# Patient Record
Sex: FEMALE | Race: WHITE | NOT HISPANIC OR LATINO | Employment: OTHER | URBAN - NONMETROPOLITAN AREA
[De-identification: names, ages, dates, MRNs, and addresses within clinical notes are randomized per-mention and may not be internally consistent; named-entity substitution may affect disease eponyms.]

---

## 2017-08-09 ENCOUNTER — OFFICE VISIT (OUTPATIENT)
Dept: NEUROSURGERY | Facility: CLINIC | Age: 82
End: 2017-08-09

## 2017-08-09 VITALS
DIASTOLIC BLOOD PRESSURE: 68 MMHG | SYSTOLIC BLOOD PRESSURE: 152 MMHG | HEIGHT: 60 IN | BODY MASS INDEX: 35.34 KG/M2 | WEIGHT: 180 LBS

## 2017-08-09 DIAGNOSIS — G91.2 NORMAL PRESSURE HYDROCEPHALUS (HCC): Primary | ICD-10-CM

## 2017-08-09 DIAGNOSIS — G62.9 NEUROPATHY: ICD-10-CM

## 2017-08-09 DIAGNOSIS — Z78.9 NON-SMOKER: ICD-10-CM

## 2017-08-09 PROCEDURE — 99203 OFFICE O/P NEW LOW 30 MIN: CPT | Performed by: NEUROLOGICAL SURGERY

## 2017-08-09 RX ORDER — FLUOXETINE HYDROCHLORIDE 20 MG/1
20 CAPSULE ORAL DAILY
COMMUNITY

## 2017-08-09 RX ORDER — LISINOPRIL 40 MG/1
40 TABLET ORAL DAILY
COMMUNITY

## 2017-08-09 RX ORDER — HYDROCODONE BITARTRATE AND ACETAMINOPHEN 7.5; 325 MG/1; MG/1
1 TABLET ORAL EVERY 6 HOURS PRN
COMMUNITY
End: 2021-02-25 | Stop reason: ALTCHOICE

## 2017-08-09 RX ORDER — GABAPENTIN 300 MG/1
100 CAPSULE ORAL 3 TIMES DAILY
Status: ON HOLD | COMMUNITY
End: 2022-01-01

## 2017-08-09 RX ORDER — CLONIDINE HYDROCHLORIDE 0.1 MG/1
0.1 TABLET ORAL 2 TIMES DAILY
COMMUNITY

## 2017-08-09 RX ORDER — HYDROCHLOROTHIAZIDE 25 MG/1
25 TABLET ORAL DAILY
COMMUNITY
End: 2020-12-30 | Stop reason: HOSPADM

## 2017-08-09 RX ORDER — LOVASTATIN 20 MG/1
20 TABLET ORAL NIGHTLY
COMMUNITY
End: 2020-12-30 | Stop reason: HOSPADM

## 2017-08-09 RX ORDER — AMOXICILLIN 500 MG
1200 CAPSULE ORAL 2 TIMES DAILY WITH MEALS
COMMUNITY

## 2017-08-09 RX ORDER — QUETIAPINE FUMARATE 300 MG/1
300 TABLET, FILM COATED ORAL NIGHTLY
COMMUNITY

## 2017-08-09 RX ORDER — PROPRANOLOL HYDROCHLORIDE 20 MG/1
20 TABLET ORAL 3 TIMES DAILY
COMMUNITY
End: 2020-12-30 | Stop reason: HOSPADM

## 2017-08-09 RX ORDER — PRAMIPEXOLE DIHYDROCHLORIDE 0.5 MG/1
0.5 TABLET ORAL NIGHTLY
COMMUNITY

## 2017-08-09 RX ORDER — RALOXIFENE HYDROCHLORIDE 60 MG/1
60 TABLET, FILM COATED ORAL DAILY
COMMUNITY
End: 2021-03-11

## 2017-08-09 NOTE — PROGRESS NOTES
Patient: Dolly Ramirez  : 10/14/1933    Primary Care Provider: Vidal Elizondo MD    Requesting Provider: Vidal Elizondo MD        History    Chief Complaint: Walking problems  Chief Complaint   Patient presents with   • involuntary movement of body     no pain but complains of jerking of the legs and arms; had imaging @ [facility] and brought CD in today for review       History of Present Illness: 83-year-old female with a six-year history of gradually decreasing ability to walk.  She currently walks with a walker she has been using a walker now for several years.  She does feel getting worse.  Recently she still describing some tremulousness and jerking in her legs when she walks she also describes it with her arms.  When he pushed for details it really sounds more like a tremor and a tremulousness rather than jerking.  He denies any significant memory are cognitive problems.  She denies any urinary incontinence other than the say that she wears a pad because she cannot get to the bathroom facet because her walking is so poor.  She does have some back problems and orthopedic knee issues is also blind in one eye.  Otherwise she lives with her  she uses a walker in the house.  She is relatively functional for her ADLs.  She does not drive.    Review of Systems   Musculoskeletal: Positive for gait problem.   All other systems reviewed and are negative.      Past Medical History:   Past Medical History:   Diagnosis Date   • Arthritis    • Cancer     breast cancer   • Chronic joint pain        Past Surgical History:   Past Surgical History:   Procedure Laterality Date   • BREAST SURGERY      breast cancer   • KIDNEY SURGERY      cancer   • TOTAL KNEE ARTHROPLASTY         Family History: family history is not on file.    Social History:  reports that she has never smoked. She has never used smokeless tobacco. She reports that she does not drink alcohol or use illicit  drugs.    Medications:    (Not in a hospital admission)    Allergies:  Review of patient's allergies indicates no known allergies.    Physical Exam:     Physical Exam   Constitutional: She is oriented to person, place, and time. She appears well-developed and well-nourished.   Cardiovascular: Normal rate and regular rhythm.    Pulmonary/Chest: Effort normal. No respiratory distress.   Abdominal: Soft. She exhibits no distension. There is no tenderness.   Neurological: She is oriented to person, place, and time. She has normal strength. She has an abnormal Tandem Gait Test. She has a normal Finger-Nose-Finger Test.   Reflex Scores:       Tricep reflexes are 1+ on the right side and 1+ on the left side.       Bicep reflexes are 1+ on the right side and 1+ on the left side.       Brachioradialis reflexes are 1+ on the right side and 1+ on the left side.       Patellar reflexes are 1+ on the right side and 1+ on the left side.       Achilles reflexes are 1+ on the right side and 1+ on the left side.  Psychiatric: Her speech is normal.       Neurologic Exam     Mental Status   Oriented to person, place, and time.   Attention: normal.   Speech: speech is normal   Level of consciousness: alert  Knowledge: good.     Cranial Nerves   Cranial nerves II through XII intact.     Motor Exam   Muscle bulk: normal  Overall muscle tone: normal  Right arm pronator drift: absent  Left arm pronator drift: absent    Strength   Strength 5/5 throughout.     Sensory Exam   Light touch normal.   Pinprick normal.     Gait, Coordination, and Reflexes     Coordination   Finger to nose coordination: normal  Tandem walking coordination: abnormal    Tremor   Intention tremor: Mild resting and intention tremor.    Reflexes   Right brachioradialis: 1+  Left brachioradialis: 1+  Right biceps: 1+  Left biceps: 1+  Right triceps: 1+  Left triceps: 1+  Right patellar: 1+  Left patellar: 1+  Right achilles: 1+  Left achilles: 1+  Right Gaspar:  absent  Left Gaspar: absent  Right ankle clonus: absent  Left ankle clonus: absent       Slow shuffling unsteady gait with short step length         Independent Review of Radiographic Studies:   MRI of the brain shows ventriculomegaly with some transependymal flow.    ASSESSMENT/PLAN: Dolly clinically presents with a slow shuffling gait and an MRI that is possibly suggestive for normal pressure hydrocephalus.  I think it would be reasonable to put her through our normal pressure hydrocephalus evaluation we explained this to her in great detail.  Her biggest issue is her walking and that would be what we would hope to try to improve with this diagnosis and treatment.  She is going to discuss this amongst her family members. We would be happy to order the workup and get it started and see her in follow-up.  1. Normal pressure hydrocephalus    2. Neuropathy    3. Non-smoker    4. BMI 35.0-35.9,adult          Return in about 4 weeks (around 9/6/2017) for test results w/DR MONIQUE.      Corky Monique MD

## 2017-08-09 NOTE — PATIENT INSTRUCTIONS

## 2019-04-23 ENCOUNTER — TRANSCRIBE ORDERS (OUTPATIENT)
Dept: ADMINISTRATIVE | Facility: HOSPITAL | Age: 84
End: 2019-04-23

## 2019-04-23 ENCOUNTER — LAB (OUTPATIENT)
Dept: LAB | Facility: HOSPITAL | Age: 84
End: 2019-04-23

## 2019-04-23 ENCOUNTER — HOSPITAL ENCOUNTER (OUTPATIENT)
Dept: GENERAL RADIOLOGY | Facility: HOSPITAL | Age: 84
Discharge: HOME | End: 2019-04-23
Admitting: INTERNAL MEDICINE

## 2019-04-23 DIAGNOSIS — D64.9 ANEMIA, UNSPECIFIED TYPE: ICD-10-CM

## 2019-04-23 DIAGNOSIS — N18.30 CHRONIC KIDNEY DISEASE, STAGE III (MODERATE) (HCC): ICD-10-CM

## 2019-04-23 DIAGNOSIS — J96.11 CHRONIC RESPIRATORY FAILURE WITH HYPOXIA (HCC): ICD-10-CM

## 2019-04-23 DIAGNOSIS — I10 ESSENTIAL (PRIMARY) HYPERTENSION: ICD-10-CM

## 2019-04-23 DIAGNOSIS — J96.11 CHRONIC RESPIRATORY FAILURE WITH HYPOXIA (HCC): Primary | ICD-10-CM

## 2019-04-23 LAB
25(OH)D3 SERPL-MCNC: 64.9 NG/ML (ref 30–100)
ALBUMIN SERPL-MCNC: 4.5 G/DL (ref 3.5–5)
ALBUMIN/GLOB SERPL: 1.6 G/DL (ref 1.1–2.5)
ALP SERPL-CCNC: 66 U/L (ref 24–120)
ALT SERPL W P-5'-P-CCNC: <15 U/L (ref 0–54)
ANION GAP SERPL CALCULATED.3IONS-SCNC: 10 MMOL/L (ref 4–13)
AST SERPL-CCNC: 47 U/L (ref 7–45)
BASOPHILS # BLD AUTO: 0.03 10*3/MM3 (ref 0–0.2)
BASOPHILS NFR BLD AUTO: 0.5 % (ref 0–2)
BILIRUB SERPL-MCNC: 0.5 MG/DL (ref 0.1–1)
BUN BLD-MCNC: 21 MG/DL (ref 5–21)
BUN/CREAT SERPL: 17.8 (ref 7–25)
CALCIUM SPEC-SCNC: 10.2 MG/DL (ref 8.4–10.4)
CHLORIDE SERPL-SCNC: 94 MMOL/L (ref 98–110)
CO2 SERPL-SCNC: 33 MMOL/L (ref 24–31)
CREAT BLD-MCNC: 1.18 MG/DL (ref 0.5–1.4)
DEPRECATED RDW RBC AUTO: 49.6 FL (ref 40–54)
EOSINOPHIL # BLD AUTO: 0.14 10*3/MM3 (ref 0–0.7)
EOSINOPHIL NFR BLD AUTO: 2.3 % (ref 0–4)
ERYTHROCYTE [DISTWIDTH] IN BLOOD BY AUTOMATED COUNT: 14.1 % (ref 12–15)
GFR SERPL CREATININE-BSD FRML MDRD: 44 ML/MIN/1.73
GLOBULIN UR ELPH-MCNC: 2.9 GM/DL
GLUCOSE BLD-MCNC: 117 MG/DL (ref 70–100)
HCT VFR BLD AUTO: 38.8 % (ref 37–47)
HGB BLD-MCNC: 12.1 G/DL (ref 12–16)
IMM GRANULOCYTES # BLD AUTO: 0.01 10*3/MM3 (ref 0–0.05)
IMM GRANULOCYTES NFR BLD AUTO: 0.2 % (ref 0–5)
LYMPHOCYTES # BLD AUTO: 1.63 10*3/MM3 (ref 0.72–4.86)
LYMPHOCYTES NFR BLD AUTO: 26.3 % (ref 15–45)
MCH RBC QN AUTO: 29.9 PG (ref 28–32)
MCHC RBC AUTO-ENTMCNC: 31.2 G/DL (ref 33–36)
MCV RBC AUTO: 95.8 FL (ref 82–98)
MONOCYTES # BLD AUTO: 0.57 10*3/MM3 (ref 0.19–1.3)
MONOCYTES NFR BLD AUTO: 9.2 % (ref 4–12)
NEUTROPHILS # BLD AUTO: 3.82 10*3/MM3 (ref 1.87–8.4)
NEUTROPHILS NFR BLD AUTO: 61.5 % (ref 39–78)
NRBC BLD AUTO-RTO: 0 /100 WBC (ref 0–0.2)
PLATELET # BLD AUTO: 242 10*3/MM3 (ref 130–400)
PMV BLD AUTO: 9.9 FL (ref 6–12)
POTASSIUM BLD-SCNC: 4.1 MMOL/L (ref 3.5–5.3)
PROT SERPL-MCNC: 7.4 G/DL (ref 6.3–8.7)
RBC # BLD AUTO: 4.05 10*6/MM3 (ref 4.2–5.4)
SODIUM BLD-SCNC: 137 MMOL/L (ref 135–145)
T4 FREE SERPL-MCNC: 1.12 NG/DL (ref 0.78–2.19)
TSH SERPL DL<=0.05 MIU/L-ACNC: 2.01 MIU/ML (ref 0.47–4.68)
WBC NRBC COR # BLD: 6.2 10*3/MM3 (ref 4.8–10.8)

## 2019-04-23 PROCEDURE — 36415 COLL VENOUS BLD VENIPUNCTURE: CPT

## 2019-04-23 PROCEDURE — 84439 ASSAY OF FREE THYROXINE: CPT | Performed by: INTERNAL MEDICINE

## 2019-04-23 PROCEDURE — 85025 COMPLETE CBC W/AUTO DIFF WBC: CPT | Performed by: INTERNAL MEDICINE

## 2019-04-23 PROCEDURE — 84443 ASSAY THYROID STIM HORMONE: CPT | Performed by: INTERNAL MEDICINE

## 2019-04-23 PROCEDURE — 80053 COMPREHEN METABOLIC PANEL: CPT | Performed by: INTERNAL MEDICINE

## 2019-04-23 PROCEDURE — 84481 FREE ASSAY (FT-3): CPT | Performed by: INTERNAL MEDICINE

## 2019-04-23 PROCEDURE — 71046 X-RAY EXAM CHEST 2 VIEWS: CPT

## 2019-04-23 PROCEDURE — 82306 VITAMIN D 25 HYDROXY: CPT | Performed by: INTERNAL MEDICINE

## 2019-04-24 LAB — T3FREE SERPL-MCNC: 2.4 PG/ML (ref 2–4.4)

## 2020-12-27 ENCOUNTER — APPOINTMENT (OUTPATIENT)
Dept: MRI IMAGING | Facility: HOSPITAL | Age: 85
End: 2020-12-27

## 2020-12-27 ENCOUNTER — HOSPITAL ENCOUNTER (INPATIENT)
Facility: HOSPITAL | Age: 85
LOS: 2 days | Discharge: NURSING, REHAB OR LONG-TERM CARE | End: 2020-12-30
Attending: FAMILY MEDICINE | Admitting: INTERNAL MEDICINE

## 2020-12-27 ENCOUNTER — APPOINTMENT (OUTPATIENT)
Dept: GENERAL RADIOLOGY | Facility: HOSPITAL | Age: 85
End: 2020-12-27

## 2020-12-27 DIAGNOSIS — R27.0 ATAXIA: ICD-10-CM

## 2020-12-27 DIAGNOSIS — R79.89 ELEVATED BRAIN NATRIURETIC PEPTIDE (BNP) LEVEL: ICD-10-CM

## 2020-12-27 DIAGNOSIS — R13.12 OROPHARYNGEAL DYSPHAGIA: ICD-10-CM

## 2020-12-27 DIAGNOSIS — R60.9 EDEMA, UNSPECIFIED TYPE: ICD-10-CM

## 2020-12-27 DIAGNOSIS — R26.9 GAIT ABNORMALITY: ICD-10-CM

## 2020-12-27 DIAGNOSIS — R77.8 ELEVATED TROPONIN: ICD-10-CM

## 2020-12-27 DIAGNOSIS — Z74.09 IMPAIRED MOBILITY AND ADLS: ICD-10-CM

## 2020-12-27 DIAGNOSIS — I63.9 CEREBROVASCULAR ACCIDENT (CVA), UNSPECIFIED MECHANISM (HCC): Primary | ICD-10-CM

## 2020-12-27 DIAGNOSIS — Z78.9 IMPAIRED MOBILITY AND ADLS: ICD-10-CM

## 2020-12-27 DIAGNOSIS — R25.2 JERKING MOVEMENTS OF EXTREMITIES: ICD-10-CM

## 2020-12-27 LAB
ALBUMIN SERPL-MCNC: 3.7 G/DL (ref 3.5–5.2)
ALBUMIN/GLOB SERPL: 1.4 G/DL
ALP SERPL-CCNC: 50 U/L (ref 39–117)
ALT SERPL W P-5'-P-CCNC: 5 U/L (ref 1–33)
ANION GAP SERPL CALCULATED.3IONS-SCNC: 9 MMOL/L (ref 5–15)
AST SERPL-CCNC: 29 U/L (ref 1–32)
BASOPHILS # BLD AUTO: 0.02 10*3/MM3 (ref 0–0.2)
BASOPHILS NFR BLD AUTO: 0.4 % (ref 0–1.5)
BILIRUB SERPL-MCNC: 0.4 MG/DL (ref 0–1.2)
BUN SERPL-MCNC: 32 MG/DL (ref 8–23)
BUN/CREAT SERPL: 22.2 (ref 7–25)
CALCIUM SPEC-SCNC: 10 MG/DL (ref 8.6–10.5)
CHLORIDE SERPL-SCNC: 92 MMOL/L (ref 98–107)
CO2 SERPL-SCNC: 31 MMOL/L (ref 22–29)
CREAT SERPL-MCNC: 1.44 MG/DL (ref 0.57–1)
DEPRECATED RDW RBC AUTO: 55.1 FL (ref 37–54)
EOSINOPHIL # BLD AUTO: 0.06 10*3/MM3 (ref 0–0.4)
EOSINOPHIL NFR BLD AUTO: 1.1 % (ref 0.3–6.2)
ERYTHROCYTE [DISTWIDTH] IN BLOOD BY AUTOMATED COUNT: 15.8 % (ref 12.3–15.4)
ERYTHROCYTE [SEDIMENTATION RATE] IN BLOOD: 5 MM/HR (ref 0–20)
GFR SERPL CREATININE-BSD FRML MDRD: 34 ML/MIN/1.73
GLOBULIN UR ELPH-MCNC: 2.6 GM/DL
GLUCOSE SERPL-MCNC: 121 MG/DL (ref 65–99)
HCT VFR BLD AUTO: 38.8 % (ref 34–46.6)
HGB BLD-MCNC: 12.1 G/DL (ref 12–15.9)
HOLD SPECIMEN: NORMAL
HOLD SPECIMEN: NORMAL
IMM GRANULOCYTES # BLD AUTO: 0.01 10*3/MM3 (ref 0–0.05)
IMM GRANULOCYTES NFR BLD AUTO: 0.2 % (ref 0–0.5)
LYMPHOCYTES # BLD AUTO: 1.09 10*3/MM3 (ref 0.7–3.1)
LYMPHOCYTES NFR BLD AUTO: 19.3 % (ref 19.6–45.3)
MAGNESIUM SERPL-MCNC: 1.7 MG/DL (ref 1.6–2.4)
MCH RBC QN AUTO: 29.6 PG (ref 26.6–33)
MCHC RBC AUTO-ENTMCNC: 31.2 G/DL (ref 31.5–35.7)
MCV RBC AUTO: 94.9 FL (ref 79–97)
MONOCYTES # BLD AUTO: 0.55 10*3/MM3 (ref 0.1–0.9)
MONOCYTES NFR BLD AUTO: 9.7 % (ref 5–12)
NEUTROPHILS NFR BLD AUTO: 3.93 10*3/MM3 (ref 1.7–7)
NEUTROPHILS NFR BLD AUTO: 69.3 % (ref 42.7–76)
NRBC BLD AUTO-RTO: 0 /100 WBC (ref 0–0.2)
NT-PROBNP SERPL-MCNC: ABNORMAL PG/ML (ref 0–1800)
PLATELET # BLD AUTO: 219 10*3/MM3 (ref 140–450)
PMV BLD AUTO: 9.7 FL (ref 6–12)
POTASSIUM SERPL-SCNC: 4.6 MMOL/L (ref 3.5–5.2)
PROT SERPL-MCNC: 6.3 G/DL (ref 6–8.5)
RBC # BLD AUTO: 4.09 10*6/MM3 (ref 3.77–5.28)
SARS-COV-2 RNA PNL SPEC NAA+PROBE: NOT DETECTED
SODIUM SERPL-SCNC: 132 MMOL/L (ref 136–145)
TROPONIN T SERPL-MCNC: 0.03 NG/ML (ref 0–0.03)
WBC # BLD AUTO: 5.66 10*3/MM3 (ref 3.4–10.8)
WHOLE BLOOD HOLD SPECIMEN: NORMAL
WHOLE BLOOD HOLD SPECIMEN: NORMAL

## 2020-12-27 PROCEDURE — 85651 RBC SED RATE NONAUTOMATED: CPT | Performed by: PHYSICIAN ASSISTANT

## 2020-12-27 PROCEDURE — 25010000002 LORAZEPAM PER 2 MG: Performed by: FAMILY MEDICINE

## 2020-12-27 PROCEDURE — 83880 ASSAY OF NATRIURETIC PEPTIDE: CPT | Performed by: PHYSICIAN ASSISTANT

## 2020-12-27 PROCEDURE — 93005 ELECTROCARDIOGRAM TRACING: CPT

## 2020-12-27 PROCEDURE — 71045 X-RAY EXAM CHEST 1 VIEW: CPT

## 2020-12-27 PROCEDURE — 99285 EMERGENCY DEPT VISIT HI MDM: CPT

## 2020-12-27 PROCEDURE — 93005 ELECTROCARDIOGRAM TRACING: CPT | Performed by: FAMILY MEDICINE

## 2020-12-27 PROCEDURE — 93010 ELECTROCARDIOGRAM REPORT: CPT | Performed by: EMERGENCY MEDICINE

## 2020-12-27 PROCEDURE — 84484 ASSAY OF TROPONIN QUANT: CPT | Performed by: PHYSICIAN ASSISTANT

## 2020-12-27 PROCEDURE — 72141 MRI NECK SPINE W/O DYE: CPT

## 2020-12-27 PROCEDURE — 84443 ASSAY THYROID STIM HORMONE: CPT | Performed by: PHYSICIAN ASSISTANT

## 2020-12-27 PROCEDURE — 85025 COMPLETE CBC W/AUTO DIFF WBC: CPT | Performed by: PHYSICIAN ASSISTANT

## 2020-12-27 PROCEDURE — 83735 ASSAY OF MAGNESIUM: CPT | Performed by: PHYSICIAN ASSISTANT

## 2020-12-27 PROCEDURE — 36415 COLL VENOUS BLD VENIPUNCTURE: CPT

## 2020-12-27 PROCEDURE — 80053 COMPREHEN METABOLIC PANEL: CPT | Performed by: PHYSICIAN ASSISTANT

## 2020-12-27 PROCEDURE — 93005 ELECTROCARDIOGRAM TRACING: CPT | Performed by: PHYSICIAN ASSISTANT

## 2020-12-27 PROCEDURE — 84439 ASSAY OF FREE THYROXINE: CPT | Performed by: PHYSICIAN ASSISTANT

## 2020-12-27 PROCEDURE — 70551 MRI BRAIN STEM W/O DYE: CPT

## 2020-12-27 PROCEDURE — 84481 FREE ASSAY (FT-3): CPT | Performed by: PHYSICIAN ASSISTANT

## 2020-12-27 PROCEDURE — 87635 SARS-COV-2 COVID-19 AMP PRB: CPT | Performed by: PHYSICIAN ASSISTANT

## 2020-12-27 RX ORDER — LORAZEPAM 2 MG/ML
1 INJECTION INTRAMUSCULAR ONCE
Status: COMPLETED | OUTPATIENT
Start: 2020-12-27 | End: 2020-12-27

## 2020-12-27 RX ORDER — BUMETANIDE 0.25 MG/ML
1 INJECTION INTRAMUSCULAR; INTRAVENOUS ONCE
Status: COMPLETED | OUTPATIENT
Start: 2020-12-27 | End: 2020-12-28

## 2020-12-27 RX ORDER — SODIUM CHLORIDE 0.9 % (FLUSH) 0.9 %
10 SYRINGE (ML) INJECTION AS NEEDED
Status: DISCONTINUED | OUTPATIENT
Start: 2020-12-27 | End: 2020-12-30 | Stop reason: HOSPADM

## 2020-12-27 RX ADMIN — LORAZEPAM 1 MG: 2 INJECTION INTRAMUSCULAR; INTRAVENOUS at 22:56

## 2020-12-28 ENCOUNTER — APPOINTMENT (OUTPATIENT)
Dept: ULTRASOUND IMAGING | Facility: HOSPITAL | Age: 85
End: 2020-12-28

## 2020-12-28 ENCOUNTER — APPOINTMENT (OUTPATIENT)
Dept: CARDIOLOGY | Facility: HOSPITAL | Age: 85
End: 2020-12-28

## 2020-12-28 PROBLEM — J96.02 ACUTE RESPIRATORY FAILURE WITH HYPOXIA AND HYPERCAPNIA (HCC): Status: ACTIVE | Noted: 2020-12-28

## 2020-12-28 PROBLEM — I50.9 ACUTE HEART FAILURE (HCC): Status: ACTIVE | Noted: 2020-12-28

## 2020-12-28 PROBLEM — J96.01 ACUTE RESPIRATORY FAILURE WITH HYPOXIA AND HYPERCAPNIA (HCC): Status: ACTIVE | Noted: 2020-12-28

## 2020-12-28 PROBLEM — I63.9 CEREBROVASCULAR ACCIDENT (CVA) (HCC): Status: ACTIVE | Noted: 2020-12-28

## 2020-12-28 PROBLEM — I10 BENIGN HYPERTENSION: Chronic | Status: ACTIVE | Noted: 2020-12-28

## 2020-12-28 PROBLEM — H54.40 BLIND ONE EYE: Chronic | Status: ACTIVE | Noted: 2020-12-28

## 2020-12-28 LAB
ALBUMIN SERPL-MCNC: 3.9 G/DL (ref 3.5–5.2)
ALBUMIN/GLOB SERPL: 1.4 G/DL
ALP SERPL-CCNC: 55 U/L (ref 39–117)
ALT SERPL W P-5'-P-CCNC: 6 U/L (ref 1–33)
AMMONIA BLD-SCNC: 26 UMOL/L (ref 11–51)
AMPHET+METHAMPHET UR QL: NEGATIVE
AMPHETAMINES UR QL: NEGATIVE
ANION GAP SERPL CALCULATED.3IONS-SCNC: 11 MMOL/L (ref 5–15)
ARTERIAL PATENCY WRIST A: ABNORMAL
AST SERPL-CCNC: 28 U/L (ref 1–32)
ATMOSPHERIC PRESS: 756 MMHG
ATMOSPHERIC PRESS: 759 MMHG
ATMOSPHERIC PRESS: 759 MMHG
BACTERIA UR QL AUTO: ABNORMAL /HPF
BARBITURATES UR QL SCN: NEGATIVE
BASE EXCESS BLDA CALC-SCNC: 7.3 MMOL/L (ref 0–2)
BASE EXCESS BLDA CALC-SCNC: 8.5 MMOL/L (ref 0–2)
BASE EXCESS BLDA CALC-SCNC: 9.3 MMOL/L (ref 0–2)
BASOPHILS # BLD AUTO: 0.02 10*3/MM3 (ref 0–0.2)
BASOPHILS NFR BLD AUTO: 0.3 % (ref 0–1.5)
BDY SITE: ABNORMAL
BENZODIAZ UR QL SCN: POSITIVE
BH CV ECHO MEAS - AO MAX PG (FULL): 22 MMHG
BH CV ECHO MEAS - AO MAX PG: 23.8 MMHG
BH CV ECHO MEAS - AO MEAN PG (FULL): 10 MMHG
BH CV ECHO MEAS - AO MEAN PG: 11 MMHG
BH CV ECHO MEAS - AO ROOT AREA (BSA CORRECTED): 1.9
BH CV ECHO MEAS - AO ROOT AREA: 8 CM^2
BH CV ECHO MEAS - AO ROOT DIAM: 3.2 CM
BH CV ECHO MEAS - AO V2 MAX: 244 CM/SEC
BH CV ECHO MEAS - AO V2 MEAN: 156 CM/SEC
BH CV ECHO MEAS - AO V2 VTI: 58.7 CM
BH CV ECHO MEAS - AVA(I,A): 0.93 CM^2
BH CV ECHO MEAS - AVA(I,D): 0.93 CM^2
BH CV ECHO MEAS - AVA(V,A): 0.87 CM^2
BH CV ECHO MEAS - AVA(V,D): 0.87 CM^2
BH CV ECHO MEAS - BSA(HAYCOCK): 1.8 M^2
BH CV ECHO MEAS - BSA: 1.7 M^2
BH CV ECHO MEAS - BZI_BMI: 33.9 KILOGRAMS/M^2
BH CV ECHO MEAS - BZI_METRIC_HEIGHT: 149.9 CM
BH CV ECHO MEAS - BZI_METRIC_WEIGHT: 76.2 KG
BH CV ECHO MEAS - EDV(CUBED): 114.1 ML
BH CV ECHO MEAS - EDV(MOD-SP4): 72.1 ML
BH CV ECHO MEAS - EDV(TEICH): 110.2 ML
BH CV ECHO MEAS - EF(CUBED): 48 %
BH CV ECHO MEAS - EF(MOD-SP4): 65 %
BH CV ECHO MEAS - EF(TEICH): 40.2 %
BH CV ECHO MEAS - ESV(CUBED): 59.3 ML
BH CV ECHO MEAS - ESV(MOD-SP4): 25.2 ML
BH CV ECHO MEAS - ESV(TEICH): 65.9 ML
BH CV ECHO MEAS - FS: 19.6 %
BH CV ECHO MEAS - IVS/LVPW: 0.94
BH CV ECHO MEAS - IVSD: 1.2 CM
BH CV ECHO MEAS - LA DIMENSION: 4.4 CM
BH CV ECHO MEAS - LA/AO: 1.4
BH CV ECHO MEAS - LAT PEAK E' VEL: 5.6 CM/SEC
BH CV ECHO MEAS - LV DIASTOLIC VOL/BSA (35-75): 42.1 ML/M^2
BH CV ECHO MEAS - LV MASS(C)D: 232 GRAMS
BH CV ECHO MEAS - LV MASS(C)DI: 135.5 GRAMS/M^2
BH CV ECHO MEAS - LV MAX PG: 1.8 MMHG
BH CV ECHO MEAS - LV MEAN PG: 1 MMHG
BH CV ECHO MEAS - LV SYSTOLIC VOL/BSA (12-30): 14.7 ML/M^2
BH CV ECHO MEAS - LV V1 MAX: 67.2 CM/SEC
BH CV ECHO MEAS - LV V1 MEAN: 45.9 CM/SEC
BH CV ECHO MEAS - LV V1 VTI: 17.4 CM
BH CV ECHO MEAS - LVIDD: 4.9 CM
BH CV ECHO MEAS - LVIDS: 3.9 CM
BH CV ECHO MEAS - LVLD AP4: 7.4 CM
BH CV ECHO MEAS - LVLS AP4: 6.1 CM
BH CV ECHO MEAS - LVOT AREA (M): 3.1 CM^2
BH CV ECHO MEAS - LVOT AREA: 3.1 CM^2
BH CV ECHO MEAS - LVOT DIAM: 2 CM
BH CV ECHO MEAS - LVPWD: 1.3 CM
BH CV ECHO MEAS - MED PEAK E' VEL: 4.8 CM/SEC
BH CV ECHO MEAS - MV A MAX VEL: 115 CM/SEC
BH CV ECHO MEAS - MV DEC SLOPE: 365.5 CM/SEC^2
BH CV ECHO MEAS - MV DEC TIME: 0.16 SEC
BH CV ECHO MEAS - MV E MAX VEL: 54.3 CM/SEC
BH CV ECHO MEAS - MV E/A: 0.47
BH CV ECHO MEAS - MV P1/2T MAX VEL: 97.7 CM/SEC
BH CV ECHO MEAS - MV P1/2T: 78.3 MSEC
BH CV ECHO MEAS - MVA P1/2T LCG: 2.3 CM^2
BH CV ECHO MEAS - MVA(P1/2T): 2.8 CM^2
BH CV ECHO MEAS - PA MAX PG: 3.4 MMHG
BH CV ECHO MEAS - PA V2 MAX: 92.3 CM/SEC
BH CV ECHO MEAS - RAP SYSTOLE: 5 MMHG
BH CV ECHO MEAS - RVDD: 5.4 CM
BH CV ECHO MEAS - RVSP: 67.7 MMHG
BH CV ECHO MEAS - SI(AO): 275.7 ML/M^2
BH CV ECHO MEAS - SI(CUBED): 32 ML/M^2
BH CV ECHO MEAS - SI(LVOT): 31.9 ML/M^2
BH CV ECHO MEAS - SI(MOD-SP4): 27.4 ML/M^2
BH CV ECHO MEAS - SI(TEICH): 25.8 ML/M^2
BH CV ECHO MEAS - SV(AO): 472.1 ML
BH CV ECHO MEAS - SV(CUBED): 54.8 ML
BH CV ECHO MEAS - SV(LVOT): 54.7 ML
BH CV ECHO MEAS - SV(MOD-SP4): 46.9 ML
BH CV ECHO MEAS - SV(TEICH): 44.2 ML
BH CV ECHO MEAS - TR MAX VEL: 396 CM/SEC
BH CV ECHO MEASUREMENTS AVERAGE E/E' RATIO: 10.44
BILIRUB SERPL-MCNC: 0.4 MG/DL (ref 0–1.2)
BILIRUB UR QL STRIP: NEGATIVE
BODY TEMPERATURE: 37 C
BUN SERPL-MCNC: 31 MG/DL (ref 8–23)
BUN/CREAT SERPL: 21.8 (ref 7–25)
BUPRENORPHINE SERPL-MCNC: NEGATIVE NG/ML
CALCIUM SPEC-SCNC: 9.8 MG/DL (ref 8.6–10.5)
CANNABINOIDS SERPL QL: NEGATIVE
CHLORIDE SERPL-SCNC: 91 MMOL/L (ref 98–107)
CHOLEST SERPL-MCNC: 130 MG/DL (ref 0–200)
CLARITY UR: CLEAR
CO2 SERPL-SCNC: 30 MMOL/L (ref 22–29)
COCAINE UR QL: NEGATIVE
COLOR UR: ABNORMAL
CREAT SERPL-MCNC: 1.42 MG/DL (ref 0.57–1)
D-LACTATE SERPL-SCNC: 0.6 MMOL/L (ref 0.5–2)
DEPRECATED RDW RBC AUTO: 55.8 FL (ref 37–54)
EOSINOPHIL # BLD AUTO: 0.04 10*3/MM3 (ref 0–0.4)
EOSINOPHIL NFR BLD AUTO: 0.6 % (ref 0.3–6.2)
EPAP: 6
EPAP: 8
ERYTHROCYTE [DISTWIDTH] IN BLOOD BY AUTOMATED COUNT: 15.8 % (ref 12.3–15.4)
GAS FLOW AIRWAY: 3 LPM
GFR SERPL CREATININE-BSD FRML MDRD: 35 ML/MIN/1.73
GLOBULIN UR ELPH-MCNC: 2.8 GM/DL
GLUCOSE BLDC GLUCOMTR-MCNC: 103 MG/DL (ref 70–130)
GLUCOSE SERPL-MCNC: 107 MG/DL (ref 65–99)
GLUCOSE UR STRIP-MCNC: NEGATIVE MG/DL
HBA1C MFR BLD: 5.4 % (ref 4.8–5.6)
HCO3 BLDA-SCNC: 35.8 MMOL/L (ref 20–26)
HCO3 BLDA-SCNC: 37 MMOL/L (ref 20–26)
HCO3 BLDA-SCNC: 37.2 MMOL/L (ref 20–26)
HCT VFR BLD AUTO: 41.1 % (ref 34–46.6)
HDLC SERPL-MCNC: 55 MG/DL (ref 40–60)
HGB BLD-MCNC: 12.9 G/DL (ref 12–15.9)
HGB UR QL STRIP.AUTO: NEGATIVE
HOLD SPECIMEN: NORMAL
HYALINE CASTS UR QL AUTO: ABNORMAL /LPF
IMM GRANULOCYTES # BLD AUTO: 0.02 10*3/MM3 (ref 0–0.05)
IMM GRANULOCYTES NFR BLD AUTO: 0.3 % (ref 0–0.5)
INHALED O2 CONCENTRATION: 30 %
INHALED O2 CONCENTRATION: 35 %
IPAP: 14
IPAP: 18
KETONES UR QL STRIP: NEGATIVE
LDLC SERPL CALC-MCNC: 55 MG/DL (ref 0–100)
LDLC/HDLC SERPL: 0.95 {RATIO}
LEFT ATRIUM VOLUME INDEX: 60 ML/M2
LEUKOCYTE ESTERASE UR QL STRIP.AUTO: NEGATIVE
LV EF 2D ECHO EST: 65 %
LYMPHOCYTES # BLD AUTO: 1.08 10*3/MM3 (ref 0.7–3.1)
LYMPHOCYTES NFR BLD AUTO: 17.4 % (ref 19.6–45.3)
Lab: ABNORMAL
MAXIMAL PREDICTED HEART RATE: 133 BPM
MCH RBC QN AUTO: 29.9 PG (ref 26.6–33)
MCHC RBC AUTO-ENTMCNC: 31.4 G/DL (ref 31.5–35.7)
MCV RBC AUTO: 95.4 FL (ref 79–97)
METHADONE UR QL SCN: NEGATIVE
MODALITY: ABNORMAL
MONOCYTES # BLD AUTO: 0.69 10*3/MM3 (ref 0.1–0.9)
MONOCYTES NFR BLD AUTO: 11.1 % (ref 5–12)
NEUTROPHILS NFR BLD AUTO: 4.36 10*3/MM3 (ref 1.7–7)
NEUTROPHILS NFR BLD AUTO: 70.3 % (ref 42.7–76)
NITRITE UR QL STRIP: NEGATIVE
NOTIFIED BY: ABNORMAL
NOTIFIED WHO: ABNORMAL
NRBC BLD AUTO-RTO: 0 /100 WBC (ref 0–0.2)
NT-PROBNP SERPL-MCNC: 8437 PG/ML (ref 0–1800)
OPIATES UR QL: POSITIVE
OXYCODONE UR QL SCN: NEGATIVE
PCO2 BLDA: 67.1 MM HG (ref 35–45)
PCO2 BLDA: 69.5 MM HG (ref 35–45)
PCO2 BLDA: 70.2 MM HG (ref 35–45)
PCO2 TEMP ADJ BLD: 67.1 MM HG (ref 35–45)
PCO2 TEMP ADJ BLD: 69.5 MM HG (ref 35–45)
PCO2 TEMP ADJ BLD: 70.2 MM HG (ref 35–45)
PCP UR QL SCN: NEGATIVE
PH BLDA: 7.32 PH UNITS (ref 7.35–7.45)
PH BLDA: 7.33 PH UNITS (ref 7.35–7.45)
PH BLDA: 7.35 PH UNITS (ref 7.35–7.45)
PH UR STRIP.AUTO: <=5 [PH] (ref 5–8)
PH, TEMP CORRECTED: 7.32 PH UNITS (ref 7.35–7.45)
PH, TEMP CORRECTED: 7.33 PH UNITS (ref 7.35–7.45)
PH, TEMP CORRECTED: 7.35 PH UNITS (ref 7.35–7.45)
PLATELET # BLD AUTO: 202 10*3/MM3 (ref 140–450)
PMV BLD AUTO: 9.5 FL (ref 6–12)
PO2 BLDA: 60 MM HG (ref 83–108)
PO2 BLDA: 67.2 MM HG (ref 83–108)
PO2 BLDA: 92.5 MM HG (ref 83–108)
PO2 TEMP ADJ BLD: 60 MM HG (ref 83–108)
PO2 TEMP ADJ BLD: 67.2 MM HG (ref 83–108)
PO2 TEMP ADJ BLD: 92.5 MM HG (ref 83–108)
POTASSIUM SERPL-SCNC: 4.8 MMOL/L (ref 3.5–5.2)
PROPOXYPH UR QL: NEGATIVE
PROT SERPL-MCNC: 6.7 G/DL (ref 6–8.5)
PROT UR QL STRIP: ABNORMAL
QT INTERVAL: 382 MS
QTC INTERVAL: 415 MS
RBC # BLD AUTO: 4.31 10*6/MM3 (ref 3.77–5.28)
RBC # UR: ABNORMAL /HPF
REF LAB TEST METHOD: ABNORMAL
SAO2 % BLDCOA: 89.8 % (ref 94–99)
SAO2 % BLDCOA: 93.2 % (ref 94–99)
SAO2 % BLDCOA: 98.2 % (ref 94–99)
SET MECH RESP RATE: 12
SET MECH RESP RATE: 14
SODIUM SERPL-SCNC: 132 MMOL/L (ref 136–145)
SP GR UR STRIP: 1.02 (ref 1–1.03)
SQUAMOUS #/AREA URNS HPF: ABNORMAL /HPF
STRESS TARGET HR: 113 BPM
T3FREE SERPL-MCNC: 1.59 PG/ML (ref 2–4.4)
T4 FREE SERPL-MCNC: 0.94 NG/DL (ref 0.93–1.7)
TRICYCLICS UR QL SCN: POSITIVE
TRIGL SERPL-MCNC: 113 MG/DL (ref 0–150)
TROPONIN T SERPL-MCNC: 0.02 NG/ML (ref 0–0.03)
TSH SERPL DL<=0.05 MIU/L-ACNC: 2.68 UIU/ML (ref 0.27–4.2)
UROBILINOGEN UR QL STRIP: ABNORMAL
VENTILATOR MODE: ABNORMAL
VIT B12 BLD-MCNC: 1577 PG/ML (ref 211–946)
VLDLC SERPL-MCNC: 20 MG/DL (ref 5–40)
WBC # BLD AUTO: 6.21 10*3/MM3 (ref 3.4–10.8)
WBC UR QL AUTO: ABNORMAL /HPF

## 2020-12-28 PROCEDURE — 94799 UNLISTED PULMONARY SVC/PX: CPT

## 2020-12-28 PROCEDURE — 92526 ORAL FUNCTION THERAPY: CPT

## 2020-12-28 PROCEDURE — 83605 ASSAY OF LACTIC ACID: CPT | Performed by: PHYSICIAN ASSISTANT

## 2020-12-28 PROCEDURE — 93880 EXTRACRANIAL BILAT STUDY: CPT | Performed by: SURGERY

## 2020-12-28 PROCEDURE — 99223 1ST HOSP IP/OBS HIGH 75: CPT | Performed by: PSYCHIATRY & NEUROLOGY

## 2020-12-28 PROCEDURE — 80061 LIPID PANEL: CPT | Performed by: INTERNAL MEDICINE

## 2020-12-28 PROCEDURE — 93010 ELECTROCARDIOGRAM REPORT: CPT | Performed by: EMERGENCY MEDICINE

## 2020-12-28 PROCEDURE — 83036 HEMOGLOBIN GLYCOSYLATED A1C: CPT | Performed by: INTERNAL MEDICINE

## 2020-12-28 PROCEDURE — 82607 VITAMIN B-12: CPT | Performed by: PHYSICIAN ASSISTANT

## 2020-12-28 PROCEDURE — 80306 DRUG TEST PRSMV INSTRMNT: CPT | Performed by: PSYCHIATRY & NEUROLOGY

## 2020-12-28 PROCEDURE — 82803 BLOOD GASES ANY COMBINATION: CPT

## 2020-12-28 PROCEDURE — 80053 COMPREHEN METABOLIC PANEL: CPT | Performed by: INTERNAL MEDICINE

## 2020-12-28 PROCEDURE — 84484 ASSAY OF TROPONIN QUANT: CPT | Performed by: INTERNAL MEDICINE

## 2020-12-28 PROCEDURE — 93005 ELECTROCARDIOGRAM TRACING: CPT | Performed by: INTERNAL MEDICINE

## 2020-12-28 PROCEDURE — 97530 THERAPEUTIC ACTIVITIES: CPT

## 2020-12-28 PROCEDURE — 81001 URINALYSIS AUTO W/SCOPE: CPT | Performed by: PHYSICIAN ASSISTANT

## 2020-12-28 PROCEDURE — 82962 GLUCOSE BLOOD TEST: CPT

## 2020-12-28 PROCEDURE — 83880 ASSAY OF NATRIURETIC PEPTIDE: CPT | Performed by: INTERNAL MEDICINE

## 2020-12-28 PROCEDURE — P9612 CATHETERIZE FOR URINE SPEC: HCPCS

## 2020-12-28 PROCEDURE — 93306 TTE W/DOPPLER COMPLETE: CPT

## 2020-12-28 PROCEDURE — 92610 EVALUATE SWALLOWING FUNCTION: CPT

## 2020-12-28 PROCEDURE — 94660 CPAP INITIATION&MGMT: CPT

## 2020-12-28 PROCEDURE — 93306 TTE W/DOPPLER COMPLETE: CPT | Performed by: INTERNAL MEDICINE

## 2020-12-28 PROCEDURE — 97535 SELF CARE MNGMENT TRAINING: CPT

## 2020-12-28 PROCEDURE — 93880 EXTRACRANIAL BILAT STUDY: CPT

## 2020-12-28 PROCEDURE — 82140 ASSAY OF AMMONIA: CPT | Performed by: CLINICAL NURSE SPECIALIST

## 2020-12-28 PROCEDURE — 97162 PT EVAL MOD COMPLEX 30 MIN: CPT

## 2020-12-28 PROCEDURE — 85025 COMPLETE CBC W/AUTO DIFF WBC: CPT | Performed by: INTERNAL MEDICINE

## 2020-12-28 PROCEDURE — 97166 OT EVAL MOD COMPLEX 45 MIN: CPT

## 2020-12-28 PROCEDURE — 93970 EXTREMITY STUDY: CPT

## 2020-12-28 PROCEDURE — 93970 EXTREMITY STUDY: CPT | Performed by: SURGERY

## 2020-12-28 PROCEDURE — 25010000002 PERFLUTREN 6.52 MG/ML SUSPENSION: Performed by: INTERNAL MEDICINE

## 2020-12-28 PROCEDURE — 36600 WITHDRAWAL OF ARTERIAL BLOOD: CPT

## 2020-12-28 RX ORDER — SODIUM CHLORIDE 0.9 % (FLUSH) 0.9 %
10 SYRINGE (ML) INJECTION EVERY 12 HOURS SCHEDULED
Status: DISCONTINUED | OUTPATIENT
Start: 2020-12-28 | End: 2020-12-30 | Stop reason: HOSPADM

## 2020-12-28 RX ORDER — OXYBUTYNIN CHLORIDE 5 MG/1
5 TABLET ORAL 3 TIMES DAILY
Status: ON HOLD | COMMUNITY
End: 2020-12-28

## 2020-12-28 RX ORDER — OXYBUTYNIN CHLORIDE 10 MG/1
10 TABLET, EXTENDED RELEASE ORAL DAILY
COMMUNITY

## 2020-12-28 RX ORDER — PROPRANOLOL HYDROCHLORIDE 40 MG/1
20 TABLET ORAL 3 TIMES DAILY
Status: DISCONTINUED | OUTPATIENT
Start: 2020-12-28 | End: 2020-12-30 | Stop reason: HOSPADM

## 2020-12-28 RX ORDER — PRAMIPEXOLE DIHYDROCHLORIDE 0.25 MG/1
0.5 TABLET ORAL DAILY
Status: DISCONTINUED | OUTPATIENT
Start: 2020-12-28 | End: 2020-12-30 | Stop reason: HOSPADM

## 2020-12-28 RX ORDER — ASPIRIN 300 MG/1
300 SUPPOSITORY RECTAL DAILY
Status: DISCONTINUED | OUTPATIENT
Start: 2020-12-28 | End: 2020-12-30

## 2020-12-28 RX ORDER — OXYBUTYNIN CHLORIDE 5 MG/1
5 TABLET ORAL 3 TIMES DAILY
Status: DISCONTINUED | OUTPATIENT
Start: 2020-12-28 | End: 2020-12-28

## 2020-12-28 RX ORDER — CLONIDINE HYDROCHLORIDE 0.1 MG/1
0.1 TABLET ORAL EVERY 12 HOURS SCHEDULED
Status: DISCONTINUED | OUTPATIENT
Start: 2020-12-28 | End: 2020-12-28

## 2020-12-28 RX ORDER — GABAPENTIN 300 MG/1
300 CAPSULE ORAL EVERY 12 HOURS SCHEDULED
Status: DISCONTINUED | OUTPATIENT
Start: 2020-12-28 | End: 2020-12-30 | Stop reason: HOSPADM

## 2020-12-28 RX ORDER — LISINOPRIL 20 MG/1
40 TABLET ORAL DAILY
Status: DISCONTINUED | OUTPATIENT
Start: 2020-12-28 | End: 2020-12-28

## 2020-12-28 RX ORDER — ACETAMINOPHEN 325 MG/1
650 TABLET ORAL EVERY 4 HOURS PRN
Status: DISCONTINUED | OUTPATIENT
Start: 2020-12-28 | End: 2020-12-30 | Stop reason: HOSPADM

## 2020-12-28 RX ORDER — ASPIRIN 325 MG
325 TABLET ORAL DAILY
Status: DISCONTINUED | OUTPATIENT
Start: 2020-12-28 | End: 2020-12-29

## 2020-12-28 RX ORDER — SODIUM CHLORIDE 0.9 % (FLUSH) 0.9 %
10 SYRINGE (ML) INJECTION AS NEEDED
Status: DISCONTINUED | OUTPATIENT
Start: 2020-12-28 | End: 2020-12-30 | Stop reason: HOSPADM

## 2020-12-28 RX ORDER — ACETAMINOPHEN 650 MG/1
650 SUPPOSITORY RECTAL EVERY 4 HOURS PRN
Status: DISCONTINUED | OUTPATIENT
Start: 2020-12-28 | End: 2020-12-30 | Stop reason: HOSPADM

## 2020-12-28 RX ORDER — ONDANSETRON 2 MG/ML
4 INJECTION INTRAMUSCULAR; INTRAVENOUS EVERY 6 HOURS PRN
Status: DISCONTINUED | OUTPATIENT
Start: 2020-12-28 | End: 2020-12-30 | Stop reason: HOSPADM

## 2020-12-28 RX ORDER — ONDANSETRON 4 MG/1
4 TABLET, FILM COATED ORAL EVERY 6 HOURS PRN
Status: DISCONTINUED | OUTPATIENT
Start: 2020-12-28 | End: 2020-12-30 | Stop reason: HOSPADM

## 2020-12-28 RX ORDER — NITROGLYCERIN 0.4 MG/1
0.4 TABLET SUBLINGUAL
Status: DISCONTINUED | OUTPATIENT
Start: 2020-12-28 | End: 2020-12-30 | Stop reason: HOSPADM

## 2020-12-28 RX ORDER — RALOXIFENE HYDROCHLORIDE 60 MG/1
60 TABLET, FILM COATED ORAL DAILY
Status: DISCONTINUED | OUTPATIENT
Start: 2020-12-28 | End: 2020-12-30 | Stop reason: HOSPADM

## 2020-12-28 RX ORDER — BUMETANIDE 0.25 MG/ML
1 INJECTION INTRAMUSCULAR; INTRAVENOUS EVERY 12 HOURS
Status: DISCONTINUED | OUTPATIENT
Start: 2020-12-28 | End: 2020-12-30

## 2020-12-28 RX ORDER — FLUOXETINE HYDROCHLORIDE 20 MG/1
20 CAPSULE ORAL DAILY
Status: DISCONTINUED | OUTPATIENT
Start: 2020-12-28 | End: 2020-12-30 | Stop reason: HOSPADM

## 2020-12-28 RX ORDER — ATORVASTATIN CALCIUM 10 MG/1
10 TABLET, FILM COATED ORAL DAILY
Status: DISCONTINUED | OUTPATIENT
Start: 2020-12-28 | End: 2020-12-28

## 2020-12-28 RX ORDER — ATORVASTATIN CALCIUM 40 MG/1
80 TABLET, FILM COATED ORAL NIGHTLY
Status: DISCONTINUED | OUTPATIENT
Start: 2020-12-28 | End: 2020-12-30 | Stop reason: HOSPADM

## 2020-12-28 RX ORDER — OXYBUTYNIN CHLORIDE 5 MG/1
10 TABLET, EXTENDED RELEASE ORAL DAILY
Status: DISCONTINUED | OUTPATIENT
Start: 2020-12-28 | End: 2020-12-30 | Stop reason: HOSPADM

## 2020-12-28 RX ORDER — QUETIAPINE FUMARATE 100 MG/1
300 TABLET, FILM COATED ORAL NIGHTLY
Status: DISCONTINUED | OUTPATIENT
Start: 2020-12-28 | End: 2020-12-30 | Stop reason: HOSPADM

## 2020-12-28 RX ADMIN — OXYBUTYNIN CHLORIDE 5 MG: 5 TABLET ORAL at 10:35

## 2020-12-28 RX ADMIN — ATORVASTATIN CALCIUM 80 MG: 40 TABLET, FILM COATED ORAL at 21:31

## 2020-12-28 RX ADMIN — PERFLUTREN 8.48 MG: 6.52 INJECTION, SUSPENSION INTRAVENOUS at 14:54

## 2020-12-28 RX ADMIN — PRAMIPEXOLE DIHYDROCHLORIDE 0.5 MG: 0.25 TABLET ORAL at 10:36

## 2020-12-28 RX ADMIN — ASPIRIN 325 MG: 325 TABLET, COATED ORAL at 10:41

## 2020-12-28 RX ADMIN — GABAPENTIN 300 MG: 300 CAPSULE ORAL at 10:49

## 2020-12-28 RX ADMIN — SODIUM CHLORIDE, PRESERVATIVE FREE 10 ML: 5 INJECTION INTRAVENOUS at 21:30

## 2020-12-28 RX ADMIN — FLUOXETINE HYDROCHLORIDE 20 MG: 20 CAPSULE ORAL at 10:36

## 2020-12-28 RX ADMIN — QUETIAPINE FUMARATE 300 MG: 100 TABLET ORAL at 21:31

## 2020-12-28 RX ADMIN — OXYBUTYNIN CHLORIDE 10 MG: 5 TABLET, EXTENDED RELEASE ORAL at 16:21

## 2020-12-28 RX ADMIN — PROPRANOLOL HYDROCHLORIDE 20 MG: 40 TABLET ORAL at 10:34

## 2020-12-28 RX ADMIN — RALOXIFENE 60 MG: 60 TABLET ORAL at 10:37

## 2020-12-28 RX ADMIN — BUMETANIDE 1 MG: 0.25 INJECTION INTRAMUSCULAR; INTRAVENOUS at 21:31

## 2020-12-28 RX ADMIN — PROPRANOLOL HYDROCHLORIDE 20 MG: 40 TABLET ORAL at 16:21

## 2020-12-28 RX ADMIN — BUMETANIDE 1 MG: 0.25 INJECTION INTRAMUSCULAR; INTRAVENOUS at 00:43

## 2020-12-28 RX ADMIN — CARBIDOPA AND LEVODOPA 1 TABLET: 25; 100 TABLET ORAL at 10:35

## 2020-12-28 RX ADMIN — PROPRANOLOL HYDROCHLORIDE 20 MG: 40 TABLET ORAL at 21:42

## 2020-12-28 RX ADMIN — GABAPENTIN 300 MG: 300 CAPSULE ORAL at 21:42

## 2020-12-28 RX ADMIN — BUMETANIDE 1 MG: 0.25 INJECTION INTRAMUSCULAR; INTRAVENOUS at 10:34

## 2020-12-28 RX ADMIN — SODIUM CHLORIDE, PRESERVATIVE FREE 10 ML: 5 INJECTION INTRAVENOUS at 10:42

## 2020-12-29 PROBLEM — J84.9 INTERSTITIAL LUNG DISEASE (HCC): Status: ACTIVE | Noted: 2020-12-29

## 2020-12-29 PROBLEM — I26.09 ACUTE COR PULMONALE (HCC): Status: ACTIVE | Noted: 2020-12-29

## 2020-12-29 LAB
ANION GAP SERPL CALCULATED.3IONS-SCNC: 6 MMOL/L (ref 5–15)
BUN SERPL-MCNC: 28 MG/DL (ref 8–23)
BUN/CREAT SERPL: 23.9 (ref 7–25)
CALCIUM SPEC-SCNC: 9.8 MG/DL (ref 8.6–10.5)
CHLORIDE SERPL-SCNC: 91 MMOL/L (ref 98–107)
CO2 SERPL-SCNC: 39 MMOL/L (ref 22–29)
CREAT SERPL-MCNC: 1.17 MG/DL (ref 0.57–1)
GFR SERPL CREATININE-BSD FRML MDRD: 44 ML/MIN/1.73
GLUCOSE SERPL-MCNC: 131 MG/DL (ref 65–99)
POTASSIUM SERPL-SCNC: 3.8 MMOL/L (ref 3.5–5.2)
QT INTERVAL: 386 MS
QTC INTERVAL: 413 MS
SODIUM SERPL-SCNC: 136 MMOL/L (ref 136–145)

## 2020-12-29 PROCEDURE — 97110 THERAPEUTIC EXERCISES: CPT

## 2020-12-29 PROCEDURE — 80048 BASIC METABOLIC PNL TOTAL CA: CPT | Performed by: NURSE PRACTITIONER

## 2020-12-29 PROCEDURE — 99232 SBSQ HOSP IP/OBS MODERATE 35: CPT | Performed by: INTERNAL MEDICINE

## 2020-12-29 PROCEDURE — 94799 UNLISTED PULMONARY SVC/PX: CPT

## 2020-12-29 PROCEDURE — 94660 CPAP INITIATION&MGMT: CPT

## 2020-12-29 PROCEDURE — 97535 SELF CARE MNGMENT TRAINING: CPT

## 2020-12-29 PROCEDURE — 99222 1ST HOSP IP/OBS MODERATE 55: CPT | Performed by: INTERNAL MEDICINE

## 2020-12-29 PROCEDURE — 97530 THERAPEUTIC ACTIVITIES: CPT

## 2020-12-29 PROCEDURE — 99233 SBSQ HOSP IP/OBS HIGH 50: CPT | Performed by: PSYCHIATRY & NEUROLOGY

## 2020-12-29 RX ORDER — ASPIRIN 81 MG/1
81 TABLET, CHEWABLE ORAL DAILY
Status: DISCONTINUED | OUTPATIENT
Start: 2020-12-30 | End: 2020-12-30 | Stop reason: HOSPADM

## 2020-12-29 RX ADMIN — OXYBUTYNIN CHLORIDE 10 MG: 5 TABLET, EXTENDED RELEASE ORAL at 10:12

## 2020-12-29 RX ADMIN — APIXABAN 2.5 MG: 2.5 TABLET, FILM COATED ORAL at 13:46

## 2020-12-29 RX ADMIN — GABAPENTIN 300 MG: 300 CAPSULE ORAL at 20:51

## 2020-12-29 RX ADMIN — BUMETANIDE 1 MG: 0.25 INJECTION INTRAMUSCULAR; INTRAVENOUS at 10:06

## 2020-12-29 RX ADMIN — PROPRANOLOL HYDROCHLORIDE 20 MG: 40 TABLET ORAL at 20:51

## 2020-12-29 RX ADMIN — APIXABAN 2.5 MG: 2.5 TABLET, FILM COATED ORAL at 20:51

## 2020-12-29 RX ADMIN — PROPRANOLOL HYDROCHLORIDE 20 MG: 40 TABLET ORAL at 17:09

## 2020-12-29 RX ADMIN — GABAPENTIN 300 MG: 300 CAPSULE ORAL at 10:07

## 2020-12-29 RX ADMIN — SODIUM CHLORIDE, PRESERVATIVE FREE 10 ML: 5 INJECTION INTRAVENOUS at 20:52

## 2020-12-29 RX ADMIN — BUMETANIDE 1 MG: 0.25 INJECTION INTRAMUSCULAR; INTRAVENOUS at 20:51

## 2020-12-29 RX ADMIN — FLUOXETINE HYDROCHLORIDE 20 MG: 20 CAPSULE ORAL at 10:06

## 2020-12-29 RX ADMIN — ASPIRIN 325 MG: 325 TABLET, COATED ORAL at 10:12

## 2020-12-29 RX ADMIN — RALOXIFENE 60 MG: 60 TABLET ORAL at 10:06

## 2020-12-29 RX ADMIN — PRAMIPEXOLE DIHYDROCHLORIDE 0.5 MG: 0.25 TABLET ORAL at 10:07

## 2020-12-29 RX ADMIN — PROPRANOLOL HYDROCHLORIDE 20 MG: 40 TABLET ORAL at 10:12

## 2020-12-29 RX ADMIN — SODIUM CHLORIDE, PRESERVATIVE FREE 10 ML: 5 INJECTION INTRAVENOUS at 10:15

## 2020-12-29 RX ADMIN — QUETIAPINE FUMARATE 300 MG: 100 TABLET ORAL at 20:51

## 2020-12-29 RX ADMIN — ATORVASTATIN CALCIUM 80 MG: 40 TABLET, FILM COATED ORAL at 20:51

## 2020-12-30 ENCOUNTER — HOSPITAL ENCOUNTER (INPATIENT)
Age: 85
LOS: 17 days | Discharge: HOME HEALTH | DRG: 057 | End: 2021-01-16
Attending: PSYCHIATRY & NEUROLOGY | Admitting: PSYCHIATRY & NEUROLOGY
Payer: MEDICARE

## 2020-12-30 ENCOUNTER — APPOINTMENT (OUTPATIENT)
Dept: NUCLEAR MEDICINE | Facility: HOSPITAL | Age: 85
End: 2020-12-30

## 2020-12-30 VITALS
BODY MASS INDEX: 32.6 KG/M2 | HEIGHT: 59 IN | RESPIRATION RATE: 16 BRPM | OXYGEN SATURATION: 97 % | WEIGHT: 161.7 LBS | TEMPERATURE: 98.1 F | DIASTOLIC BLOOD PRESSURE: 65 MMHG | SYSTOLIC BLOOD PRESSURE: 144 MMHG | HEART RATE: 76 BPM

## 2020-12-30 DIAGNOSIS — I63.9 ACUTE ISCHEMIC STROKE (HCC): Primary | ICD-10-CM

## 2020-12-30 LAB
ANION GAP SERPL CALCULATED.3IONS-SCNC: 7 MMOL/L (ref 5–15)
BUN SERPL-MCNC: 28 MG/DL (ref 8–23)
BUN/CREAT SERPL: 24.3 (ref 7–25)
CALCIUM SPEC-SCNC: 9.8 MG/DL (ref 8.6–10.5)
CHLORIDE SERPL-SCNC: 88 MMOL/L (ref 98–107)
CO2 SERPL-SCNC: 43 MMOL/L (ref 22–29)
CREAT SERPL-MCNC: 1.15 MG/DL (ref 0.57–1)
GFR SERPL CREATININE-BSD FRML MDRD: 45 ML/MIN/1.73
GLUCOSE SERPL-MCNC: 92 MG/DL (ref 65–99)
MAGNESIUM SERPL-MCNC: 1.3 MG/DL (ref 1.6–2.4)
POTASSIUM SERPL-SCNC: 3.4 MMOL/L (ref 3.5–5.2)
SARS-COV-2 RDRP RESP QL NAA+PROBE: NORMAL
SODIUM SERPL-SCNC: 138 MMOL/L (ref 136–145)

## 2020-12-30 PROCEDURE — 87635 SARS-COV-2 COVID-19 AMP PRB: CPT | Performed by: INTERNAL MEDICINE

## 2020-12-30 PROCEDURE — 83735 ASSAY OF MAGNESIUM: CPT | Performed by: NURSE PRACTITIONER

## 2020-12-30 PROCEDURE — 97535 SELF CARE MNGMENT TRAINING: CPT

## 2020-12-30 PROCEDURE — 1180000000 HC REHAB R&B

## 2020-12-30 PROCEDURE — 25010000002 MAGNESIUM SULFATE 2 GM/50ML SOLUTION: Performed by: INTERNAL MEDICINE

## 2020-12-30 PROCEDURE — 6370000000 HC RX 637 (ALT 250 FOR IP): Performed by: PSYCHIATRY & NEUROLOGY

## 2020-12-30 PROCEDURE — 97116 GAIT TRAINING THERAPY: CPT

## 2020-12-30 PROCEDURE — 99232 SBSQ HOSP IP/OBS MODERATE 35: CPT | Performed by: PSYCHIATRY & NEUROLOGY

## 2020-12-30 PROCEDURE — 0 TECHNETIUM ALBUMIN AGGREGATED: Performed by: INTERNAL MEDICINE

## 2020-12-30 PROCEDURE — 94799 UNLISTED PULMONARY SVC/PX: CPT

## 2020-12-30 PROCEDURE — 80048 BASIC METABOLIC PNL TOTAL CA: CPT | Performed by: NURSE PRACTITIONER

## 2020-12-30 PROCEDURE — A9540 TC99M MAA: HCPCS | Performed by: INTERNAL MEDICINE

## 2020-12-30 PROCEDURE — 94660 CPAP INITIATION&MGMT: CPT

## 2020-12-30 PROCEDURE — 92526 ORAL FUNCTION THERAPY: CPT | Performed by: SPEECH-LANGUAGE PATHOLOGIST

## 2020-12-30 PROCEDURE — 78582 LUNG VENTILAT&PERFUS IMAGING: CPT

## 2020-12-30 PROCEDURE — 99232 SBSQ HOSP IP/OBS MODERATE 35: CPT | Performed by: INTERNAL MEDICINE

## 2020-12-30 RX ORDER — BUMETANIDE 0.25 MG/ML
1 INJECTION INTRAMUSCULAR; INTRAVENOUS DAILY
Status: DISCONTINUED | OUTPATIENT
Start: 2020-12-31 | End: 2020-12-30 | Stop reason: HOSPADM

## 2020-12-30 RX ORDER — FLUOXETINE HYDROCHLORIDE 20 MG/1
20 CAPSULE ORAL DAILY
COMMUNITY
End: 2022-01-01 | Stop reason: SDUPTHER

## 2020-12-30 RX ORDER — QUETIAPINE FUMARATE 300 MG/1
300 TABLET, FILM COATED ORAL NIGHTLY
Status: DISCONTINUED | OUTPATIENT
Start: 2020-12-30 | End: 2020-12-30

## 2020-12-30 RX ORDER — RALOXIFENE HYDROCHLORIDE 60 MG/1
60 TABLET, FILM COATED ORAL DAILY
COMMUNITY
Start: 2020-12-31 | End: 2021-01-01

## 2020-12-30 RX ORDER — GABAPENTIN 300 MG/1
300 CAPSULE ORAL 3 TIMES DAILY
Status: DISCONTINUED | OUTPATIENT
Start: 2020-12-30 | End: 2020-12-31 | Stop reason: SDUPTHER

## 2020-12-30 RX ORDER — PRAMIPEXOLE DIHYDROCHLORIDE 0.25 MG/1
0.5 TABLET ORAL NIGHTLY
Status: DISCONTINUED | OUTPATIENT
Start: 2020-12-30 | End: 2021-01-16 | Stop reason: HOSPADM

## 2020-12-30 RX ORDER — CLONIDINE HYDROCHLORIDE 0.1 MG/1
0.1 TABLET ORAL 2 TIMES DAILY
COMMUNITY
End: 2022-01-01 | Stop reason: SDUPTHER

## 2020-12-30 RX ORDER — CLONIDINE HYDROCHLORIDE 0.1 MG/1
0.1 TABLET ORAL 2 TIMES DAILY
Status: DISCONTINUED | OUTPATIENT
Start: 2020-12-30 | End: 2020-12-31 | Stop reason: SDUPTHER

## 2020-12-30 RX ORDER — QUETIAPINE FUMARATE 300 MG/1
300 TABLET, FILM COATED ORAL NIGHTLY
Status: DISCONTINUED | OUTPATIENT
Start: 2020-12-30 | End: 2020-12-31 | Stop reason: SDUPTHER

## 2020-12-30 RX ORDER — ASPIRIN 81 MG/1
81 TABLET, CHEWABLE ORAL DAILY
Start: 2020-12-31 | End: 2021-09-17

## 2020-12-30 RX ORDER — ATORVASTATIN CALCIUM 80 MG/1
80 TABLET, FILM COATED ORAL NIGHTLY
Start: 2020-12-30

## 2020-12-30 RX ORDER — BUMETANIDE 1 MG/1
1 TABLET ORAL DAILY
Start: 2020-12-31

## 2020-12-30 RX ORDER — GABAPENTIN 300 MG/1
300 CAPSULE ORAL 3 TIMES DAILY
Status: ON HOLD | COMMUNITY
End: 2021-01-16 | Stop reason: HOSPADM

## 2020-12-30 RX ORDER — ASPIRIN 81 MG/1
81 TABLET, CHEWABLE ORAL DAILY
Status: ON HOLD | COMMUNITY
End: 2021-01-16 | Stop reason: SDUPTHER

## 2020-12-30 RX ORDER — BUMETANIDE 1 MG/1
1 TABLET ORAL DAILY
Status: ON HOLD | COMMUNITY
End: 2021-01-16 | Stop reason: SDUPTHER

## 2020-12-30 RX ORDER — MAGNESIUM SULFATE HEPTAHYDRATE 40 MG/ML
2 INJECTION, SOLUTION INTRAVENOUS ONCE
Status: COMPLETED | OUTPATIENT
Start: 2020-12-30 | End: 2020-12-30

## 2020-12-30 RX ORDER — METOPROLOL SUCCINATE 25 MG/1
50 TABLET, EXTENDED RELEASE ORAL DAILY
Start: 2020-12-31 | End: 2020-12-30 | Stop reason: SDUPTHER

## 2020-12-30 RX ORDER — CHOLECALCIFEROL (VITAMIN D3) 1250 MCG
1 CAPSULE ORAL DAILY
COMMUNITY
Start: 2020-12-31

## 2020-12-30 RX ORDER — LISINOPRIL 40 MG/1
40 TABLET ORAL DAILY
COMMUNITY
End: 2022-01-01 | Stop reason: SDUPTHER

## 2020-12-30 RX ORDER — OXYBUTYNIN CHLORIDE 10 MG/1
10 TABLET, EXTENDED RELEASE ORAL DAILY
COMMUNITY
End: 2022-01-01 | Stop reason: SDUPTHER

## 2020-12-30 RX ORDER — POTASSIUM CHLORIDE 750 MG/1
40 CAPSULE, EXTENDED RELEASE ORAL ONCE
Status: COMPLETED | OUTPATIENT
Start: 2020-12-30 | End: 2020-12-30

## 2020-12-30 RX ORDER — ATORVASTATIN CALCIUM 80 MG/1
80 TABLET, FILM COATED ORAL NIGHTLY
Status: DISCONTINUED | OUTPATIENT
Start: 2020-12-30 | End: 2021-01-16 | Stop reason: HOSPADM

## 2020-12-30 RX ORDER — PRAMIPEXOLE DIHYDROCHLORIDE 0.5 MG/1
0.5 TABLET ORAL NIGHTLY
COMMUNITY
End: 2022-01-01 | Stop reason: SDUPTHER

## 2020-12-30 RX ORDER — HYDROCODONE BITARTRATE AND ACETAMINOPHEN 7.5; 325 MG/1; MG/1
1 TABLET ORAL EVERY 6 HOURS PRN
Status: ON HOLD | COMMUNITY
End: 2021-01-16 | Stop reason: HOSPADM

## 2020-12-30 RX ORDER — QUETIAPINE FUMARATE 300 MG/1
300 TABLET, FILM COATED ORAL NIGHTLY
COMMUNITY
End: 2022-01-01 | Stop reason: SDUPTHER

## 2020-12-30 RX ORDER — AMOXICILLIN 500 MG
1 CAPSULE ORAL 2 TIMES DAILY WITH MEALS
COMMUNITY

## 2020-12-30 RX ORDER — METOPROLOL SUCCINATE 25 MG/1
25 TABLET, EXTENDED RELEASE ORAL DAILY
Status: ON HOLD | COMMUNITY
End: 2021-01-16 | Stop reason: HOSPADM

## 2020-12-30 RX ORDER — METOPROLOL SUCCINATE 25 MG/1
25 TABLET, EXTENDED RELEASE ORAL DAILY
Start: 2020-12-31

## 2020-12-30 RX ORDER — HYDROCODONE BITARTRATE AND ACETAMINOPHEN 7.5; 325 MG/1; MG/1
1 TABLET ORAL EVERY 6 HOURS PRN
Status: DISCONTINUED | OUTPATIENT
Start: 2020-12-30 | End: 2021-01-16 | Stop reason: HOSPADM

## 2020-12-30 RX ADMIN — KIT FOR THE PREPARATION OF TECHNETIUM TC 99M ALBUMIN AGGREGATED 1 DOSE: 2.5 INJECTION, POWDER, FOR SOLUTION INTRAVENOUS at 10:30

## 2020-12-30 RX ADMIN — CLONIDINE HYDROCHLORIDE 0.1 MG: 0.1 TABLET ORAL at 23:12

## 2020-12-30 RX ADMIN — POTASSIUM CHLORIDE 40 MEQ: 750 CAPSULE, EXTENDED RELEASE ORAL at 09:07

## 2020-12-30 RX ADMIN — ACETAMINOPHEN 650 MG: 325 TABLET, FILM COATED ORAL at 00:18

## 2020-12-30 RX ADMIN — PROPRANOLOL HYDROCHLORIDE 20 MG: 40 TABLET ORAL at 09:07

## 2020-12-30 RX ADMIN — OXYBUTYNIN CHLORIDE 10 MG: 5 TABLET, EXTENDED RELEASE ORAL at 09:07

## 2020-12-30 RX ADMIN — QUETIAPINE FUMARATE 300 MG: 300 TABLET ORAL at 23:12

## 2020-12-30 RX ADMIN — SODIUM CHLORIDE, PRESERVATIVE FREE 10 ML: 5 INJECTION INTRAVENOUS at 09:16

## 2020-12-30 RX ADMIN — GABAPENTIN 300 MG: 300 CAPSULE ORAL at 23:12

## 2020-12-30 RX ADMIN — PRAMIPEXOLE DIHYDROCHLORIDE 0.5 MG: 0.25 TABLET ORAL at 09:07

## 2020-12-30 RX ADMIN — APIXABAN 2.5 MG: 2.5 TABLET, FILM COATED ORAL at 09:16

## 2020-12-30 RX ADMIN — ATORVASTATIN CALCIUM 80 MG: 80 TABLET, FILM COATED ORAL at 23:12

## 2020-12-30 RX ADMIN — CARBIDOPA AND LEVODOPA 1 TABLET: 25; 100 TABLET ORAL at 23:12

## 2020-12-30 RX ADMIN — RALOXIFENE 60 MG: 60 TABLET ORAL at 09:07

## 2020-12-30 RX ADMIN — ASPIRIN 81 MG: 81 TABLET, CHEWABLE ORAL at 09:07

## 2020-12-30 RX ADMIN — GABAPENTIN 300 MG: 300 CAPSULE ORAL at 09:07

## 2020-12-30 RX ADMIN — PRAMIPEXOLE DIHYDROCHLORIDE 0.5 MG: 0.25 TABLET ORAL at 23:12

## 2020-12-30 RX ADMIN — PROPRANOLOL HYDROCHLORIDE 20 MG: 40 TABLET ORAL at 15:02

## 2020-12-30 RX ADMIN — FLUOXETINE HYDROCHLORIDE 20 MG: 20 CAPSULE ORAL at 09:07

## 2020-12-30 RX ADMIN — MAGNESIUM SULFATE HEPTAHYDRATE 2 G: 40 INJECTION, SOLUTION INTRAVENOUS at 09:07

## 2020-12-30 ASSESSMENT — PAIN SCALES - GENERAL: PAINLEVEL_OUTOF10: 0

## 2020-12-31 PROBLEM — I63.9 ACUTE ISCHEMIC STROKE (HCC): Status: ACTIVE | Noted: 2020-12-31

## 2020-12-31 LAB
BASOPHILS ABSOLUTE: 0 K/UL (ref 0–0.2)
BASOPHILS RELATIVE PERCENT: 0.2 % (ref 0–1)
EOSINOPHILS ABSOLUTE: 0.1 K/UL (ref 0–0.6)
EOSINOPHILS RELATIVE PERCENT: 2.2 % (ref 0–5)
HCT VFR BLD CALC: 37 % (ref 37–47)
HEMOGLOBIN: 10.9 G/DL (ref 12–16)
IMMATURE GRANULOCYTES #: 0 K/UL
LYMPHOCYTES ABSOLUTE: 1.2 K/UL (ref 1.1–4.5)
LYMPHOCYTES RELATIVE PERCENT: 18.4 % (ref 20–40)
MCH RBC QN AUTO: 29.2 PG (ref 27–31)
MCHC RBC AUTO-ENTMCNC: 29.5 G/DL (ref 33–37)
MCV RBC AUTO: 99.2 FL (ref 81–99)
MONOCYTES ABSOLUTE: 0.8 K/UL (ref 0–0.9)
MONOCYTES RELATIVE PERCENT: 12.7 % (ref 0–10)
NEUTROPHILS ABSOLUTE: 4.2 K/UL (ref 1.5–7.5)
NEUTROPHILS RELATIVE PERCENT: 66.2 % (ref 50–65)
PDW BLD-RTO: 15.4 % (ref 11.5–14.5)
PLATELET # BLD: 163 K/UL (ref 130–400)
PMV BLD AUTO: 9.3 FL (ref 9.4–12.3)
PREALBUMIN: 12 MG/DL (ref 20–40)
RBC # BLD: 3.73 M/UL (ref 4.2–5.4)
WBC # BLD: 6.3 K/UL (ref 4.8–10.8)

## 2020-12-31 PROCEDURE — 97535 SELF CARE MNGMENT TRAINING: CPT

## 2020-12-31 PROCEDURE — 6370000000 HC RX 637 (ALT 250 FOR IP): Performed by: PSYCHIATRY & NEUROLOGY

## 2020-12-31 PROCEDURE — 97161 PT EVAL LOW COMPLEX 20 MIN: CPT

## 2020-12-31 PROCEDURE — 85025 COMPLETE CBC W/AUTO DIFF WBC: CPT

## 2020-12-31 PROCEDURE — 99223 1ST HOSP IP/OBS HIGH 75: CPT | Performed by: PSYCHIATRY & NEUROLOGY

## 2020-12-31 PROCEDURE — 97530 THERAPEUTIC ACTIVITIES: CPT

## 2020-12-31 PROCEDURE — 92610 EVALUATE SWALLOWING FUNCTION: CPT

## 2020-12-31 PROCEDURE — 1180000000 HC REHAB R&B

## 2020-12-31 PROCEDURE — 97165 OT EVAL LOW COMPLEX 30 MIN: CPT

## 2020-12-31 PROCEDURE — 96125 COGNITIVE TEST BY HC PRO: CPT

## 2020-12-31 PROCEDURE — 97110 THERAPEUTIC EXERCISES: CPT

## 2020-12-31 PROCEDURE — 84134 ASSAY OF PREALBUMIN: CPT

## 2020-12-31 PROCEDURE — 36415 COLL VENOUS BLD VENIPUNCTURE: CPT

## 2020-12-31 PROCEDURE — 2700000000 HC OXYGEN THERAPY PER DAY

## 2020-12-31 PROCEDURE — 92522 EVALUATE SPEECH PRODUCTION: CPT

## 2020-12-31 RX ORDER — RALOXIFENE HYDROCHLORIDE 60 MG/1
60 TABLET, FILM COATED ORAL DAILY
Status: DISCONTINUED | OUTPATIENT
Start: 2020-12-31 | End: 2020-12-31 | Stop reason: CLARIF

## 2020-12-31 RX ORDER — BUMETANIDE 1 MG/1
1 TABLET ORAL DAILY
Status: DISCONTINUED | OUTPATIENT
Start: 2020-12-31 | End: 2021-01-16 | Stop reason: HOSPADM

## 2020-12-31 RX ORDER — HYDROCODONE BITARTRATE AND ACETAMINOPHEN 7.5; 325 MG/1; MG/1
1 TABLET ORAL EVERY 6 HOURS PRN
Status: DISCONTINUED | OUTPATIENT
Start: 2020-12-31 | End: 2021-01-16 | Stop reason: HOSPADM

## 2020-12-31 RX ORDER — AMOXICILLIN 500 MG
1 CAPSULE ORAL 2 TIMES DAILY WITH MEALS
Status: DISCONTINUED | OUTPATIENT
Start: 2020-12-31 | End: 2020-12-31 | Stop reason: CLARIF

## 2020-12-31 RX ORDER — QUETIAPINE FUMARATE 300 MG/1
300 TABLET, FILM COATED ORAL NIGHTLY
Status: DISCONTINUED | OUTPATIENT
Start: 2020-12-31 | End: 2021-01-16 | Stop reason: HOSPADM

## 2020-12-31 RX ORDER — FLUOXETINE HYDROCHLORIDE 20 MG/1
20 CAPSULE ORAL DAILY
Status: DISCONTINUED | OUTPATIENT
Start: 2020-12-31 | End: 2021-01-16 | Stop reason: HOSPADM

## 2020-12-31 RX ORDER — SODIUM PHOSPHATE, DIBASIC AND SODIUM PHOSPHATE, MONOBASIC 7; 19 G/133ML; G/133ML
1 ENEMA RECTAL DAILY PRN
Status: DISCONTINUED | OUTPATIENT
Start: 2020-12-31 | End: 2021-01-16 | Stop reason: HOSPADM

## 2020-12-31 RX ORDER — LISINOPRIL 20 MG/1
40 TABLET ORAL DAILY
Status: DISCONTINUED | OUTPATIENT
Start: 2020-12-31 | End: 2021-01-16 | Stop reason: HOSPADM

## 2020-12-31 RX ORDER — ASPIRIN 81 MG/1
81 TABLET, CHEWABLE ORAL DAILY
Status: DISCONTINUED | OUTPATIENT
Start: 2020-12-31 | End: 2021-01-16 | Stop reason: HOSPADM

## 2020-12-31 RX ORDER — METOPROLOL SUCCINATE 25 MG/1
25 TABLET, EXTENDED RELEASE ORAL DAILY
Status: DISCONTINUED | OUTPATIENT
Start: 2020-12-31 | End: 2021-01-12

## 2020-12-31 RX ORDER — POLYETHYLENE GLYCOL 3350 17 G/17G
17 POWDER, FOR SOLUTION ORAL DAILY
Status: DISCONTINUED | OUTPATIENT
Start: 2020-12-31 | End: 2021-01-16 | Stop reason: HOSPADM

## 2020-12-31 RX ORDER — GABAPENTIN 300 MG/1
300 CAPSULE ORAL 3 TIMES DAILY
Status: DISCONTINUED | OUTPATIENT
Start: 2020-12-31 | End: 2021-01-05

## 2020-12-31 RX ORDER — OXYBUTYNIN CHLORIDE 5 MG/1
10 TABLET, EXTENDED RELEASE ORAL DAILY
Status: DISCONTINUED | OUTPATIENT
Start: 2020-12-31 | End: 2021-01-16 | Stop reason: HOSPADM

## 2020-12-31 RX ORDER — PRAMIPEXOLE DIHYDROCHLORIDE 0.25 MG/1
0.5 TABLET ORAL NIGHTLY
Status: DISCONTINUED | OUTPATIENT
Start: 2020-12-31 | End: 2020-12-31 | Stop reason: SDUPTHER

## 2020-12-31 RX ORDER — CLONIDINE HYDROCHLORIDE 0.1 MG/1
0.1 TABLET ORAL 2 TIMES DAILY
Status: DISCONTINUED | OUTPATIENT
Start: 2020-12-31 | End: 2021-01-16 | Stop reason: HOSPADM

## 2020-12-31 RX ORDER — ACETAMINOPHEN 325 MG/1
650 TABLET ORAL EVERY 4 HOURS PRN
Status: DISCONTINUED | OUTPATIENT
Start: 2020-12-31 | End: 2021-01-16 | Stop reason: HOSPADM

## 2020-12-31 RX ORDER — M-VIT,TX,IRON,MINS/CALC/FOLIC 27MG-0.4MG
1 TABLET ORAL DAILY
Status: DISCONTINUED | OUTPATIENT
Start: 2020-12-31 | End: 2021-01-16 | Stop reason: HOSPADM

## 2020-12-31 RX ADMIN — GABAPENTIN 300 MG: 300 CAPSULE ORAL at 13:47

## 2020-12-31 RX ADMIN — GABAPENTIN 300 MG: 300 CAPSULE ORAL at 20:58

## 2020-12-31 RX ADMIN — BUMETANIDE 1 MG: 1 TABLET ORAL at 07:50

## 2020-12-31 RX ADMIN — Medication 5000 UNITS: at 07:51

## 2020-12-31 RX ADMIN — PRAMIPEXOLE DIHYDROCHLORIDE 0.5 MG: 0.25 TABLET ORAL at 20:58

## 2020-12-31 RX ADMIN — QUETIAPINE FUMARATE 300 MG: 300 TABLET ORAL at 20:58

## 2020-12-31 RX ADMIN — POLYETHYLENE GLYCOL 3350 17 G: 17 POWDER, FOR SOLUTION ORAL at 07:49

## 2020-12-31 RX ADMIN — FLUOXETINE HYDROCHLORIDE 20 MG: 20 CAPSULE ORAL at 07:51

## 2020-12-31 RX ADMIN — APIXABAN 2.5 MG: 2.5 TABLET, FILM COATED ORAL at 06:38

## 2020-12-31 RX ADMIN — OXYBUTYNIN CHLORIDE 10 MG: 5 TABLET, EXTENDED RELEASE ORAL at 07:49

## 2020-12-31 RX ADMIN — CARBIDOPA AND LEVODOPA 1 TABLET: 25; 100 TABLET ORAL at 20:58

## 2020-12-31 RX ADMIN — MULTIPLE VITAMINS W/ MINERALS TAB 1 TABLET: TAB at 07:49

## 2020-12-31 RX ADMIN — GABAPENTIN 300 MG: 300 CAPSULE ORAL at 07:50

## 2020-12-31 RX ADMIN — ASPIRIN 81 MG: 81 TABLET, CHEWABLE ORAL at 07:49

## 2020-12-31 RX ADMIN — ATORVASTATIN CALCIUM 80 MG: 80 TABLET, FILM COATED ORAL at 20:58

## 2020-12-31 RX ADMIN — APIXABAN 2.5 MG: 2.5 TABLET, FILM COATED ORAL at 18:19

## 2020-12-31 RX ADMIN — CLONIDINE HYDROCHLORIDE 0.1 MG: 0.1 TABLET ORAL at 20:58

## 2020-12-31 ASSESSMENT — PAIN SCALES - GENERAL: PAINLEVEL_OUTOF10: 0

## 2020-12-31 NOTE — PLAN OF CARE
Short term goal 2: PROPEL W/C 75FT WITH TURNS, SBA   Short term goal 3: COMPLETE CAR TF MOD A FOR LE MANAGEMENT  Short term goal 4: COMPLETE STAND STEP TF TO R AND L WITH RW, CGA  Short term goal 5: PT CGA FOR ALL BED MOBILITY            Long term goals  Time Frame for Long term goals : 3 WEEKS  Long term goal 1: PT ' WITH RW, SUPERVISION  Long term goal 2: NEGOTIATE 4 STEPS, 4\", CGA, BILATERAL HANDRAIL  Long term goal 3: COMPLETE STAND STEP TF TO R AND L WITH RW, IND  Long term goal 4: COMPLETE CAR TF WITH SUPERVISION  Long term goal 5: PT IND WITH ALL BED MOBILITY  These goals were reviewed with this patient at the time of assessment and Sonal Osuna is in agreement.      Plan of Care: Frequency:  [] 5 days per week, 90 minutes per day                             [x]  5 days per week, 60 minutes per day               Current Treatment Recommendations: Strengthening, ROM, Transfer Training, ADL/Self-care Training, IADL Training, Balance Training, Functional Mobility Training, Cognitive/Perceptual Training, Wheelchair Mobility Training, Endurance Training, Gait Training, Stair training, Neuromuscular Re-education, Home Exercise Program, Safety Education & Training, Patient/Caregiver Education & Training, Equipment Evaluation, Education, & procurement, Positioning    IRF-MARY BETH  Roll Left and Right  Assistance Needed: Partial/moderate assistance  CARE Score: 3  Discharge Goal: Independent  Sit to Lying  Assistance Needed: Partial/moderate assistance  CARE Score: 3  Discharge Goal: Independent  Lying to Sitting on Side of Bed  Assistance Needed: Partial/moderate assistance  CARE Score: 3  Discharge Goal: Independent  Sit to Stand  Assistance Needed: Partial/moderate assistance  CARE Score: 3  Discharge Goal: Independent  Chair/Bed-to-Chair Transfer  Assistance Needed: Dependent  CARE Score: 1  Discharge Goal: Independent  Car Transfer Reason if not Attempted: Not attempted due to medical condition or safety concerns  CARE Score: 88  Discharge Goal: Supervision or touching assistance  Walk 10 Feet  Reason if not Attempted: Not attempted due to medical condition or safety concerns  CARE Score: 88  Discharge Goal: Independent  Walk 50 Feet with Two Turns  Reason if not Attempted: Not attempted due to medical condition or safety concerns  CARE Score: 88  Discharge Goal: Independent  Walk 150 Feet  Reason if not Attempted: Not attempted due to medical condition or safety concerns  CARE Score: 88  Discharge Goal: Supervision or touching assistance  Walking 10 Feet on Uneven Surfaces  Reason if not Attempted: Not attempted due to medical condition or safety concerns  CARE Score: 88  Discharge Goal: Supervision or touching assistance  1 Step (Curb)  Reason if not Attempted: Not attempted due to medical condition or safety concerns  CARE Score: 88  Discharge Goal: Supervision or touching assistance  4 Steps  Reason if not Attempted: Not attempted due to medical condition or safety concerns  CARE Score: 88  Discharge Goal: Supervision or touching assistance  12 Steps  Reason if not Attempted: Not attempted due to medical condition or safety concerns  CARE Score: 88  Discharge Goal: Supervision or touching assistance  Wheel 50 Feet with Two Turns  Reason if not Attempted: Not attempted due to medical condition or safety concerns  CARE Score: 88  Discharge Goal: Independent  Wheel 150 Feet  Reason if not Attempted: Not attempted due to medical condition or safety concerns  CARE Score: 88  Discharge Goal: Independent    OCCUPATIONAL THERAPY:  Goals:             Short term goals  Time Frame for Short term goals: 1 week  Short term goal 1: CGA with clothing management/hygiene for toileting. Short term goal 2: CGA with toilet transfers. Short term goal 3: Min A with bathing hygiene. Short term goal 4: Min A with LB dressing, AE PRN. Assistance Needed: Substantial/maximal assistance  CARE Score: 2  Discharge Goal: Supervision or touching assistance  Toilet Transfer  Assistance Needed: Partial/moderate assistance  CARE Score: 3  Discharge Goal: Supervision or touching assistance  Picking Up Object  Assistance Needed: Dependent  CARE Score: 1  Discharge Goal: Partial/moderate assistance  Treatments may include IADL retraining, strengthening, safety education and training, patient/caregiver education and training, equipment evaluation/ training/procurement, neuromuscular reeducation, wheelchair mobility training. SPEECH THERAPY:   Plan of Care and Goals:   LTG Goal 1: The patient will use appropriate memory strategies to schedule and recall weekly events, express needs and recall names to maintain safety and participate socially in functional living environment. LTG 2: The patient will develop functional cognitive linguistic based skills and utilize compensatory strategies to communicate wants and needs effectively to different conversational partners, maintain safety and participate socially in functional living environment           Short-term Goals  Timeframe for Short-term Goals: 2-3 weeks  Goal 1: The patient will demonstrate recall of functional information following a/an (immediate, short term, long term) delay with min cues in order to increase functional integration into environment. Goal 2: The patient will complete simple/complex naming tasks with min verbal cues at 80% accuracy to improve thought organization and word retrieval skills. Goal 3: The patient will follow multi step commands related to functional living environment with 80% accuracy and min cues in order to increase functional integration into environment. Goal 4: The patient will complete problem solving/reasoning tasks with min verbal cues at 80% accuracy in order to improve safety awareness for functional tasks. Timeframe for Short-term Goals: 2-3 weeks    IRF-MARY BETH  Hearing, Speech, and Vision  Expression of Ideas and Wants: Without difficulty  Understanding Verbal and Non-Verbal Content: Understands             Plan of Care:  Frequency:   [x] 5 days per week, 60 minutes per day                            [] Not appropriate for Speech Therapy  Treatments may include speech/language/communication therapy, cognitive training, group therapy, education, and/or dysphagia therapy based on the above goals. These goals were reviewed with this patient at the time of assessment and Christian Ibrahim is in agreement. CASE MANAGEMENT:  Goals:   Assist patient/family with discharge planning, patient/family counseling,  and coordination with insurance during ARU stay. Patient Goals: want to go home as soon as I can, able to take care of self               Activities Prior to Admit:                         Christian Ibrahim will be seen a minimum of 3 hours of therapy per day/a minimum of 5 out of 7 days per week. [] In this rare instance due to the nature of this patient's medical involvement, this patient will be seen 15 hours per week (900 minutes within a 7 day period). Treatments may include therapeutic exercises, gait training, neuromuscular re-ed, transfer training, community reintegration, bed mobility, w/c mobility and training, self care, home mgmt, cognitive training, energy conservation,dysphagia tx, speech/language/communication therapy, group therapy, and patient/family education. In addition, dietician/nutritionist may monitor calorie count as well as intake and collaboratively work with SLP on dietary upgrades. Neuropsychology/Psychology may evaluate and provide necessary support.     Medical issues being managed closely and that require 24 hour availability of a physician:   [] Swallowing Precautions  [x] Bowel/Bladder Fx  [] Weight bearing precautions

## 2020-12-31 NOTE — PROGRESS NOTES
PHARMACY NOTE  Angelia Yu was ordered Evista. Per the [address], this medication is non-formulary and not stocked by pharmacy. It has been discontinued. The medication can be reordered at discharge.      Electronically signed by Ezequiel Dunn, [address] [place] [address] on 12/31/2020 at 6:34 AM

## 2020-12-31 NOTE — PROGRESS NOTES
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 (WEAKNESS OF R SIDE>L SIDE, VISION IMPAIRED ON L SIDE)    Additional Pertinent Hx: PE with acute cor pulmonale; interstitial pulmonary disease; HTN; ACUTE RESP FAILURE WITH HYPOXIA AND HYPERCAPNIA; POLYNEUROPATHY; NORMAL PRESSURE HYDROCEPHALUS; MACULAR DEGENERATION  RESTRICTIONS/PRECAUTIONS:    Restrictions/Precautions  Restrictions/Precautions: Weight Bearing, Fall Risk(ASPIRATION RISK)  Position Activity Restriction  Other position/activity restrictions: 2 L O2  OVERALL  ORIENTATION STATUS:     PAIN:  Pain Level: 0(denies any pain whatsoever)                    NEUROLOGICAL                    Motor Control  Gross Motor?: Exceptions  Comments: DYSTONIA NOTED WITH MOVEMENT, AND INTERMITTENTLY AT REST      STRENGTH  Strength RLE  Comment: GROSSLY 3+/5  Strength LLE  Comment: GROSSLY 4- TO 4/5  ROM  AROM RLE (degrees)  RLE General AROM: DECREASED HIP AND KNEE FLX; ANKLE DF AND PF WFL   AROM LLE (degrees)  LLE General AROM: DECREASED HIP AND KNEE FLX; ANKLE DF AND PF WFL   POSTURE/BALANCE  Balance  Sitting - Static: Fair, -(NEEDS UE SUPPORT TO MAINTAIN SEATED BALANCE)  Sitting - Dynamic: Poor  Standing - Dynamic: Poor, -  Comments: PT CAN STAND FOR 2MIN WITH RW AND THEN PERFORM STAND STEP TF. (UNSUPPORTED STANDING NOT ASSESSED )       ACTIVITY TOLERANCE  Activity Tolerance  Activity Tolerance: Patient limited by fatigue, Patient limited by endurance      BED MOBILITY  Bed Mobility  Rolling: Moderate assistance, Minimal assistance(ROLLING R AND L; PT USING BEDRAIL)  Supine to Sit: Moderate assistance(SITTING UP FROM R S/L TO SITTING EOB )  Scooting:  Moderate assistance(SCOOTING FWD TO EOB SO FEET FLAT ON FLOOR FOR STS) Comment: PT REQUIRED MOD A WITH SUPINE TO SIT FROM R S/L TO SITTING EOB, ASSIST WITH LE AND TRUNK BY THERAPIST  TRANSFERS  Sit to Stand: Moderate Assistance(PT DIFFICULT INITIATING STS )      Bed to Chair: Moderate assistance, 2 Person Assistance(PT UNSTEADY WHEN 1 STEP ATTEMPTED STANDING EOB )        WHEELCHAIR PROPULSION 1  Propulsion 1  Propulsion: Manual  Level: Level Tile  Method: FRANK GARCIA  Level of Assistance: Minimal assistance  Description/ Details: NO TURNS  Distance: 4 FT   WHEELCHAIR PROPULSION 2     AMBULATION 1     AMBULATION 2     STAIRS       GOALS:  Short term goals  Time Frame for Short term goals: 1 WEEK  Short term goal 1: PT AMB 3X12FT IN II BARS WITH MOD AX1   Short term goal 2: PROPEL W/C 75FT WITH TURNS, SBA   Short term goal 3: COMPLETE CAR TF MOD A FOR LE MANAGEMENT  Short term goal 4: COMPLETE STAND STEP TF TO R AND L WITH RW, CGA  Short term goal 5: PT CGA FOR ALL BED MOBILITY    Long term goals  Time Frame for Long term goals : 3 WEEKS  Long term goal 1: PT ' WITH RW, SUPERVISION  Long term goal 2: NEGOTIATE 4 STEPS, 4\", CGA, BILATERAL HANDRAIL  Long term goal 3: COMPLETE STAND STEP TF TO R AND L WITH RW, IND  Long term goal 4: COMPLETE CAR TF WITH SUPERVISION  Long term goal 5: PT IND WITH ALL BED MOBILITY  HOME LIVING:                    ASSESSMENT (IMPAIRMENTS/BARRIERS):   Body structures, Functions, Activity limitations: Decreased functional mobility , Decreased ADL status, Decreased ROM, Decreased strength, Decreased safe awareness, Decreased endurance, Decreased balance, Decreased coordination Assistance Needed: Partial/moderate assistance  CARE Score: 3  Discharge Goal: Independent    Sit to Lying  Assistance Needed: Partial/moderate assistance  CARE Score: 3  Discharge Goal: Independent    Lying to Sitting on Side of Bed  Assistance Needed: Partial/moderate assistance  CARE Score: 3  Discharge Goal: Independent    Sit to Stand  Assistance Needed: Partial/moderate assistance  CARE Score: 3  Discharge Goal: Independent    Chair/Bed-to-Chair Transfer  Assistance Needed: Dependent  CARE Score: 1  Discharge Goal: Independent    Car Transfer  Reason if not Attempted: Not attempted due to medical condition or safety concerns  CARE Score: 88  Discharge Goal: Supervision or touching assistance    Walk 10 Feet  Reason if not Attempted: Not attempted due to medical condition or safety concerns  CARE Score: 88  Discharge Goal: Independent    Walk 50 Feet with Two Turns  Reason if not Attempted: Not attempted due to medical condition or safety concerns  CARE Score: 88  Discharge Goal: Independent    Walk 150 Feet  Reason if not Attempted: Not attempted due to medical condition or safety concerns  CARE Score: 88  Discharge Goal: Supervision or touching assistance    Walking 10 Feet on Uneven Surfaces  Reason if not Attempted: Not attempted due to medical condition or safety concerns  CARE Score: 88  Discharge Goal: Supervision or touching assistance    1 Step (Curb)  Reason if not Attempted: Not attempted due to medical condition or safety concerns  CARE Score: 88  Discharge Goal: Supervision or touching assistance    4 Steps  Reason if not Attempted: Not attempted due to medical condition or safety concerns  CARE Score: 88  Discharge Goal: Supervision or touching assistance    12 Steps  Reason if not Attempted: Not attempted due to medical condition or safety concerns  CARE Score: 88  Discharge Goal: Supervision or touching assistance    Wheel 50 Feet with Two Turns Reason if not Attempted: Not attempted due to medical condition or safety concerns  CARE Score: 88  Discharge Goal: Independent    Wheel 150 Feet  Reason if not Attempted: Not attempted due to medical condition or safety concerns  CARE Score: 88  Discharge Goal: Independent      LAST TREATMENT TIME  PT Individual Minutes  Time In: 0900  Time Out: 1010  Minutes: 70

## 2020-12-31 NOTE — PROGRESS NOTES
PHARMACY NOTE  Orlando Rice was ordered Fish Oil. Per the [address], this medication is non-formulary and not stocked by pharmacy. It has been discontinued. The medication can be reordered at discharge.      Electronically signed by Hi Knapp, [address] [place] [address] on 12/31/2020 at 6:35 AM

## 2020-12-31 NOTE — PROGRESS NOTES
Comprehensive Nutrition Assessment    Type and Reason for Visit:  Initial    Nutrition Recommendations/Plan: continue current POC    Nutrition Assessment:  Following patient for new admission to Rehab unit. Pt just finishing lunch at time of visit. Sleepy. PO intake improved from breakfast--now %. Malnutrition Assessment:  Malnutrition Status:  No malnutrition    Context:  Acute Illness     Findings of the 6 clinical characteristics of malnutrition:  Energy Intake:  No significant decrease in energy intake  Weight Loss:  No significant weight loss     Body Fat Loss:  No significant body fat loss     Muscle Mass Loss:  No significant muscle mass loss    Fluid Accumulation:  1 - Mild Extremities   Strength:  Not Performed    Nutrition Related Findings:  well nourished appearing      Wounds:  (scabs)       Current Nutrition Therapies:    DIET GENERAL; Anthropometric Measures:  · Height: 4' 11\" (149.9 cm)  · Current Body Weight: 169 lb 11 oz (77 kg)   · Admission Body Weight: 169 lb 11 oz (77 kg)    · Usual Body Weight: 161 lb 11 oz (73.3 kg)(12/27/2020)     · Ideal Body Weight: 95 lbs; % Ideal Body Weight 178.6 %   · BMI: 34.3  · Adjusted Body Weight:  ; No Adjustment   · Adjusted BMI:      · BMI Categories: Obese Class 1 (BMI 30.0-34. 9)       Nutrition Diagnosis:   · Overweight/Obese related to excessive energy intake as evidenced by BMI      Nutrition Interventions:   Food and/or Nutrient Delivery:  Continue Current Diet  Nutrition Education/Counseling:  No recommendation at this time   Coordination of Nutrition Care:  Continue to monitor while inpatient    Goals:  po intake 50% or greater.   Weight stable or decrease 1-3# per week       Nutrition Monitoring and Evaluation:   Behavioral-Environmental Outcomes:  None Identified   Food/Nutrient Intake Outcomes:  Food and Nutrient Intake  Physical Signs/Symptoms Outcomes:  Biochemical Data, Skin, Weight, Nutrition Focused Physical Findings Discharge Planning:    Continue current diet     Electronically signed by Luciana Vitale MS, RD, LD on 12/31/20 at 1:02 PM CST    Contact: [phone]

## 2020-12-31 NOTE — PROGRESS NOTES
[facility]  SPEECH THERAPY  [facility] UNIT  Speech  Language  Cognitive Evaluation    TIME   [id number]      Name: Anderson May  YOB: 1933  Gender: female  Referring Physician: Maryann Curtis    Admitting diagnosis: Bilateral embolic strokes     Secondary diagnoses: Cor pulmonale, restless legs, essential hypertension, macular degeneration      History of Present Illness/Injury: This 80 y.o. female  who presented to [facility] with inability to walk. She has a past medical history significant for breast cancer status post bilateral implants, chronic back pain, tremors, macular degeneration and essential hypertension. Patient had gone to bed on 12/26 around 2030. When she awoke in the morning around 830 she had difficulty getting out of bed. At baseline she is alert and oriented and conversant. Patient continued to have difficulty ambulating with unsteady gait. Her primary care provider advised her to seek evaluation at the emergency department. In the ED, blood pressure was elevated at 186/102. MRI brain was done showing acute stroke left medial precentral gyrus and acute right occipital stroke with chronic microvascular disease and atrophy. IV TPA not considered as last known well was 2030 on 12/26. She was admitted to the hospitalist service. The Cognitive Linguistic Quick Test (CLQT) assists you in quickly determining severity ratings (normal, mild, moderate, severe) for five primary domains of cognition (Attention, Memory, Executive Functions, Language, and Visuo-spatial Skills) and a composite severity rating for adults with known or suspected neurological impairment (e.g., as a result of stroke, traumatic brain injury, or dementia). · A Clock Drawing Severity Rating can serve as a quick monitor of progress or decline. · CLQT results can be used to target areas for direct treatment or everyday management of impaired skills to identify the need for more in-depth testing, or to help determine a different diagnosis.     Clock drawing task was completed with a score of 12/13. Generative naming was completed to assess word retrieval skills. Patient completing with a score of 3 out of possible 9. Story retell completed to assess working memory, comprehension, and language output skills. Patient completed with a score of 6 out of possible 10. Personal facts portion completed with a score of 8/8. Overall impression: Patient presents with mild/mod expressive and receptive language deficits. Expressive language deficits in areas of convergent/divergent naming and thought organization. REceptive language deficits in areas of following multi step commands, and understanding more complex concepts and questions. Moderate cognitive deficits in areas of problem solving/reasoning, memory, attention, executive functions, and safety awareness. Recommend speech services 5/6x a week for 60 minute sessions. Precautions: Fall    Subjective:  Patient was drowsy at the beginning of the session, however continued to be cooperative with all tasks. Receptive:   Auditory Comprehension:  Word level: WFL  Phrase/sentence level: WFL  Simple yes/no questions: Yes/No Questions: [facility])  One Step Basic Commands: (WFL)  Two Step Basic Commands: Mild Multistep Basic Commands: Moderate(Mild/mod)  Basic Questions: Antwan Dodd  Conversation: Mild(Mild/moderate impairment)     Visual/Reading Comprehension:   Decreased scanning secondary to blindness in right eye. Patient was able to read at large print level. Expressive Skills/ Verbal Expression:  Primary Mode of Expression: Verbal  Initiation: (WFL)  Repetition: (WFL)  Automatic Speech: (WFL)  Confrontation: (WFL)  Convergent: Moderate  Divergent: Severe  Responsive: Mild  Conversation: Moderate  Interfering Components: Impaired thought organization    Motor Speech/Dysarthria:   Motor Speech: Within Functional Limits    Oral Motor:  Appeared to be within functional limits    Attention/ Orientation/ Memory:    Overall Orientation Status: Within Functional Limits  Attention/concentration: Attention: Exceptions to [facility]  Patient oriented to person, place, time and biographical information. Patient did present with mild decreased attention skills when completing tasks. Did require min verbal cues to stay on task. Patient recalling 3/3 words following 1 minute delay/distraction. Story retell completed within the CLQT. Patient scoring a 6 out of possible 10. Problem Solving:   Problem Solving: Exceptions to [facility]    Abstract reasoning:    Abstract Reasoning: [facility]    Safety Awareness:   Safety/Judgement: Exceptions to [facility]    Number Sequencing:  Numeric Reasoning: [facility]     Pragmatics: Pragmatics: Within functional limits    Writing:   Dominant Hand: Right  Will assess further in treatment session.        VISION/HEARING:    Vision: Impaired(Patient is blind in right eye)  Hearing: Hard of hearing    REHAB POTENTIAL: [] Excellent [x] Good [] Fair  [] Poor    EDUCATION:   Learner: [x]Patient [] Significant other [] Son/Daughter [] Parent     [] Other:   Education: [x] Reviewed results and recommendations of this evaluation     [] Reviewed diet and strategies [] Reviewed signs, symptoms and risk of aspiration     [] Demonstrated how to thick liquid appropriately. [x] Reviewed goals and Plan of Care     [] OTHER:   Method: [x] Discussion [x] Demonstration [] Hand-out     [] OTHER:   Evaluation of Education:     [] Verbalizes understanding [x] Demonstrates with assistance     [] Demonstrates without assistance [x]Needs further instruction     [] No evidence of learning  [] Family not present    PLAN / TREATMENT RECOMMENDATIONS:  [] No further speech therapy services indicated. [x]  Further Speech therapy services reccommended       See POC for Goals.

## 2020-12-31 NOTE — PROGRESS NOTES
Ruddy Contreras arrived to room # [id number]. Presented with: cva  Mental Status: Patient is oriented, alert and coherent. Vitals:    12/30/20 2120   BP: (!) 149/68   Pulse: 79   Resp: 16   Temp: 97.1 °F (36.2 °C)     Patient safety contract and falls prevention contract reviewed with patient Yes. Oriented Patient to room. Call light within reach. Yes. Needs, issues or concerns expressed at this time: yes, concerned that bed is \"too high\".       Electronically signed by Layla Peña RN on 12/30/2020 at 10:03 PM

## 2020-12-31 NOTE — PROGRESS NOTES
4 Eyes Skin Assessment    Diego Starr is being assessed upon: Admission    I agree that I, SARAN Barragan, along with Tino Camacho RN (either 2 RN's or 1 LPN and 1 RN) have performed a thorough Head to Toe Skin Assessment on the patient. ALL assessment sites listed below have been assessed. Areas assessed by both nurses:     [x]   Head, Face, and Ears   [x]   Shoulders, Back, and Chest  [x]   Arms, Elbows, and Hands   [x]   Coccyx, Sacrum, and Ischium  [x]   Legs, Feet, and Heels    Does the Patient have Skin Breakdown?  No    Erasmo Prevention initiated: Yes  Wound Care Orders initiated: No    WO nurse consulted for Pressure Injury (Stage 3,4, Unstageable, DTI, NWPT, and Complex wounds) and New or Established Ostomies: No        Primary Nurse eSignature: SARAN Barragan RN on 12/30/2020 at 10:02 PM      Co-Signer eSignature: {Esignature:[id number]}

## 2020-12-31 NOTE — H&P
[facility]   History and Physical        Patient:   Chato Torrez  MR#:    [id number]  Account Number:                   [id number]      Room:    [address] [place] [address]-   YOB: 1933  Date of Progress Note: 12/31/2020  Time of Note                           6:26 AM  Attending Physician:  Gisella Estrada MD        Admitting diagnosis: Bilateral embolic strokes    Secondary diagnoses: Cor pulmonale, restless legs, essential hypertension, macular degeneration    CHIEF COMPLAINT: Generalized weakness, worse on the right      HISTORY OF PRESENT ILLNESS:   This 80 y.o. female  who presented to [facility] with inability to walk. She has a past medical history significant for breast cancer status post bilateral implants, chronic back pain, tremors, macular degeneration and essential hypertension. Patient had gone to bed on 12/26 around 2030. When she awoke in the morning around 830 she had difficulty getting out of bed. At baseline she is alert and oriented and conversant. Patient continued to have difficulty ambulating with unsteady gait. Her primary care provider advised her to seek evaluation at the emergency department. In the ED, blood pressure was elevated at 186/102. MRI brain was done showing acute stroke left medial precentral gyrus and acute right occipital stroke with chronic microvascular disease and atrophy. IV TPA not considered as last known well was 2030 on 12/26. She was admitted to the hospitalist service. interstitial fibrotic changes within the lungs. VQ scan today showed low probability for pulmonary embolus. She will need pulmonary function test, CT of the chest, and right heart cath in the outpatient setting once euvolemic. Creatinine on admission noted to with repeat down to 1. 15. Serum CO2 43she has a contraction alkalosis but renal function appears to be improving and stable. Overall, she is alert and oriented and neurologically back to baseline. She remains on 2 L nasal cannula recommend to wean as tolerated for SPO2 goal greater than 90%, avoid over oxygenating with known CO2 retention. Continue to encourage BiPAP nightly. She denies shortness of breath, chest pain or pressure. She is tolerating a diet. She has had good urine output from diuresis. Labs did show hypokalemia and hypomagnesemia for which she did receive a blood replacement. She continues to have weakness, more so on the right. She was evaluated by SPT for swallow and found to have Oropharyngeal dysphagia but she has now been upgraded to a regular diet with thin liquids. She is participating in both PT/OT. She is felt to need a stay on Rehab to work towards her goal of returning home with her family. She is now felt ready to start the Rehab program.    REVIEW OF SYSTEMS:  Constitutional  No fever or chills. No diaphoresis or significant fatigue. HENT   No tinnitus or significant hearing loss. Eyes  no sudden vision change or eye pain  Respiratory  no significant shortness of breath or cough  Cardiovascular  no chest pain No palpitations or significant leg swelling  Gastrointestinal  no abdominal swelling or pain. Genitourinary  No difficulty urinating, dysuria  Musculoskeletal  no back pain or myalgia. Skin  no color change or rash  Neurologic  No seizures. No lateralizing weakness. Hematologic  no easy bruising or excessive bleeding. Psychiatric  no severe anxiety or nervousness. All other review of systems are negative. Past Medical History:  No past medical history on file. Past Surgical History:  No past surgical history on file.     Medications in Hospital:      Current Facility-Administered Medications:     apixaban (ELIQUIS) tablet 2.5 mg, 2.5 mg, Oral, Q12H, Saintclair Edin, MD    aspirin chewable tablet 81 mg, 81 mg, Oral, Daily, Saintclair Edin, MD    bumetanide [facility]) tablet 1 mg, 1 mg, Oral, Daily, Saintclair Edin, MD    metoprolol succinate (TOPROL XL) extended release tablet 25 mg, 25 mg, Oral, Daily, Saintclair Edin, MD    carbidopa-levodopa (SINEMET)  MG per tablet 1 tablet, 1 tablet, Oral, Nightly, Saintclair Edin, MD    cloNIDine (CATAPRES) tablet 0.1 mg, 0.1 mg, Oral, BID, Saintclair Edin, MD    Fish Oil CAPS 1,200 mg, 1 capsule, Oral, BID WC, Saintclair Edin, MD    FLUoxetine (PROZAC) capsule 20 mg, 20 mg, Oral, Daily, Saintclair Edin, MD    gabapentin (NEURONTIN) capsule 300 mg, 300 mg, Oral, TID, Saintclair Edin, MD    HYDROcodone-acetaminophen [facility]) 7.5-325 MG per tablet 1 tablet, 1 tablet, Oral, Q6H PRN, Saintclair Edin, MD    lisinopril (PRINIVIL;ZESTRIL) tablet 40 mg, 40 mg, Oral, Daily, Saintclair Edin, MD    Multivitamin Adults TABS 1 ampule, 1 ampule, Oral, Daily, Saintclair Edin, MD    oxybutynin [facility]) extended release tablet 10 mg, 10 mg, Oral, Daily, Saintclair Edin, MD    pramipexole (MIRAPEX) tablet 0.5 mg, 0.5 mg, Oral, Nightly, Saintclair Edin, MD    QUEtiapine (SEROQUEL) tablet 300 mg, 300 mg, Oral, Nightly, Saintclair Edin, MD    Vitamin D3 CAPS 1 capsule, 1 capsule, Oral, Daily, Saintclair Edin, MD    raloxifene (EVISTA) tablet 60 mg, 60 mg, Oral, Daily, Saintclair Edin, MD    acetaminophen (TYLENOL) tablet 650 mg, 650 mg, Oral, Q4H PRN, Saintclair Edin, MD    polyethylene glycol [facility]) packet 17 g, 17 g, Oral, Daily, Saintclair Edin, MD    magnesium hydroxide (MILK OF MAGNESIA) 400 MG/5ML suspension 30 mL, 30 mL, Oral, Daily PRN, Saintclair Edin, MD   fleet rectal enema 1 enema, 1 enema, Rectal, Daily PRN, Ellen Joseph MD    atorvastatin (LIPITOR) tablet 80 mg, 80 mg, Oral, Nightly, Ellen Joseph MD, 80 mg at 12/30/20 2312    cloNIDine (CATAPRES) tablet 0.1 mg, 0.1 mg, Oral, BID, Ellen Joseph MD, 0.1 mg at 12/30/20 2312    gabapentin (NEURONTIN) capsule 300 mg, 300 mg, Oral, TID, Ellen Joseph MD, 300 mg at 12/30/20 2312    HYDROcodone-acetaminophen ([facility]) 7.5-325 MG per tablet 1 tablet, 1 tablet, Oral, Q6H PRN, Ellen Joseph MD    pramipexole (MIRAPEX) tablet 0.5 mg, 0.5 mg, Oral, Nightly, Ellen Joseph MD, 0.5 mg at 12/30/20 2312    carbidopa-levodopa (SINEMET)  MG per tablet 1 tablet, 1 tablet, Oral, Nightly, Ellen Joseph MD, 1 tablet at 12/30/20 2312    QUEtiapine (SEROQUEL) tablet 300 mg, 300 mg, Oral, Nightly, Ellen Joseph MD, 300 mg at 12/30/20 2312    Allergies:  Patient has no known allergies. Social History:   TOBACCO:   reports that she has never smoked. She does not have any smokeless tobacco history on file. ETOH:   has no history on file for alcohol. Family History:   No family history on file. Physical Exam:    Vitals: /60   Pulse 78   Temp 96.4 °F (35.8 °C) (Temporal)   Resp 16   Ht 4' 11\" (1.499 m)   Wt 169 lb 11.2 oz (77 kg)   SpO2 95%   BMI 34.28 kg/m²     Constitutional  well developed, well nourished. Eyes  conjunctiva normal.  Pupils react to light  Ear, nose, throat -hearing intact to finger rub No scars, masses, or lesions over external nose or ears, no atrophy of tongue  Neck-symmetric, no masses noted, no jugular vein distension  Respiration- chest wall appears symmetric, good expansion,   normal effort without use of accessory muscles  Cardiovascular- RRR without m,r,g  Musculoskeletal  no significant wasting of muscles noted, no bony deformities  Extremities-no clubbing, cyanosis or edema  Skin  warm, dry, and intact. No rash, erythema, or pallor. Psychiatric  mood, affect, and behavior appear normal.      Neurological exam  Awake, alert, fluent oriented x 3 appropriate affect  Attention and concentration appear appropriate  Recent and remote memory appears unremarkable  Speech normal without dysarthria  No clear issues with language of fund of knowledge    Cranial Nerve Exam   CN II- Visual fields grossly unremarkable  CN III, IV,VI-EOMI, No nystagmus, conjugate eye movements, no ptosis  CN VII-no facial assymetry  CN VIII-Hearing intact   CN IX and X- Palate elevates in midline  CN XI-good shoulder shrug  CN XII-Tongue midline with no fasciculations or fibrillations    Motor Exam  Moving bilateral arms and legs equally      Reflexes   Absent throughout    Tremors- no tremors in hands or head noted    Gait  Not tested    Coordination  No clear ataxia noted        CBC: No results for input(s): WBC, HGB, PLT in the last 72 hours. BMP:  No results for input(s): NA, K, CL, CO2, BUN, CREATININE, GLUCOSE in the last 72 hours. Hepatic: No results for input(s): AST, ALT, ALB, BILITOT, ALKPHOS in the last 72 hours. Lipids: No results for input(s): CHOL, HDL in the last 72 hours. Invalid input(s): LDLCALCU    INR: No results for input(s): INR in the last 72 hours. Assessment and Plan     1. 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 Baby aspirin and Eliquis  2. DVT prophylaxisEliquis  3. Essential hypertensioncontinue current medications and monitoring  4. CHF with recent pulmonary edemacontinue Bumex  5. Restless leg syndromecontinue Sinemet and Mirapex  6. Acute respiratory failure with hypercapniacontinue O2 and monitoring    If her creatinine remains below 1.5 will consider regular dose Eliquis.   Patient's functional assessment  Prior to hospitalization the patient was continent of bowel and continent of bladder    Functional assessment All notes from reehab data were reviewed regarding the patient's functional status. Current therapy  Requires PT, OT and/or speech therapy    Anticipated discharge approximately 17 days    Anticipated discharge setting  Home with home care    No clear barriers to discharge    The patient appears to be an appropriate candidate for inpatient rehabilitation    Sufficiently stable: Patient's condition is sufficiently stable at the time of admission to allow the patient to actively participate in an intensive rehabilitation program    Close medical supervision: A rehabilitation physician, or other licensed treating physician with specialized training and experience in inpatient rehabilitation, will conduct face-to-face visits with the patient a minimum of at least 3 days per week throughout the patient's stay    This patient requires close medical supervision for the active management of the ongoing conditions and potential complications stated in the admission note    Intensive rehabilitation nursing: The patient demonstrates the need for 24-hour rehabilitation nursing care for active management of the multiple medical issues documented in the admission note    Appropriate therapy needed: The patient requires the active and ongoing therapeutic intervention of at least 2 therapeutic disciplines, one of which must be physical or occupational therapy and/or speech therapy    Intensive therapy: The patient requires and is reasonably expected to actively participate in at least 3 hours of therapy per day at least 5 days per week, and expected to make measurable improvements that will be of practical value to improve the patient's functional capacity or adaptation to impairments.  In addition, therapy treatments will begin within 36 hours from midnight of the day of the patient's admission to the inpatient rehabilitation facility    Expected duration and frequency therapy: 180 minutes per day, 5 days per week Interdisciplinary team: The patient demonstrates the need for an interdisciplinary team for active management of the following medical issues including ataxia, motor planning, balance, disease management, elimination, endurance, family training, education, independent ADLs, pain management, precautions, range of motion, safety, strength, and transfers    I have reviewed the preadmission screening documents and concur with the findings. I believe the patient meets criteria and is sufficiently stable to allow participation in the program. This requires an intensive level of therapy, close medical supervision, and an interdisciplinary team approach provided through an individualized plan of care. I approve admitting this patient for an intensive inpatient rehabilitation program.      Akbar Brenner.  Apryl Cruz MD

## 2020-12-31 NOTE — PROGRESS NOTES
Occupational Therapy   Occupational Therapy Initial Assessment  Date: 2020   Patient Name: Alexandra Aggarwal  MRN: [id number]     : 10/14/1933    Date of Service: 2020    Discharge Recommendations:  Continue to assess pending progress       Assessment   Performance deficits / Impairments: Decreased functional mobility ; Decreased ADL status; Decreased strength;Decreased cognition;Decreased endurance;Decreased balance;Decreased high-level IADLs  Assessment: Patient requires increased assistance with ADLs due to decreased strength/activity tolerance, requiring 1 L O2. Pt's O2 sats at 83% when trialing on room air. She has intention tremors inhibiting her transfers. Placed back on 1 L O2. She requires skilled OT to increase her independence with ADLs. Treatment Diagnosis: B CVAs  Prognosis: Good  Decision Making: Low Complexity  OT Education: OT Role;Plan of Care;ADL Adaptive Strategies;Transfer Training  Activity Tolerance  Activity Tolerance: Patient limited by fatigue  Safety Devices  Safety Devices in place: Yes  Type of devices: Bed alarm in place;Call light within reach           Patient Diagnosis(es): There were no encounter diagnoses. has no past medical history on file. has no past surgical history on file. Treatment Diagnosis: B CVAs      Restrictions  Restrictions/Precautions  Restrictions/Precautions: Weight Bearing, Fall Risk(ASPIRATION RISK)  Lower Extremity Weight Bearing Restrictions  Right Lower Extremity Weight Bearing: Weight Bearing As Tolerated  Left Lower Extremity Weight Bearing: Weight Bearing As Tolerated  Position Activity Restriction  Other position/activity restrictions: 1 L O2    Subjective   General  Chart Reviewed: Yes  Patient assessed for rehabilitation services?: Yes  Diagnosis: B  CVAs  Oxygen Therapy  O2 Device: Nasal cannula  O2 Flow Rate (L/min): 2 L/min  Social/Functional History    Will need to assess tomorrow.         Objective Vision: Impaired(Macular degeneration/ blind in R eye)  Hearing: Exceptions to [facility]  Hearing Exceptions: Hard of hearing/hearing concerns    Orientation  Overall Orientation Status: Within Functional Limits     Balance  Sitting Balance: Stand by assistance  Standing Balance: Minimal assistance     Tone RUE  RUE Tone: Normotonic  Tone LUE  LUE Tone: Normotonic  Coordination  Movements Are Fluid And Coordinated: No  Coordination and Movement description: Gross motor impairments; Intention tremors        Transfers  Stand Step Transfers: Moderate assistance  Sit to stand: Minimal assistance  Stand to sit: Minimal assistance     Cognition  Overall Cognitive Status: Exceptions  Following Commands: Follows all commands without difficulty  Attention Span: Appears intact  Cognition Comment: Very lethargic, difficulty verbalizing at times.                  LUE AROM (degrees)  LUE AROM : WFL  Left Hand AROM (degrees)  Left Hand AROM: WFL  RUE AROM (degrees)  RUE AROM : WFL  Right Hand AROM (degrees)  Right Hand AROM: WFL  LUE Strength  Gross LUE Strength: Exceptions to [facility]  LUE Strength Comment: Proximally/distally 4/5  RUE Strength  Gross RUE Strength: Exceptions to [facility]  RUE Strength Comment: Proximally/distally 4/5                   Plan   Plan  Current Treatment Recommendations: Strengthening, Balance Training, Functional Mobility Training, Endurance Training, Safety Education & Training, Equipment Evaluation, Education, & procurement, Patient/Caregiver Education & Training, Self-Care / ADL, Home Management Training         OutComes Score             Eating  Assistance Needed: Setup or clean-up assistance  CARE Score: 5  Discharge Goal: Independent    Oral Hygiene  Assistance Needed: Partial/moderate assistance  Comment: with brushing dentures  CARE Score: 3  Discharge Goal: Independent     Toileting Hygiene  Assistance Needed: Substantial/maximal assistance  CARE Score: 2  Discharge Goal: Supervision or touching assistance

## 2020-12-31 NOTE — PROGRESS NOTES
Speech Language Pathology  Facility/Department: [facility] 8 REHAB UNIT   CLINICAL BEDSIDE SWALLOW EVALUATION    NAME: Alexandra Aggarwal  : 10/14/1933  MRN: [id number]    ADMISSION DATE: 2020  ADMITTING DIAGNOSIS: has Acute ischemic stroke [facility]) on their problem list.      Date of Eval: 2020  Evaluating Therapist: Cornelia Cline    Current Diet level:  Current Diet : Regular  Current Liquid Diet : Thin      Primary Complaint   Chewing/swallowing    Pain:  Pain Assessment  Pain Assessment: 0-10  Pain Level: 0(denies any pain whatsoever)    Reason for Referral  Alexandra Aggarwal was referred for a bedside swallow evaluation to assess the efficiency of her swallow function, identify signs and symptoms of aspiration and make recommendations regarding safe dietary consistencies, effective compensatory strategies, and safe eating environment. Impression  Patients swallow function assessed. Patient presenting with functional oral prep and mastication for more solid consistencies. Mild sluggish/decreased laryngeal elevation for airway protection was observed, however no overt s/s of aspiration with any regular solid, soft solid, or thin liquids during lunch time meal.       At this time recommend continuation of regular diet consistency. Meds whole with water. Treatment Plan  Requires SLP Intervention: Yes(For cognition)    Recommended Diet and Intervention  Diet Solids Recommendation: Regular  Liquid Consistency Recommendation: Thin  Recommended Form of Meds: Whole with water          Compensatory Swallowing Strategies  Compensatory Swallowing Strategies: Small bites/sips; Alternate solids and liquids;Upright as possible for all oral intake;Remain upright for 30-45 minutes after meals    General  Chart Reviewed: Yes  Behavior/Cognition: Alert; Cooperative  Communication Observation: Functional  Follows Directions: Simple  Dentition: Dentures bottom  Patient Positioning: Upright in chair Consistencies Administered: Reg solid; Dysphagia Soft and Bite-Sized (Dysphagia III); Dysphagia Pureed (Dysphagia I); Thin      Oral Phase Dysfunction  Oral Phase  Oral Phase: WFL  Oral Phase  Oral Phase - Comment: Patient presenting with functional mastication and oral prep for more solid consistencies . Indicators of Pharyngeal Phase Dysfunction   Pharyngeal Phase  Pharyngeal Phase: Exceptions  Pharyngeal Phase   Pharyngeal: Mild sluggish/decreased laryngeal elevation for airway protection, however no overt s/s of aspiration observed with any regular solid, soft solid, or thin liquids.     Electronically signed by MICHELLE Parikh on 12/31/2020 at 1:44 PM

## 2021-01-01 ENCOUNTER — OFFICE VISIT (OUTPATIENT)
Dept: PRIMARY CARE CLINIC | Age: 86
End: 2021-01-01
Payer: MEDICARE

## 2021-01-01 VITALS
SYSTOLIC BLOOD PRESSURE: 134 MMHG | BODY MASS INDEX: 34.84 KG/M2 | WEIGHT: 172.8 LBS | DIASTOLIC BLOOD PRESSURE: 80 MMHG | HEIGHT: 59 IN | RESPIRATION RATE: 16 BRPM | HEART RATE: 66 BPM | OXYGEN SATURATION: 100 % | TEMPERATURE: 97.7 F

## 2021-01-01 DIAGNOSIS — I35.1 NONRHEUMATIC AORTIC VALVE INSUFFICIENCY: ICD-10-CM

## 2021-01-01 DIAGNOSIS — I63.9 CEREBROVASCULAR ACCIDENT (CVA), UNSPECIFIED MECHANISM (HCC): ICD-10-CM

## 2021-01-01 DIAGNOSIS — I50.812 CHRONIC RIGHT-SIDED HEART FAILURE (HCC): ICD-10-CM

## 2021-01-01 DIAGNOSIS — G91.2 NPH (NORMAL PRESSURE HYDROCEPHALUS) (HCC): Primary | ICD-10-CM

## 2021-01-01 PROCEDURE — 99203 OFFICE O/P NEW LOW 30 MIN: CPT | Performed by: FAMILY MEDICINE

## 2021-01-01 PROCEDURE — 97129 THER IVNTJ 1ST 15 MIN: CPT

## 2021-01-01 PROCEDURE — 1180000000 HC REHAB R&B

## 2021-01-01 PROCEDURE — G8484 FLU IMMUNIZE NO ADMIN: HCPCS | Performed by: FAMILY MEDICINE

## 2021-01-01 PROCEDURE — 1090F PRES/ABSN URINE INCON ASSESS: CPT | Performed by: FAMILY MEDICINE

## 2021-01-01 PROCEDURE — 2700000000 HC OXYGEN THERAPY PER DAY

## 2021-01-01 PROCEDURE — 1123F ACP DISCUSS/DSCN MKR DOCD: CPT | Performed by: FAMILY MEDICINE

## 2021-01-01 PROCEDURE — G8427 DOCREV CUR MEDS BY ELIG CLIN: HCPCS | Performed by: FAMILY MEDICINE

## 2021-01-01 PROCEDURE — 97130 THER IVNTJ EA ADDL 15 MIN: CPT

## 2021-01-01 PROCEDURE — 4040F PNEUMOC VAC/ADMIN/RCVD: CPT | Performed by: FAMILY MEDICINE

## 2021-01-01 PROCEDURE — 97530 THERAPEUTIC ACTIVITIES: CPT

## 2021-01-01 PROCEDURE — G8417 CALC BMI ABV UP PARAM F/U: HCPCS | Performed by: FAMILY MEDICINE

## 2021-01-01 PROCEDURE — 1036F TOBACCO NON-USER: CPT | Performed by: FAMILY MEDICINE

## 2021-01-01 PROCEDURE — 97110 THERAPEUTIC EXERCISES: CPT

## 2021-01-01 PROCEDURE — 6370000000 HC RX 637 (ALT 250 FOR IP): Performed by: PSYCHIATRY & NEUROLOGY

## 2021-01-01 PROCEDURE — 97116 GAIT TRAINING THERAPY: CPT

## 2021-01-01 PROCEDURE — 97535 SELF CARE MNGMENT TRAINING: CPT

## 2021-01-01 RX ADMIN — ATORVASTATIN CALCIUM 80 MG: 80 TABLET, FILM COATED ORAL at 20:47

## 2021-01-01 RX ADMIN — FLUOXETINE HYDROCHLORIDE 20 MG: 20 CAPSULE ORAL at 07:57

## 2021-01-01 RX ADMIN — OXYBUTYNIN CHLORIDE 10 MG: 5 TABLET, EXTENDED RELEASE ORAL at 07:58

## 2021-01-01 RX ADMIN — GABAPENTIN 300 MG: 300 CAPSULE ORAL at 20:47

## 2021-01-01 RX ADMIN — METOPROLOL SUCCINATE 25 MG: 25 TABLET, EXTENDED RELEASE ORAL at 07:57

## 2021-01-01 RX ADMIN — APIXABAN 2.5 MG: 2.5 TABLET, FILM COATED ORAL at 17:04

## 2021-01-01 RX ADMIN — ASPIRIN 81 MG: 81 TABLET, CHEWABLE ORAL at 07:57

## 2021-01-01 RX ADMIN — Medication 5000 UNITS: at 07:57

## 2021-01-01 RX ADMIN — BUMETANIDE 1 MG: 1 TABLET ORAL at 07:57

## 2021-01-01 RX ADMIN — CLONIDINE HYDROCHLORIDE 0.1 MG: 0.1 TABLET ORAL at 20:47

## 2021-01-01 RX ADMIN — CLONIDINE HYDROCHLORIDE 0.1 MG: 0.1 TABLET ORAL at 07:57

## 2021-01-01 RX ADMIN — CARBIDOPA AND LEVODOPA 1 TABLET: 25; 100 TABLET ORAL at 20:47

## 2021-01-01 RX ADMIN — MULTIPLE VITAMINS W/ MINERALS TAB 1 TABLET: TAB at 07:57

## 2021-01-01 RX ADMIN — PRAMIPEXOLE DIHYDROCHLORIDE 0.5 MG: 0.25 TABLET ORAL at 20:46

## 2021-01-01 RX ADMIN — GABAPENTIN 300 MG: 300 CAPSULE ORAL at 13:47

## 2021-01-01 RX ADMIN — APIXABAN 2.5 MG: 2.5 TABLET, FILM COATED ORAL at 05:30

## 2021-01-01 RX ADMIN — HYDROCODONE BITARTRATE AND ACETAMINOPHEN 1 TABLET: 7.5; 325 TABLET ORAL at 17:06

## 2021-01-01 RX ADMIN — QUETIAPINE FUMARATE 300 MG: 300 TABLET ORAL at 20:47

## 2021-01-01 RX ADMIN — GABAPENTIN 300 MG: 300 CAPSULE ORAL at 07:58

## 2021-01-01 RX ADMIN — LISINOPRIL 40 MG: 20 TABLET ORAL at 07:57

## 2021-01-01 ASSESSMENT — ENCOUNTER SYMPTOMS
DIARRHEA: 0
NAUSEA: 0
BACK PAIN: 0
ABDOMINAL PAIN: 0
VOMITING: 0
WHEEZING: 0
EYE DISCHARGE: 0
COUGH: 0
SHORTNESS OF BREATH: 1
COLOR CHANGE: 0

## 2021-01-01 ASSESSMENT — PATIENT HEALTH QUESTIONNAIRE - PHQ9
SUM OF ALL RESPONSES TO PHQ QUESTIONS 1-9: 0
1. LITTLE INTEREST OR PLEASURE IN DOING THINGS: 0
2. FEELING DOWN, DEPRESSED OR HOPELESS: 0
SUM OF ALL RESPONSES TO PHQ9 QUESTIONS 1 & 2: 0
SUM OF ALL RESPONSES TO PHQ QUESTIONS 1-9: 0
SUM OF ALL RESPONSES TO PHQ QUESTIONS 1-9: 0

## 2021-01-01 ASSESSMENT — PAIN SCALES - GENERAL: PAINLEVEL_OUTOF10: 6

## 2021-01-01 ASSESSMENT — PAIN DESCRIPTION - ONSET: ONSET: SUDDEN

## 2021-01-01 ASSESSMENT — PAIN DESCRIPTION - FREQUENCY: FREQUENCY: INTERMITTENT

## 2021-01-01 ASSESSMENT — PAIN - FUNCTIONAL ASSESSMENT: PAIN_FUNCTIONAL_ASSESSMENT: PREVENTS OR INTERFERES SOME ACTIVE ACTIVITIES AND ADLS

## 2021-01-01 NOTE — PROGRESS NOTES
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 She was cooperative with all therapy tasks. Objective: The patient was able to state the year, month, day of the week. She was able to recall all demographic information without cueing. She was able to complete mental manipulation tasks. She did lose her place a few times with counting in reverse but with minimal cues, she scored at 100%. Some dysfluencies were noted this date. Anomia observed in conversation and structured tasks. Short term recall tasks were completed with delay and distraction. She was able to recall unrelated words at 80% without cues. 02 sats on room air were at 90 this date. Assessment:  Patient presents with mild/mod expressive and receptive language deficits. Moderate cognitive deficits were noted. She is recommended to continue speech therapy services to address deficits.        SHORT TERM GOAL #1: Goal 1: The patient will demonstrate recall of functional information following a/an (immediate, short term, long term) delay with min cues in order to increase functional integration into environment. SHORT TERM GOAL #2:  Goal 2: The patient will complete simple/complex naming tasks with min verbal cues at 80% accuracy to improve thought organization and word retrieval skills. SHORT TERM GOAL #3:  Goal 3: The patient will follow multi step commands related to functional living environment with 80% accuracy and min cues in order to increase functional integration into environment. SHORT TERM GOAL #4:  Goal 4: The patient will complete problem solving/reasoning tasks with min verbal cues at 80% accuracy in order to improve safety awareness for functional tasks. ASSESSMENT:  Assessment: [x]Progressing towards goals          []Not Progressing towards goals    Patient Tolerance of Treatment:   [x]Tolerated well []Tolerated fair []Required rest breaks []Fatigued    Education:  Learner:  [x]Patient          []Significant Other          []Other  Education provided regarding:  [x]Goals and POC   []Diet and swallowing precautions    []Home Exercise Program  []Progress and/or discharge information  Method of Education:  [x]Discussion          []Demonstration          []Handout          []Other  Evaluation of Education:   [x]Verbalized understanding   []Demonstrates without assistance  []Demonstrates with assistance  []Needs further instruction     []No evidence of learning                  []No family present      Plan: [x]Continue with current plan of care    []Modify current plan of care as follows:    []Discharge patient    Discharge Location:    Services/Supervision Recommended:      [x]Patient continues to require treatment by a licensed therapist to address functional deficits as outlined in the established plan of care.     Electronically Signed By:  Fazal Walter  1/1/2021,11:18 AM.

## 2021-01-02 PROCEDURE — 97130 THER IVNTJ EA ADDL 15 MIN: CPT

## 2021-01-02 PROCEDURE — 97129 THER IVNTJ 1ST 15 MIN: CPT

## 2021-01-02 PROCEDURE — 6370000000 HC RX 637 (ALT 250 FOR IP): Performed by: PSYCHIATRY & NEUROLOGY

## 2021-01-02 PROCEDURE — 1180000000 HC REHAB R&B

## 2021-01-02 PROCEDURE — 97530 THERAPEUTIC ACTIVITIES: CPT

## 2021-01-02 PROCEDURE — 97535 SELF CARE MNGMENT TRAINING: CPT

## 2021-01-02 PROCEDURE — 97110 THERAPEUTIC EXERCISES: CPT

## 2021-01-02 PROCEDURE — 2700000000 HC OXYGEN THERAPY PER DAY

## 2021-01-02 RX ADMIN — GABAPENTIN 300 MG: 300 CAPSULE ORAL at 15:00

## 2021-01-02 RX ADMIN — FLUOXETINE HYDROCHLORIDE 20 MG: 20 CAPSULE ORAL at 08:36

## 2021-01-02 RX ADMIN — OXYBUTYNIN CHLORIDE 10 MG: 5 TABLET, EXTENDED RELEASE ORAL at 08:36

## 2021-01-02 RX ADMIN — CLONIDINE HYDROCHLORIDE 0.1 MG: 0.1 TABLET ORAL at 08:40

## 2021-01-02 RX ADMIN — APIXABAN 2.5 MG: 2.5 TABLET, FILM COATED ORAL at 20:54

## 2021-01-02 RX ADMIN — GABAPENTIN 300 MG: 300 CAPSULE ORAL at 20:54

## 2021-01-02 RX ADMIN — BUMETANIDE 1 MG: 1 TABLET ORAL at 08:36

## 2021-01-02 RX ADMIN — ASPIRIN 81 MG: 81 TABLET, CHEWABLE ORAL at 08:36

## 2021-01-02 RX ADMIN — CLONIDINE HYDROCHLORIDE 0.1 MG: 0.1 TABLET ORAL at 20:54

## 2021-01-02 RX ADMIN — POLYETHYLENE GLYCOL 3350 17 G: 17 POWDER, FOR SOLUTION ORAL at 08:36

## 2021-01-02 RX ADMIN — CARBIDOPA AND LEVODOPA 1 TABLET: 25; 100 TABLET ORAL at 20:54

## 2021-01-02 RX ADMIN — PRAMIPEXOLE DIHYDROCHLORIDE 0.5 MG: 0.25 TABLET ORAL at 20:54

## 2021-01-02 RX ADMIN — APIXABAN 2.5 MG: 2.5 TABLET, FILM COATED ORAL at 08:36

## 2021-01-02 RX ADMIN — METOPROLOL SUCCINATE 25 MG: 25 TABLET, EXTENDED RELEASE ORAL at 08:36

## 2021-01-02 RX ADMIN — Medication 5000 UNITS: at 08:41

## 2021-01-02 RX ADMIN — MULTIPLE VITAMINS W/ MINERALS TAB 1 TABLET: TAB at 08:41

## 2021-01-02 RX ADMIN — QUETIAPINE FUMARATE 300 MG: 300 TABLET ORAL at 20:54

## 2021-01-02 RX ADMIN — ATORVASTATIN CALCIUM 80 MG: 80 TABLET, FILM COATED ORAL at 20:54

## 2021-01-02 RX ADMIN — LISINOPRIL 40 MG: 20 TABLET ORAL at 08:36

## 2021-01-02 RX ADMIN — GABAPENTIN 300 MG: 300 CAPSULE ORAL at 08:36

## 2021-01-02 ASSESSMENT — PAIN SCALES - GENERAL: PAINLEVEL_OUTOF10: 0

## 2021-01-02 NOTE — PROGRESS NOTES
01/02/21 1009   Restrictions/Precautions   Restrictions/Precautions Weight Bearing; Fall Risk   Lower Extremity Weight Bearing Restrictions   Right Lower Extremity Weight Bearing Weight Bearing As Tolerated   Left Lower Extremity Weight Bearing Weight Bearing As Tolerated   General   Chart Reviewed Yes   Patient assessed for rehabilitation services? 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 FOCUSING OF LARGE AMPLITUDE LOW Antoine Díaz.    Conditions Requiring Skilled Therapeutic Intervention   Assessment PT ARIVED ON 2L/MIN NC WITH PO2 OF 99%, O2 DECREASED TO 1L/MIN WITH PO2 REMAINING AT 99%, PT THEN PLACED ON ROOM AIR WITH PO2 AT 92%   Activity Tolerance   Activity Tolerance Patient limited by fatigue

## 2021-01-02 NOTE — PLAN OF CARE
Problem: Falls - Risk of:  Goal: Will remain free from falls  Description: Will remain free from falls  Outcome: Ongoing  Goal: Absence of physical injury  Description: Absence of physical injury  Outcome: Ongoing     Problem: ACTIVITY INTOLERANCE/IMPAIRED MOBILITY  Goal: Mobility/activity is maintained at optimum level for patient  Outcome: Ongoing     Problem: Skin Integrity:  Goal: Will show no infection signs and symptoms  Description: Will show no infection signs and symptoms  Outcome: Ongoing  Goal: Absence of new skin breakdown  Description: Absence of new skin breakdown  Outcome: Ongoing

## 2021-01-02 NOTE — PROGRESS NOTES
Occupational Therapy  Facility/Department: [facility] 8 REHAB UNIT  Daily Treatment Note  NAME: Jasvir Rodriguez  : 10/14/1933  MRN: [id number]    Date of Service: 2021    Discharge Recommendations:  Continue to assess pending progress       Assessment   Performance deficits / Impairments: Decreased functional mobility ; Decreased ADL status; Decreased strength;Decreased cognition;Decreased endurance;Decreased balance;Decreased high-level IADLs  Assessment: She has intention tremors inhibiting her transfers, had great difficulty with stand pivot transfer from bed to w/c today. She requires skilled OT to increase her independence with ADLs. Treatment Diagnosis: B CVAs  Prognosis: Good  Activity Tolerance  Activity Tolerance: Patient Tolerated treatment well         Patient Diagnosis(es): There were no encounter diagnoses. has no past medical history on file. has no past surgical history on file. Restrictions  Restrictions/Precautions  Restrictions/Precautions: Weight Bearing, Fall Risk  Lower Extremity Weight Bearing Restrictions  Right Lower Extremity Weight Bearing: Weight Bearing As Tolerated  Left Lower Extremity Weight Bearing: Weight Bearing As Tolerated  Position Activity Restriction  Other position/activity restrictions: 1 L O2  Subjective   General  Chart Reviewed: Yes  Patient assessed for rehabilitation services?: Yes  Response to previous treatment: Patient with no complaints from previous session  Family / Caregiver Present: No  Diagnosis: B  CVAs  Vital Signs  Patient Currently in Pain: No   Orientation     Objective    ADL  Grooming: Setup;Stand by assistance  UE Dressing: Moderate assistance;Minimal assistance  LE Dressing: Moderate assistance;Maximum assistance        Balance  Sitting Balance: Supervision  Bed mobility  Supine to Sit: Contact guard assistance  Transfers  Stand Pivot Transfers: Moderate assistance  Sit to stand:  Moderate assistance  Stand to sit: Minimal assistance Type of ROM/Therapeutic Exercise  Type of ROM/Therapeutic Exercise: AROM  Comment: BUE 2 sets x 10 reps. Plan   Plan  Current Treatment Recommendations: Strengthening, Balance Training, Functional Mobility Training, Endurance Training, Safety Education & Training, Equipment Evaluation, Education, & procurement, Patient/Caregiver Education & Training, Self-Care / ADL, Home Management Training  G-Code     OutComes Score                                                  AM-PAC Score             Goals  Short term goals  Time Frame for Short term goals: 1 week  Short term goal 1: CGA with clothing management/hygiene for toileting. Short term goal 2: CGA with toilet transfers. Short term goal 3: Min A with bathing hygiene. Short term goal 4: Min A with LB dressing, AE PRN. Short term goal 5: Min A with donning/doffing socks and shoes using AE PRN. Short term goal 6: Patient will complete 1-2 handed static standing tasks for 3 minutes, requiring SBA. Long term goals  Time Frame for Long term goals : 3 weeks  Long term goal 1: Supervision with clothing management/hygiene for toileting. Long term goal 2: Supervision with toilet transfers. Long term goal 3: Supervision with bathing hygiene. Long term goal 4: Supervision with LB dressing, AE PRN. Long term goal 5: Supervision with UB dressing. Long term goals 6: Patient verbalize DME needs. Patient Goals   Patient goals : Return home.        Therapy Time   Individual Concurrent Group Co-treatment   Time In 0900         Time Out 1000         Minutes 60         Timed Code Treatment Minutes: Yasmin 23 Migel Venegas

## 2021-01-02 NOTE — PROGRESS NOTES
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 02 sats were at 90 and above, therefore she is not wearing her 02 today. She has stated her  was a preacher and completed numerous mission trips. She made a point to state she did not go on all of the mission trips with him and does not want staff members asking if she joined him. Objective:  Pt did complain of food sticking in the oral cavity this date. She did complain of dry mouth. Liquid wash was utilized which was beneficial in clearing residue. No overt s/s of aspiration observed with solids or with thin liquids via straw. Increased dysfluencies and anomia were noted this date. Anomia during conversation and structured tasks was noted. Automatic speech tasks were completed. Days of the week were completed without difficutly. Months of year were also completed without difficulty. Counting from 1 to 20 without cueing. Mental manipulation tasks (counting in reverse from 20 to 1 without cues) were completed without cueing. [place] divergent naming tasks were completed. Five items were named in two minutes. Later in the session she was able to name two items in a different category when given one minute to complete. Abstract divergent naming tasks were completed. She was able to name words that start with the letter /s/. She named eleven words when given two minutes to complete. Assessment:  Patient presents with mild/mod expressive and receptive language deficits. Moderate cognitive deficits were noted. She is recommended to continue speech therapy services to address deficits. SHORT TERM GOAL #1:  Goal 1: The patient will demonstrate recall of functional information following a/an (immediate, short term, long term) delay with min cues in order to increase functional integration into environment. SHORT TERM GOAL #2:  Goal 2: The patient will complete simple/complex naming tasks with min verbal cues at 80% accuracy to improve thought organization and word retrieval skills. SHORT TERM GOAL #3:  Goal 3: The patient will follow multi step commands related to functional living environment with 80% accuracy and min cues in order to increase functional integration into environment. SHORT TERM GOAL #4:  Goal 4: The patient will complete problem solving/reasoning tasks with min verbal cues at 80% accuracy in order to improve safety awareness for functional tasks.       ASSESSMENT:  Assessment: [x]Progressing towards goals          []Not Progressing towards goals    Patient Tolerance of Treatment:   [x]Tolerated well []Tolerated fair []Required rest breaks []Fatigued    Education:  Learner:  [x]Patient          []Significant Other          []Other  Education provided regarding:  [x]Goals and POC   []Diet and swallowing precautions    []Home Exercise Program  []Progress and/or discharge information  Method of Education:  [x]Discussion          []Demonstration          []Handout          []Other  Evaluation of Education: [x]Verbalized understanding   []Demonstrates without assistance  []Demonstrates with assistance  []Needs further instruction     []No evidence of learning                  []No family present      Plan: [x]Continue with current plan of care    []Modify current plan of care as follows:    []Discharge patient    Discharge Location:    Services/Supervision Recommended:      [x]Patient continues to require treatment by a licensed therapist to address functional deficits as outlined in the established plan of care.                   Electronically Signed By:  Linda Kemp  1/2/2021,11:12 AM.

## 2021-01-02 NOTE — PLAN OF CARE
Problem: Falls - Risk of:  Goal: Will remain free from falls  Description: Will remain free from falls  1/2/2021 1544 by Bharat Barry RN  Outcome: Ongoing  1/2/2021 0537 by Paresh Perez LPN  Outcome: Ongoing  Goal: Absence of physical injury  Description: Absence of physical injury  1/2/2021 1544 by Bharat Barry RN  Outcome: Ongoing  1/2/2021 0537 by Paresh Perez LPN  Outcome: Ongoing

## 2021-01-03 PROCEDURE — 6370000000 HC RX 637 (ALT 250 FOR IP): Performed by: PSYCHIATRY & NEUROLOGY

## 2021-01-03 PROCEDURE — 2700000000 HC OXYGEN THERAPY PER DAY

## 2021-01-03 PROCEDURE — 1180000000 HC REHAB R&B

## 2021-01-03 RX ADMIN — CLONIDINE HYDROCHLORIDE 0.1 MG: 0.1 TABLET ORAL at 20:57

## 2021-01-03 RX ADMIN — CLONIDINE HYDROCHLORIDE 0.1 MG: 0.1 TABLET ORAL at 07:27

## 2021-01-03 RX ADMIN — MULTIPLE VITAMINS W/ MINERALS TAB 1 TABLET: TAB at 07:28

## 2021-01-03 RX ADMIN — ASPIRIN 81 MG: 81 TABLET, CHEWABLE ORAL at 07:27

## 2021-01-03 RX ADMIN — BUMETANIDE 1 MG: 1 TABLET ORAL at 07:27

## 2021-01-03 RX ADMIN — APIXABAN 2.5 MG: 2.5 TABLET, FILM COATED ORAL at 20:56

## 2021-01-03 RX ADMIN — PRAMIPEXOLE DIHYDROCHLORIDE 0.5 MG: 0.25 TABLET ORAL at 20:56

## 2021-01-03 RX ADMIN — APIXABAN 2.5 MG: 2.5 TABLET, FILM COATED ORAL at 07:27

## 2021-01-03 RX ADMIN — QUETIAPINE FUMARATE 300 MG: 300 TABLET ORAL at 20:56

## 2021-01-03 RX ADMIN — LISINOPRIL 40 MG: 20 TABLET ORAL at 07:27

## 2021-01-03 RX ADMIN — Medication 5000 UNITS: at 07:28

## 2021-01-03 RX ADMIN — GABAPENTIN 300 MG: 300 CAPSULE ORAL at 20:56

## 2021-01-03 RX ADMIN — GABAPENTIN 300 MG: 300 CAPSULE ORAL at 07:28

## 2021-01-03 RX ADMIN — GABAPENTIN 300 MG: 300 CAPSULE ORAL at 14:17

## 2021-01-03 RX ADMIN — CARBIDOPA AND LEVODOPA 1 TABLET: 25; 100 TABLET ORAL at 21:01

## 2021-01-03 RX ADMIN — OXYBUTYNIN CHLORIDE 10 MG: 5 TABLET, EXTENDED RELEASE ORAL at 07:28

## 2021-01-03 RX ADMIN — FLUOXETINE HYDROCHLORIDE 20 MG: 20 CAPSULE ORAL at 07:28

## 2021-01-03 RX ADMIN — HYDROCODONE BITARTRATE AND ACETAMINOPHEN 1 TABLET: 7.5; 325 TABLET ORAL at 14:17

## 2021-01-03 RX ADMIN — ATORVASTATIN CALCIUM 80 MG: 80 TABLET, FILM COATED ORAL at 20:56

## 2021-01-03 RX ADMIN — METOPROLOL SUCCINATE 25 MG: 25 TABLET, EXTENDED RELEASE ORAL at 07:28

## 2021-01-03 ASSESSMENT — PAIN SCALES - GENERAL: PAINLEVEL_OUTOF10: 8

## 2021-01-03 NOTE — PLAN OF CARE
Problem: Falls - Risk of:  Goal: Will remain free from falls  Description: Will remain free from falls  1/3/2021 1029 by Atul Husain RN  Outcome: Ongoing  1/3/2021 0103 by Bety Troncoso LPN  Outcome: Ongoing  Goal: Absence of physical injury  Description: Absence of physical injury  1/3/2021 1029 by Atul Husain RN  Outcome: Ongoing  1/3/2021 0103 by Bety Troncoso LPN  Outcome: Ongoing

## 2021-01-03 NOTE — PLAN OF CARE
Problem: Falls - Risk of:  Goal: Will remain free from falls  Description: Will remain free from falls  1/3/2021 0103 by Ivan Jaime LPN  Outcome: Ongoing  1/2/2021 1544 by Ginette Wright RN  Outcome: Ongoing  Goal: Absence of physical injury  Description: Absence of physical injury  1/3/2021 0103 by Ivan Jaime LPN  Outcome: Ongoing  1/2/2021 1544 by Ginette Wright RN  Outcome: Ongoing     Problem: ACTIVITY INTOLERANCE/IMPAIRED MOBILITY  Goal: Mobility/activity is maintained at optimum level for patient  1/3/2021 0103 by Ivan Jaime LPN  Outcome: Ongoing  1/2/2021 1544 by Ginette Wright RN  Outcome: Ongoing     Problem: Skin Integrity:  Goal: Will show no infection signs and symptoms  Description: Will show no infection signs and symptoms  1/3/2021 0103 by Ivan Jaime LPN  Outcome: Ongoing  1/2/2021 1544 by Ginette Wright RN  Outcome: Ongoing  Goal: Absence of new skin breakdown  Description: Absence of new skin breakdown  1/3/2021 0103 by Ivan Jaime LPN  Outcome: Ongoing  1/2/2021 1544 by Ginette Wright RN  Outcome: Ongoing

## 2021-01-04 LAB
ANION GAP SERPL CALCULATED.3IONS-SCNC: 6 MMOL/L (ref 7–19)
BASOPHILS ABSOLUTE: 0 K/UL (ref 0–0.2)
BASOPHILS RELATIVE PERCENT: 0.4 % (ref 0–1)
BUN BLDV-MCNC: 39 MG/DL (ref 8–23)
CALCIUM SERPL-MCNC: 9.2 MG/DL (ref 8.8–10.2)
CHLORIDE BLD-SCNC: 94 MMOL/L (ref 98–111)
CO2: 40 MMOL/L (ref 22–29)
CREAT SERPL-MCNC: 1.6 MG/DL (ref 0.5–0.9)
EOSINOPHILS ABSOLUTE: 0.2 K/UL (ref 0–0.6)
EOSINOPHILS RELATIVE PERCENT: 3 % (ref 0–5)
GFR AFRICAN AMERICAN: 37
GFR NON-AFRICAN AMERICAN: 30
GLUCOSE BLD-MCNC: 97 MG/DL (ref 74–109)
HCT VFR BLD CALC: 34.6 % (ref 37–47)
HEMOGLOBIN: 10 G/DL (ref 12–16)
HYPOCHROMIA: ABNORMAL
IMMATURE GRANULOCYTES #: 0 K/UL
LYMPHOCYTES ABSOLUTE: 1.3 K/UL (ref 1.1–4.5)
LYMPHOCYTES RELATIVE PERCENT: 25.5 % (ref 20–40)
MCH RBC QN AUTO: 28.7 PG (ref 27–31)
MCHC RBC AUTO-ENTMCNC: 28.9 G/DL (ref 33–37)
MCV RBC AUTO: 99.4 FL (ref 81–99)
MONOCYTES ABSOLUTE: 0.7 K/UL (ref 0–0.9)
MONOCYTES RELATIVE PERCENT: 14.5 % (ref 0–10)
NEUTROPHILS ABSOLUTE: 2.8 K/UL (ref 1.5–7.5)
NEUTROPHILS RELATIVE PERCENT: 56.2 % (ref 50–65)
PDW BLD-RTO: 15.1 % (ref 11.5–14.5)
PLATELET # BLD: 171 K/UL (ref 130–400)
PLATELET SLIDE REVIEW: ADEQUATE
PMV BLD AUTO: 10.2 FL (ref 9.4–12.3)
POTASSIUM REFLEX MAGNESIUM: 4.2 MMOL/L (ref 3.5–5)
RBC # BLD: 3.48 M/UL (ref 4.2–5.4)
SODIUM BLD-SCNC: 140 MMOL/L (ref 136–145)
WBC # BLD: 5 K/UL (ref 4.8–10.8)

## 2021-01-04 PROCEDURE — 97110 THERAPEUTIC EXERCISES: CPT

## 2021-01-04 PROCEDURE — 99232 SBSQ HOSP IP/OBS MODERATE 35: CPT | Performed by: PSYCHIATRY & NEUROLOGY

## 2021-01-04 PROCEDURE — 2700000000 HC OXYGEN THERAPY PER DAY

## 2021-01-04 PROCEDURE — 36415 COLL VENOUS BLD VENIPUNCTURE: CPT

## 2021-01-04 PROCEDURE — 97116 GAIT TRAINING THERAPY: CPT

## 2021-01-04 PROCEDURE — 97535 SELF CARE MNGMENT TRAINING: CPT

## 2021-01-04 PROCEDURE — 85025 COMPLETE CBC W/AUTO DIFF WBC: CPT

## 2021-01-04 PROCEDURE — 80048 BASIC METABOLIC PNL TOTAL CA: CPT

## 2021-01-04 PROCEDURE — 97129 THER IVNTJ 1ST 15 MIN: CPT

## 2021-01-04 PROCEDURE — 97130 THER IVNTJ EA ADDL 15 MIN: CPT

## 2021-01-04 PROCEDURE — 1180000000 HC REHAB R&B

## 2021-01-04 PROCEDURE — 6370000000 HC RX 637 (ALT 250 FOR IP): Performed by: PSYCHIATRY & NEUROLOGY

## 2021-01-04 RX ADMIN — LISINOPRIL 40 MG: 20 TABLET ORAL at 07:33

## 2021-01-04 RX ADMIN — MULTIPLE VITAMINS W/ MINERALS TAB 1 TABLET: TAB at 07:34

## 2021-01-04 RX ADMIN — GABAPENTIN 300 MG: 300 CAPSULE ORAL at 13:27

## 2021-01-04 RX ADMIN — CLONIDINE HYDROCHLORIDE 0.1 MG: 0.1 TABLET ORAL at 07:34

## 2021-01-04 RX ADMIN — METOPROLOL SUCCINATE 25 MG: 25 TABLET, EXTENDED RELEASE ORAL at 07:34

## 2021-01-04 RX ADMIN — QUETIAPINE FUMARATE 300 MG: 300 TABLET ORAL at 20:19

## 2021-01-04 RX ADMIN — GABAPENTIN 300 MG: 300 CAPSULE ORAL at 07:34

## 2021-01-04 RX ADMIN — BUMETANIDE 1 MG: 1 TABLET ORAL at 07:33

## 2021-01-04 RX ADMIN — CLONIDINE HYDROCHLORIDE 0.1 MG: 0.1 TABLET ORAL at 20:20

## 2021-01-04 RX ADMIN — FLUOXETINE HYDROCHLORIDE 20 MG: 20 CAPSULE ORAL at 07:34

## 2021-01-04 RX ADMIN — GABAPENTIN 300 MG: 300 CAPSULE ORAL at 20:20

## 2021-01-04 RX ADMIN — ASPIRIN 81 MG: 81 TABLET, CHEWABLE ORAL at 07:34

## 2021-01-04 RX ADMIN — PRAMIPEXOLE DIHYDROCHLORIDE 0.5 MG: 0.25 TABLET ORAL at 20:19

## 2021-01-04 RX ADMIN — APIXABAN 2.5 MG: 2.5 TABLET, FILM COATED ORAL at 20:20

## 2021-01-04 RX ADMIN — CARBIDOPA AND LEVODOPA 1 TABLET: 25; 100 TABLET ORAL at 20:19

## 2021-01-04 RX ADMIN — OXYBUTYNIN CHLORIDE 10 MG: 5 TABLET, EXTENDED RELEASE ORAL at 07:34

## 2021-01-04 RX ADMIN — ATORVASTATIN CALCIUM 80 MG: 80 TABLET, FILM COATED ORAL at 20:19

## 2021-01-04 RX ADMIN — APIXABAN 2.5 MG: 2.5 TABLET, FILM COATED ORAL at 07:33

## 2021-01-04 RX ADMIN — Medication 5000 UNITS: at 07:33

## 2021-01-04 ASSESSMENT — PAIN DESCRIPTION - PAIN TYPE: TYPE: ACUTE PAIN

## 2021-01-04 ASSESSMENT — PAIN SCALES - GENERAL: PAINLEVEL_OUTOF10: 4

## 2021-01-04 ASSESSMENT — PAIN - FUNCTIONAL ASSESSMENT: PAIN_FUNCTIONAL_ASSESSMENT: PREVENTS OR INTERFERES SOME ACTIVE ACTIVITIES AND ADLS

## 2021-01-04 ASSESSMENT — PAIN DESCRIPTION - ONSET: ONSET: PROGRESSIVE

## 2021-01-04 ASSESSMENT — PAIN DESCRIPTION - DESCRIPTORS: DESCRIPTORS: ACHING;DISCOMFORT

## 2021-01-04 ASSESSMENT — PAIN DESCRIPTION - FREQUENCY: FREQUENCY: INTERMITTENT

## 2021-01-04 ASSESSMENT — PAIN DESCRIPTION - PROGRESSION: CLINICAL_PROGRESSION: NOT CHANGED

## 2021-01-04 NOTE — PROGRESS NOTES
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 She requested to have her feet propped. She also reported a great lunch meal and stated she enjoyed eating lunch today. Pt reported she was \"feeling like patti,\" when she could stand and walk on her feet. Objective:   Pt did not demonstrate speech production deficits this date. Pt had minimal to no difficulty with expressive language in structured conversation. Delayed recall of functional information was completed with 'wh' questions and mild difficulty. Simple naming tasks completed with family members. Pt recalled all members within two minutes naming six people. Multi-step commands completed with ADL task. Pt was independent with demonstrating safety awareness during task. She also verbalized safety awareness errors. SHORT TERM GOAL #1:  Goal 1: The patient will demonstrate recall of functional information following a/an (immediate, short term, long term) delay with min cues in order to increase functional integration into environment. NOT MET    SHORT TERM GOAL #2:  Goal 2: The patient will complete simple/complex naming tasks with min verbal cues at 80% accuracy to improve thought organization and word retrieval skills.  NOT MET SHORT TERM GOAL #3:  Goal 3: The patient will follow multi step commands related to functional living environment with 80% accuracy and min cues in order to increase functional integration into environment. SHORT TERM GOAL #4:  Goal 4: The patient will complete problem solving/reasoning tasks with min verbal cues at 80% accuracy in order to improve safety awareness for functional tasks. SHORT TERM GOAL #5:       Swallowing Short Term Goals            ASSESSMENT:  Assessment: [x]Progressing towards goals          []Not Progressing towards goals    Patient Tolerance of Treatment:   [x]Tolerated well []Tolerated fair []Required rest breaks []Fatigued    Education:  Learner:  [x]Patient          []Significant Other          []Other  Education provided regarding:  [x]Goals and POC   []Diet and swallowing precautions    []Home Exercise Program  []Progress and/or discharge information  Method of Education:  [x]Discussion          []Demonstration          []Handout          []Other  Evaluation of Education:   [x]Verbalized understanding   []Demonstrates without assistance  []Demonstrates with assistance  []Needs further instruction     []No evidence of learning                  []No family present      Plan: [x]Continue with current plan of care    []Modify current plan of care as follows:    []Discharge patient    Discharge Location:    Services/Supervision Recommended:      [x]Patient continues to require treatment by a licensed therapist to address functional deficits as outlined in the established plan of care.     Electronically signed by Naina Malone on 1/4/21 at 1:28 PM CST

## 2021-01-04 NOTE — PROGRESS NOTES
Occupational Therapy     01/04/21 0900   General   Diagnosis B  CVAs   Pain Assessment   Patient Currently in Pain No   Pain Assessment 0-10   Balance   Sitting Balance Supervision   Standing Balance Minimal assistance   Transfers   Stand Step Transfers Minimal assistance   Sit to stand Minimal assistance   Stand to sit Contact guard assistance   Transfer Comments Cues for proper hand placement. Toilet Transfers   Toilet - Technique Ambulating   Equipment Used Grab bars   Toilet Transfer Minimal assistance   Assessment   Performance deficits / Impairments Decreased functional mobility ; Decreased ADL status; Decreased strength;Decreased cognition;Decreased endurance;Decreased balance;Decreased high-level IADLs   Treatment Diagnosis B CVAs   Prognosis Good   Timed Code Treatment Minutes 60 Minutes   Activity Tolerance   Activity Tolerance Patient Tolerated treatment well   Safety Devices   Safety Devices in place Yes   Type of devices Gait belt  (Given to PT.)   Plan   Current Treatment Recommendations Strengthening;Balance Training;Functional Mobility Training; Endurance Training; Safety Education & Training;Equipment Evaluation, Education, & procurement;Patient/Caregiver Education & Training;Self-Care / ADL; Home Management Training      01/04/21 0900   Oral Hygiene   Assistance Needed Supervision or touching assistance   CARE Score [id number] Needed Partial/moderate assistance   Comment Mod A. CARE Score 3   Shower/Bathe Self   Assistance Needed Partial/moderate assistance   Comment Shower, LH sponge, Min A for balance during standing portion of task. CARE Score 3   Upper Body Dressing   Assistance Needed Supervision or touching assistance   CARE Score 4   Lower Body Dressing   Assistance Needed Partial/moderate assistance   Comment Mod A, cues for tech.    CARE Score 3   Putting On/Taking Off Footwear   Assistance Needed Substantial/maximal assistance   CARE Score 2

## 2021-01-04 NOTE — PROGRESS NOTES
David Starks  [id number]     01/04/21 1034 01/04/21 1035 01/04/21 1049   Subjective   Subjective Pt agreed to therapy this morning. --   --    Bed Mobility   Rolling Stand by assistance  --   --    Supine to Sit Contact guard assistance  --   --    Scooting Stand by assistance  --   --    Transfers   Sit to Stand  --  Minimal Assistance  --    Stand to sit  --  Minimal Assistance  --    Bed to Chair  --  Minimal assistance  (Stand Pivot w/ RW to L side)  --    Ambulation   Ambulation?  --  Yes  --    Ambulation 1   Surface  --  level tile  --    Device  --  Rolling Walker  --    Other Apparatus  --  Wheelchair follow  --    Assistance  --  Minimal assistance  --    Quality of Gait  --  Pt needed proper instruction for 3 pt gait pattern w/ RW, but continues to struggle w/ her involuntary jerking movements and fatigues very quickly. --    Distance  --  6'  --    Exercises   Comments  --   --  Practiced bed mobility on mat table and TF training. Conditions Requiring Skilled Therapeutic Intervention   Body structures, Functions, Activity limitations  --   --  Decreased functional mobility ; Decreased ADL status; Decreased ROM; Decreased strength;Decreased safe awareness;Decreased endurance;Decreased balance;Decreased coordination   Assessment  --   --  Practiced bed mobility and TF training this morning. Pt is able to Roll to L side w/ SBA and only needed CGA for Supine to/from Sit after proper instructions. Practiced Stand-Step Pivot TF, but Pt tends to grab at therapist due to her jerking movements needing Mod-A. Switched to [employer]. Pt was much more smooth and safe when performing Stand Pivot w/ RW only needing Min A. Pt continues to fatigue quickly during activities needing several rest breaks.    Prognosis  --   --  Fair   Activity Tolerance   Activity Tolerance  --   --  Patient limited by fatigue;Patient limited by endurance Electronically signed by Jac Rodriguez PTA on 1/4/2021 at 10:54 AM

## 2021-01-04 NOTE — PROGRESS NOTES
Patient:   Jamia Calloway  MR#:    [id number]   Room:    0811/811-01   YOB: 1933  Date of Progress Note: 1/4/2021  Time of Note                           8:07 AM  Consulting Physician:   Thea Coker M.D. Attending Physician:  Thea Coker MD     CHIEF COMPLAINT: Generalized weakness, worse on the right     Subjective  This 80 y.o. female  who presented to [facility] with inability to walk. She has a past medical history significant for breast cancer status post bilateral implants, chronic back pain, tremors, macular degeneration and essential hypertension. Patient had gone to bed on 12/26 around 2030. When she awoke in the morning around 830 she had difficulty getting out of bed. At baseline she is alert and oriented and conversant. Patient continued to have difficulty ambulating with unsteady gait. Her primary care provider advised her to seek evaluation at the emergency department. In the ED, blood pressure was elevated at 186/102. MRI brain was done showing acute stroke left medial precentral gyrus and acute right occipital stroke with chronic microvascular disease and atrophy. IV TPA not considered as last known well was 2030 on 12/26. She was admitted to the hospitalist service. She was seen in consultation by neurology. Aspirin 325 mg daily was changed to dual therapy with Eliquis 2.5 mg twice daily and aspirin 81 mg daily. Per neurology, MRI brain likely embolic with right occipital and left medial precentral gyrus. Also, transthoracic echo findings placed at increased risk of developing atrial fibrillation with mild to moderate left atrial enlargement and TTE limited study suggest possible right to left shunt with rope score 4 suggesting that 38% chance that stroke due to PFO and 12% risk of to urinary recurrence of stroke/TIA. Venous Doppler of bilateral lower extremities did not show thrombus. Given patient age, would need to determine risk/benefits of closure but at this point recommend dual therapy with aspirin and Eliquis. Lipitor 80 mg daily. LDL at goal at 55. She has maintained normal sinus rhythm on continuous telemetry. She will need Zio patch at discharge. She is okay for discharge from neurology standpoint. She is to follow-up with neurology in 3 to 4 weeks. She was seen in consultation by cardiology. BNP 8,437 on admission. Chest x-ray showed cardiomegaly, bilateral effusions, and pulmonary edema. Transthoracic echocardiogram showed cor pulmonale with normal LVEF. She was started on IV Bumex twice daily will be transitioned to oral to start tomorrow 1 mg Bumex daily. She has had good urine output with diuresis. Patient states lower extremity edema has much improved. Patient denies history of heart failure or pulmonary disease. She is a non-smoker. She is to follow-up with cardiology per recommendations, she will need a right heart cath as outpatient basis. She was seen in consultation by pulmonology. Acute respiratory failure with hypoxia and hypercapnia. ABG on 12/28 showed pH 7.32, PCO2 69, PO2 60 and HCO3 35. She was subsequently placed on BiPAP. Probably chronic CO2 retention.   Of note, she had a previous chest x-ray in 2019 that showed interstitial fibrotic changes within the lungs. VQ scan today showed low probability for pulmonary embolus. She will need pulmonary function test, CT of the chest, and right heart cath in the outpatient setting once euvolemic. Creatinine on admission noted to with repeat down to 1. 15. Serum CO2 43she has a contraction alkalosis but renal function appears to be improving and stable. Overall, she is alert and oriented and neurologically back to baseline. She remains on 2 L nasal cannula recommend to wean as tolerated for SPO2 goal greater than 90%, avoid over oxygenating with known CO2 retention. Continue to encourage BiPAP nightly. She denies shortness of breath, chest pain or pressure. She is tolerating a diet. She has had good urine output from diuresis. Labs did show hypokalemia and hypomagnesemia for which she did receive a blood replacement. She continues to have weakness, more so on the right. She was evaluated by SPT for swallow and found to have Oropharyngeal dysphagia but she has now been upgraded to a regular diet with thin liquids. No complaints this morning  REVIEW OF SYSTEMS:  Constitutional: No fevers No chills  Neck:No stiffness  Respiratory: No shortness of breath  Cardiovascular: No chest pain No palpitations  Gastrointestinal: No abdominal pain    Genitourinary: No Dysuria  Neurological: No headache, no confusion      PHYSICAL EXAM:  BP (!) 110/57   Pulse 78   Temp 96.6 °F (35.9 °C) (Temporal)   Resp 16   Ht 4' 11\" (1.499 m)   Wt 168 lb 3.2 oz (76.3 kg)   SpO2 92%   BMI 33.97 kg/m²     Constitutional: she appears well-developed and well-nourished. Eyes  conjunctiva normal.  Pupils react to light  Ear, nose, throat -hearing intact to voice.  No scars, masses, or lesions over external nose or ears, no atrophy of tongue  Neck-symmetric, no masses noted, no jugular vein distension  Respiration- chest wall appears symmetric, good expansion, normal effort without use of accessory muscles  Cardiovascular- RRR  Musculoskeletal  no significant wasting of muscles noted, no bony deformities, gait no gross ataxia  Extremities-no clubbing, cyanosis or edema  Skin  warm, dry, and intact. No rash, erythema, or pallor. Psychiatric  mood, affect, and behavior appear normal.      Neurology  NEUROLOGICAL EXAM:      Mental status   Awake, alert, fluent oriented x 3 appropriate affect  Attention and concentration appear appropriate  Recent and remote memory appears unremarkable  Speech normal without dysarthria  No clear issues with language       Cranial Nerves   CN II- Visual fields grossly unremarkable  CN III, IV,VI-EOMI, No nystagmus, conjugate eye movements, no ptosis  CN VII-no facial asymmetry  CN VIII-Hearing intact   CN IX and X- Palate elevates in midline  CN XI-good shoulder shrug  CN XII-Tongue midline with no fasciculations or fibrillations          Motor function  Antigravity x 4     Sensory function Intact to light touch     Cerebellar F-N intact     Tremor None present     Gait                  Not tested           Nursing/pcp notes, imaging,labs and vitals reviewed. PT,OT and/or speech notes reviewed    Lab Results   Component Value Date    WBC 5.0 01/04/2021    HGB 10.0 (L) 01/04/2021    HCT 34.6 (L) 01/04/2021    MCV 99.4 (H) 01/04/2021     01/04/2021     Lab Results   Component Value Date     01/04/2021    K 4.2 01/04/2021    CL 94 (L) 01/04/2021    CO2 40 (H) 01/04/2021    BUN 39 (H) 01/04/2021    CREATININE 1.6 (H) 01/04/2021    GLUCOSE 97 01/04/2021    CALCIUM 9.2 01/04/2021    LABGLOM 30 (A) 01/04/2021    GFRAA 37 (L) 01/04/2021   No results found for: INRNo results found for: PHENYTOIN, ESR, CRP    Objective    ADL  Grooming: Setup;Stand by assistance  UE Dressing: Moderate assistance;Minimal assistance  LE Dressing:  Moderate assistance;Maximum assistance  Balance  Sitting Balance: Supervision  Bed mobility Supine to Sit: Contact guard assistance  Transfers  Stand Pivot Transfers: Moderate assistance  Sit to stand: Moderate assistance  Stand to sit: Minimal assistance  Type of ROM/Therapeutic Exercise  Type of ROM/Therapeutic Exercise: AROM  Comment: BUE 2 sets x 10 reps. Objective:  Pt did complain of food sticking in the oral cavity this date. She did complain of dry mouth. Liquid wash was utilized which was beneficial in clearing residue. No overt s/s of aspiration observed with solids or with thin liquids via straw.      Increased dysfluencies and anomia were noted this date. Anomia during conversation and structured tasks was noted. Automatic speech tasks were completed. Days of the week were completed without difficutly. Months of year were also completed without difficulty.     Counting from 1 to 20 without cueing. Mental manipulation tasks (counting in reverse from 20 to 1 without cues) were completed without cueing.     [place] divergent naming tasks were completed. Five items were named in two minutes. Later in the session she was able to name two items in a different category when given one minute to complete.     Abstract divergent naming tasks were completed. She was able to name words that start with the letter /s/. She named eleven words when given two minutes to complete.        Assessment:  Patient presents with mild/mod expressive and receptive language deficits. Moderate cognitive deficits were noted. She is recommended to continue speech therapy services to address deficits.        01/02/21 1009   Restrictions/Precautions   Restrictions/Precautions Weight Bearing; Fall Risk   Lower Extremity Weight Bearing Restrictions   Right Lower Extremity Weight Bearing Weight Bearing As Tolerated   Left Lower Extremity Weight Bearing Weight Bearing As Tolerated   General   Chart Reviewed Yes   Patient assessed for rehabilitation services?  Yes Additional Pertinent Hx PE with acute cor pulmonale; interstitial pulmonary disease; HTN; ACUTE RESP FAILURE WITH HYPOXIA AND HYPERCAPNIA; POLYNEUROPATHY; NORMAL PRESSURE HYDROCEPHALUS; MACULAR DEGENERATION; R EYE BLINDNESS   Diagnosis CVA L MEDIAL PRECENTRAL GYRUS AND R OCCPITAL STROKE (R SIDE BODY EFFECTED)   Pain Assessment   Pain Assessment 0-10   Pain Level 0   Exercises   Comments SEATED EX BLE'S 2 SETS 10 REPS. FOCUSING OF LARGE AMPLITUDE LOW [place]. Conditions Requiring Skilled Therapeutic Intervention   Assessment PT ARIVED ON 2L/MIN NC WITH PO2 OF 99%, O2 DECREASED TO 1L/MIN WITH PO2 REMAINING AT 99%, PT THEN PLACED ON ROOM AIR WITH PO2 AT 92%   Activity Tolerance   Activity Tolerance Patient limited by fatigue        RECORD REVIEW: Previous medical records, medications were reviewed at today's visit    IMPRESSION:   1. 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 Baby aspirin and Eliquis  2. DVT prophylaxisEliquis  3. Essential hypertensioncontinue current medications and monitoring  4. CHF with recent pulmonary edemacontinue Bumex  5. Restless leg syndromecontinue Sinemet and Mirapex  6. Acute respiratory failure with hypercapniacontinue O2 and monitoring  7. CKDreduce diuretics and continue to monitor. GFR worse today than it was last week.   Staff next week

## 2021-01-04 NOTE — PATIENT CARE CONFERENCE
Quality of Gait: MAXIMUM VERBAL CUES FOR PROPER 3 POIN TSEQUENCE LEADING WITH RIGHT LE.  INCONSISTENT PLACEMENT OF HANDSS. INITIALLLY REQUIRED ASSIST FOR PROPER RIGHT LE ADVANCEMENT, HOWEVER THIS IMPROVED WITH REPETITIONS.  2 INSTANCES OF INTENTIONAL/RESTING FULL BODY TREMORS DURING GAIT. ABLE TO CONTINUE DESPITRE THESE EPISODES WHICH EVENTUALLY SUBSIDED. DURING EPISODE PATIENT PRESENTED WITH BILATERAL PSEUDO-KNEE BUCKLE WITHOUT FULL COLLAPSE. Distance: 12 FT X2  STAIRS     GOALS:  Short term goals  Time Frame for Short term goals: 1 WEEK  Short term goal 1: PT AMB 3X12FT IN II BARS WITH MOD AX1   Short term goal 2: PROPEL W/C 75FT WITH TURNS, SBA   Short term goal 3: COMPLETE CAR TF MOD A FOR LE MANAGEMENT  Short term goal 4: COMPLETE STAND STEP TF TO R AND L WITH RW, CGA  Short term goal 5: PT CGA FOR ALL BED MOBILITY    Long term goals  Time Frame for Long term goals : 3 WEEKS  Long term goal 1: PT ' WITH RW, SUPERVISION  Long term goal 2: NEGOTIATE 4 STEPS, 4\", CGA, BILATERAL HANDRAIL  Long term goal 3: COMPLETE STAND STEP TF TO R AND L WITH RW, IND  Long term goal 4: COMPLETE CAR TF WITH SUPERVISION  Long term goal 5: PT IND WITH ALL BED MOBILITY    ASSESSMENT:  Assessment: O2 SATS DROPPED TO 86% ON 1L DURING BED MOBILITY, REQUIRED INCREASING TO 2L TO FULLY RECOVER. RECOMMEND 1L AT REST, 2L WITH ACTIVIYT. SEE GAIT. RECOMMEND CONTINUE IN PARALLEL BARS 1-2 MORE SESSION BEFORE ADVANCING TO RW.       SPEECH THERAPY    Objective:   Recall of functional information including delayed and short-term was targeted in structured conversation with open-ended and 'wh' questions. Pt with response latency frequently that did not interfere with conversational turns.  Pt with 70-80% accuracy.     Simple naming tasks with family members completed. Pt named 5 members in one minute. Later, pt attempted to name 14 members, but could not focus attention to get all names. Verbal cues did not assist with attention, pt just lost interest.     Functional problem solving and verbal reasoning completed with 'wh' questions. SLP modeled responses occasionally, provided verbal prompts for details, and provided verbal cues for pt to demonstrate 70-80% accuracy.         SHORT TERM GOAL #1:  Goal 1: The patient will demonstrate recall of functional information following a/an (immediate, short term, long term) delay with min cues in order to increase functional integration into environment.     SHORT TERM GOAL #2:  Goal 2: The patient will complete simple/complex naming tasks with min verbal cues at 80% accuracy to improve thought organization and word retrieval skills.     SHORT TERM GOAL #3:  Goal 3: The patient will follow multi step commands related to functional living environment with 80% accuracy and min cues in order to increase functional integration into environment.     SHORT TERM GOAL #4:  Goal 4: The patient will complete problem solving/reasoning tasks with min verbal cues at 80% accuracy in order to improve safety awareness for functional tasks. OCCUPATIONAL THERAPY    CURRENT IRF-MARY BETH SCORES  Eating: CARE Score: 5  Oral Hygiene: CARE Score: 4  Toileting: CARE Score: 3  Shower/Bathe: CARE Score: 3  Upper Body Dressing: CARE Score: 4  Lower Body Dressing: CARE Score: 3  Footwear: CARE Score: 2  Toilet Transfers: CARE Score: 3(min A)  Picking Up Object:  CARE Score: 1      UE Functioning:  WFLs    Pain Assessment:  Pain Level: 0       STGs:  Short term goals  Time Frame for Short term goals: 1 week  Short term goal 1: CGA with clothing management/hygiene for toileting. Short term goal 2: CGA with toilet transfers. Short term goal 3: Min A with bathing hygiene. Assessment: Daughter present for team conference, asks appropriate questions. Son is assisting Mr Fernando Pradhan at home    Discharge Plan   Estimated Length of Stay: 1/16/21  Destination: family will assist at home, home health care    Pass: No    Services at Discharge: [id number], Occupational Therapy, Speech Therapy and Nursing per evaluations    Equipment at Discharge: Determine closer to D/C; need O2 evaluation    Progress made in the prior week:  1. EVAL 1/31  2.  3.  4.  5.      Goals for following week:  1. AMBULATE 25 FT WITH RW CGA  2. Complete LB dressing with min A  3.   4.   5.     Factors facilitating achievement of predicted outcomes: Motivated, Cooperative and Pleasant    Barriers to the achievement of predicted outcomes: Communication deficit, Decreased endurance, Decreased sensation, Decreased proprioception, Upper extremity weakness and Lower extremity weakness  Tremors-(preexisted) daughter reported that she was diagnosed several years ago with normal pressure hydrocephalus    Team Members Present at Conference:  : Cecilia Hidalgo [place]   Occupational Therapist: Gillian Steven, OTR/L  Physical Therapist: Daria Singletary PT,DPT  Speech Therapist: Lilian Mullins [address] [address] [facility], [place], [facility]  Nurse: Pieter Lezama, RN,BSN   Nurse Manager:  Pieter Lezama, RN, BSN  Dietitian:  Teresa Ramirez RD  Rehab Director:  Lisha Cagle approve the established interdisciplinary plan of care as documented within the medical record of Eliza Darling.

## 2021-01-05 LAB
ANION GAP SERPL CALCULATED.3IONS-SCNC: 5 MMOL/L (ref 7–19)
BUN BLDV-MCNC: 41 MG/DL (ref 8–23)
CALCIUM SERPL-MCNC: 9.2 MG/DL (ref 8.8–10.2)
CHLORIDE BLD-SCNC: 94 MMOL/L (ref 98–111)
CO2: 40 MMOL/L (ref 22–29)
CREAT SERPL-MCNC: 1.6 MG/DL (ref 0.5–0.9)
GFR AFRICAN AMERICAN: 37
GFR NON-AFRICAN AMERICAN: 30
GLUCOSE BLD-MCNC: 103 MG/DL (ref 74–109)
POTASSIUM SERPL-SCNC: 3.9 MMOL/L (ref 3.5–5)
SODIUM BLD-SCNC: 139 MMOL/L (ref 136–145)

## 2021-01-05 PROCEDURE — 97116 GAIT TRAINING THERAPY: CPT

## 2021-01-05 PROCEDURE — 97129 THER IVNTJ 1ST 15 MIN: CPT

## 2021-01-05 PROCEDURE — 36415 COLL VENOUS BLD VENIPUNCTURE: CPT

## 2021-01-05 PROCEDURE — 2700000000 HC OXYGEN THERAPY PER DAY

## 2021-01-05 PROCEDURE — 1180000000 HC REHAB R&B

## 2021-01-05 PROCEDURE — 97530 THERAPEUTIC ACTIVITIES: CPT

## 2021-01-05 PROCEDURE — 97110 THERAPEUTIC EXERCISES: CPT

## 2021-01-05 PROCEDURE — 6370000000 HC RX 637 (ALT 250 FOR IP): Performed by: PSYCHIATRY & NEUROLOGY

## 2021-01-05 PROCEDURE — 97130 THER IVNTJ EA ADDL 15 MIN: CPT

## 2021-01-05 PROCEDURE — 80048 BASIC METABOLIC PNL TOTAL CA: CPT

## 2021-01-05 PROCEDURE — 97535 SELF CARE MNGMENT TRAINING: CPT

## 2021-01-05 PROCEDURE — 99232 SBSQ HOSP IP/OBS MODERATE 35: CPT | Performed by: PSYCHIATRY & NEUROLOGY

## 2021-01-05 RX ORDER — GABAPENTIN 100 MG/1
100 CAPSULE ORAL 3 TIMES DAILY
Status: DISCONTINUED | OUTPATIENT
Start: 2021-01-05 | End: 2021-01-16 | Stop reason: HOSPADM

## 2021-01-05 RX ADMIN — CARBIDOPA AND LEVODOPA 1 TABLET: 25; 100 TABLET ORAL at 20:14

## 2021-01-05 RX ADMIN — Medication 5000 UNITS: at 08:09

## 2021-01-05 RX ADMIN — MULTIPLE VITAMINS W/ MINERALS TAB 1 TABLET: TAB at 08:06

## 2021-01-05 RX ADMIN — METOPROLOL SUCCINATE 25 MG: 25 TABLET, EXTENDED RELEASE ORAL at 08:06

## 2021-01-05 RX ADMIN — OXYBUTYNIN CHLORIDE 10 MG: 5 TABLET, EXTENDED RELEASE ORAL at 08:06

## 2021-01-05 RX ADMIN — QUETIAPINE FUMARATE 300 MG: 300 TABLET ORAL at 20:15

## 2021-01-05 RX ADMIN — GABAPENTIN 100 MG: 100 CAPSULE ORAL at 14:48

## 2021-01-05 RX ADMIN — CLONIDINE HYDROCHLORIDE 0.1 MG: 0.1 TABLET ORAL at 20:15

## 2021-01-05 RX ADMIN — FLUOXETINE HYDROCHLORIDE 20 MG: 20 CAPSULE ORAL at 08:06

## 2021-01-05 RX ADMIN — APIXABAN 2.5 MG: 2.5 TABLET, FILM COATED ORAL at 08:06

## 2021-01-05 RX ADMIN — ASPIRIN 81 MG: 81 TABLET, CHEWABLE ORAL at 08:06

## 2021-01-05 RX ADMIN — GABAPENTIN 300 MG: 300 CAPSULE ORAL at 08:06

## 2021-01-05 RX ADMIN — CLONIDINE HYDROCHLORIDE 0.1 MG: 0.1 TABLET ORAL at 08:06

## 2021-01-05 RX ADMIN — APIXABAN 2.5 MG: 2.5 TABLET, FILM COATED ORAL at 20:14

## 2021-01-05 RX ADMIN — LISINOPRIL 40 MG: 20 TABLET ORAL at 08:06

## 2021-01-05 RX ADMIN — ATORVASTATIN CALCIUM 80 MG: 80 TABLET, FILM COATED ORAL at 20:15

## 2021-01-05 RX ADMIN — GABAPENTIN 100 MG: 100 CAPSULE ORAL at 20:14

## 2021-01-05 RX ADMIN — PRAMIPEXOLE DIHYDROCHLORIDE 0.5 MG: 0.25 TABLET ORAL at 20:14

## 2021-01-05 NOTE — PROGRESS NOTES
Patient:   Cristopher Miranda  MR#:    [id number]   Room:    0811/811-01   YOB: 1933  Date of Progress Note: 1/5/2021  Time of Note                           1:27 PM  Consulting Physician:   Roxana Stratton M.D. Attending Physician:  Roxana Stratton MD     CHIEF COMPLAINT: Generalized weakness, worse on the right     Subjective  This 80 y.o. female  who presented to [facility] with inability to walk. She has a past medical history significant for breast cancer status post bilateral implants, chronic back pain, tremors, macular degeneration and essential hypertension. Patient had gone to bed on 12/26 around 2030. When she awoke in the morning around 830 she had difficulty getting out of bed. At baseline she is alert and oriented and conversant. Patient continued to have difficulty ambulating with unsteady gait. Her primary care provider advised her to seek evaluation at the emergency department. In the ED, blood pressure was elevated at 186/102. MRI brain was done showing acute stroke left medial precentral gyrus and acute right occipital stroke with chronic microvascular disease and atrophy. IV TPA not considered as last known well was 2030 on 12/26. She was admitted to the hospitalist service. She was seen in consultation by neurology. Aspirin 325 mg daily was changed to dual therapy with Eliquis 2.5 mg twice daily and aspirin 81 mg daily. Per neurology, MRI brain likely embolic with right occipital and left medial precentral gyrus. Also, transthoracic echo findings placed at increased risk of developing atrial fibrillation with mild to moderate left atrial enlargement and TTE limited study suggest possible right to left shunt with rope score 4 suggesting that 38% chance that stroke due to PFO and 12% risk of to urinary recurrence of stroke/TIA. Venous Doppler of bilateral lower extremities did not show thrombus. Given patient age, would need to determine risk/benefits of closure but at this point recommend dual therapy with aspirin and Eliquis. Lipitor 80 mg daily. LDL at goal at 55. She has maintained normal sinus rhythm on continuous telemetry. She will need Zio patch at discharge. She is okay for discharge from neurology standpoint. She is to follow-up with neurology in 3 to 4 weeks. She was seen in consultation by cardiology. BNP 8,437 on admission. Chest x-ray showed cardiomegaly, bilateral effusions, and pulmonary edema. Transthoracic echocardiogram showed cor pulmonale with normal LVEF. She was started on IV Bumex twice daily will be transitioned to oral to start tomorrow 1 mg Bumex daily. She has had good urine output with diuresis. Patient states lower extremity edema has much improved. Patient denies history of heart failure or pulmonary disease. She is a non-smoker. She is to follow-up with cardiology per recommendations, she will need a right heart cath as outpatient basis. She was seen in consultation by pulmonology. Acute respiratory failure with hypoxia and hypercapnia. ABG on 12/28 showed pH 7.32, PCO2 69, PO2 60 and HCO3 35. She was subsequently placed on BiPAP. Probably chronic CO2 retention.   Of note, she had a previous chest x-ray in 2019 that showed interstitial fibrotic changes within the lungs. VQ scan today showed low probability for pulmonary embolus. She will need pulmonary function test, CT of the chest, and right heart cath in the outpatient setting once euvolemic. Creatinine on admission noted to with repeat down to 1. 15. Serum CO2 43she has a contraction alkalosis but renal function appears to be improving and stable. Overall, she is alert and oriented and neurologically back to baseline. She remains on 2 L nasal cannula recommend to wean as tolerated for SPO2 goal greater than 90%, avoid over oxygenating with known CO2 retention. Continue to encourage BiPAP nightly. She denies shortness of breath, chest pain or pressure. She is tolerating a diet. She has had good urine output from diuresis. Labs did show hypokalemia and hypomagnesemia for which she did receive a blood replacement. She continues to have weakness, more so on the right. She was evaluated by SPT for swallow and found to have Oropharyngeal dysphagia but she has now been upgraded to a regular diet with thin liquids. Numerous questions answered. No significant complaints although edema in her feet appears worse. REVIEW OF SYSTEMS:  Constitutional: No fevers No chills  Neck:No stiffness  Respiratory: No shortness of breath  Cardiovascular: No chest pain No palpitations  Gastrointestinal: No abdominal pain    Genitourinary: No Dysuria  Neurological: No headache, no confusion      PHYSICAL EXAM:  /62   Pulse 82   Temp 97.8 °F (36.6 °C) (Temporal)   Resp 16   Ht 4' 11\" (1.499 m)   Wt 168 lb 3.2 oz (76.3 kg)   SpO2 (!) 86% Comment: during bed mob, stayed high 80s, increased to 2l for recover  BMI 33.97 kg/m²     Constitutional: she appears well-developed and well-nourished. Eyes  conjunctiva normal.  Pupils react to light  Ear, nose, throat -hearing intact to voice.  No scars, masses, or lesions over external nose or ears, no atrophy of tongue Neck-symmetric, no masses noted, no jugular vein distension  Respiration- chest wall appears symmetric, good expansion,   normal effort without use of accessory muscles  Cardiovascular- RRR  Musculoskeletal  no significant wasting of muscles noted, no bony deformities, gait no gross ataxia  Extremities-no clubbing, cyanosis or edema  Skin  warm, dry, and intact. No rash, erythema, or pallor. Psychiatric  mood, affect, and behavior appear normal.      Neurology  NEUROLOGICAL EXAM:      Mental status   Awake, alert, fluent oriented x 3 appropriate affect  Attention and concentration appear appropriate  Recent and remote memory appears unremarkable  Speech normal without dysarthria  No clear issues with language       Cranial Nerves   CN II- Visual fields grossly unremarkable  CN III, IV,VI-EOMI, No nystagmus, conjugate eye movements, no ptosis  CN VII-no facial asymmetry  CN VIII-Hearing intact   CN IX and X- Palate elevates in midline  CN XI-good shoulder shrug  CN XII-Tongue midline with no fasciculations or fibrillations          Motor function  Antigravity x 4     Sensory function Intact to light touch     Cerebellar F-N intact     Tremor None present     Gait                  Not tested           Nursing/pcp notes, imaging,labs and vitals reviewed.      PT,OT and/or speech notes reviewed    Lab Results   Component Value Date    WBC 5.0 01/04/2021    HGB 10.0 (L) 01/04/2021    HCT 34.6 (L) 01/04/2021    MCV 99.4 (H) 01/04/2021     01/04/2021     Lab Results   Component Value Date     01/05/2021    K 3.9 01/05/2021    CL 94 (L) 01/05/2021    CO2 40 (H) 01/05/2021    BUN 41 (H) 01/05/2021    CREATININE 1.6 (H) 01/05/2021    GLUCOSE 103 01/05/2021    CALCIUM 9.2 01/05/2021    LABGLOM 30 (A) 01/05/2021    GFRAA 37 (L) 01/05/2021   No results found for: INRNo results found for: PHENYTOIN, ESR, CRP  01/05/21 1000    Oxygen Therapy   SpO2 98 %   O2 Device Nasal cannula   O2 Flow Rate (L/min) 2 L/min

## 2021-01-05 NOTE — PROGRESS NOTES
01/05/21 1000   Oxygen Therapy   SpO2 98 %   O2 Device Nasal cannula   O2 Flow Rate (L/min) 2 L/min   Bed Mobility   Supine to Sit Stand by assistance  (triplanar to left)   Sit to Supine Stand by assistance  (from left side of bed.  triplanar/long sit techniqe)   Transfers   Sit to Stand Moderate Assistance;Minimal Assistance  (mod a from recliner, min a from wc.)   Bed to Chair Minimal assistance;Contact guard assistance  (with rw)   Comment lack of anterior lean for sit to stand from recliner, improved from wc   Ambulation 1   Device Parallel Bars   Other Apparatus O2  (2l)   Assistance Moderate assistance   Quality of Gait MAXIMUM VERBAL CUES FOR PROPER 3 POIN TSEQUENCE LEADING WITH RIGHT LE.  INCONSISTENT PLACEMENT OF HANDSS. INITIALLLY REQUIRED ASSIST FOR PROPER RIGHT LE ADVANCEMENT, HOWEVER THIS IMPROVED WITH REPETITIONS.  2 INSTANCES OF INTENTIONAL/RESTING FULL BODY TREMORS DURING GAIT. ABLE TO CONTINUE DESPITRE THESE EPISODES WHICH EVENTUALLY SUBSIDED. DURING EPISODE PATIENT PRESENTED WITH BILATERAL PSEUDO-KNEE BUCKLE WITHOUT FULL COLLAPSE. Distance 12 FT X2   Conditions Requiring Skilled Therapeutic Intervention   Body structures, Functions, Activity limitations Decreased functional mobility ; Decreased ADL status; Decreased ROM; Decreased strength;Decreased safe awareness;Decreased endurance;Decreased balance;Decreased coordination   Assessment O2 SATS DROPPED TO 86% ON 1L DURING BED MOBILITY, REQUIRED INCREASING TO 2L TO FULLY RECOVER. RECOMMEND 1L AT REST, 2L WITH ACTIVIYT. SEE GAIT. RECOMMEND CONTINUE IN PARALLEL BARS 1-2 MORE SESSION BEFORE ADVANCING TO RW.

## 2021-01-05 NOTE — PLAN OF CARE
Problem: Falls - Risk of:  Goal: Will remain free from falls  Description: Will remain free from falls  1/5/2021 1139 by Ester Thomas RN  Outcome: Ongoing  1/4/2021 2304 by Duglas Rincon LPN  Outcome: Ongoing  Goal: Absence of physical injury  Description: Absence of physical injury  1/5/2021 1139 by Ester Thomas RN  Outcome: Ongoing  1/4/2021 2304 by Duglas Rincon LPN  Outcome: Ongoing

## 2021-01-05 NOTE — PROGRESS NOTES
Type of ROM/Therapeutic Exercise: AROM; Resistive Bands  Comment: BUE 2 sets x 10 reps AROM and with red resistive band. Plan   Plan  Current Treatment Recommendations: Strengthening, Balance Training, Functional Mobility Training, Endurance Training, Safety Education & Training, Equipment Evaluation, Education, & procurement, Patient/Caregiver Education & Training, Self-Care / ADL, Home Management Training  G-Code     OutComes Score                                                  AM-PAC Score             Goals  Short term goals  Time Frame for Short term goals: 1 week  Short term goal 1: CGA with clothing management/hygiene for toileting. Short term goal 2: CGA with toilet transfers. Short term goal 3: Min A with bathing hygiene. Short term goal 4: Min A with LB dressing, AE PRN. Short term goal 5: Min A with donning/doffing socks and shoes using AE PRN. Short term goal 6: Patient will complete 1-2 handed static standing tasks for 3 minutes, requiring SBA. Long term goals  Time Frame for Long term goals : 3 weeks  Long term goal 1: Supervision with clothing management/hygiene for toileting. Long term goal 2: Supervision with toilet transfers. Long term goal 3: Supervision with bathing hygiene. Long term goal 4: Supervision with LB dressing, AE PRN. Long term goal 5: Supervision with UB dressing. Long term goals 6: Patient verbalize DME needs. Patient Goals   Patient goals : Return home.        Therapy Time   Individual Concurrent Group Co-treatment   Time In 0815         Time Out 0900         Minutes 45         Timed Code Treatment Minutes: [id number]

## 2021-01-05 NOTE — PLAN OF CARE
Problem: Falls - Risk of:  Goal: Will remain free from falls  Description: Will remain free from falls  1/4/2021 2304 by Troy Boyle LPN  Outcome: Ongoing  1/4/2021 1524 by Vanita Weller RN  Outcome: Ongoing  Goal: Absence of physical injury  Description: Absence of physical injury  1/4/2021 2304 by Troy Boyle LPN  Outcome: Ongoing  1/4/2021 1524 by Vanita Weller RN  Outcome: Ongoing     Problem: ACTIVITY INTOLERANCE/IMPAIRED MOBILITY  Goal: Mobility/activity is maintained at optimum level for patient  1/4/2021 2304 by Troy Boyle LPN  Outcome: Ongoing  1/4/2021 1524 by Vanita Weller RN  Outcome: Ongoing     Problem: Skin Integrity:  Goal: Will show no infection signs and symptoms  Description: Will show no infection signs and symptoms  1/4/2021 2304 by Troy Boyle LPN  Outcome: Ongoing  1/4/2021 1524 by Vanita Weller RN  Outcome: Ongoing  Goal: Absence of new skin breakdown  Description: Absence of new skin breakdown  1/4/2021 2304 by Troy Boyle LPN  Outcome: Ongoing  1/4/2021 1524 by Vanita Weller RN  Outcome: Ongoing

## 2021-01-05 NOTE — PROGRESS NOTES
[facility]  INPATIENT SPEECH THERAPY  [facility] 8 REHAB UNIT  TIME     1100   1200    [x]Daily Note  []Progress Note    Date: 2021  Patient Name: Frederick Villa        MRN: [id number]    Account #: [de-identified]  : 10/14/1933  (80 y.o.)  Gender: female   Primary Andrei Duckworth MD  Swallowing Status/Diet: General diet, thin liquids        PATIENT DIAGNOSIS(ES):    Diagnosis: CVA L MEDIAL PRECENTRAL GYRUS AND R OCCPITAL STROKE (WEAKNESS OF R SIDE>L SIDE, VISION IMPAIRED ON L SIDE)     Additional Pertinent Hx: PE with acute cor pulmonale; interstitial pulmonary disease; HTN; ACUTE RESP FAILURE WITH HYPOXIA AND HYPERCAPNIA; POLYNEUROPATHY; NORMAL PRESSURE HYDROCEPHALUS; MACULAR DEGENERATION     Subjective:   Pt upright in wheelchair ready for skilled ST. Pt verbalized, \"you are making me think. \"     Objective:   Recall of functional information including delayed and short-term was targeted in structured conversation with open-ended and 'wh' questions. Pt with response latency frequently that did not interfere with conversational turns. Pt with 70-80% accuracy. Simple naming tasks with family members completed. Pt named 5 members in one minute. Later, pt attempted to name 14 members, but could not focus attention to get all names. Verbal cues did not assist with attention, pt just lost interest.    Functional problem solving and verbal reasoning completed with 'wh' questions. SLP modeled responses occasionally, provided verbal prompts for details, and provided verbal cues for pt to demonstrate 70-80% accuracy. SHORT TERM GOAL #1:  Goal 1: The patient will demonstrate recall of functional information following a/an (immediate, short term, long term) delay with min cues in order to increase functional integration into environment.     SHORT TERM GOAL #2: Goal 2: The patient will complete simple/complex naming tasks with min verbal cues at 80% accuracy to improve thought organization and word retrieval skills. SHORT TERM GOAL #3:  Goal 3: The patient will follow multi step commands related to functional living environment with 80% accuracy and min cues in order to increase functional integration into environment. SHORT TERM GOAL #4:  Goal 4: The patient will complete problem solving/reasoning tasks with min verbal cues at 80% accuracy in order to improve safety awareness for functional tasks. ASSESSMENT:  Assessment: [x]Progressing towards goals          []Not Progressing towards goals    Patient Tolerance of Treatment:   [x]Tolerated well []Tolerated fair []Required rest breaks []Fatigued    Education:  Learner:  [x]Patient          []Significant Other          []Other  Education provided regarding:  [x]Goals and POC   []Diet and swallowing precautions    []Home Exercise Program  []Progress and/or discharge information  Method of Education:  [x]Discussion          []Demonstration          []Handout          []Other  Evaluation of Education:   [x]Verbalized understanding   []Demonstrates without assistance  []Demonstrates with assistance  []Needs further instruction     []No evidence of learning                  []No family present      Plan: [x]Continue with current plan of care    []Modify current plan of care as follows:    []Discharge patient    Discharge Location:    Services/Supervision Recommended:      [x]Patient continues to require treatment by a licensed therapist to address functional deficits as outlined in the established plan of care.     Electronically signed by Christina Villarreal on 1/5/21 at 3:08 PM CST

## 2021-01-06 LAB
ANION GAP SERPL CALCULATED.3IONS-SCNC: 6 MMOL/L (ref 7–19)
BUN BLDV-MCNC: 35 MG/DL (ref 8–23)
CALCIUM SERPL-MCNC: 9.2 MG/DL (ref 8.8–10.2)
CHLORIDE BLD-SCNC: 98 MMOL/L (ref 98–111)
CO2: 39 MMOL/L (ref 22–29)
CREAT SERPL-MCNC: 1.4 MG/DL (ref 0.5–0.9)
GFR AFRICAN AMERICAN: 43
GFR NON-AFRICAN AMERICAN: 36
GLUCOSE BLD-MCNC: 96 MG/DL (ref 74–109)
POTASSIUM SERPL-SCNC: 4.2 MMOL/L (ref 3.5–5)
SODIUM BLD-SCNC: 143 MMOL/L (ref 136–145)

## 2021-01-06 PROCEDURE — 2700000000 HC OXYGEN THERAPY PER DAY

## 2021-01-06 PROCEDURE — 6370000000 HC RX 637 (ALT 250 FOR IP): Performed by: PSYCHIATRY & NEUROLOGY

## 2021-01-06 PROCEDURE — 97116 GAIT TRAINING THERAPY: CPT

## 2021-01-06 PROCEDURE — 97129 THER IVNTJ 1ST 15 MIN: CPT

## 2021-01-06 PROCEDURE — 97130 THER IVNTJ EA ADDL 15 MIN: CPT

## 2021-01-06 PROCEDURE — 36415 COLL VENOUS BLD VENIPUNCTURE: CPT

## 2021-01-06 PROCEDURE — 1180000000 HC REHAB R&B

## 2021-01-06 PROCEDURE — 80048 BASIC METABOLIC PNL TOTAL CA: CPT

## 2021-01-06 PROCEDURE — 97530 THERAPEUTIC ACTIVITIES: CPT

## 2021-01-06 PROCEDURE — 97535 SELF CARE MNGMENT TRAINING: CPT

## 2021-01-06 PROCEDURE — 97110 THERAPEUTIC EXERCISES: CPT

## 2021-01-06 PROCEDURE — 99233 SBSQ HOSP IP/OBS HIGH 50: CPT | Performed by: PSYCHIATRY & NEUROLOGY

## 2021-01-06 RX ADMIN — ATORVASTATIN CALCIUM 80 MG: 80 TABLET, FILM COATED ORAL at 20:09

## 2021-01-06 RX ADMIN — CARBIDOPA AND LEVODOPA 1 TABLET: 25; 100 TABLET ORAL at 20:09

## 2021-01-06 RX ADMIN — APIXABAN 2.5 MG: 2.5 TABLET, FILM COATED ORAL at 20:09

## 2021-01-06 RX ADMIN — Medication 5000 UNITS: at 09:09

## 2021-01-06 RX ADMIN — FLUOXETINE HYDROCHLORIDE 20 MG: 20 CAPSULE ORAL at 09:08

## 2021-01-06 RX ADMIN — BUMETANIDE 1 MG: 1 TABLET ORAL at 09:09

## 2021-01-06 RX ADMIN — CLONIDINE HYDROCHLORIDE 0.1 MG: 0.1 TABLET ORAL at 09:09

## 2021-01-06 RX ADMIN — ASPIRIN 81 MG: 81 TABLET, CHEWABLE ORAL at 09:09

## 2021-01-06 RX ADMIN — LISINOPRIL 40 MG: 20 TABLET ORAL at 09:09

## 2021-01-06 RX ADMIN — GABAPENTIN 100 MG: 100 CAPSULE ORAL at 13:06

## 2021-01-06 RX ADMIN — CLONIDINE HYDROCHLORIDE 0.1 MG: 0.1 TABLET ORAL at 20:09

## 2021-01-06 RX ADMIN — GABAPENTIN 100 MG: 100 CAPSULE ORAL at 09:09

## 2021-01-06 RX ADMIN — MULTIPLE VITAMINS W/ MINERALS TAB 1 TABLET: TAB at 09:08

## 2021-01-06 RX ADMIN — HYDROCODONE BITARTRATE AND ACETAMINOPHEN 1 TABLET: 7.5; 325 TABLET ORAL at 18:58

## 2021-01-06 RX ADMIN — QUETIAPINE FUMARATE 300 MG: 300 TABLET ORAL at 20:09

## 2021-01-06 RX ADMIN — PRAMIPEXOLE DIHYDROCHLORIDE 0.5 MG: 0.25 TABLET ORAL at 20:09

## 2021-01-06 RX ADMIN — OXYBUTYNIN CHLORIDE 10 MG: 5 TABLET, EXTENDED RELEASE ORAL at 09:08

## 2021-01-06 RX ADMIN — METOPROLOL SUCCINATE 25 MG: 25 TABLET, EXTENDED RELEASE ORAL at 09:09

## 2021-01-06 RX ADMIN — APIXABAN 2.5 MG: 2.5 TABLET, FILM COATED ORAL at 09:08

## 2021-01-06 RX ADMIN — GABAPENTIN 100 MG: 100 CAPSULE ORAL at 20:09

## 2021-01-06 ASSESSMENT — PAIN - FUNCTIONAL ASSESSMENT: PAIN_FUNCTIONAL_ASSESSMENT: ACTIVITIES ARE NOT PREVENTED

## 2021-01-06 ASSESSMENT — PAIN SCALES - GENERAL
PAINLEVEL_OUTOF10: 0
PAINLEVEL_OUTOF10: 8

## 2021-01-06 ASSESSMENT — PAIN DESCRIPTION - DESCRIPTORS: DESCRIPTORS: ACHING;DISCOMFORT

## 2021-01-06 ASSESSMENT — PAIN DESCRIPTION - ONSET: ONSET: ON-GOING

## 2021-01-06 NOTE — PROGRESS NOTES
[facility]  INPATIENT SPEECH THERAPY  [facility] 8 [facility] UNIT  TIME    1100   1200    [x]Daily Note  []Progress Note    Date: 2021  Patient Name: Mary Cabello        MRN: [id number]    Account #: [de-identified]  : 10/14/1933  (80 y.o.)  Gender: female   Primary Provider: João Lo MD  Precautions: fall risk  Swallowing Status/Diet: General diet, thin liquids    Subjective:   Pt upright in wheelchair with PT present. Toileting needs were completed at beginning of session with PT present. Pt had SOB and stuffy nose that did impair her breath rate this date. Objective:   Recall of functional information, person's names, and recent medical events all verbalized with minimal difficulty and mild response latency with immediate, short-term and long-term information. Simple and complex naming tasks completed with 70-80% accuracy with verbal prompts. During functional ADL task, pt responded to multi-step directions with minimal verbal repetition and mild-moderate visual prompts. Pt approximately 70-80%. SHORT TERM GOAL #1:  Goal 1: The patient will demonstrate recall of functional information following a/an (immediate, short term, long term) delay with min cues in order to increase functional integration into environment. SHORT TERM GOAL #2:  Goal 2: The patient will complete simple/complex naming tasks with min verbal cues at 80% accuracy to improve thought organization and word retrieval skills. SHORT TERM GOAL #3:  Goal 3: The patient will follow multi step commands related to functional living environment with 80% accuracy and min cues in order to increase functional integration into environment. SHORT TERM GOAL #4:  Goal 4: The patient will complete problem solving/reasoning tasks with min verbal cues at 80% accuracy in order to improve safety awareness for functional tasks.       ASSESSMENT:  Assessment: [x]Progressing towards goals          []Not Progressing towards goals Patient Tolerance of Treatment:   [x]Tolerated well []Tolerated fair []Required rest breaks []Fatigued    Education:  Learner:  [x]Patient          []Significant Other          []Other  Education provided regarding:  [x]Goals and POC   []Diet and swallowing precautions    []Home Exercise Program  []Progress and/or discharge information  Method of Education:  [x]Discussion          []Demonstration          []Handout          []Other  Evaluation of Education:   [x]Verbalized understanding   []Demonstrates without assistance  []Demonstrates with assistance  []Needs further instruction     []No evidence of learning                  []No family present      Plan: [x]Continue with current plan of care    []Modify current plan of care as follows:    []Discharge patient    Discharge Location:    Services/Supervision Recommended:      [x]Patient continues to require treatment by a licensed therapist to address functional deficits as outlined in the established plan of care.     Electronically signed by Sotero Helm on 1/6/21 at 2:28 PM CST

## 2021-01-06 NOTE — PLAN OF CARE
Problem: Falls - Risk of:  Goal: Will remain free from falls  Description: Will remain free from falls  1/5/2021 2330 by Claudeen Bel, RN  Outcome: Ongoing  1/5/2021 1139 by Rosalinda Mitchell RN  Outcome: Ongoing  Goal: Absence of physical injury  Description: Absence of physical injury  1/5/2021 2330 by Claudeen Bel, RN  Outcome: Ongoing  1/5/2021 1139 by Rosalinda Mitchell RN  Outcome: Ongoing     Problem: Skin Integrity:  Goal: Will show no infection signs and symptoms  Description: Will show no infection signs and symptoms  1/5/2021 2330 by Claudeen Bel, RN  Outcome: Ongoing  1/5/2021 1139 by Rosalinda Mitchell RN  Outcome: Ongoing  Goal: Absence of new skin breakdown  Description: Absence of new skin breakdown  1/5/2021 2330 by Claudeen Bel, RN  Outcome: Ongoing  1/5/2021 1139 by Rosalinda Mitchell RN  Outcome: Ongoing

## 2021-01-06 NOTE — PROGRESS NOTES
01/06/21 1000   Restrictions/Precautions   Restrictions/Precautions Weight Bearing; Fall Risk   Lower Extremity Weight Bearing Restrictions   Right Lower Extremity Weight Bearing Weight Bearing As Tolerated   Left Lower Extremity Weight Bearing Weight Bearing As Tolerated   Position Activity Restriction   Other position/activity restrictions 2 L O2   Vision   Vision Impaired   Hearing   Hearing X   Hearing Exceptions Hard of hearing/hearing concerns   General   Chart Reviewed Yes   Patient assessed for rehabilitation services? Yes   Additional Pertinent Hx PE with acute cor pulmonale; interstitial pulmonary disease; HTN; ACUTE RESP FAILURE WITH HYPOXIA AND HYPERCAPNIA; POLYNEUROPATHY; NORMAL PRESSURE HYDROCEPHALUS; MACULAR DEGENERATION; R EYE BLINDNESS   Pain Assessment   Pain Assessment 0-10   Pain Level 0   Oxygen Therapy   O2 Flow Rate (L/min) 2 L/min   Transfers   Sit to Stand Contact guard assistance   Stand to sit Contact guard assistance   Stand Pivot Transfers Minimal Assistance   Ambulation   Ambulation? Yes   Ambulation 1   Surface level tile   Device Parallel Bars   Other Apparatus O2   Assistance Moderate assistance   Quality of Gait MAXIMUM VERBAL CUES FOR PROPER 3 POIN TSEQUENCE LEADING WITH RIGHT LE.  INCONSISTENT PLACEMENT OF HANDSS. INITIALLLY REQUIRED ASSIST FOR PROPER RIGHT LE ADVANCEMENT, HOWEVER THIS IMPROVED WITH REPETITIONS.  2 INSTANCES OF INTENTIONAL/RESTING FULL BODY TREMORS DURING GAIT. ABLE TO CONTINUE DESPITRE THESE EPISODES WHICH EVENTUALLY SUBSIDED. DURING EPISODE PATIENT PRESENTED WITH BILATERAL PSEUDO-KNEE BUCKLE WITHOUT FULL COLLAPSE.      Distance 12 FT x3   Exercises   Comments sitting BLE active exercises 2 sets x 20 reps   Conditions Requiring Skilled Therapeutic Intervention Body structures, Functions, Activity limitations Decreased functional mobility ; Decreased ADL status; Decreased ROM; Decreased strength;Decreased safe awareness;Decreased endurance;Decreased balance;Decreased coordination   Assessment Patient ambulated 12 feet x3 with decreased complaints of fatigue. Pt demonstrated increased balance during ambulation. Patient performed sit-to-stand and stand-to-sit TF with contact guarding only. Activity Tolerance   Activity Tolerance Patient Tolerated treatment well   Safety Devices   Type of devices Chair alarm in place;Call light within reach; Left in chair;Patient at risk for falls

## 2021-01-06 NOTE — PROGRESS NOTES
Occupational Therapy     01/06/21 0900   Pain Assessment   Patient Currently in Pain No   Pain Assessment 0-10   Pain Level 0   Balance   Sitting Balance Supervision   Standing Balance Contact guard assistance  (At RW.)   Standing Balance   Time 3 minutes x1 trial, 2 minutes and 30 seconds x1 trial.   Activity Intermittent 1 handed static standing task. Comment O2 at 95% post standing trials on room air. Functional Mobility   Functional - Mobility Device Rolling Walker   Activity Other   Assist Level Minimal assistance   Functional Mobility Comments Short distance x3 trials, O2 sat dropped to 86% on room air post functional mobillity, increased to 96% on 2L of O2 per nc and 2 minutes rest.   Transfers   Sit to stand Contact guard assistance   Stand to sit Contact guard assistance   Transfer Comments Cues for proper hand placement. Toilet Transfers   Toilet - Technique Stand pivot   Equipment Used Grab bars   Toilet Transfer Contact guard assistance   Type of ROM/Therapeutic Exercise   Type of ROM/Therapeutic Exercise AROM   Comment BUE 1 set x 10 reps. Assessment   Performance deficits / Impairments Decreased functional mobility ; Decreased ADL status; Decreased strength;Decreased cognition;Decreased endurance;Decreased balance;Decreased high-level IADLs   Treatment Diagnosis B CVAs   Prognosis Good   Timed Code Treatment Minutes 60 Minutes   Activity Tolerance   Activity Tolerance Patient Tolerated treatment well   Safety Devices   Safety Devices in place Yes   Type of devices Gait belt  (GIven to PT.)   Plan   Current Treatment Recommendations Strengthening;Balance Training;Functional Mobility Training; Endurance Training; Safety Education & Training;Equipment Evaluation, Education, & procurement;Patient/Caregiver Education & Training;Self-Care / ADL; Home Management Training      01/06/21 0900   Toileting Hygiene   Assistance Needed Supervision or touching assistance Comment CGA during standing portion of task. CARE Score 4   Lower Body Dressing   Assistance Needed Partial/moderate assistance   Comment Using AE, cues for tech. CARE Score 3   Putting On/Taking Off Footwear   Assistance Needed Partial/moderate assistance   Comment Using AE, mod cues for tech, unable to don shoes this date d/t edema in B feet.    CARE Score 3

## 2021-01-06 NOTE — PROGRESS NOTES
Occupational Therapy     01/06/21 1300   General   Diagnosis B  CVAs   Pain Assessment   Patient Currently in Pain No   Pain Assessment 0-10   Pain Level 0   Balance   Sitting Balance Supervision   Standing Balance Contact guard assistance   Functional Mobility   Functional - Mobility Device Rolling Walker   Activity Other   Assist Level Minimal assistance   Transfers   Sit to stand Contact guard assistance   Stand to sit Contact guard assistance   Type of ROM/Therapeutic Exercise   Type of ROM/Therapeutic Exercise Cane/Wand   Comment 2# 2 sets x 10 reps. Assessment   Performance deficits / Impairments Decreased functional mobility ; Decreased ADL status; Decreased strength;Decreased cognition;Decreased endurance;Decreased balance;Decreased high-level IADLs   Treatment Diagnosis B CVAs   Prognosis Good   Timed Code Treatment Minutes 30 Minutes   Activity Tolerance   Activity Tolerance Patient Tolerated treatment well   Safety Devices   Safety Devices in place Yes   Type of devices Call light within reach; Chair alarm in place   Plan   Current Treatment Recommendations Strengthening;Balance Training;Functional Mobility Training; Endurance Training; Safety Education & Training;Equipment Evaluation, Education, & procurement;Patient/Caregiver Education & Training;Self-Care / ADL; Home Management Training

## 2021-01-06 NOTE — PROGRESS NOTES
Patient:   Deepika Lorenzo  MR#:    [id number]   Room:    0811/811-01   YOB: 1933  Date of Progress Note: 1/6/2021  Time of Note                           8:03 AM  Consulting Physician:   Lyly Blackwell M.D. Attending Physician:  Lyly Blackwell MD     CHIEF COMPLAINT: Generalized weakness, worse on the right     Subjective  This 80 y.o. female  who presented to [facility] with inability to walk. She has a past medical history significant for breast cancer status post bilateral implants, chronic back pain, tremors, macular degeneration and essential hypertension. Patient had gone to bed on 12/26 around 2030. When she awoke in the morning around 830 she had difficulty getting out of bed. At baseline she is alert and oriented and conversant. Patient continued to have difficulty ambulating with unsteady gait. Her primary care provider advised her to seek evaluation at the emergency department. In the ED, blood pressure was elevated at 186/102. MRI brain was done showing acute stroke left medial precentral gyrus and acute right occipital stroke with chronic microvascular disease and atrophy. IV TPA not considered as last known well was 2030 on 12/26. She was admitted to the hospitalist service. She was seen in consultation by neurology. Aspirin 325 mg daily was changed to dual therapy with Eliquis 2.5 mg twice daily and aspirin 81 mg daily. Per neurology, MRI brain likely embolic with right occipital and left medial precentral gyrus. Also, transthoracic echo findings placed at increased risk of developing atrial fibrillation with mild to moderate left atrial enlargement and TTE limited study suggest possible right to left shunt with rope score 4 suggesting that 38% chance that stroke due to PFO and 12% risk of to urinary recurrence of stroke/TIA. Venous Doppler of bilateral lower extremities did not show thrombus. Given patient age, would need to determine risk/benefits of closure but at this point recommend dual therapy with aspirin and Eliquis. Lipitor 80 mg daily. LDL at goal at 55. She has maintained normal sinus rhythm on continuous telemetry. She will need Zio patch at discharge. She is to follow-up with neurology in 3 to 4 weeks. She was seen in consultation by cardiology. BNP 8,437 on admission. Chest x-ray showed cardiomegaly, bilateral effusions, and pulmonary edema. Transthoracic echocardiogram showed cor pulmonale with normal LVEF. She was started on IV Bumex twice daily will be transitioned to oral to start tomorrow 1 mg Bumex daily. She has had good urine output with diuresis. Patient states lower extremity edema has much improved. Patient denies history of heart failure or pulmonary disease. She is a non-smoker. She is to follow-up with cardiology per recommendations, she will need a right heart cath as outpatient basis. She was seen in consultation by pulmonology. Acute respiratory failure with hypoxia and hypercapnia. ABG on 12/28 showed pH 7.32, PCO2 69, PO2 60 and HCO3 35. She was subsequently placed on BiPAP. Probably chronic CO2 retention. Of note, she had a previous chest x-ray in 2019 that showed interstitial fibrotic changes within the lungs.   VQ scan showed low probability for pulmonary embolus. She will need pulmonary function test, CT of the chest, and right heart cath in the outpatient setting once euvolemic. Creatinine on admission noted to with repeat down to 1. 15. Serum CO2 43she has a contraction alkalosis but renal function appears to be improving and stable. Overall, she is alert and oriented and neurologically back to baseline. She remains on 2 L nasal cannula recommend to wean as tolerated for SPO2 goal greater than 90%, avoid over oxygenating with known CO2 retention. Continue to encourage BiPAP nightly. She denies shortness of breath, chest pain or pressure. She is tolerating a diet. She has had good urine output from diuresis. Labs did show hypokalemia and hypomagnesemia for which she did receive a blood replacement. She continues to have weakness, more so on the right. She was evaluated by SPT for swallow and found to have Oropharyngeal dysphagia but she has now been upgraded to a regular diet with thin liquids. No c/o today. Anxious to go home. Says her feet swell all the time at home. Declines DANIEL hose. REVIEW OF SYSTEMS:  Constitutional: No fevers No chills  Neck:No stiffness  Respiratory: No shortness of breath  Cardiovascular: No chest pain No palpitations  Gastrointestinal: No abdominal pain    Genitourinary: No Dysuria  Neurological: No headache, no confusion      PHYSICAL EXAM:  /75   Pulse 71   Temp 97.2 °F (36.2 °C) (Temporal)   Resp 16   Ht 4' 11\" (1.499 m)   Wt 168 lb 3.2 oz (76.3 kg)   SpO2 94%   BMI 33.97 kg/m²     Constitutional: she appears well-developed and well-nourished. Eyes  conjunctiva normal.  Pupils react to light  Ear, nose, throat -hearing intact to voice.  No scars, masses, or lesions over external nose or ears, no atrophy of tongue  Neck-symmetric, no masses noted, no jugular vein distension  Respiration- chest wall appears symmetric, good expansion, Long term goal 3: COMPLETE STAND STEP TF TO R AND L WITH RW, IND  Long term goal 4: COMPLETE CAR TF WITH SUPERVISION  Long term goal 5: PT IND WITH ALL BED MOBILITY     ASSESSMENT:  Assessment: O2 SATS DROPPED TO 86% ON 1L DURING BED MOBILITY, REQUIRED INCREASING TO 2L TO FULLY RECOVER. RECOMMEND 1L AT REST, 2L WITH ACTIVIYT. SEE GAIT. RECOMMEND CONTINUE IN PARALLEL BARS 1-2 MORE SESSION BEFORE ADVANCING TO RW.         SPEECH THERAPY     Objective:   Recall of functional information including delayed and short-term was targeted in structured conversation with open-ended and 'wh' questions. Pt with response latency frequently that did not interfere with conversational turns. Pt with 70-80% accuracy.      Simple naming tasks with family members completed. Pt named 5 members in one minute. Later, pt attempted to name 14 members, but could not focus attention to get all names. Verbal cues did not assist with attention, pt just lost interest.     Functional problem solving and verbal reasoning completed with 'wh' questions.  SLP modeled responses occasionally, provided verbal prompts for details, and provided verbal cues for pt to demonstrate 70-80% accuracy.         SHORT TERM GOAL #1:  Goal 1: The patient will demonstrate recall of functional information following a/an (immediate, short term, long term) delay with min cues in order to increase functional integration into environment.     SHORT TERM GOAL #2:  Goal 2: The patient will complete simple/complex naming tasks with min verbal cues at 80% accuracy to improve thought organization and word retrieval skills.     SHORT TERM GOAL #3:  Goal 3: The patient will follow multi step commands related to functional living environment with 80% accuracy and min cues in order to increase functional integration into environment.     SHORT TERM GOAL #4: Pt is well-nourished with PO intake >50% and stable wt status. No issues. Please see nutrition note for details.     RECORD REVIEW: Previous medical records, medications were reviewed at today's visit    IMPRESSION:   1. 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 Baby aspirin and Eliquis  2. DVT prophylaxisEliquis  3. Essential hypertensioncontinue current medications and monitoring. stable  4. CHF with recent pulmonary edemacontinue Bumex  5. Restless leg syndromecontinue Sinemet and Mirapex. Controlled. 6.  Acute respiratory failure with hypercapniacontinue O2 and monitoring. Stable. Still requiring O2  7. CKDstable to worse than it was a week or so ago. Continues to have significant edema. Bumex held only on 1/5. Gabapentin reduced to help with peripheral edema. Patient denies shortness of air.     Staffing this date , mutlidisciplinary with entire team and pt's daughter with complex decision making and planning for discharge  ELOS:1/16/20

## 2021-01-07 LAB
ANION GAP SERPL CALCULATED.3IONS-SCNC: 8 MMOL/L (ref 7–19)
BASOPHILS ABSOLUTE: 0 K/UL (ref 0–0.2)
BASOPHILS RELATIVE PERCENT: 0.6 % (ref 0–1)
BUN BLDV-MCNC: 32 MG/DL (ref 8–23)
CALCIUM SERPL-MCNC: 9.7 MG/DL (ref 8.8–10.2)
CHLORIDE BLD-SCNC: 94 MMOL/L (ref 98–111)
CO2: 39 MMOL/L (ref 22–29)
CREAT SERPL-MCNC: 1.3 MG/DL (ref 0.5–0.9)
EOSINOPHILS ABSOLUTE: 0.2 K/UL (ref 0–0.6)
EOSINOPHILS RELATIVE PERCENT: 3.1 % (ref 0–5)
GFR AFRICAN AMERICAN: 47
GFR NON-AFRICAN AMERICAN: 39
GLUCOSE BLD-MCNC: 97 MG/DL (ref 74–109)
HCT VFR BLD CALC: 35.1 % (ref 37–47)
HEMOGLOBIN: 10.5 G/DL (ref 12–16)
IMMATURE GRANULOCYTES #: 0 K/UL
LYMPHOCYTES ABSOLUTE: 1.3 K/UL (ref 1.1–4.5)
LYMPHOCYTES RELATIVE PERCENT: 26.7 % (ref 20–40)
MCH RBC QN AUTO: 29.5 PG (ref 27–31)
MCHC RBC AUTO-ENTMCNC: 29.9 G/DL (ref 33–37)
MCV RBC AUTO: 98.6 FL (ref 81–99)
MONOCYTES ABSOLUTE: 0.7 K/UL (ref 0–0.9)
MONOCYTES RELATIVE PERCENT: 14 % (ref 0–10)
NEUTROPHILS ABSOLUTE: 2.7 K/UL (ref 1.5–7.5)
NEUTROPHILS RELATIVE PERCENT: 55.4 % (ref 50–65)
PDW BLD-RTO: 14.9 % (ref 11.5–14.5)
PLATELET # BLD: 194 K/UL (ref 130–400)
PMV BLD AUTO: 10.4 FL (ref 9.4–12.3)
POTASSIUM REFLEX MAGNESIUM: 4 MMOL/L (ref 3.5–5)
RBC # BLD: 3.56 M/UL (ref 4.2–5.4)
SODIUM BLD-SCNC: 141 MMOL/L (ref 136–145)
WBC # BLD: 4.9 K/UL (ref 4.8–10.8)

## 2021-01-07 PROCEDURE — 97110 THERAPEUTIC EXERCISES: CPT

## 2021-01-07 PROCEDURE — 6370000000 HC RX 637 (ALT 250 FOR IP): Performed by: PSYCHIATRY & NEUROLOGY

## 2021-01-07 PROCEDURE — 97116 GAIT TRAINING THERAPY: CPT

## 2021-01-07 PROCEDURE — 97530 THERAPEUTIC ACTIVITIES: CPT

## 2021-01-07 PROCEDURE — 97130 THER IVNTJ EA ADDL 15 MIN: CPT

## 2021-01-07 PROCEDURE — 2700000000 HC OXYGEN THERAPY PER DAY

## 2021-01-07 PROCEDURE — 99232 SBSQ HOSP IP/OBS MODERATE 35: CPT | Performed by: PSYCHIATRY & NEUROLOGY

## 2021-01-07 PROCEDURE — 36415 COLL VENOUS BLD VENIPUNCTURE: CPT

## 2021-01-07 PROCEDURE — 80048 BASIC METABOLIC PNL TOTAL CA: CPT

## 2021-01-07 PROCEDURE — 97129 THER IVNTJ 1ST 15 MIN: CPT

## 2021-01-07 PROCEDURE — 85025 COMPLETE CBC W/AUTO DIFF WBC: CPT

## 2021-01-07 PROCEDURE — 1180000000 HC REHAB R&B

## 2021-01-07 RX ADMIN — OXYBUTYNIN CHLORIDE 10 MG: 5 TABLET, EXTENDED RELEASE ORAL at 08:31

## 2021-01-07 RX ADMIN — Medication 5000 UNITS: at 08:30

## 2021-01-07 RX ADMIN — FLUOXETINE HYDROCHLORIDE 20 MG: 20 CAPSULE ORAL at 08:31

## 2021-01-07 RX ADMIN — APIXABAN 2.5 MG: 2.5 TABLET, FILM COATED ORAL at 08:31

## 2021-01-07 RX ADMIN — QUETIAPINE FUMARATE 300 MG: 300 TABLET ORAL at 20:17

## 2021-01-07 RX ADMIN — APIXABAN 2.5 MG: 2.5 TABLET, FILM COATED ORAL at 20:17

## 2021-01-07 RX ADMIN — CLONIDINE HYDROCHLORIDE 0.1 MG: 0.1 TABLET ORAL at 08:31

## 2021-01-07 RX ADMIN — PRAMIPEXOLE DIHYDROCHLORIDE 0.5 MG: 0.25 TABLET ORAL at 20:17

## 2021-01-07 RX ADMIN — ASPIRIN 81 MG: 81 TABLET, CHEWABLE ORAL at 08:31

## 2021-01-07 RX ADMIN — GABAPENTIN 100 MG: 100 CAPSULE ORAL at 08:31

## 2021-01-07 RX ADMIN — CLONIDINE HYDROCHLORIDE 0.1 MG: 0.1 TABLET ORAL at 20:17

## 2021-01-07 RX ADMIN — CARBIDOPA AND LEVODOPA 1 TABLET: 25; 100 TABLET ORAL at 20:17

## 2021-01-07 RX ADMIN — ATORVASTATIN CALCIUM 80 MG: 80 TABLET, FILM COATED ORAL at 20:18

## 2021-01-07 RX ADMIN — BUMETANIDE 1 MG: 1 TABLET ORAL at 08:31

## 2021-01-07 RX ADMIN — GABAPENTIN 100 MG: 100 CAPSULE ORAL at 20:17

## 2021-01-07 RX ADMIN — MULTIPLE VITAMINS W/ MINERALS TAB 1 TABLET: TAB at 08:30

## 2021-01-07 RX ADMIN — LISINOPRIL 40 MG: 20 TABLET ORAL at 08:31

## 2021-01-07 RX ADMIN — METOPROLOL SUCCINATE 25 MG: 25 TABLET, EXTENDED RELEASE ORAL at 08:31

## 2021-01-07 RX ADMIN — GABAPENTIN 100 MG: 100 CAPSULE ORAL at 13:54

## 2021-01-07 ASSESSMENT — PAIN SCALES - GENERAL: PAINLEVEL_OUTOF10: 0

## 2021-01-07 NOTE — PROGRESS NOTES
[facility]  INPATIENT SPEECH THERAPY  [facility] 8 REHAB UNIT  TIME   Time In: 1100  Time Out: 1200  Minutes: 60       [x]Daily Note  []Progress Note    Date: 2021  Patient Name: Erum Phoenix        MRN: [id number]    Account #: [de-identified]  : 10/14/1933  (80 y.o.)  Gender: female   Primary Provider: George Marquez MD  Precautions: Fall  Swallowing Status/Diet: Regular diet with thin liquids      Subjective: Patient alert and cooperative with all therapy tasks. Patient upright in her recliner. Patient states she has been refusing her alarm. Patient has nasal cannula 2 liters. Objective:   Patient able to recall her kids names, grand kids names, and great grand kids names at 100% accuracy. Patient also able to verbalize/recall recent daily events with no difficulty. Patient did present with word finding difficulties towards the end of the session. Patient stating \"my words just aren't coming out right. \" Did also note poor thought organization towards the end of the session. Suspect fatigue. Patient continues to show improvements for following multi step commands when completing ADL's. Safety awareness also improved when completing ambulation tasks and toileting tasks. Continue speech services to address higher level cognition. SHORT TERM GOAL #1:  Goal 1: The patient will demonstrate recall of functional information following a/an (immediate, short term, long term) delay with min cues in order to increase functional integration into environment. SHORT TERM GOAL #2:  Goal 2: The patient will complete simple/complex naming tasks with min verbal cues at 80% accuracy to improve thought organization and word retrieval skills. SHORT TERM GOAL #3:  Goal 3: The patient will follow multi step commands related to functional living environment with 80% accuracy and min cues in order to increase functional integration into environment.     SHORT TERM GOAL #4: Goal 4: The patient will complete problem solving/reasoning tasks with min verbal cues at 80% accuracy in order to improve safety awareness for functional tasks. ASSESSMENT:  Assessment: [x]Progressing towards goals          []Not Progressing towards goals    Patient Tolerance of Treatment:   [x]Tolerated well []Tolerated fair []Required rest breaks []Fatigued    Education:  Learner:  [x]Patient          []Significant Other          []Other  Education provided regarding:  [x]Goals and POC   []Diet and swallowing precautions    []Home Exercise Program  []Progress and/or discharge information  Method of Education:  [x]Discussion          []Demonstration          []Handout          []Other  Evaluation of Education:   [x]Verbalized understanding   []Demonstrates without assistance  []Demonstrates with assistance  []Needs further instruction     []No evidence of learning                  []No family present      Plan: [x]Continue with current plan of care    []Modify current plan of care as follows:    []Discharge patient    Discharge Location:    Services/Supervision Recommended:      [x]Patient continues to require treatment by a licensed therapist to address functional deficits as outlined in the established plan of care.

## 2021-01-07 NOTE — PROGRESS NOTES
She was seen in consultation by neurology. Aspirin 325 mg daily was changed to dual therapy with Eliquis 2.5 mg twice daily and aspirin 81 mg daily. Per neurology, MRI brain likely embolic with right occipital and left medial precentral gyrus. Also, transthoracic echo findings placed at increased risk of developing atrial fibrillation with mild to moderate left atrial enlargement and TTE limited study suggest possible right to left shunt with rope score 4 suggesting that 38% chance that stroke due to PFO and 12% risk of to urinary recurrence of stroke/TIA. Venous Doppler of bilateral lower extremities did not show thrombus. Given patient age, would need to determine risk/benefits of closure but at this point recommend dual therapy with aspirin and Eliquis. Lipitor 80 mg daily. LDL at goal at 55. She has maintained normal sinus rhythm on continuous telemetry. She will need Zio patch at discharge. She is to follow-up with neurology in 3 to 4 weeks. She was seen in consultation by cardiology. BNP 8,437 on admission. Chest x-ray showed cardiomegaly, bilateral effusions, and pulmonary edema. Transthoracic echocardiogram showed cor pulmonale with normal LVEF. She was started on IV Bumex twice daily will be transitioned to oral to start tomorrow 1 mg Bumex daily. She has had good urine output with diuresis. Patient states lower extremity edema has much improved. Patient denies history of heart failure or pulmonary disease. She is a non-smoker. She is to follow-up with cardiology per recommendations, she will need a right heart cath as outpatient basis. She was seen in consultation by pulmonology. Acute respiratory failure with hypoxia and hypercapnia. ABG on 12/28 showed pH 7.32, PCO2 69, PO2 60 and HCO3 35. She was subsequently placed on BiPAP. Probably chronic CO2 retention. Of note, she had a previous chest x-ray in 2019 that showed interstitial fibrotic changes within the lungs.   VQ scan showed low probability for pulmonary embolus. She will need pulmonary function test, CT of the chest, and right heart cath in the outpatient setting once euvolemic. Creatinine on admission noted to with repeat down to 1. 15. Serum CO2 43she has a contraction alkalosis but renal function appears to be improving and stable. Overall, she is alert and oriented and neurologically back to baseline. She remains on 2 L nasal cannula recommend to wean as tolerated for SPO2 goal greater than 90%, avoid over oxygenating with known CO2 retention. Continue to encourage BiPAP nightly. She denies shortness of breath, chest pain or pressure. She is tolerating a diet. She has had good urine output from diuresis. Labs did show hypokalemia and hypomagnesemia for which she did receive a blood replacement. She continues to have weakness, more so on the right. She was evaluated by SPT for swallow and found to have Oropharyngeal dysphagia but she has now been upgraded to a regular diet with thin liquids. No complaints this morning  REVIEW OF SYSTEMS:  Constitutional: No fevers No chills  Neck:No stiffness  Respiratory: No shortness of breath  Cardiovascular: No chest pain No palpitations  Gastrointestinal: No abdominal pain    Genitourinary: No Dysuria  Neurological: No headache, no confusion      PHYSICAL EXAM:  /69   Pulse 66   Temp 96.6 °F (35.9 °C) (Temporal)   Resp 16   Ht 4' 11\" (1.499 m)   Wt 168 lb 3.2 oz (76.3 kg)   SpO2 92%   BMI 33.97 kg/m²     Constitutional: she appears well-developed and well-nourished. Eyes  conjunctiva normal.  Pupils react to light  Ear, nose, throat -hearing intact to voice.  No scars, masses, or lesions over external nose or ears, no atrophy of tongue  Neck-symmetric, no masses noted, no jugular vein distension  Respiration- chest wall appears symmetric, good expansion,   normal effort without use of accessory muscles  Cardiovascular- RRR Musculoskeletal  no significant wasting of muscles noted, no bony deformities, gait no gross ataxia  Extremities-no clubbing, cyanosis or edema  Skin  warm, dry, and intact. No rash, erythema, or pallor. Psychiatric  mood, affect, and behavior appear normal.      Neurology  NEUROLOGICAL EXAM:      Mental status   Awake, alert, fluent oriented x 3 appropriate affect  Attention and concentration appear appropriate  Recent and remote memory appears unremarkable  Speech normal without dysarthria  No clear issues with language       Cranial Nerves   CN II- Visual fields grossly unremarkable  CN III, IV,VI-EOMI, No nystagmus, conjugate eye movements, no ptosis  CN VII-no facial asymmetry  CN VIII-Hearing intact      Motor function  Antigravity x 4             Tremor None present     Gait                  Not tested           Nursing/pcp notes, imaging,labs and vitals reviewed.      PT,OT and/or speech notes reviewed    Lab Results   Component Value Date    WBC 4.9 01/07/2021    HGB 10.5 (L) 01/07/2021    HCT 35.1 (L) 01/07/2021    MCV 98.6 01/07/2021     01/07/2021     Lab Results   Component Value Date     01/07/2021    K 4.0 01/07/2021    CL 94 (L) 01/07/2021    CO2 39 (H) 01/07/2021    BUN 32 (H) 01/07/2021    CREATININE 1.3 (H) 01/07/2021    GLUCOSE 97 01/07/2021    CALCIUM 9.7 01/07/2021    LABGLOM 39 (A) 01/07/2021    GFRAA 47 (L) 01/07/2021   No results found for: INRNo results found for: PHENYTOIN, ESR, CRP  PHYSICAL THERAPY  STRENGTH  Strength RLE  Comment: GROSSLY 3+/5  Strength LLE  Comment: GROSSLY 4- TO 4/5  ROM  AROM RLE (degrees)  RLE General AROM: DECREASED HIP AND KNEE FLX; ANKLE DF AND PF WFL   AROM LLE (degrees)  LLE General AROM: DECREASED HIP AND KNEE FLX; ANKLE DF AND PF WFL   BED MOBILITY  Bed Mobility  Rolling: Stand by assistance  Supine to Sit: Stand by assistance(triplanar to left)  Sit to Supine: Stand by assistance(from left side of bed.  triplanar/long sit techniqe) Scooting: Stand by assistance  Comment: PT REQUIRED MOD A WITH SUPINE TO SIT FROM R S/L TO SITTING EOB, ASSIST WITH LE AND TRUNK BY THERAPIST  TRANSFERS  Transfers  Sit to Stand: Moderate Assistance, Minimal Assistance(mod a from recliner, min a from wc.)  Stand to sit: Minimal Assistance  Bed to Chair: Minimal assistance, Contact guard assistance(with rw)  Stand Pivot Transfers: Moderate Assistance(MOD A FOR STS; VC FOR FOOT PLACEMENT/SEQUENCE)  Comment: lack of anterior lean for sit to stand from recliner, improved from wc  WHEELCHAIR PROPULSION  Propulsion 1  Propulsion: Manual  Level: Level Tile  Method: FRANK GARCIA  Level of Assistance: Minimal assistance  Description/ Details: NO TURNS  Distance: 4 FT   AMBULATION  Ambulation 1  Surface: level tile  Device: Parallel Bars  Other Apparatus: O2(2l)  Assistance: Moderate assistance  Quality of Gait: MAXIMUM VERBAL CUES FOR PROPER 3 POIN TSEQUENCE LEADING WITH RIGHT LE.  INCONSISTENT PLACEMENT OF HANDSS. INITIALLLY REQUIRED ASSIST FOR PROPER RIGHT LE ADVANCEMENT, HOWEVER THIS IMPROVED WITH REPETITIONS.  2 INSTANCES OF INTENTIONAL/RESTING FULL BODY TREMORS DURING GAIT. ABLE TO CONTINUE DESPITRE THESE EPISODES WHICH EVENTUALLY SUBSIDED. DURING EPISODE PATIENT PRESENTED WITH BILATERAL PSEUDO-KNEE BUCKLE WITHOUT FULL COLLAPSE.     Distance: 12 FT X2  STAIRS  GOALS:  Short term goals  Time Frame for Short term goals: 1 WEEK  Short term goal 1: PT AMB 3X12FT IN II BARS WITH MOD AX1   Short term goal 2: PROPEL W/C 75FT WITH TURNS, SBA   Short term goal 3: COMPLETE CAR TF MOD A FOR LE MANAGEMENT  Short term goal 4: COMPLETE STAND STEP TF TO R AND L WITH RW, CGA  Short term goal 5: PT CGA FOR ALL BED MOBILITY     Long term goals  Time Frame for Long term goals : 3 WEEKS  Long term goal 1: PT ' WITH RW, SUPERVISION  Long term goal 2: NEGOTIATE 4 STEPS, 4\", CGA, BILATERAL HANDRAIL  Long term goal 3: COMPLETE STAND STEP TF TO R AND L WITH RW, IND Long term goal 4: COMPLETE CAR TF WITH SUPERVISION  Long term goal 5: PT IND WITH ALL BED MOBILITY     ASSESSMENT:  Assessment: O2 SATS DROPPED TO 86% ON 1L DURING BED MOBILITY, REQUIRED INCREASING TO 2L TO FULLY RECOVER. RECOMMEND 1L AT REST, 2L WITH ACTIVIYT. SEE GAIT. RECOMMEND CONTINUE IN PARALLEL BARS 1-2 MORE SESSION BEFORE ADVANCING TO RW.         SPEECH THERAPY     Objective:   Recall of functional information including delayed and short-term was targeted in structured conversation with open-ended and 'wh' questions. Pt with response latency frequently that did not interfere with conversational turns. Pt with 70-80% accuracy.      Simple naming tasks with family members completed. Pt named 5 members in one minute. Later, pt attempted to name 14 members, but could not focus attention to get all names. Verbal cues did not assist with attention, pt just lost interest.     Functional problem solving and verbal reasoning completed with 'wh' questions.  SLP modeled responses occasionally, provided verbal prompts for details, and provided verbal cues for pt to demonstrate 70-80% accuracy.         SHORT TERM GOAL #1:  Goal 1: The patient will demonstrate recall of functional information following a/an (immediate, short term, long term) delay with min cues in order to increase functional integration into environment.     SHORT TERM GOAL #2:  Goal 2: The patient will complete simple/complex naming tasks with min verbal cues at 80% accuracy to improve thought organization and word retrieval skills.     SHORT TERM GOAL #3:  Goal 3: The patient will follow multi step commands related to functional living environment with 80% accuracy and min cues in order to increase functional integration into environment.     SHORT TERM GOAL #4:  Goal 4: The patient will complete problem solving/reasoning tasks with min verbal cues at 80% accuracy in order to improve safety awareness for functional tasks.        OCCUPATIONAL THERAPY    CURRENT IRF-[facility] BETH SCORES  Eating: CARE Score: 5  Oral Hygiene: CARE Score: 4  Toileting: CARE Score: 3  Shower/Bathe: CARE Score: 3  Upper Body Dressing: CARE Score: 4  Lower Body Dressing: CARE Score: 3  Footwear: CARE Score: 2  Toilet Transfers: CARE Score: 3(min A)  Picking Up Object:  CARE Score: 1        UE Functioning:  WFLs     Pain Assessment:  Pain Level: 0     STGs:  Short term goals  Time Frame for Short term goals: 1 week  Short term goal 1: CGA with clothing management/hygiene for toileting. Short term goal 2: CGA with toilet transfers. Short term goal 3: Min A with bathing hygiene. Short term goal 4: Min A with LB dressing, AE PRN. Short term goal 5: Min A with donning/doffing socks and shoes using AE PRN. Short term goal 6: Patient will complete 1-2 handed static standing tasks for 3 minutes, requiring SBA.     LTGs:  Long term goals  Time Frame for Long term goals : 3 weeks  Long term goal 1: Supervision with clothing management/hygiene for toileting. Long term goal 2: Supervision with toilet transfers. Long term goal 3: Supervision with bathing hygiene. Long term goal 4: Supervision with LB dressing, AE PRN. Long term goal 5: Supervision with UB dressing. Long term goals 6: Patient verbalize DME needs.     Assessment:     Decreased functional mobility ; Decreased ADL status; Decreased strength; Decreased cognition; Decreased endurance; Decreased balance; Decreased high-level IADLs                 NUTRITION  Current Wt: Weight: 168 lb 3.2 oz (76.3 kg) / Body mass index is 33.97 kg/m². Admission Wt: Admission Body Weight: 169 lb 11 oz (77 kg)  Oral Diet Orders:   GENERAL  Oral Nutrition Supplement (ONS) Orders:  None  Pt is well-nourished with PO intake >50% and stable wt status. No issues.  Please see nutrition note for details.     RECORD REVIEW: Previous medical records, medications were reviewed at today's visit    IMPRESSION: 1.  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 Baby aspirin and Eliquis  2. DVT prophylaxisEliquis  3. Essential hypertensioncontinue current medications and monitoring. stable  4. CHF with recent pulmonary edemacontinue Bumex  5. Restless leg syndromecontinue Sinemet and Mirapex. Controlled. 6.  Acute respiratory failure with hypercapniacontinue O2 and monitoring. Stable. Still requiring O2  7. CKDstable to worse than it was a week or so ago. Continues to have significant edema. Bumex held only on 1/5. Gabapentin reduced to help with peripheral edema. Patient denies shortness of air.     Continue current treatment  ELOS:1/16/20

## 2021-01-07 NOTE — PROGRESS NOTES
Angelia Yu  [id number]     01/07/21 1418 01/07/21 1419 01/07/21 1420   Subjective   Subjective Pt agreed to therapy this afternoon. --   --    Bed Mobility   Rolling  --  Stand by assistance  --    Supine to Sit  --  Stand by assistance  --    Sit to Supine  --  Stand by assistance  --    Transfers   Sit to Stand  --  Contact guard assistance  --    Stand to sit  --  Contact guard assistance  --    Bed to Chair  --  Contact guard assistance  --    Ambulation   Ambulation?  --  Yes  --    Ambulation 1   Surface  --  level tile  --    Device  --  Rolling Walker  --    Other Apparatus  --  Wheelchair follow;O2  (2L)  --    Assistance  --  Contact guard assistance  --    Quality of Gait  --  Pt needed VC's to stay inside RW. Pt also showed forward flex posture. --    Distance  --  28'  --    Exercises   Comments  --   --  Sitting Jose LE ther ex x 15 reps. Conditions Requiring Skilled Therapeutic Intervention   Body structures, Functions, Activity limitations  --   --  Decreased functional mobility ; Decreased ADL status; Decreased strength;Decreased endurance   Assessment  --   --  Pt continues to need VC's for techniques w/ bed mobility. Pt has improved to CGA for TF's. Pt continues to amb well w/ RW, but needed minor cues. Pt still fatigues quickly during activities needing several rest breaks.    Prognosis  --   --  Fair   Activity Tolerance   Activity Tolerance  --   --  Patient limited by fatigue;Patient limited by endurance   Electronically signed by Rogelio Lyons PTA on 1/7/2021 at 2:29 PM

## 2021-01-07 NOTE — PROGRESS NOTES
01/07/21 1000   Restrictions/Precautions   Restrictions/Precautions Weight Bearing; Fall Risk   Lower Extremity Weight Bearing Restrictions   Right Lower Extremity Weight Bearing Weight Bearing As Tolerated   Left Lower Extremity Weight Bearing Weight Bearing As Tolerated   Position Activity Restriction   Other position/activity restrictions 2 L O2   Vision   Vision Impaired   Hearing   Hearing X   Hearing Exceptions Hard of hearing/hearing concerns   General   Chart Reviewed Yes   Patient assessed for rehabilitation services? Yes   Additional Pertinent Hx PE with acute cor pulmonale; interstitial pulmonary disease; HTN; ACUTE RESP FAILURE WITH HYPOXIA AND HYPERCAPNIA; POLYNEUROPATHY; NORMAL PRESSURE HYDROCEPHALUS; MACULAR DEGENERATION; R EYE BLINDNESS   Pain Screening   Patient Currently in Pain No   Pain Assessment   Pain Assessment 0-10   Pain Level 0   Oxygen Therapy   SpO2 98 %   O2 Device Nasal cannula   O2 Flow Rate (L/min) 2 L/min   Transfers   Sit to Stand Contact guard assistance   Stand to sit Contact guard assistance   Bed to Chair Minimal assistance;Contact guard assistance   Stand Pivot Transfers Contact guard assistance   Comment PT improved anterior lean with transfers. Ambulation   Ambulation? Yes   More Ambulation? Yes   Ambulation 1   Surface level tile   Device Parallel Bars   Other Apparatus Wheelchair follow;O2   Assistance Contact guard assistance   Quality of Gait Pt has improved gait pattern and weight shifting. Decreased jerking compared to past sessions. Gait Deviations Slow Scarlet   Distance 12 ft   Ambulation 2   Surface - 2 level tile   Device 2 Rolling Walker   Other Apparatus 2 O2;Wheelchair follow   Assistance 2 Contact guard assistance   Quality of Gait 2 Pt had improved distance with no LOB.    Gait Deviations Slow Scarlet   Distance 40' x 3   Ambulation 3   Surface - 3 level tile   Device 3 Rolling Walker   Other Apparatus 3 O2   Assistance 3 Contact guard assistance Gait Deviations Slow Scarlet   Distance 20' x 2   Comments Patient performed 20' of gait followed by a transfer to chair x 2. Decreased speed and increased shuffling when turning. Stairs/Curb   Stairs? No   Conditions Requiring Skilled Therapeutic Intervention   Body structures, Functions, Activity limitations Decreased functional mobility ; Decreased ADL status; Decreased ROM; Decreased strength;Decreased safe awareness;Decreased endurance;Decreased balance;Decreased coordination   Activity Tolerance   Activity Tolerance Patient Tolerated treatment well;Patient limited by endurance   Safety Devices   Type of devices Call light within reach; Chair alarm in place

## 2021-01-08 LAB
ANION GAP SERPL CALCULATED.3IONS-SCNC: 9 MMOL/L (ref 7–19)
BUN BLDV-MCNC: 28 MG/DL (ref 8–23)
CALCIUM SERPL-MCNC: 9.6 MG/DL (ref 8.8–10.2)
CHLORIDE BLD-SCNC: 97 MMOL/L (ref 98–111)
CO2: 35 MMOL/L (ref 22–29)
CREAT SERPL-MCNC: 1.2 MG/DL (ref 0.5–0.9)
GFR AFRICAN AMERICAN: 51
GFR NON-AFRICAN AMERICAN: 42
GLUCOSE BLD-MCNC: 103 MG/DL (ref 74–109)
POTASSIUM SERPL-SCNC: 4.2 MMOL/L (ref 3.5–5)
SODIUM BLD-SCNC: 141 MMOL/L (ref 136–145)

## 2021-01-08 PROCEDURE — 36415 COLL VENOUS BLD VENIPUNCTURE: CPT

## 2021-01-08 PROCEDURE — 97130 THER IVNTJ EA ADDL 15 MIN: CPT

## 2021-01-08 PROCEDURE — 97530 THERAPEUTIC ACTIVITIES: CPT

## 2021-01-08 PROCEDURE — 1180000000 HC REHAB R&B

## 2021-01-08 PROCEDURE — 97129 THER IVNTJ 1ST 15 MIN: CPT

## 2021-01-08 PROCEDURE — 97535 SELF CARE MNGMENT TRAINING: CPT

## 2021-01-08 PROCEDURE — 80048 BASIC METABOLIC PNL TOTAL CA: CPT

## 2021-01-08 PROCEDURE — 99232 SBSQ HOSP IP/OBS MODERATE 35: CPT | Performed by: PSYCHIATRY & NEUROLOGY

## 2021-01-08 PROCEDURE — 97116 GAIT TRAINING THERAPY: CPT

## 2021-01-08 PROCEDURE — 97110 THERAPEUTIC EXERCISES: CPT

## 2021-01-08 PROCEDURE — 6370000000 HC RX 637 (ALT 250 FOR IP): Performed by: PSYCHIATRY & NEUROLOGY

## 2021-01-08 RX ADMIN — BUMETANIDE 1 MG: 1 TABLET ORAL at 08:19

## 2021-01-08 RX ADMIN — PRAMIPEXOLE DIHYDROCHLORIDE 0.5 MG: 0.25 TABLET ORAL at 20:05

## 2021-01-08 RX ADMIN — GABAPENTIN 100 MG: 100 CAPSULE ORAL at 08:19

## 2021-01-08 RX ADMIN — FLUOXETINE HYDROCHLORIDE 20 MG: 20 CAPSULE ORAL at 08:19

## 2021-01-08 RX ADMIN — GABAPENTIN 100 MG: 100 CAPSULE ORAL at 14:06

## 2021-01-08 RX ADMIN — ACETAMINOPHEN 650 MG: 325 TABLET ORAL at 20:04

## 2021-01-08 RX ADMIN — CLONIDINE HYDROCHLORIDE 0.1 MG: 0.1 TABLET ORAL at 20:05

## 2021-01-08 RX ADMIN — Medication 5000 UNITS: at 08:19

## 2021-01-08 RX ADMIN — APIXABAN 2.5 MG: 2.5 TABLET, FILM COATED ORAL at 08:19

## 2021-01-08 RX ADMIN — LISINOPRIL 40 MG: 20 TABLET ORAL at 08:18

## 2021-01-08 RX ADMIN — ASPIRIN 81 MG: 81 TABLET, CHEWABLE ORAL at 08:19

## 2021-01-08 RX ADMIN — QUETIAPINE FUMARATE 300 MG: 300 TABLET ORAL at 20:04

## 2021-01-08 RX ADMIN — GABAPENTIN 100 MG: 100 CAPSULE ORAL at 20:04

## 2021-01-08 RX ADMIN — APIXABAN 2.5 MG: 2.5 TABLET, FILM COATED ORAL at 20:04

## 2021-01-08 RX ADMIN — METOPROLOL SUCCINATE 25 MG: 25 TABLET, EXTENDED RELEASE ORAL at 08:21

## 2021-01-08 RX ADMIN — ATORVASTATIN CALCIUM 80 MG: 80 TABLET, FILM COATED ORAL at 20:04

## 2021-01-08 RX ADMIN — CLONIDINE HYDROCHLORIDE 0.1 MG: 0.1 TABLET ORAL at 08:18

## 2021-01-08 RX ADMIN — OXYBUTYNIN CHLORIDE 10 MG: 5 TABLET, EXTENDED RELEASE ORAL at 08:19

## 2021-01-08 RX ADMIN — CARBIDOPA AND LEVODOPA 1 TABLET: 25; 100 TABLET ORAL at 20:04

## 2021-01-08 RX ADMIN — MULTIPLE VITAMINS W/ MINERALS TAB 1 TABLET: TAB at 08:19

## 2021-01-08 ASSESSMENT — PAIN DESCRIPTION - ORIENTATION: ORIENTATION: ANTERIOR

## 2021-01-08 ASSESSMENT — PAIN DESCRIPTION - DESCRIPTORS: DESCRIPTORS: HEADACHE

## 2021-01-08 ASSESSMENT — PAIN SCALES - GENERAL
PAINLEVEL_OUTOF10: 8
PAINLEVEL_OUTOF10: 3

## 2021-01-08 ASSESSMENT — PAIN - FUNCTIONAL ASSESSMENT: PAIN_FUNCTIONAL_ASSESSMENT: ACTIVITIES ARE NOT PREVENTED

## 2021-01-08 ASSESSMENT — PAIN DESCRIPTION - LOCATION: LOCATION: HEAD

## 2021-01-08 NOTE — PROGRESS NOTES
Occupational Therapy  Facility/Department: [facility] 8 REHAB UNIT  Daily Treatment Note  NAME: Cristopher Miranda  : 10/14/1933  MRN: [id number]    Date of Service: 2021    Discharge Recommendations:  Continue to assess pending progress       Assessment   Performance deficits / Impairments: Decreased functional mobility ; Decreased ADL status; Decreased strength;Decreased cognition;Decreased endurance;Decreased balance;Decreased high-level IADLs  Assessment: Pt's O2 sats on room air at 91%. Post functional mobility to/from bathroom pt's O2 decreased to 82%. Did not increase with rest, placed on 1 L O2 and O2 sats increased to 92%. Recommend 1 L O2 with exertion and room air at rest. Notified RN and PT. Treatment Diagnosis: B CVAs  OT Education: Transfer Training  Activity Tolerance  Activity Tolerance: Patient Tolerated treatment well  Safety Devices  Safety Devices in place: Yes(Left with PT)  Type of devices: Left in chair         Patient Diagnosis(es): There were no encounter diagnoses. has no past medical history on file. has no past surgical history on file.     Restrictions  Restrictions/Precautions  Restrictions/Precautions: Weight Bearing, Fall Risk  Lower Extremity Weight Bearing Restrictions  Right Lower Extremity Weight Bearing: Weight Bearing As Tolerated  Left Lower Extremity Weight Bearing: Weight Bearing As Tolerated  Position Activity Restriction  Other position/activity restrictions: 2 L O2  Subjective   General  Chart Reviewed: Yes  Patient assessed for rehabilitation services?: Yes  Response to previous treatment: Patient with no complaints from previous session  Family / Caregiver Present: No  Diagnosis: B  CVAs  Vital Signs  Pulse: 69  Oxygen Therapy  SpO2: 91 %  Pulse Oximeter Device Mode: Intermittent  Pulse Oximeter Device Location: Left;Finger  O2 Device: None (Room air)  O2 Flow Rate (L/min): 1 L/min        Objective    Transfers  Sit to stand: Stand by assistance Stand to sit: Stand by assistance  Transfer Comments: to/from recliner, w/c and couch      Type of ROM/Therapeutic Exercise  Type of ROM/Therapeutic Exercise: Cane/Wand  Comment: 2#, 10 reps with shoulder flexion    Plan   Plan  Current Treatment Recommendations: Strengthening, Balance Training, Functional Mobility Training, Endurance Training, Safety Education & Training, Equipment Evaluation, Education, & procurement, Patient/Caregiver Education & Training, Self-Care / ADL, Home Management Training       OutComes Score       01/08/21 1000   Eating   Assistance Needed Independent   CARE Score 6   [address] Needed Supervision or touching assistance   Comment CGA   CARE Score 4      01/08/21 1000   Toilet Transfer   Assistance Needed Supervision or touching assistance   Comment SBA   CARE Score 4          Goals  Short term goals  Time Frame for Short term goals: 1 week  Short term goal 1: MET  Short term goal 2: MET  Short term goal 3: Min A with bathing hygiene. Short term goal 4: Min A with LB dressing, AE PRN. Short term goal 5: Min A with donning/doffing socks and shoes using AE PRN. Short term goal 6: Patient will complete 1-2 handed static standing tasks for 3 minutes, requiring SBA. Long term goals  Time Frame for Long term goals : 3 weeks  Long term goal 1: Supervision with clothing management/hygiene for toileting. Long term goal 2: Supervision with toilet transfers. Long term goal 3: Supervision with bathing hygiene. Long term goal 4: Supervision with LB dressing, AE PRN. Long term goal 5: Supervision with UB dressing. Long term goals 6: Patient verbalize DME needs. Patient Goals   Patient goals : Return home.        Therapy Time   Individual Concurrent Group Co-treatment   Time In 1000         Time Out 1100         Minutes 60         Timed Code Treatment Minutes: 75 James Adair OT

## 2021-01-08 NOTE — PROGRESS NOTES
Joshua Ge  [id number]     01/08/21 1121 01/08/21 1131 01/08/21 1209   Restrictions/Precautions   Restrictions/Precautions Weight Bearing; Fall Risk  --   --    Lower Extremity Weight Bearing Restrictions   Right Lower Extremity Weight Bearing Weight Bearing As Tolerated  --   --    Left Lower Extremity Weight Bearing Weight Bearing As Tolerated  --   --    Transfers   Sit to Stand Stand by assistance  --   --    Stand to sit Contact guard assistance  --   --    Ambulation   Ambulation?  --  Yes  --    Ambulation 1   Surface  --  level tile  --    Device  --  Rolling Walker  --    Other Apparatus  --  Wheelchair follow  (On [employer]. pre amb, 96% and post amb 91%)  --    Assistance  --  Contact guard assistance  --    Quality of Gait  --  Maintained a good and steady pace with a forward gaze and stayed within the RW. On Room Air. pre amb, 96% and post amb 91%  --    Gait Deviations  --  Slow Scarlet  --    Distance  --  61'  --    Exercises   Comments Assisted Pt to BR and ADLs at start of session. Sitting Jose LE ther ex x 15 reps  --   --    Conditions Requiring Skilled Therapeutic Intervention   Body structures, Functions, Activity limitations  --   --  Decreased functional mobility ; Decreased ADL status; Decreased strength;Decreased endurance   Assessment  --   --  Pt tolerated session well. Assisted pt to BR and ADLs at start of session. Pt amb well maintained a good and steady pace with a forward gaze and stayed within the RW. On Room Air. pre amb, 96% and post amb 91%. Pt requested to do sitting Jose ther ex on RA. Pt did display some fatigue. Checked pulse ox and 86% on RA. Had pt put O2 at 1L on and pulse ox was 93%. Post ther ex pulse ox was 98% on O2 1L.    Safety Devices   Type of devices  --   --   --       01/08/21 1217   Restrictions/Precautions   Restrictions/Precautions  --    Lower Extremity Weight Bearing Restrictions   Right Lower Extremity Weight Bearing  -- Left Lower Extremity Weight Bearing  --    Transfers   Sit to Stand  --    Stand to sit  --    Ambulation   Ambulation? --    Ambulation 1   Surface  --    Device  --    Other Apparatus  --    Assistance  --    Quality of Gait  --    Gait Deviations  --    Distance  --    Exercises   Comments  --    Conditions Requiring Skilled Therapeutic Intervention   Body structures, Functions, Activity limitations  --    Assessment  --    Safety Devices   Type of devices Call light within reach; Chair alarm in place; Left in chair   Electronically signed by Aristeo Bocanegra PTA on 1/8/2021 at 12:19 PM

## 2021-01-08 NOTE — PROGRESS NOTES
Neil Aly  [id number]       01/08/21 1421 01/08/21 1424 01/08/21 1437   Restrictions/Precautions   Restrictions/Precautions Weight Bearing; Fall Risk  --   --    Lower Extremity Weight Bearing Restrictions   Right Lower Extremity Weight Bearing Weight Bearing As Tolerated  --   --    Left Lower Extremity Weight Bearing Weight Bearing As Tolerated  --   --    Subjective   Subjective Pt agreed to therapy this afternoon. --   --    Pain Screening   Patient Currently in Pain No  --   --    Pain Assessment   Pain Assessment 0-10  --   --    Transfers   Sit to Stand  --  Stand by assistance  --    Stand to sit  --  Contact guard assistance  --    Ambulation   Ambulation?  --  Yes  --    Ambulation 1   Surface  --  level tile  --    Device  --  Rolling Walker  --    Assistance  --  Contact guard assistance;Stand by assistance  --    Quality of Gait  --  Maintained a good and steady pace with a forward gaze and stayed within the RW. On Room Air. pre amb, 96% and post amb 90%. --    Gait Deviations  --  Slow Scarlet  --    Distance  --  76'  --    Comments  --  Pt requested not to have a w/c follow. Tolerated increased distance well and incorporated a turn. --    Exercises   Comments  --   --  Sitting Jose LE ther ex x 15 reps.    Conditions Requiring Skilled Therapeutic Intervention   Body structures, Functions, Activity limitations  --   --   --    Assessment  --   --   --    Activity Tolerance   Activity Tolerance  --   --   --    Safety Devices   Type of devices  --   --   --       01/08/21 1505 01/08/21 1511   Restrictions/Precautions   Restrictions/Precautions  --   --    Lower Extremity Weight Bearing Restrictions   Right Lower Extremity Weight Bearing  --   --    Left Lower Extremity Weight Bearing  --   --    Subjective   Subjective  --   --    Pain Screening   Patient Currently in Pain  --   --    Pain Assessment   Pain Assessment  --   --    Transfers   Sit to Stand  --   --    Stand to sit  --   -- Ambulation   Ambulation?  --   --    Ambulation 1   Surface  --   --    Device  --   --    Assistance  --   --    Quality of Gait  --   --    Gait Deviations  --   --    Distance  --   --    Comments  --   --    Exercises   Comments  --   --    Conditions Requiring Skilled Therapeutic Intervention   Body structures, Functions, Activity limitations Decreased functional mobility ; Decreased ADL status; Decreased strength;Decreased endurance  --    Assessment Pt tolerated session well. Pt amb well maintaining a good and steady pace with a forward gaze and stayed within the RW. Pt requested to not have a w/c follow and was able to complete a continous amb with an incoporated turn while on RA. Pt tolerated sitting Jose ther ex on RA with minimal  fatigue. Sendy Crawford --    Activity Tolerance   Activity Tolerance Patient limited by fatigue;Patient limited by endurance  --    Safety Devices   Type of devices  --  Call light within reach; Chair alarm in place; Left in chair   Electronically signed by Trina Holcomb PTA on 1/8/2021 at 3:14 PM

## 2021-01-08 NOTE — PROGRESS NOTES
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 She was cooperative with all tasks. She was oriented x4. Objective:  She still complains of xerostomia. She tries to drink plenty of fluids throughout the day. Abstract divergent naming tasks were completed. She was asked to name words that start with /k/. She was given two minutes and to complete and named five items. A concrete divergent naming task was completed. She was able to name six items in one minute without cues. Her speech was much improved this date. Very few dysfluencies were noted this date. Very few episodes of anomia observed. Semantic paraphasias were noted this date. She was able to count  from 1 to 20 without cueing. Mental manipulation tasks (counting in reverse from 20 to 1 without cues) were completed without cueing. Reverse recall of two digits at 100% accuracy without cues. Problem solving and safety awareness tasks were completed at 80% accuracy without cues.                 Assessment:  Patient presents with mild/mod expressive and receptive language deficits. Moderate cognitive deficits were noted.  She is recommended to continue speech therapy services to address deficits.  Electronically Signed By:  Miko Stewart  1/8/2021,1:12 PM.

## 2021-01-08 NOTE — PLAN OF CARE
Problem: Falls - Risk of:  Goal: Will remain free from falls  Description: Will remain free from falls  1/8/2021 0906 by Kevon Nyhan, LPN  Outcome: Ongoing  1/7/2021 2303 by Jamal Stoll RN  Outcome: Ongoing  Goal: Absence of physical injury  Description: Absence of physical injury  1/8/2021 0906 by Kevon Nyhan, LPN  Outcome: Ongoing  1/7/2021 2303 by Jamal Stoll RN  Outcome: Ongoing     Problem: ACTIVITY INTOLERANCE/IMPAIRED MOBILITY  Goal: Mobility/activity is maintained at optimum level for patient  1/8/2021 0906 by Kevon Nyhan, LPN  Outcome: Ongoing  1/7/2021 2303 by Jamal Stoll RN  Outcome: Ongoing     Problem: HEMODYNAMIC STATUS  Goal: Patient has stable vital signs and fluid balance  1/8/2021 0906 by Kevon Nyhan, LPN  Outcome: Ongoing  1/7/2021 2303 by Jamal Stoll RN  Outcome: Ongoing     Problem: Skin Integrity:  Goal: Will show no infection signs and symptoms  Description: Will show no infection signs and symptoms  1/8/2021 0906 by Kevon Nyhan, LPN  Outcome: Ongoing  1/7/2021 2303 by Jamal Stoll RN  Outcome: Ongoing  Goal: Absence of new skin breakdown  Description: Absence of new skin breakdown  1/8/2021 0906 by Kevon Nyhan, LPN  Outcome: Ongoing  1/7/2021 2303 by Jamal Stoll RN  Outcome: Ongoing     Problem: Pain:  Goal: Pain level will decrease  Description: Pain level will decrease  1/8/2021 0906 by Kevon Nyhan, LPN  Outcome: Ongoing  1/7/2021 2303 by Jamal Stoll RN  Outcome: Ongoing  Goal: Control of acute pain  Description: Control of acute pain  1/8/2021 0906 by Kevon Nyhan, LPN  Outcome: Ongoing  1/7/2021 2303 by Jamal Stoll RN  Outcome: Ongoing  Goal: Control of chronic pain  Description: Control of chronic pain  1/8/2021 0906 by Kevon Nyhan, LPN  Outcome: Ongoing  1/7/2021 2303 by Jamal Stoll RN  Outcome: Ongoing     Problem: Bleeding:  Goal: Will show no signs and symptoms of excessive bleeding Description: Will show no signs and symptoms of excessive bleeding  1/8/2021 0906 by Teddy Estes LPN  Outcome: Ongoing  1/7/2021 2303 by Arnol Solorio RN  Outcome: Ongoing     Problem: Mood - Altered:  Goal: Mood stable  Description: Mood stable  1/8/2021 0906 by Teddy Estes LPN  Outcome: Ongoing  1/7/2021 2303 by Arnol Solorio RN  Outcome: Ongoing

## 2021-01-08 NOTE — PROGRESS NOTES
Patient:   Joshua Ge  MR#:    [id number]   Room:    [facility]/811-01   YOB: 1933  Date of Progress Note: 1/8/2021  Time of Note                           8:38 AM  Consulting Physician:   Melania Boothe M.D. Attending Physician:  Melania Boothe MD     CHIEF COMPLAINT: Generalized weakness, worse on the right     Subjective  This 80 y.o. female  who presented to [facility] with inability to walk. She has a past medical history significant for breast cancer status post bilateral implants, chronic back pain, tremors, macular degeneration and essential hypertension. Patient had gone to bed on 12/26 around 2030. When she awoke in the morning around 830 she had difficulty getting out of bed. At baseline she is alert and oriented and conversant. Patient continued to have difficulty ambulating with unsteady gait. Her primary care provider advised her to seek evaluation at the emergency department. In the ED, blood pressure was elevated at 186/102. MRI brain was done showing acute stroke left medial precentral gyrus and acute right occipital stroke with chronic microvascular disease and atrophy. IV TPA not considered as last known well was 2030 on 12/26. She was admitted to the hospitalist service. She was seen in consultation by neurology. Aspirin 325 mg daily was changed to dual therapy with Eliquis 2.5 mg twice daily and aspirin 81 mg daily. Per neurology, MRI brain likely embolic with right occipital and left medial precentral gyrus. Also, transthoracic echo findings placed at increased risk of developing atrial fibrillation with mild to moderate left atrial enlargement and TTE limited study suggest possible right to left shunt with rope score 4 suggesting that 38% chance that stroke due to PFO and 12% risk of to urinary recurrence of stroke/TIA. Venous Doppler of bilateral lower extremities did not show thrombus. Given patient age, would need to determine risk/benefits of closure but at this point recommend dual therapy with aspirin and Eliquis. Lipitor 80 mg daily. LDL at goal at 55. She has maintained normal sinus rhythm on continuous telemetry. She will need Zio patch at discharge. She is to follow-up with neurology in 3 to 4 weeks. She was seen in consultation by cardiology. BNP 8,437 on admission. Chest x-ray showed cardiomegaly, bilateral effusions, and pulmonary edema. Transthoracic echocardiogram showed cor pulmonale with normal LVEF. She was started on IV Bumex twice daily will be transitioned to oral to start tomorrow 1 mg Bumex daily. She has had good urine output with diuresis. Patient states lower extremity edema has much improved. Patient denies history of heart failure or pulmonary disease. She is a non-smoker. She is to follow-up with cardiology per recommendations, she will need a right heart cath as outpatient basis. She was seen in consultation by pulmonology. Acute respiratory failure with hypoxia and hypercapnia. ABG on 12/28 showed pH 7.32, PCO2 69, PO2 60 and HCO3 35. She was subsequently placed on BiPAP. Probably chronic CO2 retention. Of note, she had a previous chest x-ray in 2019 that showed interstitial fibrotic changes within the lungs.   VQ scan Cardiovascular- RRR  Musculoskeletal  no significant wasting of muscles noted, no bony deformities, gait no gross ataxia  Extremities-no clubbing, cyanosis or edema  Skin  warm, dry, and intact. No rash, erythema, or pallor. Psychiatric  mood, affect, and behavior appear normal.      Neurology  NEUROLOGICAL EXAM:      Mental status   Awake, alert, fluent oriented x 3 appropriate affect  Attention and concentration appear appropriate  Recent and remote memory appears unremarkable  Speech normal without dysarthria  No clear issues with language       Cranial Nerves   CN II- Visual fields grossly unremarkable  CN III, IV,VI-EOMI, No nystagmus, conjugate eye movements, no ptosis  CN VII-no facial asymmetry  CN VIII-Hearing intact      Motor function  Antigravity x 4             Tremor None present     Gait                  Not tested           Nursing/pcp notes, imaging,labs and vitals reviewed. PT,OT and/or speech notes reviewed    Lab Results   Component Value Date    WBC 4.9 01/07/2021    HGB 10.5 (L) 01/07/2021    HCT 35.1 (L) 01/07/2021    MCV 98.6 01/07/2021     01/07/2021     Lab Results   Component Value Date     01/08/2021    K 4.2 01/08/2021    CL 97 (L) 01/08/2021    CO2 35 (H) 01/08/2021    BUN 28 (H) 01/08/2021    CREATININE 1.2 (H) 01/08/2021    GLUCOSE 103 01/08/2021    CALCIUM 9.6 01/08/2021    LABGLOM 42 (A) 01/08/2021    GFRAA 51 (L) 01/08/2021   No results found for: INRNo results found for: PHENYTOIN, ESR, CRP    01/07/21 1418 01/07/21 1419 01/07/21 1420   Subjective   Subjective Pt agreed to therapy this afternoon.   --   --    Bed Mobility   Rolling  --  Stand by assistance  --    Supine to Sit  --  Stand by assistance  --    Sit to Supine  --  Stand by assistance  --    Transfers   Sit to Stand  --  Contact guard assistance  --    Stand to sit  --  Contact guard assistance  --    Bed to Chair  --  Contact guard assistance  --    Ambulation   Ambulation?  --  Yes  -- Ambulation 1   Surface  --  level tile  --    Device  --  Rolling Walker  --    Other Apparatus  --  Wheelchair follow;O2  (2L)  --    Assistance  --  Contact guard assistance  --    Quality of Gait  --  Pt needed VC's to stay inside RW. Pt also showed forward flex posture. --    Distance  --  28'  --    Exercises   Comments  --   --  Sitting Jose LE ther ex x 15 reps. Conditions Requiring Skilled Therapeutic Intervention   Body structures, Functions, Activity limitations  --   --  Decreased functional mobility ; Decreased ADL status; Decreased strength;Decreased endurance   Assessment  --   --  Pt continues to need VC's for techniques w/ bed mobility. Pt has improved to CGA for TF's. Pt continues to amb well w/ RW, but needed minor cues. Pt still fatigues quickly during activities needing several rest breaks. Prognosis  --   --  Fair   Activity Tolerance   Activity Tolerance  --   --  Patient limited by fatigue;Patient limited by endurance       01/07/21 1300   General   Diagnosis B  CVAs   Pain Assessment   Patient Currently in Pain No   Pain Assessment 0-10   Pain Level 0   Balance   Sitting Balance Independent   Standing Balance Stand by assistance   Standing Balance   Time 2 minutes x1 trial, 4 minutes x1 trial.   Activity 2 handed static standing task   Comment O2 maintained at 98% on 2L of O2 per nc. Functional Mobility   Functional - Mobility Device Rolling Walker   Activity To/from bathroom; Other   Assist Level Contact guard assistance   Transfers   Stand Step Transfers Stand by assistance   Sit to stand Stand by assistance   Stand to sit Stand by assistance   Toilet Transfers   Toilet - Technique Ambulating   Equipment Used Raised toilet seat without rails  (And GB.)   Toilet Transfer Stand by assistance   Toilet Transfers Comments Home setup.    Short term goals   Short term goal 2 MET   Assessment Performance deficits / Impairments Decreased functional mobility ; Decreased ADL status; Decreased strength;Decreased cognition;Decreased endurance;Decreased balance;Decreased high-level IADLs   Assessment Pt. tolerated tx well, pt. has demonstrated improvements in endurance and balance during functional mobility and ADLs. Pt. would benefit from continued skilled therapy to address deficits and increase independence during functional tasks. Treatment Diagnosis B CVAs   Prognosis Good   Timed Code Treatment Minutes 60 Minutes   Activity Tolerance   Activity Tolerance Patient Tolerated treatment well   Safety Devices   Safety Devices in place Yes   Type of devices Gait belt  (Given to PT.)   Plan   Current Treatment Recommendations Strengthening;Balance Training;Functional Mobility Training; Endurance Training; Safety Education & Training;Equipment Evaluation, Education, & procurement;Patient/Caregiver Education & Training;Self-Care / ADL; Home Management Training          RECORD REVIEW: Previous medical records, medications were reviewed at today's visit    IMPRESSION:   1. 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 Baby aspirin and Eliquis  2. DVT prophylaxisEliquis  3. Essential hypertensioncontinue current medications and monitoring. stable  4. CHF with recent pulmonary edemacontinue Bumex  5. Restless leg syndromecontinue Sinemet and Mirapex. Controlled. 6.  Acute respiratory failure with hypercapniacontinue O2 and monitoring. Stable. Still requiring O2  7. CKDimproving. continues to have significant edema. Bumex held only on 1/5. Gabapentin reduced to help with peripheral edema. Patient denies shortness of air.   Continues to slowly improve  Continue current treatment  ELOS:1/16/20

## 2021-01-09 PROCEDURE — 1180000000 HC REHAB R&B

## 2021-01-09 PROCEDURE — 97116 GAIT TRAINING THERAPY: CPT

## 2021-01-09 PROCEDURE — 6370000000 HC RX 637 (ALT 250 FOR IP): Performed by: PSYCHIATRY & NEUROLOGY

## 2021-01-09 PROCEDURE — 97110 THERAPEUTIC EXERCISES: CPT

## 2021-01-09 RX ORDER — ONDANSETRON 4 MG/1
4 TABLET, ORALLY DISINTEGRATING ORAL EVERY 8 HOURS PRN
Status: DISCONTINUED | OUTPATIENT
Start: 2021-01-09 | End: 2021-01-16 | Stop reason: HOSPADM

## 2021-01-09 RX ADMIN — QUETIAPINE FUMARATE 300 MG: 300 TABLET ORAL at 20:37

## 2021-01-09 RX ADMIN — METOPROLOL SUCCINATE 25 MG: 25 TABLET, EXTENDED RELEASE ORAL at 07:34

## 2021-01-09 RX ADMIN — CLONIDINE HYDROCHLORIDE 0.1 MG: 0.1 TABLET ORAL at 07:35

## 2021-01-09 RX ADMIN — ONDANSETRON 4 MG: 4 TABLET, ORALLY DISINTEGRATING ORAL at 15:22

## 2021-01-09 RX ADMIN — BUMETANIDE 1 MG: 1 TABLET ORAL at 07:35

## 2021-01-09 RX ADMIN — GABAPENTIN 100 MG: 100 CAPSULE ORAL at 20:37

## 2021-01-09 RX ADMIN — Medication 5000 UNITS: at 07:34

## 2021-01-09 RX ADMIN — GABAPENTIN 100 MG: 100 CAPSULE ORAL at 07:35

## 2021-01-09 RX ADMIN — OXYBUTYNIN CHLORIDE 10 MG: 5 TABLET, EXTENDED RELEASE ORAL at 07:35

## 2021-01-09 RX ADMIN — FLUOXETINE HYDROCHLORIDE 20 MG: 20 CAPSULE ORAL at 07:35

## 2021-01-09 RX ADMIN — APIXABAN 2.5 MG: 2.5 TABLET, FILM COATED ORAL at 20:37

## 2021-01-09 RX ADMIN — APIXABAN 2.5 MG: 2.5 TABLET, FILM COATED ORAL at 07:41

## 2021-01-09 RX ADMIN — ATORVASTATIN CALCIUM 80 MG: 80 TABLET, FILM COATED ORAL at 20:37

## 2021-01-09 RX ADMIN — ASPIRIN 81 MG: 81 TABLET, CHEWABLE ORAL at 07:35

## 2021-01-09 RX ADMIN — PRAMIPEXOLE DIHYDROCHLORIDE 0.5 MG: 0.25 TABLET ORAL at 20:37

## 2021-01-09 RX ADMIN — GABAPENTIN 100 MG: 100 CAPSULE ORAL at 14:02

## 2021-01-09 RX ADMIN — MULTIPLE VITAMINS W/ MINERALS TAB 1 TABLET: TAB at 07:34

## 2021-01-09 RX ADMIN — LISINOPRIL 40 MG: 20 TABLET ORAL at 07:35

## 2021-01-09 RX ADMIN — CARBIDOPA AND LEVODOPA 1 TABLET: 25; 100 TABLET ORAL at 20:36

## 2021-01-09 RX ADMIN — CLONIDINE HYDROCHLORIDE 0.1 MG: 0.1 TABLET ORAL at 20:36

## 2021-01-09 ASSESSMENT — PAIN SCALES - GENERAL: PAINLEVEL_OUTOF10: 0

## 2021-01-09 NOTE — PLAN OF CARE
Problem: Falls - Risk of:  Goal: Will remain free from falls  Description: Will remain free from falls  1/9/2021 0950 by Amber Koenig RN  Outcome: Ongoing  1/8/2021 2225 by Leticia Gagnon RN  Outcome: Ongoing   Up with 1 assist with walker

## 2021-01-09 NOTE — PLAN OF CARE
Problem: Falls - Risk of:  Goal: Will remain free from falls  Description: Will remain free from falls  1/8/2021 2225 by James Ewing RN  Outcome: Ongoing  1/8/2021 0906 by Salvatore Cruz LPN  Outcome: Ongoing  Goal: Absence of physical injury  Description: Absence of physical injury  1/8/2021 2225 by James Ewing RN  Outcome: Ongoing  1/8/2021 0906 by Salvatore Cruz LPN  Outcome: Ongoing     Problem: ACTIVITY INTOLERANCE/IMPAIRED MOBILITY  Goal: Mobility/activity is maintained at optimum level for patient  1/8/2021 2225 by James Ewing RN  Outcome: Ongoing  1/8/2021 0906 by Salvatore Cruz LPN  Outcome: Ongoing     Problem: Skin Integrity:  Goal: Will show no infection signs and symptoms  Description: Will show no infection signs and symptoms  1/8/2021 2225 by James Ewing RN  Outcome: Ongoing  1/8/2021 0906 by Salvatore Cruz LPN  Outcome: Ongoing  Goal: Absence of new skin breakdown  Description: Absence of new skin breakdown  1/8/2021 2225 by James Ewing RN  Outcome: Ongoing  1/8/2021 0906 by Salvatore Cruz LPN  Outcome: Ongoing     Problem: Pain:  Goal: Pain level will decrease  Description: Pain level will decrease  1/8/2021 2225 by James Ewing RN  Outcome: Ongoing  1/8/2021 0906 by Salvatore Cruz LPN  Outcome: Ongoing  Goal: Control of acute pain  Description: Control of acute pain  1/8/2021 2225 by James Ewing RN  Outcome: Ongoing  1/8/2021 0906 by Salvatore Cruz LPN  Outcome: Ongoing  Goal: Control of chronic pain  Description: Control of chronic pain  1/8/2021 2225 by James Ewing RN  Outcome: Ongoing  1/8/2021 0906 by Salvatore Cruz LPN  Outcome: Ongoing     Problem: HEMODYNAMIC STATUS  Goal: Patient has stable vital signs and fluid balance  1/8/2021 2225 by James Ewing RN  Outcome: Ongoing  1/8/2021 0906 by Salvatore Cruz LPN  Outcome: Ongoing     Problem: Bleeding: Goal: Will show no signs and symptoms of excessive bleeding  Description: Will show no signs and symptoms of excessive bleeding  1/8/2021 2225 by Jaylan Rick RN  Outcome: Ongoing  1/8/2021 0906 by Arnie Zurita LPN  Outcome: Ongoing     Problem: Mood - Altered:  Goal: Mood stable  Description: Mood stable  1/8/2021 2225 by Jaylan Rick RN  Outcome: Ongoing  1/8/2021 0906 by Arnie Zurita LPN  Outcome: Ongoing

## 2021-01-09 NOTE — PROGRESS NOTES
Joshua Ge  [id number]     01/09/21 0830 01/09/21 0838 01/09/21 0850   Restrictions/Precautions   Restrictions/Precautions  --  Weight Bearing; Fall Risk  --    Lower Extremity Weight Bearing Restrictions   Right Lower Extremity Weight Bearing  --  Weight Bearing As Tolerated  --    Left Lower Extremity Weight Bearing  --  Weight Bearing As Tolerated  --    Subjective   Subjective Pt agreed to therapy this morning. --   --    Pain Screening   Patient Currently in Pain No  --   --    Pain Assessment   Pain Assessment 0-10  --   --    Pain Level 0  --   --    Transfers   Sit to Stand  --  Stand by assistance  --    Stand to sit  --  Stand by assistance  --    Car Transfer  --  Stand by assistance;Contact guard assistance  --    Comment  --  Practiced Car TF well with no LOB. VCs given for hand placement. CGA for RLE advancement into car. --    Ambulation   Ambulation?  --   --   --    Ambulation 1   Surface  --   --   --    Device  --   --   --    Assistance  --   --   --    Quality of Gait  --   --   --    Gait Deviations  --   --   --    Distance  --   --   --    Comments  --   --   --    Exercises   Comments  --   --  Assisted pt to BR at the start and end of session. Assisted with dressing and changing depends .  Practiced Car TF   Conditions Requiring Skilled Therapeutic Intervention   Body structures, Functions, Activity limitations  --   --   --    Assessment  --   --   --    Activity Tolerance   Activity Tolerance  --   --   --    Safety Devices   Type of devices  --   --   --       01/09/21 1030 01/09/21 1032 01/09/21 1044   Restrictions/Precautions   Restrictions/Precautions  --   --   --    Lower Extremity Weight Bearing Restrictions   Right Lower Extremity Weight Bearing  --   --   --    Left Lower Extremity Weight Bearing  --   --   --    Subjective   Subjective  --   --   --    Pain Screening   Patient Currently in Pain  --   --   --    Pain Assessment   Pain Assessment  --   --   -- Pain Level  --   --   --    Transfers   Sit to Stand  --   --   --    Stand to sit  --   --   --    Car Transfer  --   --   --    Comment  --   --   --    Ambulation   Ambulation? Yes  --   --    Ambulation 1   Surface level tile  --   --    Device Rolling Walker  --   --    Assistance Contact guard assistance;Stand by assistance  --   --    Quality of Gait Maintained a good and steady pace with a forward gaze and stayed within the RW. No SOB noted. --   --    Gait Deviations Slow Scarlet  --   --    Distance 76'  --   --    Comments [employer] turn. --   --    Exercises   Comments  --   --   --    Conditions Requiring Skilled Therapeutic Intervention   Body structures, Functions, Activity limitations  --  Decreased functional mobility ; Decreased ADL status; Decreased strength;Decreased endurance  --    Assessment  --  Assisted pt to BR at start and end of session. Assisted with dressing and changing  depends pad. Pt continues to amb well, maintaining a good steady pace, staying within RW, and having a forward gaze. Tolerated Car TF well with no LOB. VCs given for hand placement. CGA for RLE advancement into car. Pt was on RA for all activities during therapy session. No SOB noted. --    Activity Tolerance   Activity Tolerance  --  Patient Tolerated treatment well  --    Safety Devices   Type of devices  --   --  Call light within reach; Chair alarm in place; Left in chair   Electronically signed by Mariya Matta PTA on 1/9/2021 at 10:47 AM

## 2021-01-10 PROCEDURE — 1180000000 HC REHAB R&B

## 2021-01-10 PROCEDURE — 6370000000 HC RX 637 (ALT 250 FOR IP): Performed by: PSYCHIATRY & NEUROLOGY

## 2021-01-10 RX ADMIN — OXYBUTYNIN CHLORIDE 10 MG: 5 TABLET, EXTENDED RELEASE ORAL at 07:30

## 2021-01-10 RX ADMIN — MULTIPLE VITAMINS W/ MINERALS TAB 1 TABLET: TAB at 07:36

## 2021-01-10 RX ADMIN — CARBIDOPA AND LEVODOPA 1 TABLET: 25; 100 TABLET ORAL at 20:07

## 2021-01-10 RX ADMIN — ATORVASTATIN CALCIUM 80 MG: 80 TABLET, FILM COATED ORAL at 20:07

## 2021-01-10 RX ADMIN — GABAPENTIN 100 MG: 100 CAPSULE ORAL at 13:56

## 2021-01-10 RX ADMIN — FLUOXETINE HYDROCHLORIDE 20 MG: 20 CAPSULE ORAL at 07:30

## 2021-01-10 RX ADMIN — ONDANSETRON 4 MG: 4 TABLET, ORALLY DISINTEGRATING ORAL at 18:16

## 2021-01-10 RX ADMIN — Medication 5000 UNITS: at 07:29

## 2021-01-10 RX ADMIN — ASPIRIN 81 MG: 81 TABLET, CHEWABLE ORAL at 07:30

## 2021-01-10 RX ADMIN — APIXABAN 2.5 MG: 2.5 TABLET, FILM COATED ORAL at 20:07

## 2021-01-10 RX ADMIN — GABAPENTIN 100 MG: 100 CAPSULE ORAL at 07:29

## 2021-01-10 RX ADMIN — PRAMIPEXOLE DIHYDROCHLORIDE 0.5 MG: 0.25 TABLET ORAL at 20:07

## 2021-01-10 RX ADMIN — CLONIDINE HYDROCHLORIDE 0.1 MG: 0.1 TABLET ORAL at 20:07

## 2021-01-10 RX ADMIN — METOPROLOL SUCCINATE 25 MG: 25 TABLET, EXTENDED RELEASE ORAL at 07:30

## 2021-01-10 RX ADMIN — CLONIDINE HYDROCHLORIDE 0.1 MG: 0.1 TABLET ORAL at 07:30

## 2021-01-10 RX ADMIN — QUETIAPINE FUMARATE 300 MG: 300 TABLET ORAL at 20:07

## 2021-01-10 RX ADMIN — GABAPENTIN 100 MG: 100 CAPSULE ORAL at 20:07

## 2021-01-10 RX ADMIN — APIXABAN 2.5 MG: 2.5 TABLET, FILM COATED ORAL at 07:30

## 2021-01-10 RX ADMIN — BUMETANIDE 1 MG: 1 TABLET ORAL at 07:30

## 2021-01-10 RX ADMIN — LISINOPRIL 40 MG: 20 TABLET ORAL at 07:30

## 2021-01-10 RX ADMIN — ACETAMINOPHEN 650 MG: 325 TABLET ORAL at 13:18

## 2021-01-10 ASSESSMENT — PAIN SCALES - GENERAL: PAINLEVEL_OUTOF10: 7

## 2021-01-10 NOTE — PLAN OF CARE
Problem: Falls - Risk of:  Goal: Will remain free from falls  Description: Will remain free from falls  1/10/2021 0849 by Keily Brasher RN  Outcome: Ongoing  1/9/2021 1948 by Brenton Sharif RN  Outcome: Ongoing   Up with 1 assist with walker

## 2021-01-10 NOTE — PLAN OF CARE
Problem: Falls - Risk of:  Goal: Will remain free from falls  Description: Will remain free from falls  1/9/2021 1948 by Zakia Tomlin RN  Outcome: Ongoing  1/9/2021 0950 by Maria Luisa Arroyo RN  Outcome: Ongoing  Goal: Absence of physical injury  Description: Absence of physical injury  1/9/2021 1948 by Zakia Tomlin RN  Outcome: Ongoing  1/9/2021 0950 by Maria Luisa Arroyo RN  Outcome: Ongoing     Problem: ACTIVITY INTOLERANCE/IMPAIRED MOBILITY  Goal: Mobility/activity is maintained at optimum level for patient  1/9/2021 1948 by Zakia Tomlin RN  Outcome: Ongoing  1/9/2021 0950 by Maria Luisa Arroyo RN  Outcome: Ongoing     Problem: Skin Integrity:  Goal: Will show no infection signs and symptoms  Description: Will show no infection signs and symptoms  1/9/2021 1948 by Zakia Tomlin RN  Outcome: Ongoing  1/9/2021 0950 by Maria Luisa Arroyo RN  Outcome: Ongoing  Goal: Absence of new skin breakdown  Description: Absence of new skin breakdown  1/9/2021 1948 by Zakia Tomlin RN  Outcome: Ongoing  1/9/2021 0950 by Maria Luisa Arroyo RN  Outcome: Ongoing     Problem: Pain:  Goal: Pain level will decrease  Description: Pain level will decrease  1/9/2021 1948 by Zakia Tomlin RN  Outcome: Ongoing  1/9/2021 0950 by Maria Luisa Arroyo RN  Outcome: Ongoing  Goal: Control of acute pain  Description: Control of acute pain  1/9/2021 1948 by Zakia Tomlin RN  Outcome: Ongoing  1/9/2021 0950 by Maria Luisa Arroyo RN  Outcome: Ongoing  Goal: Control of chronic pain  Description: Control of chronic pain  1/9/2021 1948 by Zakia Tomlin RN  Outcome: Ongoing  1/9/2021 0950 by Maria Luisa Arroyo RN  Outcome: Ongoing     Problem: HEMODYNAMIC STATUS  Goal: Patient has stable vital signs and fluid balance  1/9/2021 1948 by Zakia Tomlin RN  Outcome: Ongoing  1/9/2021 0950 by Maria Luisa Arroyo RN  Outcome: Ongoing     Problem: Bleeding: Goal: Will show no signs and symptoms of excessive bleeding  Description: Will show no signs and symptoms of excessive bleeding  1/9/2021 1948 by Reilly Browne RN  Outcome: Ongoing  1/9/2021 0950 by Lear Dancer, RN  Outcome: Ongoing     Problem: Mood - Altered:  Goal: Mood stable  Description: Mood stable  1/9/2021 1948 by Reilly Browne RN  Outcome: Ongoing  1/9/2021 0950 by Lear Dancer, RN  Outcome: Ongoing

## 2021-01-11 LAB
ANION GAP SERPL CALCULATED.3IONS-SCNC: 9 MMOL/L (ref 7–19)
BASOPHILS ABSOLUTE: 0 K/UL (ref 0–0.2)
BASOPHILS RELATIVE PERCENT: 0.6 % (ref 0–1)
BUN BLDV-MCNC: 20 MG/DL (ref 8–23)
CALCIUM SERPL-MCNC: 9.7 MG/DL (ref 8.8–10.2)
CHLORIDE BLD-SCNC: 98 MMOL/L (ref 98–111)
CO2: 32 MMOL/L (ref 22–29)
CREAT SERPL-MCNC: 1.2 MG/DL (ref 0.5–0.9)
EOSINOPHILS ABSOLUTE: 0.2 K/UL (ref 0–0.6)
EOSINOPHILS RELATIVE PERCENT: 3 % (ref 0–5)
GFR AFRICAN AMERICAN: 51
GFR NON-AFRICAN AMERICAN: 42
GLUCOSE BLD-MCNC: 97 MG/DL (ref 74–109)
HCT VFR BLD CALC: 33.5 % (ref 37–47)
HEMOGLOBIN: 10.1 G/DL (ref 12–16)
IMMATURE GRANULOCYTES #: 0 K/UL
LYMPHOCYTES ABSOLUTE: 1.3 K/UL (ref 1.1–4.5)
LYMPHOCYTES RELATIVE PERCENT: 25.1 % (ref 20–40)
MCH RBC QN AUTO: 28.9 PG (ref 27–31)
MCHC RBC AUTO-ENTMCNC: 30.1 G/DL (ref 33–37)
MCV RBC AUTO: 96 FL (ref 81–99)
MONOCYTES ABSOLUTE: 0.6 K/UL (ref 0–0.9)
MONOCYTES RELATIVE PERCENT: 12.6 % (ref 0–10)
NEUTROPHILS ABSOLUTE: 3 K/UL (ref 1.5–7.5)
NEUTROPHILS RELATIVE PERCENT: 58.3 % (ref 50–65)
PDW BLD-RTO: 14.6 % (ref 11.5–14.5)
PLATELET # BLD: 210 K/UL (ref 130–400)
PMV BLD AUTO: 10 FL (ref 9.4–12.3)
POTASSIUM REFLEX MAGNESIUM: 3.7 MMOL/L (ref 3.5–5)
RBC # BLD: 3.49 M/UL (ref 4.2–5.4)
SODIUM BLD-SCNC: 139 MMOL/L (ref 136–145)
WBC # BLD: 5.1 K/UL (ref 4.8–10.8)

## 2021-01-11 PROCEDURE — 36415 COLL VENOUS BLD VENIPUNCTURE: CPT

## 2021-01-11 PROCEDURE — 97530 THERAPEUTIC ACTIVITIES: CPT

## 2021-01-11 PROCEDURE — 85025 COMPLETE CBC W/AUTO DIFF WBC: CPT

## 2021-01-11 PROCEDURE — 97129 THER IVNTJ 1ST 15 MIN: CPT

## 2021-01-11 PROCEDURE — 6370000000 HC RX 637 (ALT 250 FOR IP): Performed by: PSYCHIATRY & NEUROLOGY

## 2021-01-11 PROCEDURE — 97116 GAIT TRAINING THERAPY: CPT

## 2021-01-11 PROCEDURE — 97130 THER IVNTJ EA ADDL 15 MIN: CPT

## 2021-01-11 PROCEDURE — 1180000000 HC REHAB R&B

## 2021-01-11 PROCEDURE — 99232 SBSQ HOSP IP/OBS MODERATE 35: CPT | Performed by: PSYCHIATRY & NEUROLOGY

## 2021-01-11 PROCEDURE — 97535 SELF CARE MNGMENT TRAINING: CPT

## 2021-01-11 PROCEDURE — 80048 BASIC METABOLIC PNL TOTAL CA: CPT

## 2021-01-11 PROCEDURE — 97110 THERAPEUTIC EXERCISES: CPT

## 2021-01-11 RX ADMIN — GABAPENTIN 100 MG: 100 CAPSULE ORAL at 14:13

## 2021-01-11 RX ADMIN — LISINOPRIL 40 MG: 20 TABLET ORAL at 08:10

## 2021-01-11 RX ADMIN — GABAPENTIN 100 MG: 100 CAPSULE ORAL at 08:10

## 2021-01-11 RX ADMIN — GABAPENTIN 100 MG: 100 CAPSULE ORAL at 20:33

## 2021-01-11 RX ADMIN — APIXABAN 2.5 MG: 2.5 TABLET, FILM COATED ORAL at 08:10

## 2021-01-11 RX ADMIN — ATORVASTATIN CALCIUM 80 MG: 80 TABLET, FILM COATED ORAL at 20:32

## 2021-01-11 RX ADMIN — OXYBUTYNIN CHLORIDE 10 MG: 5 TABLET, EXTENDED RELEASE ORAL at 08:09

## 2021-01-11 RX ADMIN — MULTIPLE VITAMINS W/ MINERALS TAB 1 TABLET: TAB at 08:10

## 2021-01-11 RX ADMIN — CLONIDINE HYDROCHLORIDE 0.1 MG: 0.1 TABLET ORAL at 08:10

## 2021-01-11 RX ADMIN — METOPROLOL SUCCINATE 25 MG: 25 TABLET, EXTENDED RELEASE ORAL at 08:10

## 2021-01-11 RX ADMIN — BUMETANIDE 1 MG: 1 TABLET ORAL at 08:09

## 2021-01-11 RX ADMIN — PRAMIPEXOLE DIHYDROCHLORIDE 0.5 MG: 0.25 TABLET ORAL at 20:32

## 2021-01-11 RX ADMIN — Medication 5000 UNITS: at 08:09

## 2021-01-11 RX ADMIN — FLUOXETINE HYDROCHLORIDE 20 MG: 20 CAPSULE ORAL at 08:10

## 2021-01-11 RX ADMIN — CLONIDINE HYDROCHLORIDE 0.1 MG: 0.1 TABLET ORAL at 20:32

## 2021-01-11 RX ADMIN — QUETIAPINE FUMARATE 300 MG: 300 TABLET ORAL at 20:32

## 2021-01-11 RX ADMIN — APIXABAN 2.5 MG: 2.5 TABLET, FILM COATED ORAL at 20:32

## 2021-01-11 RX ADMIN — CARBIDOPA AND LEVODOPA 1 TABLET: 25; 100 TABLET ORAL at 20:33

## 2021-01-11 RX ADMIN — ASPIRIN 81 MG: 81 TABLET, CHEWABLE ORAL at 08:09

## 2021-01-11 ASSESSMENT — PAIN SCALES - GENERAL
PAINLEVEL_OUTOF10: 0

## 2021-01-11 NOTE — PLAN OF CARE
Problem: Falls - Risk of:  Goal: Will remain free from falls  Description: Will remain free from falls  Outcome: Ongoing  Goal: Absence of physical injury  Description: Absence of physical injury  Outcome: Ongoing     Problem: HEMODYNAMIC STATUS  Goal: Patient has stable vital signs and fluid balance  Outcome: Ongoing     Problem: Skin Integrity:  Goal: Will show no infection signs and symptoms  Description: Will show no infection signs and symptoms  Outcome: Ongoing  Goal: Absence of new skin breakdown  Description: Absence of new skin breakdown  Outcome: Ongoing     Problem: Pain:  Goal: Pain level will decrease  Description: Pain level will decrease  Outcome: Ongoing  Goal: Control of acute pain  Description: Control of acute pain  Outcome: Ongoing  Goal: Control of chronic pain  Description: Control of chronic pain  Outcome: Ongoing

## 2021-01-11 NOTE — PROGRESS NOTES
Comment Used AE for socks without assistance, unable to don shoes d/t edema in B feet.    CARE Score 3

## 2021-01-11 NOTE — ACP (ADVANCE CARE PLANNING)
Advance Care Planning     Advance Care Planning Activator (Inpatient)  Conversation Note      Date of ACP Conversation: 1/11/2021    Conversation Conducted with: Patient with Decision Making Capacity  Patient said - she has a completed Living Will and could ask a family to bring one. ACP Activator: Zachariah Burgos makes decisions on behalf of the incapacitated patient: Decision Maker is asked to consider and make decisions based on patient values, known preferences, or best interests. Health Care Decision Maker:     Current Designated Health Care Decision Maker:   Primary Decision Maker: Brittney Saucedo - Spouse - [phone]      Care Preferences    Ventilation: \"If you were in your present state of health and suddenly became very ill and were unable to breathe on your own, what would your preference be about the use of a ventilator (breathing machine) if it were available to you? \"      Would the patient desire the use of ventilator (breathing machine)?: Yes    \"If your health worsens and it becomes clear that your chance of recovery is unlikely, what would your preference be about the use of a ventilator (breathing machine) if it were available to you? \"     Would the patient desire the use of ventilator (breathing machine)?: No      Resuscitation  \"CPR works best to restart the heart when there is a sudden event, like a heart attack, in someone who is otherwise healthy. Unfortunately, CPR does not typically restart the heart for people who have serious health conditions or who are very sick. \"    \"In the event your heart stopped as a result of an underlying serious health condition, would you want attempts to be made to restart your heart (answer \"yes\" for attempt to resuscitate) or would you prefer a natural death (answer \"no\" for do not attempt to resuscitate)? \" No      Length of ACP Conversation in minutes:  30 minutes    Conversation Outcomes:  [x] ACP discussion completed [x] Existing advance directive reviewed with patient; no changes to patient's previously recorded wishes    Follow-up plan:    [x] Advised patient/agent/surrogate to review completed ACP document and update if needed with changes in condition, patient preferences or care setting      Electronically signed by JULES Calderón Shaniko[employer] on 1/11/2021 at 4:18 PM

## 2021-01-11 NOTE — PATIENT CARE CONFERENCE
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)      PHYSICAL THERAPY  STRENGTH  Strength RLE  Comment: GROSSLY 3+/5  Strength LLE  Comment: GROSSLY 4- TO 4/5  ROM  AROM RLE (degrees)  RLE General AROM: DECREASED HIP AND KNEE FLX; ANKLE DF AND PF WFL   AROM LLE (degrees)  LLE General AROM: DECREASED HIP AND KNEE FLX; ANKLE DF AND PF WFL   BED MOBILITY  Bed Mobility  Bridging: Independent  Rolling: Independent  Supine to Sit: Independent  Sit to Supine: Independent  Scooting: Independent  Comment: PT REQUIRED MOD A WITH SUPINE TO SIT FROM R S/L TO SITTING EOB, ASSIST WITH LE AND TRUNK BY THERAPIST  TRANSFERS  Transfers  Sit to Stand: Stand by assistance  Stand to sit: Stand by assistance  Bed to Chair: Stand by assistance  Stand Pivot Transfers: (.)  Squat Pivot Transfers: (.)  Lateral Transfers: (.)  Car Transfer: Stand by assistance, Contact guard assistance  Comment: Practiced Car TF well with no LOB. VCs given for hand placement. CGA for RLE advancement into car. WHEELCHAIR PROPULSION  Propulsion 1  Propulsion: Manual  Level: Level Tile  Method: FRANK GARCIA  Level of Assistance: Stand by assistance  Description/ Details: Pt needed cues for proper propulsion techniques. Distance: 48'  AMBULATION  Ambulation 1  Surface: level tile  Device: Rolling Walker  Other Apparatus: O2(1L)  Assistance: Stand by assistance  Quality of Gait: Pt amb well w/o O2 this afternoon. Pt stats: 95% RA pre-post amb. Pt needed VC's to keep inside RW. Gait Deviations: Slow Scarlet  Distance: 85'x2  Comments: Incorporated turn.   STAIRS     GOALS:  Short term goals  Time Frame for Short term goals: 1 WEEK  Short term goal 1: PT AMB 3X12FT IN II BARS WITH MOD AX1   Short term goal 2: PROPEL W/C 75FT WITH TURNS, SBA Short term goal 3: COMPLETE CAR TF MOD A FOR LE MANAGEMENT  Short term goal 4: COMPLETE STAND STEP TF TO R AND L WITH RW, CGA  Short term goal 5: PT CGA FOR ALL BED MOBILITY    Long term goals  Time Frame for Long term goals : 3 WEEKS  Long term goal 1: PT ' WITH RW, SUPERVISION  Long term goal 2: NEGOTIATE 4 STEPS, 4\", CGA, BILATERAL HANDRAIL  Long term goal 3: COMPLETE STAND STEP TF TO R AND L WITH RW, IND  Long term goal 4: COMPLETE CAR TF WITH SUPERVISION  Long term goal 5: PT IND WITH ALL BED MOBILITY    ASSESSMENT:  Assessment: Ck'd Pt's O2 stats:  95-96% RA. Pt has improved to Independent for bed mobility and SBA for TF's. Pt continues to fatigue quickly during activities and become SOB needing VC's. Pt may be ready for UP AD RACHEL soon. SPEECH THERAPY  Precautions: Fall  Swallowing Status/Diet: regular diet with thin liquids        Subjective: Patient alert and cooperative with all therapy tasks. Patient seen in her room sitting upright in recliner.      Objective:   Patient recalling recent/daily events with no difficulty, however did note patient would repeat stories/topics several times throughout the session.      SLP and patient discussed finance management at home. Patient balances check book at home and also writes checks for bills. Patient completed check writing task where she was to write out 2 different checks given information provided by the SLP. Patient completed both checks with 100% accuracy, independently.      Verbal expression targeted. Patient completed abstract divergent naming task. Patient completed with 86% accuracy with min/mod verbal cues to complete. Without verbal cues patient completing with 70% accuracy.  Did note less word finding difficulties than in yesterdays session for conversational discourse.     BUE AROM WFL     Pain Assessment:  Pain Level: 0       STGs:  Short term goals  Time Frame for Short term goals: 1 week  Short term goal 1: MET  Short term goal 2: MET  Short term goal 3: MET  Short term goal 4: MET  Short term goal 5: MET  Short term goal 6: MET    LTGs:  Long term goals  Time Frame for Long term goals : 3 weeks  Long term goal 1: MET  Long term goal 2: MET  Long term goal 3: Supervision with bathing hygiene. Long term goal 4: MET  Long term goal 5: MET  Long term goals 6: MET    Assessment:  Decreased functional mobility ; Decreased ADL status; Decreased strength; Decreased cognition; Decreased endurance; Decreased balance; Decreased high-level IADLs              NUTRITION  Current Wt: Weight: 167 lb 14.4 oz (76.2 kg) / Body mass index is 33.91 kg/m². Admission Wt: Admission Body Weight: 169 lb 11 oz (77 kg)  Oral Diet Orders:   GENERAL  Oral Nutrition Supplement (ONS) Orders:  None  Pt remains nutritionally stable with PO intake >50% most meals. Wt is stable. Please see nutrition note for details. NURSING    Wounds/Incisions/Ulcers: No skin issues identified     Erasmo Scale Score: 18    Pain: Norco prn. Consultations/Labs/X-rays:     Family Education: Family available and participating in education -formal instruction scheduled 1/13/21 to begin    Fall Risk:  Smiley Score: 36    Fall in the last week no      Other Nursing Issues: BM 12. Pills whole. Up x1 assist. Incont x's, briefs. A/O x4. Generalized weakness. Eliquis. General diet.         SOCIAL WORK/CASE MANAGEMENT  Assessment: Good family support    Discharge Plan   Estimated Length of Stay: 1/16/21  Destination: home health    Pass: No    Services at Discharge: [address], Occupational Therapy, Speech Therapy and Nursing per evaluations    Equipment at Discharge: Lonita Australian, O2 evaluation continues, bathroom safety equipment, wheelchair for long distance    Progress made in the prior week: 1. CGA with clothing management/hygiene for toileting. 2. IMPROVED QUALITY OF AMBULATION  3. Improve safety awareness with ADL's  4.  5.      Goals for following week:  1. Supervision with toileting and toilet transfers. 2. AMB ON ROOM AIR WITH A.D. MAINTAINING O2 ABOVE 95%  3.   4.   5.     Factors facilitating achievement of predicted outcomes: Motivated, Cooperative and Pleasant    Barriers to the achievement of predicted outcomes: Decreased endurance, Upper extremity weakness, Lower extremity weakness and Impaired vision    Team Members Present at Conference:  : Vu Dumont   Occupational Therapist: Ene Talley, OTR/L  Physical Therapist: Viky Owens PT,DPT  Speech Therapist: Lilian Martinez Milbert Childes  Nurse: Huong Bishop RN,BSN   Nurse Manager:  Huong Bishop RN, BSN  Dietitian:  Gayle Carmichael RD  Rehab Director:  Saint Spear approve the established interdisciplinary plan of care as documented within the medical record of Debi Butcher.

## 2021-01-11 NOTE — PROGRESS NOTES
01/11/21 1114 01/11/21 1115 01/11/21 1126   Pain Assessment   Pain Assessment  --  0-10  --    Pain Level  --  0  --    Oxygen Therapy   SpO2  --  92 %  (After amb 80' on 1L O2.)  --    Pulse Oximeter Device Location  --  Right;Finger  --    O2 Device  --  Nasal cannula  --    O2 Flow Rate (L/min)  --  1 L/min  --    Transfers   Sit to Stand Stand by assistance  --   --    Stand to sit Stand by assistance  --   --    Lateral Transfers  --   --   --    Ambulation   Ambulation? Yes  --   --    Ambulation 1   Surface level tile  --   --    Device Rolling Walker  --   --    Other Apparatus O2  (1L)  --   --    Assistance Contact guard assistance;Stand by assistance  --   --    Quality of Gait Pt. came from OT on 1L O2. SPO2 92% after first walk. --   --    Gait Deviations Slow Scarlet  --   --    Distance [de-identified]' x2  --   --    Comments Incorporated turn. --   --    Exercises   Comments  --   --   --    Conditions Requiring Skilled Therapeutic Intervention   Assessment  --   --   --    Activity Tolerance   Activity Tolerance  --   --  Patient Tolerated treatment well;Patient limited by endurance   Safety Devices   Type of devices  --   --   --       01/11/21 1127 01/11/21 1141 01/11/21 1153   Pain Assessment   Pain Assessment  --   --   --    Pain Level  --   --   --    Oxygen Therapy   SpO2  --   --   --    Pulse Oximeter Device Location  --   --   --    O2 Device  --   --   --    O2 Flow Rate (L/min)  --   --   --    Transfers   Sit to Stand  --   --   --    Stand to sit  --   --   --    Lateral Transfers  --   --  Stand by assistance;Contact guard assistance  (W/C to/from toilet.   W/C to Recliner.)   Ambulation   Ambulation?  --   --   --    Ambulation 1   Surface  --   --   --    Device  --   --   --    Other Apparatus  --   --   --    Assistance  --   --   --    Quality of Gait  --   --   --    Gait Deviations  --   --   --    Distance  --   --   --    Comments  --   --   --    Exercises Comments  --  Sitting BLE ex x10 reps/ 2 sets. --    Conditions Requiring Skilled Therapeutic Intervention   Assessment Able to amb [de-identified]'  twice today. Pt. came from OT dept on 1L O2 today. SPO2 after first walk was 92%.  --   --    Activity Tolerance   Activity Tolerance  --   --   --    Safety Devices   Type of devices  --   --   --       01/11/21 1155   Pain Assessment   Pain Assessment  --    Pain Level  --    Oxygen Therapy   SpO2  --    Pulse Oximeter Device Location  --    O2 Device  --    O2 Flow Rate (L/min)  --    Transfers   Sit to Stand  --    Stand to sit  --    Lateral Transfers  --    Ambulation   Ambulation? --    Ambulation 1   Surface  --    Device  --    Other Apparatus  --    Assistance  --    Quality of Gait  --    Gait Deviations  --    Distance  --    Comments  --    Exercises   Comments  --    Conditions Requiring Skilled Therapeutic Intervention   Assessment  --    Activity Tolerance   Activity Tolerance  --    Safety Devices   Type of devices Call light within reach; Chair alarm in place   Electronically signed by Andrés Guzman PTA on 1/11/2021 at 11:55 AM

## 2021-01-11 NOTE — PROGRESS NOTES
[facility]  INPATIENT SPEECH THERAPY  [facility] 8 REHAB UNIT  TIME   [id number]    [x]Daily Note  []Progress Note    Date: 2021  Patient Name: Sonal Osuna        MRN: [id number]    Account #: [de-identified]  : 10/14/1933  (80 y.o.)  Gender: female   Primary Provider: Papo Staples MD  Precautions: Fall  Swallowing Status/Diet: Regular diet with thin liquids    Subjective: Patient alert and cooperative with all therapy tasks. Patient was seen in her room for therapy session. Objective:   Verbal expression targeted. Patient completed category naming task where she was to name 5 items within a category. Patient completed with 93% accuracy and min verbal cues. Patient then had to formulate a list of 10 items for a grocery list. Patient required extended time to complete this task. Patient was able to name 10. Scenario was given to patient where she \"forgot\" list and would have to recall the items. Patient recalling 2 of the items independently, and with cues she was able to recall 5 items. 4 step sequencing task completed with picture cards related to ADL's. Patient completed with a score of 95% accuracy independently. Patient presenting with increased word finding difficulties. Patient also noted to present with a few paraphasias within conversation. Will continue speech services to address cognition and expressive/receptive language. SHORT TERM GOAL #1:  Goal 1: The patient will demonstrate recall of functional information following a/an (immediate, short term, long term) delay with min cues in order to increase functional integration into environment. SHORT TERM GOAL #2:  Goal 2: The patient will complete simple/complex naming tasks with min verbal cues at 80% accuracy to improve thought organization and word retrieval skills.     SHORT TERM GOAL #3: Goal 3: The patient will follow multi step commands related to functional living environment with 80% accuracy and min cues in order to increase functional integration into environment. SHORT TERM GOAL #4:  Goal 4: The patient will complete problem solving/reasoning tasks with min verbal cues at 80% accuracy in order to improve safety awareness for functional tasks. ASSESSMENT:  Assessment: [x]Progressing towards goals          []Not Progressing towards goals    Patient Tolerance of Treatment:   [x]Tolerated well []Tolerated fair []Required rest breaks []Fatigued    Education:  Learner:  [x]Patient          []Significant Other          []Other  Education provided regarding:  [x]Goals and POC   []Diet and swallowing precautions    []Home Exercise Program  []Progress and/or discharge information  Method of Education:  []Discussion          []Demonstration          []Handout          []Other  Evaluation of Education:   []Verbalized understanding   []Demonstrates without assistance  [x]Demonstrates with assistance  []Needs further instruction     []No evidence of learning                  [x]No family present      Plan: [x]Continue with current plan of care    []Modify current plan of care as follows:    []Discharge patient    Discharge Location:    Services/Supervision Recommended:      [x]Patient continues to require treatment by a licensed therapist to address functional deficits as outlined in the established plan of care.

## 2021-01-11 NOTE — PROGRESS NOTES
Patient:   Erum Phoenix  MR#:    [id number]   Room:    0811/811-01   YOB: 1933  Date of Progress Note: 1/11/2021  Time of Note                           9:02 AM  Consulting Physician:   George Marquez M.D. Attending Physician:  George Marquez MD     CHIEF COMPLAINT: Generalized weakness, worse on the right     Subjective  This 80 y.o. female  who presented to [facility] with inability to walk. She has a past medical history significant for breast cancer status post bilateral implants, chronic back pain, tremors, macular degeneration and essential hypertension. Patient had gone to bed on 12/26 around 2030. When she awoke in the morning around 830 she had difficulty getting out of bed. At baseline she is alert and oriented and conversant. Patient continued to have difficulty ambulating with unsteady gait. Her primary care provider advised her to seek evaluation at the emergency department. In the ED, blood pressure was elevated at 186/102. MRI brain was done showing acute stroke left medial precentral gyrus and acute right occipital stroke with chronic microvascular disease and atrophy. IV TPA not considered as last known well was 2030 on 12/26. She was admitted to the hospitalist service. She was seen in consultation by neurology. Aspirin 325 mg daily was changed to dual therapy with Eliquis 2.5 mg twice daily and aspirin 81 mg daily. Per neurology, MRI brain likely embolic with right occipital and left medial precentral gyrus. Also, transthoracic echo findings placed at increased risk of developing atrial fibrillation with mild to moderate left atrial enlargement and TTE limited study suggest possible right to left shunt with rope score 4 suggesting that 38% chance that stroke due to PFO and 12% risk of to urinary recurrence of stroke/TIA. Venous Doppler of bilateral lower extremities did not show thrombus. Given patient age, would need to determine risk/benefits of closure but at this point recommend dual therapy with aspirin and Eliquis. Lipitor 80 mg daily. LDL at goal at 55. She has maintained normal sinus rhythm on continuous telemetry. She will need Zio patch at discharge. She is to follow-up with neurology in 3 to 4 weeks. She was seen in consultation by cardiology. BNP 8,437 on admission. Chest x-ray showed cardiomegaly, bilateral effusions, and pulmonary edema. Transthoracic echocardiogram showed cor pulmonale with normal LVEF. She was started on IV Bumex twice daily will be transitioned to oral to start tomorrow 1 mg Bumex daily. She has had good urine output with diuresis. Patient states lower extremity edema has much improved. Patient denies history of heart failure or pulmonary disease. She is a non-smoker. She is to follow-up with cardiology per recommendations, she will need a right heart cath as outpatient basis. She was seen in consultation by pulmonology. Acute respiratory failure with hypoxia and hypercapnia. ABG on 12/28 showed pH 7.32, PCO2 69, PO2 60 and HCO3 35. She was subsequently placed on BiPAP. Probably chronic CO2 retention. Of note, she had a previous chest x-ray in 2019 that showed interstitial fibrotic changes within the lungs.   VQ scan showed low probability for pulmonary embolus. She will need pulmonary function test, CT of the chest, and right heart cath in the outpatient setting once euvolemic. Creatinine on admission noted to with repeat down to 1. 15. Serum CO2 43she has a contraction alkalosis but renal function appears to be improving and stable. Overall, she is alert and oriented and neurologically back to baseline. She remains on 2 L nasal cannula recommend to wean as tolerated for SPO2 goal greater than 90%, avoid over oxygenating with known CO2 retention. Continue to encourage BiPAP nightly. She denies shortness of breath, chest pain or pressure. She is tolerating a diet. She has had good urine output from diuresis. Labs did show hypokalemia and hypomagnesemia for which she did receive a blood replacement. She continues to have weakness, more so on the right. She was evaluated by SPT for swallow and found to have Oropharyngeal dysphagia but she has now been upgraded to a regular diet with thin liquids. No complaints this morning. Had a decent weekend  REVIEW OF SYSTEMS:  Constitutional: No fevers No chills  Neck:No stiffness  Respiratory: No shortness of breath  Cardiovascular: No chest pain No palpitations  Gastrointestinal: No abdominal pain    Genitourinary: No Dysuria  Neurological: No headache, no confusion      PHYSICAL EXAM:  /60   Pulse 80   Temp 97.2 °F (36.2 °C) (Temporal)   Resp 16   Ht 4' 11\" (1.499 m)   Wt 167 lb 14.4 oz (76.2 kg)   SpO2 90%   BMI 33.91 kg/m²     Constitutional: she appears well-developed and well-nourished. Eyes  conjunctiva normal.  Pupils react to light  Ear, nose, throat -hearing intact to voice.  No scars, masses, or lesions over external nose or ears, no atrophy of tongue  Neck-symmetric, no masses noted, no jugular vein distension  Respiration- chest wall appears symmetric, good expansion,   normal effort without use of accessory muscles  Cardiovascular- RRR Pain Assessment 0-10  --   --    Pain Level 0  --   --    Transfers   Sit to Stand  --  Stand by assistance  --    Stand to sit  --  Stand by assistance  --    Car Transfer  --  Stand by assistance;Contact guard assistance  --    Comment  --  Practiced Car TF well with no LOB. VCs given for hand placement. CGA for RLE advancement into car. --    Ambulation   Ambulation?  --   --   --    Ambulation 1   Surface  --   --   --    Device  --   --   --    Assistance  --   --   --    Quality of Gait  --   --   --    Gait Deviations  --   --   --    Distance  --   --   --    Comments  --   --   --    Exercises   Comments  --   --  Assisted pt to BR at the start and end of session. Assisted with dressing and changing depends . Practiced Car TF   Conditions Requiring Skilled Therapeutic Intervention   Body structures, Functions, Activity limitations  --   --   --    Assessment  --   --   --    Activity Tolerance   Activity Tolerance  --   --   --    Safety Devices   Type of devices  --   --   --         01/09/21 1030 01/09/21 1032 01/09/21 1044   Restrictions/Precautions   Restrictions/Precautions  --   --   --    Lower Extremity Weight Bearing Restrictions   Right Lower Extremity Weight Bearing  --   --   --    Left Lower Extremity Weight Bearing  --   --   --    Subjective   Subjective  --   --   --    Pain Screening   Patient Currently in Pain  --   --   --    Pain Assessment   Pain Assessment  --   --   --    Pain Level  --   --   --    Transfers   Sit to Stand  --   --   --    Stand to sit  --   --   --    Car Transfer  --   --   --    Comment  --   --   --    Ambulation   Ambulation? Yes  --   --    Ambulation 1   Surface level tile  --   --    Device Rolling Walker  --   --    Assistance Contact guard assistance;Stand by assistance  --   --    Quality of Gait Maintained a good and steady pace with a forward gaze and stayed within the RW. No SOB noted.   --   --    Gait Deviations Slow Scarlet  --   -- Distance 76'  --   --    Comments Incorporated turn. --   --    Exercises   Comments  --   --   --    Conditions Requiring Skilled Therapeutic Intervention   Body structures, Functions, Activity limitations  --  Decreased functional mobility ; Decreased ADL status; Decreased strength;Decreased endurance  --    Assessment  --  Assisted pt to BR at start and end of session. Assisted with dressing and changing  depends pad. Pt continues to amb well, maintaining a good steady pace, staying within RW, and having a forward gaze. Tolerated Car TF well with no LOB. VCs given for hand placement. CGA for RLE advancement into car. Pt was on RA for all activities during therapy session. No SOB noted. --    Activity Tolerance   Activity Tolerance  --  Patient Tolerated treatment well  --    Safety Devices   Type of devices  --   --  Call light within reach; Chair alarm in place; Left in chair   Objective:  She still complains of xerostomia. She tries to drink plenty of fluids throughout the day.      Abstract divergent naming tasks were completed. She was asked to name words that start with /k/. She was given two minutes and to complete and named five items.     A concrete divergent naming task was completed. She was able to name six items in one minute without cues.     Her speech was much improved this date. Very few dysfluencies were noted this date. Very few episodes of anomia observed. Semantic paraphasias were noted this date.     She was able to count  from 1 to 20 without cueing. Mental manipulation tasks (counting in reverse from 20 to 1 without cues) were completed without cueing.     Reverse recall of two digits at 100% accuracy without cues.      Problem solving and safety awareness tasks were completed at 80% accuracy without cues.                01/08/21 1000   Eating   Assistance Needed Independent   CARE Score 6   [address] [facility] Needed Supervision or touching assistance   Comment CGA   CARE Score 4      01/08/21 1000   Toilet Transfer   Assistance Needed Supervision or touching assistance   Comment SBA   CARE Score 4             RECORD REVIEW: Previous medical records, medications were reviewed at today's visit    IMPRESSION:   1. 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 Baby aspirin and Eliquis  2. DVT prophylaxisEliquis  3. Essential hypertensioncontinue current medications and monitoring. stable  4. CHF with recent pulmonary edemacontinue Bumex  5. Restless leg syndromecontinue Sinemet and Mirapex. Controlled. 6.  Acute respiratory failure with hypercapniacontinue O2 and monitoring. Stable. Still requiring O2  7. CKDimproving. continues to have significant edema. Gabapentin reduced to help with peripheral edema. Patient denies shortness of air. Continues to slowly improve  Continue current treatment  ELOS:1/16/20.   Restaff Wednesday

## 2021-01-11 NOTE — PLAN OF CARE
Problem: Falls - Risk of:  Goal: Will remain free from falls  Description: Will remain free from falls  1/11/2021 1005 by Ming Lynne LPN  Outcome: Ongoing  1/10/2021 2308 by Toby Weston RN  Outcome: Ongoing  Goal: Absence of physical injury  Description: Absence of physical injury  1/11/2021 1005 by Ming Lynne LPN  Outcome: Ongoing  1/10/2021 2308 by Toby Weston RN  Outcome: Ongoing     Problem: ACTIVITY INTOLERANCE/IMPAIRED MOBILITY  Goal: Mobility/activity is maintained at optimum level for patient  1/11/2021 1005 by Ming Lynne LPN  Outcome: Ongoing  1/10/2021 2308 by Toby Weston RN  Outcome: Ongoing     Problem: HEMODYNAMIC STATUS  Goal: Patient has stable vital signs and fluid balance  1/11/2021 1005 by Ming Lynne LPN  Outcome: Ongoing  1/10/2021 2308 by Toby Weston RN  Outcome: Ongoing     Problem: Skin Integrity:  Goal: Will show no infection signs and symptoms  Description: Will show no infection signs and symptoms  1/11/2021 1005 by Ming Lynne LPN  Outcome: Ongoing  1/10/2021 2308 by Toby Weston RN  Outcome: Ongoing  Goal: Absence of new skin breakdown  Description: Absence of new skin breakdown  1/11/2021 1005 by Ming Lynne LPN  Outcome: Ongoing  1/10/2021 2308 by Toby Weston RN  Outcome: Ongoing     Problem: Pain:  Goal: Pain level will decrease  Description: Pain level will decrease  1/11/2021 1005 by Ming Lynne LPN  Outcome: Ongoing  1/10/2021 2308 by Toby Weston RN  Outcome: Ongoing  Goal: Control of acute pain  Description: Control of acute pain  1/11/2021 1005 by Ming Lynne LPN  Outcome: Ongoing  1/10/2021 2308 by Toby Weston RN  Outcome: Ongoing  Goal: Control of chronic pain  Description: Control of chronic pain  1/11/2021 1005 by Ming Lynne LPN  Outcome: Ongoing  1/10/2021 2308 by Toby Weston RN  Outcome: Ongoing     Problem: Bleeding:  Goal: Will show no signs and symptoms of excessive bleeding Description: Will show no signs and symptoms of excessive bleeding  1/11/2021 1005 by Agnes Alcaraz LPN  Outcome: Ongoing  1/10/2021 2308 by Katelyn Martin RN  Outcome: Ongoing     Problem: Mood - Altered:  Goal: Mood stable  Description: Mood stable  1/11/2021 1005 by Agnes Alcaraz LPN  Outcome: Ongoing  1/10/2021 2308 by Katelyn Martin RN  Outcome: Ongoing

## 2021-01-12 PROCEDURE — 97535 SELF CARE MNGMENT TRAINING: CPT

## 2021-01-12 PROCEDURE — 97129 THER IVNTJ 1ST 15 MIN: CPT

## 2021-01-12 PROCEDURE — 1180000000 HC REHAB R&B

## 2021-01-12 PROCEDURE — 99232 SBSQ HOSP IP/OBS MODERATE 35: CPT | Performed by: PSYCHIATRY & NEUROLOGY

## 2021-01-12 PROCEDURE — 97110 THERAPEUTIC EXERCISES: CPT

## 2021-01-12 PROCEDURE — 6370000000 HC RX 637 (ALT 250 FOR IP): Performed by: PSYCHIATRY & NEUROLOGY

## 2021-01-12 PROCEDURE — 97116 GAIT TRAINING THERAPY: CPT

## 2021-01-12 PROCEDURE — 97530 THERAPEUTIC ACTIVITIES: CPT

## 2021-01-12 PROCEDURE — 97130 THER IVNTJ EA ADDL 15 MIN: CPT

## 2021-01-12 RX ORDER — METOPROLOL SUCCINATE 50 MG/1
50 TABLET, EXTENDED RELEASE ORAL DAILY
Status: DISCONTINUED | OUTPATIENT
Start: 2021-01-13 | End: 2021-01-13

## 2021-01-12 RX ORDER — ACYCLOVIR 200 MG/1
400 CAPSULE ORAL 3 TIMES DAILY
Status: COMPLETED | OUTPATIENT
Start: 2021-01-12 | End: 2021-01-12

## 2021-01-12 RX ADMIN — MULTIPLE VITAMINS W/ MINERALS TAB 1 TABLET: TAB at 07:35

## 2021-01-12 RX ADMIN — OXYBUTYNIN CHLORIDE 10 MG: 5 TABLET, EXTENDED RELEASE ORAL at 07:35

## 2021-01-12 RX ADMIN — ASPIRIN 81 MG: 81 TABLET, CHEWABLE ORAL at 07:34

## 2021-01-12 RX ADMIN — ACYCLOVIR 400 MG: 200 CAPSULE ORAL at 20:08

## 2021-01-12 RX ADMIN — APIXABAN 2.5 MG: 2.5 TABLET, FILM COATED ORAL at 07:34

## 2021-01-12 RX ADMIN — CLONIDINE HYDROCHLORIDE 0.1 MG: 0.1 TABLET ORAL at 20:08

## 2021-01-12 RX ADMIN — GABAPENTIN 100 MG: 100 CAPSULE ORAL at 14:06

## 2021-01-12 RX ADMIN — ACYCLOVIR 400 MG: 200 CAPSULE ORAL at 14:06

## 2021-01-12 RX ADMIN — PRAMIPEXOLE DIHYDROCHLORIDE 0.5 MG: 0.25 TABLET ORAL at 20:08

## 2021-01-12 RX ADMIN — ATORVASTATIN CALCIUM 80 MG: 80 TABLET, FILM COATED ORAL at 20:08

## 2021-01-12 RX ADMIN — CLONIDINE HYDROCHLORIDE 0.1 MG: 0.1 TABLET ORAL at 07:34

## 2021-01-12 RX ADMIN — GABAPENTIN 100 MG: 100 CAPSULE ORAL at 07:35

## 2021-01-12 RX ADMIN — BUMETANIDE 1 MG: 1 TABLET ORAL at 07:34

## 2021-01-12 RX ADMIN — LISINOPRIL 40 MG: 20 TABLET ORAL at 07:35

## 2021-01-12 RX ADMIN — QUETIAPINE FUMARATE 300 MG: 300 TABLET ORAL at 20:08

## 2021-01-12 RX ADMIN — METOPROLOL SUCCINATE 25 MG: 25 TABLET, EXTENDED RELEASE ORAL at 07:35

## 2021-01-12 RX ADMIN — ACYCLOVIR 400 MG: 200 CAPSULE ORAL at 08:39

## 2021-01-12 RX ADMIN — APIXABAN 2.5 MG: 2.5 TABLET, FILM COATED ORAL at 20:08

## 2021-01-12 RX ADMIN — FLUOXETINE HYDROCHLORIDE 20 MG: 20 CAPSULE ORAL at 07:35

## 2021-01-12 RX ADMIN — Medication 5000 UNITS: at 07:34

## 2021-01-12 RX ADMIN — GABAPENTIN 100 MG: 100 CAPSULE ORAL at 20:08

## 2021-01-12 RX ADMIN — CARBIDOPA AND LEVODOPA 1 TABLET: 25; 100 TABLET ORAL at 20:08

## 2021-01-12 ASSESSMENT — PAIN SCALES - GENERAL
PAINLEVEL_OUTOF10: 0
PAINLEVEL_OUTOF10: 0

## 2021-01-12 NOTE — PROGRESS NOTES
Patient:   Anderson Beltran  MR#:    [id number]   Room:    0811/811-01   YOB: 1933  Date of Progress Note: 1/12/2021  Time of Note                           8:15 AM  Consulting Physician:   Kinga Boles M.D. Attending Physician:  Kinga Boles MD     CHIEF COMPLAINT: Generalized weakness, worse on the right     Subjective  This 80 y.o. female  who presented to [facility] with inability to walk. She has a past medical history significant for breast cancer status post bilateral implants, chronic back pain, tremors, macular degeneration and essential hypertension. Patient had gone to bed on 12/26 around 2030. When she awoke in the morning around 830 she had difficulty getting out of bed. At baseline she is alert and oriented and conversant. Patient continued to have difficulty ambulating with unsteady gait. Her primary care provider advised her to seek evaluation at the emergency department. In the ED, blood pressure was elevated at 186/102. MRI brain was done showing acute stroke left medial precentral gyrus and acute right occipital stroke with chronic microvascular disease and atrophy. IV TPA not considered as last known well was 2030 on 12/26. She was admitted to the hospitalist service. She was seen in consultation by neurology. Aspirin 325 mg daily was changed to dual therapy with Eliquis 2.5 mg twice daily and aspirin 81 mg daily. Per neurology, MRI brain likely embolic with right occipital and left medial precentral gyrus. Also, transthoracic echo findings placed at increased risk of developing atrial fibrillation with mild to moderate left atrial enlargement and TTE limited study suggest possible right to left shunt with rope score 4 suggesting that 38% chance that stroke due to PFO and 12% risk of to urinary recurrence of stroke/TIA. Venous Doppler of bilateral lower extremities did not show thrombus. Given patient age, would need to determine risk/benefits of closure but at this point recommend dual therapy with aspirin and Eliquis. Lipitor 80 mg daily. LDL at goal at 55. She has maintained normal sinus rhythm on continuous telemetry. She will need Zio patch at discharge. She is to follow-up with neurology in 3 to 4 weeks. She was seen in consultation by cardiology. BNP 8,437 on admission. Chest x-ray showed cardiomegaly, bilateral effusions, and pulmonary edema. Transthoracic echocardiogram showed cor pulmonale with normal LVEF. She was started on IV Bumex twice daily will be transitioned to oral to start tomorrow 1 mg Bumex daily. She has had good urine output with diuresis. Patient states lower extremity edema has much improved. Patient denies history of heart failure or pulmonary disease. She is a non-smoker. She is to follow-up with cardiology per recommendations, she will need a right heart cath as outpatient basis. She was seen in consultation by pulmonology. Acute respiratory failure with hypoxia and hypercapnia. ABG on 12/28 showed pH 7.32, PCO2 69, PO2 60 and HCO3 35. She was subsequently placed on BiPAP. Probably chronic CO2 retention. Of note, she had a previous chest x-ray in 2019 that showed interstitial fibrotic changes within the lungs.   VQ scan showed low probability for pulmonary embolus. She will need pulmonary function test, CT of the chest, and right heart cath in the outpatient setting once euvolemic. Creatinine on admission noted to with repeat down to 1. 15. Serum CO2 43she has a contraction alkalosis but renal function appears to be improving and stable. Overall, she is alert and oriented and neurologically back to baseline. She remains on 2 L nasal cannula recommend to wean as tolerated for SPO2 goal greater than 90%, avoid over oxygenating with known CO2 retention. Continue to encourage BiPAP nightly. She denies shortness of breath, chest pain or pressure. She is tolerating a diet. She has had good urine output from diuresis. Labs did show hypokalemia and hypomagnesemia for which she did receive a blood replacement. She continues to have weakness, more so on the right. She was evaluated by SPT for swallow and found to have Oropharyngeal dysphagia but she has now been upgraded to a regular diet with thin liquids. Having an outbreak of herpes labialis. Edema in the feet has improved. REVIEW OF SYSTEMS:  Constitutional: No fevers No chills  Neck:No stiffness  Respiratory: No shortness of breath  Cardiovascular: No chest pain No palpitations  Gastrointestinal: No abdominal pain    Genitourinary: No Dysuria  Neurological: No headache, no confusion      PHYSICAL EXAM:  BP (!) 169/75   Pulse 67   Temp 96.8 °F (36 °C) (Temporal)   Resp 12   Ht 4' 11\" (1.499 m)   Wt 167 lb 14.4 oz (76.2 kg)   SpO2 90%   BMI 33.91 kg/m²     Constitutional: she appears well-developed and well-nourished. Eyes  conjunctiva normal.  Pupils react to light  Ear, nose, throat -hearing intact to voice.  No scars, masses, or lesions over external nose or ears, no atrophy of tongue  Neck-symmetric, no masses noted, no jugular vein distension  Respiration- chest wall appears symmetric, good expansion,   normal effort without use of accessory muscles Cardiovascular- RRR  Musculoskeletal  no significant wasting of muscles noted, no bony deformities, gait no gross ataxia  Extremities-no clubbing, cyanosis or edema  Skin  warm, dry, and intact. No rash, erythema, or pallor. Psychiatric  mood, affect, and behavior appear normal.      Neurology  NEUROLOGICAL EXAM:      Mental status   Awake, alert, fluent oriented x 3 appropriate affect  Attention and concentration appear appropriate  Recent and remote memory appears unremarkable  Speech normal without dysarthria  No clear issues with language       Cranial Nerves   CN II- Visual fields grossly unremarkable  CN III, IV,VI-EOMI, No nystagmus, conjugate eye movements, no ptosis  CN VII-no facial asymmetry  CN VIII-Hearing intact      Motor function  Antigravity x 4             Tremor None present     Gait                  Not tested           Nursing/pcp notes, imaging,labs and vitals reviewed. PT,OT and/or speech notes reviewed    Lab Results   Component Value Date    WBC 5.1 01/11/2021    HGB 10.1 (L) 01/11/2021    HCT 33.5 (L) 01/11/2021    MCV 96.0 01/11/2021     01/11/2021     Lab Results   Component Value Date     01/11/2021    K 3.7 01/11/2021    CL 98 01/11/2021    CO2 32 (H) 01/11/2021    BUN 20 01/11/2021    CREATININE 1.2 (H) 01/11/2021    GLUCOSE 97 01/11/2021    CALCIUM 9.7 01/11/2021    LABGLOM 42 (A) 01/11/2021    GFRAA 51 (L) 01/11/2021   No results found for: INRNo results found for: PHENYTOIN, ESR, CRP    Objective:   Verbal expression targeted. Patient completed category naming task where she was to name 5 items within a category. Patient completed with 93% accuracy and min verbal cues. Patient then had to formulate a list of 10 items for a grocery list. Patient required extended time to complete this task. Patient was able to name 10. Scenario was given to patient where she \"forgot\" list and would have to recall the items. Patient recalling 2 of the items independently, and with cues she was able to recall 5 items.      4 step sequencing task completed with picture cards related to ADL's. Patient completed with a score of 95% accuracy independently.      Patient presenting with increased word finding difficulties. Patient also noted to present with a few paraphasias within conversation.      Will continue speech services to address cognition and expressive/receptive language.           01/11/21 1000   General   Diagnosis B  CVAs   Pain Assessment   Patient Currently in Pain No   Pain Assessment 0-10   Pain Level 0   Balance   Sitting Balance Independent   Standing Balance Supervision   Functional Mobility   Functional - Mobility Device Rolling Walker   Activity Retrieve items;Transport items; To/from bathroom   Assist Level Stand by assistance   Functional Mobility Comments Picked up objects from floor utilizing reacher, and placed clothing in closet. Demonstrated good safety throughout task. O2 sat at 94% on room air at rest, dropped to 84% post light IADL task on room air, increased back to 97% on 1L O2 per nc. Transfers   Sit to stand Stand by assistance   Stand to sit Stand by assistance   Transfer Comments To/from recliner, w/c, dinning chair. Toilet Transfers   Toilet - Technique Ambulating   Equipment Used Grab bars   Toilet Transfer Stand by assistance   Assessment   Performance deficits / Impairments Decreased functional mobility ; Decreased ADL status; Decreased strength;Decreased cognition;Decreased endurance;Decreased balance;Decreased high-level IADLs   Treatment Diagnosis B CVAs   Prognosis Good   Timed Code Treatment Minutes 60 Minutes   Activity Tolerance Activity Tolerance Patient Tolerated treatment well   Safety Devices   Safety Devices in place Yes   Type of devices Gait belt  (Given to PT.)   Plan   Current Treatment Recommendations Strengthening;Balance Training;Functional Mobility Training; Endurance Training; Safety Education & Training;Equipment Evaluation, Education, & procurement;Patient/Caregiver Education & Training;Self-Care / ADL; Home Management Training        01/11/21 1000   Lower Body Dressing   Assistance Needed Supervision or touching assistance   Comment SBA. CARE Score 4   Putting On/Taking Off Footwear   Assistance Needed Partial/moderate assistance   Comment Used AE for socks without assistance, unable to don shoes d/t edema in B feet. CARE Score 3        01/11/21 1114 01/11/21 1115 01/11/21 1126   Pain Assessment   Pain Assessment  --  0-10  --    Pain Level  --  0  --    Oxygen Therapy   SpO2  --  92 %  (After amb 80' on 1L O2.)  --    Pulse Oximeter Device Location  --  Right;Finger  --    O2 Device  --  Nasal cannula  --    O2 Flow Rate (L/min)  --  1 L/min  --    Transfers   Sit to Stand Stand by assistance  --   --    Stand to sit Stand by assistance  --   --    Lateral Transfers  --   --   --    Ambulation   Ambulation? Yes  --   --    Ambulation 1   Surface level tile  --   --    Device Rolling Walker  --   --    Other Apparatus O2  (1L)  --   --    Assistance Contact guard assistance;Stand by assistance  --   --    Quality of Gait Pt. came from OT on 1L O2. SPO2 92% after first walk. --   --    Gait Deviations Slow Scarlet  --   --    Distance [de-identified]' x2  --   --    Comments Incorporated turn.   --   --    Exercises   Comments  --   --   --    Conditions Requiring Skilled Therapeutic Intervention   Assessment  --   --   --    Activity Tolerance   Activity Tolerance  --   --  Patient Tolerated treatment well;Patient limited by endurance   Safety Devices   Type of devices  --   --   --         01/11/21 1127 01/11/21 1141 01/11/21 1153 Pain Assessment   Pain Assessment  --   --   --    Pain Level  --   --   --    Oxygen Therapy   SpO2  --   --   --    Pulse Oximeter Device Location  --   --   --    O2 Device  --   --   --    O2 Flow Rate (L/min)  --   --   --    Transfers   Sit to Stand  --   --   --    Stand to sit  --   --   --    Lateral Transfers  --   --  Stand by assistance;Contact guard assistance  (W/C to/from toilet. W/C to Recliner.)   Ambulation   Ambulation?  --   --   --    Ambulation 1   Surface  --   --   --    Device  --   --   --    Other Apparatus  --   --   --    Assistance  --   --   --    Quality of Gait  --   --   --    Gait Deviations  --   --   --    Distance  --   --   --    Comments  --   --   --    Exercises   Comments  --  Sitting BLE ex x10 reps/ 2 sets. --    Conditions Requiring Skilled Therapeutic Intervention   Assessment Able to amb [de-identified]'  twice today. Pt. came from OT dept on 1L O2 today. SPO2 after first walk was 92%.  --   --    Activity Tolerance   Activity Tolerance  --   --   --    Safety Devices   Type of devices  --   --   --         01/11/21 1155   Pain Assessment   Pain Assessment  --    Pain Level  --    Oxygen Therapy   SpO2  --    Pulse Oximeter Device Location  --    O2 Device  --    O2 Flow Rate (L/min)  --    Transfers   Sit to Stand  --    Stand to sit  --    Lateral Transfers  --    Ambulation   Ambulation? --    Ambulation 1   Surface  --    Device  --    Other Apparatus  --    Assistance  --    Quality of Gait  --    Gait Deviations  --    Distance  --    Comments  --    Exercises   Comments  --    Conditions Requiring Skilled Therapeutic Intervention   Assessment  --    Activity Tolerance   Activity Tolerance  --    Safety Devices   Type of devices Call light within reach; Chair alarm in place         RECORD REVIEW: Previous medical records, medications were reviewed at today's visit    IMPRESSION: 1.  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 Baby aspirin and Eliquis  2. DVT prophylaxisEliquis  3. Essential hypertensioncontinue current medications and monitoring. stable  4. CHF with recent pulmonary edemacontinue Bumex  5. Restless leg syndromecontinue Sinemet and Mirapex. Controlled. 6.  Acute respiratory failure with hypercapniacontinue O2 and monitoring. Stable. Still requiring O2  7. CKDimproving. Edema improving    gabapentin reduced to help with peripheral edema. Patient denies shortness of air. 8.  Herpes labialis. Recurrent. 1 day of acyclovir here today. Prescribe l-lysine as an outpatient. Continues to slowly improve  Continue current treatment  ELOS:1/16/20.   Restaff tomorrow

## 2021-01-12 NOTE — PROGRESS NOTES
Rachel Perez  [id number]     01/12/21 1326 01/12/21 1327 01/12/21 1328   Subjective   Subjective Pt reports that she is tired this afternoon, but agreed to therapy. --   --    Pain Screening   Patient Currently in Pain No  --   --    Pain Assessment   Pain Assessment 0-10  --   --    Pain Level 0  --   --    Pre Treatment Pain Screening   Pain at present 0  --   --    Scale Used Numeric Score  --   --    Intervention List Patient able to continue with treatment  --   --    Bed Mobility   Bridging Independent  --   --    Rolling Independent  --   --    Supine to Sit Independent  --   --    Sit to Supine Independent  --   --    Scooting Independent  --   --    Transfers   Sit to Stand Stand by assistance  --   --    Stand to sit Stand by assistance  --   --    Bed to Chair Stand by assistance  --   --    Stand Pivot Transfers   (.)  --   --    Squat Pivot Transfers   (.)  --   --    Lateral Transfers   (.)  --   --    Ambulation   Ambulation?  --  Yes  --    Ambulation 1   Surface  --  level tile  --    Device  --  Rolling Walker  --    Assistance  --  Stand by assistance  --    Quality of Gait  --  Pt amb well w/o O2 this afternoon. Pt stats: 95% RA pre-post amb. Pt needed VC's to keep inside RW.  --    Distance  --  85'x2  --    Stairs/Curb   Stairs? --  No  --    Wheelchair Activities   Propulsion  --  Yes  --    Propulsion 1   Propulsion  --  Manual  --    Level  --  Level Tile  --    Method  --  RUE;LUE  --    Level of Assistance  --  Stand by assistance  --    Description/ Details  --  Pt needed cues for proper propulsion techniques. --    Distance  --  48'  --    Exercises   Comments  --   --  Sitting BLE ex 20 reps. Conditions Requiring Skilled Therapeutic Intervention   Body structures, Functions, Activity limitations  --   --  Decreased functional mobility ; Decreased ADL status; Decreased strength;Decreased endurance Assessment  --   --  Ck'd Pt's O2 stats:  95-96% RA. Pt has improved to Independent for bed mobility and SBA for TF's. Pt continues to fatigue quickly during activities and become SOB needing VC's. Pt may be ready for UP AD RACHEL soon.    Prognosis  --   --  Fair   Activity Tolerance   Activity Tolerance  --   --  Patient limited by fatigue;Patient limited by endurance   Electronically signed by Jean Claude Farrell PTA on 1/12/2021 at 1:42 PM

## 2021-01-12 NOTE — PLAN OF CARE
Problem: Falls - Risk of:  Goal: Will remain free from falls  Description: Will remain free from falls  1/11/2021 2305 by Skinny Medeiros RN  Outcome: Ongoing  1/11/2021 1005 by Katty Macdonald LPN  Outcome: Ongoing  Goal: Absence of physical injury  Description: Absence of physical injury  1/11/2021 2305 by Skinny Medeiros RN  Outcome: Ongoing  1/11/2021 1005 by Katty Macdonald LPN  Outcome: Ongoing     Problem: HEMODYNAMIC STATUS  Goal: Patient has stable vital signs and fluid balance  1/11/2021 2305 by Skinny Medeiros RN  Outcome: Ongoing  1/11/2021 1005 by Katty Macdonald LPN  Outcome: Ongoing     Problem: Skin Integrity:  Goal: Will show no infection signs and symptoms  Description: Will show no infection signs and symptoms  1/11/2021 2305 by Skinny Medeiros RN  Outcome: Ongoing  1/11/2021 1005 by Katty Macdonald LPN  Outcome: Ongoing  Goal: Absence of new skin breakdown  Description: Absence of new skin breakdown  1/11/2021 2305 by Skinny Medeiros RN  Outcome: Ongoing  1/11/2021 1005 by Katty Macdonald LPN  Outcome: Ongoing     Problem: Pain:  Goal: Pain level will decrease  Description: Pain level will decrease  1/11/2021 2305 by Skinny Medeiros RN  Outcome: Ongoing  1/11/2021 1005 by Katty Macdonald LPN  Outcome: Ongoing  Goal: Control of acute pain  Description: Control of acute pain  1/11/2021 2305 by Skinny Medeiros RN  Outcome: Ongoing  1/11/2021 1005 by Katty Macdonald LPN  Outcome: Ongoing  Goal: Control of chronic pain  Description: Control of chronic pain  1/11/2021 2305 by Skinny Medeiros RN  Outcome: Ongoing  1/11/2021 1005 by Katty Macdonald LPN  Outcome: Ongoing

## 2021-01-12 NOTE — PROGRESS NOTES
Current Treatment Recommendations Strengthening;Balance Training;Functional Mobility Training; Endurance Training; Safety Education & Training;Equipment Evaluation, Education, & procurement;Patient/Caregiver Education & Training;Self-Care / ADL; Home Management Training      01/12/21 [address] or touching assistance   Comment Supervision. CARE Score 4   Upper Body Dressing   Assistance Needed Independent   CARE Score 6   Putting On/Taking Off Footwear   Assistance Needed Setup or clean-up assistance   Comment Able to don shoes today d/t decreased edema in B feet, utilized shoe horn and shoes modified with elastic shoelaces. CARE Score 5      01/12/21 1400   Toilet Transfer   Assistance Needed Supervision or touching assistance   Comment Supervision.    CARE Score 4

## 2021-01-12 NOTE — PROGRESS NOTES
Goal 3: The patient will follow multi step commands related to functional living environment with 80% accuracy and min cues in order to increase functional integration into environment. SHORT TERM GOAL #4:  Goal 4: The patient will complete problem solving/reasoning tasks with min verbal cues at 80% accuracy in order to improve safety awareness for functional tasks. ASSESSMENT:  Assessment: [x]Progressing towards goals          []Not Progressing towards goals    Patient Tolerance of Treatment:   [x]Tolerated well []Tolerated fair []Required rest breaks []Fatigued    Education:  Learner:  [x]Patient          []Significant Other          []Other  Education provided regarding:  [x]Goals and POC   []Diet and swallowing precautions    []Home Exercise Program  []Progress and/or discharge information  Method of Education:  [x]Discussion          [x]Demonstration          []Handout          []Other  Evaluation of Education:   []Verbalized understanding   []Demonstrates without assistance  [x]Demonstrates with assistance  []Needs further instruction     []No evidence of learning                  []No family present      Plan: [x]Continue with current plan of care    []Modify current plan of care as follows:    []Discharge patient    Discharge Location:    Services/Supervision Recommended:      [x]Patient continues to require treatment by a licensed therapist to address functional deficits as outlined in the established plan of care.

## 2021-01-12 NOTE — PLAN OF CARE
Problem: Falls - Risk of:  Goal: Will remain free from falls  Description: Will remain free from falls  1/12/2021 0935 by Taz Najera LPN  Outcome: Ongoing  1/11/2021 2305 by Mey White RN  Outcome: Ongoing  Goal: Absence of physical injury  Description: Absence of physical injury  1/12/2021 0935 by Taz Najera LPN  Outcome: Ongoing  1/11/2021 2305 by Mey White RN  Outcome: Ongoing     Problem: ACTIVITY INTOLERANCE/IMPAIRED MOBILITY  Goal: Mobility/activity is maintained at optimum level for patient  1/12/2021 0935 by Taz Najera LPN  Outcome: Ongoing  1/11/2021 2305 by Mey White RN  Outcome: Ongoing     Problem: HEMODYNAMIC STATUS  Goal: Patient has stable vital signs and fluid balance  1/12/2021 0935 by Taz Najera LPN  Outcome: Ongoing  1/11/2021 2305 by Mey White RN  Outcome: Ongoing     Problem: Skin Integrity:  Goal: Will show no infection signs and symptoms  Description: Will show no infection signs and symptoms  1/12/2021 0935 by Taz Najera LPN  Outcome: Ongoing  1/11/2021 2305 by Mey White RN  Outcome: Ongoing  Goal: Absence of new skin breakdown  Description: Absence of new skin breakdown  1/12/2021 0935 by Taz Najera LPN  Outcome: Ongoing  1/11/2021 2305 by Mey White RN  Outcome: Ongoing     Problem: Pain:  Goal: Pain level will decrease  Description: Pain level will decrease  1/12/2021 0935 by Taz Najera LPN  Outcome: Ongoing  1/11/2021 2305 by Mey White RN  Outcome: Ongoing  Goal: Control of acute pain  Description: Control of acute pain  1/12/2021 0935 by Taz Najera LPN  Outcome: Ongoing  1/11/2021 2305 by Mey White RN  Outcome: Ongoing  Goal: Control of chronic pain  Description: Control of chronic pain  1/12/2021 0935 by Taz Najera LPN  Outcome: Ongoing  1/11/2021 2305 by Mey White RN  Outcome: Ongoing     Problem: Bleeding:  Goal: Will show no signs and symptoms of excessive bleeding Description: Will show no signs and symptoms of excessive bleeding  1/12/2021 0935 by Taz Najera LPN  Outcome: Ongoing  1/11/2021 2305 by Mey White RN  Outcome: Ongoing     Problem: Mood - Altered:  Goal: Mood stable  Description: Mood stable  1/12/2021 0935 by Taz Najera LPN  Outcome: Ongoing  1/11/2021 2305 by Mey White RN  Outcome: Ongoing

## 2021-01-12 NOTE — PROGRESS NOTES
Nutrition Assessment     Type and Reason for Visit: Reassess    Nutrition Assessment:  Pt continues to be well-nourished. PO intake >50% most meals with stable wt of 167 lbs. Pt states her appetite is good and she likes most of her meals. Did complain of diarrhea over the weekend. No further nutritional needs at this time. Malnutrition Assessment:  Malnutrition Status: No malnutrition    Nutrition Related Findings: well nourished appearing      Current Nutrition Therapies:    DIET GENERAL; Anthropometric Measures:  · Height: 4' 11\" (149.9 cm)  · Current Body Wt: 167 lb 14.4 oz (76.2 kg)   · BMI: 33.9    Nutrition Diagnosis:   · Overweight/Obese related to excessive energy intake as evidenced by BMI      Nutrition Interventions:   Food and/or Nutrient Delivery:  Continue Current Diet  Nutrition Education/Counseling:  No recommendation at this time   Coordination of Nutrition Care:  Continue to monitor while inpatient    Goals:  po intake 50% or greater.   Weight stable or decrease 1-3# per week       Nutrition Monitoring and Evaluation:   Behavioral-Environmental Outcomes:  None Identified   Food/Nutrient Intake Outcomes:  Food and Nutrient Intake  Physical Signs/Symptoms Outcomes:  Biochemical Data, Skin, Weight, Nutrition Focused Physical Findings     Discharge Planning:    Continue current diet     Electronically signed by Jules Caldera RD, LD on 1/12/21 at 10:29 AM CST    Contact: [phone]

## 2021-01-13 PROCEDURE — 97116 GAIT TRAINING THERAPY: CPT

## 2021-01-13 PROCEDURE — 97530 THERAPEUTIC ACTIVITIES: CPT

## 2021-01-13 PROCEDURE — 97110 THERAPEUTIC EXERCISES: CPT

## 2021-01-13 PROCEDURE — 97130 THER IVNTJ EA ADDL 15 MIN: CPT

## 2021-01-13 PROCEDURE — 1180000000 HC REHAB R&B

## 2021-01-13 PROCEDURE — 97129 THER IVNTJ 1ST 15 MIN: CPT

## 2021-01-13 PROCEDURE — 97535 SELF CARE MNGMENT TRAINING: CPT

## 2021-01-13 PROCEDURE — 2700000000 HC OXYGEN THERAPY PER DAY

## 2021-01-13 PROCEDURE — 99233 SBSQ HOSP IP/OBS HIGH 50: CPT | Performed by: PSYCHIATRY & NEUROLOGY

## 2021-01-13 PROCEDURE — 6370000000 HC RX 637 (ALT 250 FOR IP): Performed by: PSYCHIATRY & NEUROLOGY

## 2021-01-13 RX ORDER — METOPROLOL SUCCINATE 50 MG/1
100 TABLET, EXTENDED RELEASE ORAL DAILY
Status: DISCONTINUED | OUTPATIENT
Start: 2021-01-13 | End: 2021-01-16 | Stop reason: HOSPADM

## 2021-01-13 RX ADMIN — GABAPENTIN 100 MG: 100 CAPSULE ORAL at 08:12

## 2021-01-13 RX ADMIN — LISINOPRIL 40 MG: 20 TABLET ORAL at 08:12

## 2021-01-13 RX ADMIN — CARBIDOPA AND LEVODOPA 1 TABLET: 25; 100 TABLET ORAL at 20:24

## 2021-01-13 RX ADMIN — APIXABAN 2.5 MG: 2.5 TABLET, FILM COATED ORAL at 08:12

## 2021-01-13 RX ADMIN — QUETIAPINE FUMARATE 300 MG: 300 TABLET ORAL at 20:24

## 2021-01-13 RX ADMIN — MULTIPLE VITAMINS W/ MINERALS TAB 1 TABLET: TAB at 08:12

## 2021-01-13 RX ADMIN — Medication 5000 UNITS: at 08:12

## 2021-01-13 RX ADMIN — ASPIRIN 81 MG: 81 TABLET, CHEWABLE ORAL at 08:11

## 2021-01-13 RX ADMIN — METOPROLOL SUCCINATE 100 MG: 50 TABLET, EXTENDED RELEASE ORAL at 08:15

## 2021-01-13 RX ADMIN — PRAMIPEXOLE DIHYDROCHLORIDE 0.5 MG: 0.25 TABLET ORAL at 20:24

## 2021-01-13 RX ADMIN — GABAPENTIN 100 MG: 100 CAPSULE ORAL at 13:57

## 2021-01-13 RX ADMIN — APIXABAN 2.5 MG: 2.5 TABLET, FILM COATED ORAL at 20:24

## 2021-01-13 RX ADMIN — FLUOXETINE HYDROCHLORIDE 20 MG: 20 CAPSULE ORAL at 08:12

## 2021-01-13 RX ADMIN — GABAPENTIN 100 MG: 100 CAPSULE ORAL at 20:24

## 2021-01-13 RX ADMIN — BUMETANIDE 1 MG: 1 TABLET ORAL at 08:12

## 2021-01-13 RX ADMIN — CLONIDINE HYDROCHLORIDE 0.1 MG: 0.1 TABLET ORAL at 08:12

## 2021-01-13 RX ADMIN — OXYBUTYNIN CHLORIDE 10 MG: 5 TABLET, EXTENDED RELEASE ORAL at 08:12

## 2021-01-13 RX ADMIN — CLONIDINE HYDROCHLORIDE 0.1 MG: 0.1 TABLET ORAL at 20:24

## 2021-01-13 RX ADMIN — ATORVASTATIN CALCIUM 80 MG: 80 TABLET, FILM COATED ORAL at 20:24

## 2021-01-13 ASSESSMENT — PAIN SCALES - GENERAL
PAINLEVEL_OUTOF10: 0
PAINLEVEL_OUTOF10: 0

## 2021-01-13 NOTE — PROGRESS NOTES
Durable Medical Equipment   Physician Order     Patient Name Joshua Ge    Address   [address] Phone   [phone] [facility]   [phone] [facility]     SENTHIL Spouse [phone]     Tova De Dios Daughter [phone]     Patient Height Height: 4' 11\" (149.9 cm)  Patient Weight 167 lb 14.4 oz (76.2 kg)   10/14/1933     1. [facility] [facility] PART A AND B  F/O Payor/Plan Precert #   MEDICARE/MEDICARE PART A AND B    Subscriber Subscriber #   Monica Seal [id number]   Address Phone    [address] [address] [place] [address] [phone]     2.  [place]  [place]  F/[place] Payor/Plan Precert #   [place]    Subscriber Subscriber #   Monica Seal [id number]   Address Phone   [address] [id number]   [facility] [address], [address]        DME NEEDS:  **TUB TRANSFER BENCH      DIAGNOSIS:  Acute ischemic stroke ([facility] [address]   2020 Yes     [facility]   History and Physical           Patient:                                    Joshua Ge  MR#:                                        [id number]  Account Number:                   [id number]              Room:                                      0811/811-01      YOB: 1933  Date of Progress Note:           2020  Time of Note                           6:26 AM  Attending Physician:               Melania Boothe MD           Admitting diagnosis: Bilateral embolic strokes     Secondary diagnoses: Cor pulmonale, restless legs, essential hypertension, macular degeneration     CHIEF COMPLAINT: Generalized weakness, worse on the right        HISTORY OF PRESENT ILLNESS: This 80 y.o. female  who presented to [facility] with inability to walk. She has a past medical history significant for breast cancer status post bilateral implants, chronic back pain, tremors, macular degeneration and essential hypertension. Patient had gone to bed on 12/26 around 2030. When she awoke in the morning around 830 she had difficulty getting out of bed. At baseline she is alert and oriented and conversant. Patient continued to have difficulty ambulating with unsteady gait. Her primary care provider advised her to seek evaluation at the emergency department. In the ED, blood pressure was elevated at 186/102. MRI brain was done showing acute stroke left medial precentral gyrus and acute right occipital stroke with chronic microvascular disease and atrophy. IV TPA not considered as last known well was 2030 on 12/26. She was admitted to the hospitalist service. She was seen in consultation by neurology. Aspirin 325 mg daily was changed to dual therapy with Eliquis 2.5 mg twice daily and aspirin 81 mg daily. Per neurology, MRI brain likely embolic with right occipital and left medial precentral gyrus. Also, transthoracic echo findings placed at increased risk of developing atrial fibrillation with mild to moderate left atrial enlargement and TTE limited study suggest possible right to left shunt with rope score 4 suggesting that 38% chance that stroke due to PFO and 12% risk of to urinary recurrence of stroke/TIA. Venous Doppler of bilateral lower extremities did not show thrombus. Given patient age, would need to determine risk/benefits of closure but at this point recommend dual therapy with aspirin and Eliquis. Lipitor 80 mg daily. LDL at goal at 55. She has maintained normal sinus rhythm on continuous telemetry. She will need Zio patch at discharge. She is okay for discharge from neurology standpoint. She is to follow-up with neurology in 3 to 4 weeks. She was seen in consultation by cardiology. BNP 8,437 on admission. Chest x-ray showed cardiomegaly, bilateral effusions, and pulmonary edema. Transthoracic echocardiogram showed cor pulmonale with normal LVEF. She was started on IV Bumex twice daily will be transitioned to oral to start tomorrow 1 mg Bumex daily. She has had good urine output with diuresis. Patient states lower extremity edema has much improved. Patient denies history of heart failure or pulmonary disease. She is a non-smoker. She is to follow-up with cardiology per recommendations, she will need a right heart cath as outpatient basis. She was seen in consultation by pulmonology. Acute respiratory failure with hypoxia and hypercapnia. ABG on 12/28 showed pH 7.32, PCO2 69, PO2 60 and HCO3 35. She was subsequently placed on BiPAP. Probably chronic CO2 retention.   Of note, she had a previous chest x-ray in 2019 that showed interstitial fibrotic changes within the lungs. VQ scan today showed low probability for pulmonary embolus. She will need pulmonary function test, CT of the chest, and right heart cath in the outpatient setting once euvolemic. Creatinine on admission noted to with repeat down to 1. 15. Serum CO2 43she has a contraction alkalosis but renal function appears to be improving and stable. Overall, she is alert and oriented and neurologically back to baseline. She remains on 2 L nasal cannula recommend to wean as tolerated for SPO2 goal greater than 90%, avoid over oxygenating with known CO2 retention. Continue to encourage BiPAP nightly. She denies shortness of breath, chest pain or pressure. She is tolerating a diet. She has had good urine output from diuresis. Labs did show hypokalemia and hypomagnesemia for which she did receive a blood replacement. She continues to have weakness, more so on the right. She was evaluated by SPT for swallow and found to have Oropharyngeal dysphagia but she has now been upgraded to a regular diet with thin liquids. She is participating in both PT/OT. She is felt to need a stay on Rehab to work towards her goal of returning home with her family.  She is now felt ready to start the Rehab program.    ____________________ _____________________ ________________   Physician Signature      Physician Name (print)   Physician NPI          Date

## 2021-01-13 NOTE — PROGRESS NOTES
[facility]  INPATIENT SPEECH THERAPY  [facility] 8 REHAB UNIT        TIME   Time In: 0900  Time Out: 1000  Minutes: 60       [x]Daily Note  []Progress Note    Date: 2021  Patient Name: Debi Butcher        MRN: [id number]    Account #: [de-identified]  : 10/14/1933  (80 y.o.)  Gender: female   Primary Provider: Carlo Jordan MD  Swallowing Status/Diet: General diet, thin liquids      PATIENT DIAGNOSIS(ES):    Diagnosis: CVA L MEDIAL PRECENTRAL GYRUS AND R OCCPITAL STROKE (WEAKNESS OF R SIDE>L SIDE, VISION IMPAIRED ON L SIDE)     Additional Pertinent Hx: PE with acute cor pulmonale; interstitial pulmonary disease; HTN; ACUTE RESP FAILURE WITH HYPOXIA AND HYPERCAPNIA; POLYNEUROPATHY; NORMAL PRESSURE HYDROCEPHALUS; MACULAR DEGENERATION    Subjective: The patient's daughter was present for today's session. Patient states she has been sleeping well. She reported pain from neuropathy. She reports good appetite She is wearing 1 LPM 02 via NC. She was oriented x3. She did require cueing for the location. Objective:  She will be discharging home with her . Her children will be staying with her once she returns home. Mental math problems (time and making change) were completed accurately but she exhibited delayed responses and some self correction. Repetition of the questions was provided. Mental math of one and two digits problems with delayed responses. Reverse recall of two digits at 100%. Counting in reverse from 20 to 1. She did require cueing but was able to resume and completed the task correctly. Abstract divergent naming tasks were completed. She was given over one minute to complete the task. She was able to name nine items and eight items in another category. She was asked to follow commands. She completed two step commands with body parts at 100%. Left right discrimination tasks were completed at 100%. Short term recall tasks with delay and distraction. Visualization strategies were utilized. She was able to recall unrelated words with descriptive cues. Diadochokinetic tasks were completed. Increased rate and accuracy were observed today. She has agreed to complete exercises for cognition at home. She states she will complete crossword puzzles and word searches at home. She used to enjoy reading novels but no longer has the attention span. She does enjoy reading and making purchases from catalogs.          Assessment:  Patient presents with mild/mod expressive and receptive language deficits. Mild to moderate cognitive deficits were noted. She is recommended to continue speech therapy services to address deficits.       SHORT TERM GOAL #1:  Goal 1: The patient will demonstrate recall of functional information following a/an (immediate, short term, long term) delay with min cues in order to increase functional integration into environment. SHORT TERM GOAL #2:  Goal 2: The patient will complete simple/complex naming tasks with min verbal cues at 80% accuracy to improve thought organization and word retrieval skills. SHORT TERM GOAL #3:  Goal 3: The patient will follow multi step commands related to functional living environment with 80% accuracy and min cues in order to increase functional integration into environment. SHORT TERM GOAL #4:  Goal 4: The patient will complete problem solving/reasoning tasks with min verbal cues at 80% accuracy in order to improve safety awareness for functional tasks.         ASSESSMENT:  Assessment: [x]Progressing towards goals          []Not Progressing towards goals    Patient Tolerance of Treatment:   [x]Tolerated well []Tolerated fair []Required rest breaks []Fatigued    Education:  Learner:  [x]Patient          []Significant Other          []Other  Education provided regarding:  [x]Goals and POC   []Diet and swallowing precautions [x]Home Exercise Program  []Progress and/or discharge information  Method of Education:  []Discussion          []Demonstration          []Handout          []Other  Evaluation of Education:   [x]Verbalized understanding   []Demonstrates without assistance  []Demonstrates with assistance  []Needs further instruction     []No evidence of learning                  []No family present      Plan: [x]Continue with current plan of care    []Modify current plan of care as follows:    []Discharge patient    Discharge Location:    Services/Supervision Recommended:      [x]Patient continues to require treatment by a licensed therapist to address functional deficits as outlined in the established plan of care.               Electronically Signed By:  Dayana Rock M.S., CCC-SLP  1/13/2021,9:16 AM.

## 2021-01-13 NOTE — PROGRESS NOTES
She was seen in consultation by neurology. Aspirin 325 mg daily was changed to dual therapy with Eliquis 2.5 mg twice daily and aspirin 81 mg daily. Per neurology, MRI brain likely embolic with right occipital and left medial precentral gyrus. Also, transthoracic echo findings placed at increased risk of developing atrial fibrillation with mild to moderate left atrial enlargement and TTE limited study suggest possible right to left shunt with rope score 4 suggesting that 38% chance that stroke due to PFO and 12% risk of to urinary recurrence of stroke/TIA. Venous Doppler of bilateral lower extremities did not show thrombus. Given patient age, would need to determine risk/benefits of closure but at this point recommend dual therapy with aspirin and Eliquis. Lipitor 80 mg daily. LDL at goal at 55. She has maintained normal sinus rhythm on continuous telemetry. She will need Zio patch at discharge. She is to follow-up with neurology in 3 to 4 weeks. She was seen in consultation by cardiology. BNP 8,437 on admission. Chest x-ray showed cardiomegaly, bilateral effusions, and pulmonary edema. Transthoracic echocardiogram showed cor pulmonale with normal LVEF. She was started on IV Bumex twice daily will be transitioned to oral to start tomorrow 1 mg Bumex daily. She has had good urine output with diuresis. Patient states lower extremity edema has much improved. Patient denies history of heart failure or pulmonary disease. She is a non-smoker. She is to follow-up with cardiology per recommendations, she will need a right heart cath as outpatient basis. She was seen in consultation by pulmonology. Acute respiratory failure with hypoxia and hypercapnia. ABG on 12/28 showed pH 7.32, PCO2 69, PO2 60 and HCO3 35. She was subsequently placed on BiPAP. Probably chronic CO2 retention. Of note, she had a previous chest x-ray in 2019 that showed interstitial fibrotic changes within the lungs.   VQ scan showed low probability for pulmonary embolus. She will need pulmonary function test, CT of the chest, and right heart cath in the outpatient setting once euvolemic. Creatinine on admission noted to with repeat down to 1. 15. Serum CO2 43she has a contraction alkalosis but renal function appears to be improving and stable. Overall, she is alert and oriented and neurologically back to baseline. She remains on 2 L nasal cannula recommend to wean as tolerated for SPO2 goal greater than 90%, avoid over oxygenating with known CO2 retention. Continue to encourage BiPAP nightly. She denies shortness of breath, chest pain or pressure. She is tolerating a diet. She has had good urine output from diuresis. Labs did show hypokalemia and hypomagnesemia for which she did receive a blood replacement. She continues to have weakness, more so on the right. She was evaluated by SPT for swallow and found to have Oropharyngeal dysphagia but she has now been upgraded to a regular diet with thin liquids. No c/o this am  REVIEW OF SYSTEMS:  Constitutional: No fevers No chills  Neck:No stiffness  Respiratory: No shortness of breath  Cardiovascular: No chest pain No palpitations  Gastrointestinal: No abdominal pain    Genitourinary: No Dysuria  Neurological: No headache, no confusion      PHYSICAL EXAM:  BP (!) 165/75   Pulse 73   Temp 96.9 °F (36.1 °C) (Temporal)   Resp 16   Ht 4' 11\" (1.499 m)   Wt 167 lb 14.4 oz (76.2 kg)   SpO2 92%   BMI 33.91 kg/m²     Constitutional: she appears well-developed and well-nourished. Eyes  conjunctiva normal.  Pupils react to light  Ear, nose, throat -hearing intact to voice.  No scars, masses, or lesions over external nose or ears, no atrophy of tongue  Neck-symmetric, no masses noted, no jugular vein distension  Respiration- chest wall appears symmetric, good expansion,   normal effort without use of accessory muscles  Cardiovascular- RRR Comment: PT REQUIRED MOD A WITH SUPINE TO SIT FROM R S/L TO SITTING EOB, ASSIST WITH LE AND TRUNK BY THERAPIST  TRANSFERS  Transfers  Sit to Stand: Stand by assistance  Stand to sit: Stand by assistance  Bed to Chair: Stand by assistance  Stand Pivot Transfers: (.)  Squat Pivot Transfers: (.)  Lateral Transfers: (.)  Car Transfer: Stand by assistance, Contact guard assistance  Comment: Practiced Car TF well with no LOB. VCs given for hand placement. CGA for RLE advancement into car. WHEELCHAIR PROPULSION  Propulsion 1  Propulsion: Manual  Level: Level Tile  Method: RUEFRANK  Level of Assistance: Stand by assistance  Description/ Details: Pt needed cues for proper propulsion techniques. Distance: 48'  AMBULATION  Ambulation 1  Surface: level tile  Device: Rolling Walker  Other Apparatus: O2(1L)  Assistance: Stand by assistance  Quality of Gait: Pt amb well w/o O2 this afternoon. Pt stats: 95% RA pre-post amb. Pt needed VC's to keep inside RW. Gait Deviations: Slow Scarlet  Distance: 85'x2  Comments: Incorporated turn.   STAIRS  GOALS:  Short term goals  Time Frame for Short term goals: 1 WEEK  Short term goal 1: PT AMB 3X12FT IN II BARS WITH MOD AX1   Short term goal 2: PROPEL W/C 75FT WITH TURNS, SBA   Short term goal 3: COMPLETE CAR TF MOD A FOR LE MANAGEMENT  Short term goal 4: COMPLETE STAND STEP TF TO R AND L WITH RW, CGA  Short term goal 5: PT CGA FOR ALL BED MOBILITY     Long term goals  Time Frame for Long term goals : 3 WEEKS  Long term goal 1: PT ' WITH RW, SUPERVISION  Long term goal 2: NEGOTIATE 4 STEPS, 4\", CGA, BILATERAL HANDRAIL  Long term goal 3: COMPLETE STAND STEP TF TO R AND L WITH RW, IND  Long term goal 4: COMPLETE CAR TF WITH SUPERVISION  Long term goal 5: PT IND WITH ALL BED MOBILITY     ASSESSMENT: Assessment: Ck'd Pt's O2 stats:  95-96% RA. Pt has improved to Independent for bed mobility and SBA for TF's. Pt continues to fatigue quickly during activities and become SOB needing VC's. Pt may be ready for UP AD RACHEL soon.        SPEECH THERAPY  Precautions: Fall  Swallowing Status/Diet: regular diet with thin liquids        Subjective: Patient alert and cooperative with all therapy tasks. Patient seen in her room sitting upright in recliner.      Objective:   Patient recalling recent/daily events with no difficulty, however did note patient would repeat stories/topics several times throughout the session.      SLP and patient discussed finance management at home. Patient balances check book at home and also writes checks for bills. Patient completed check writing task where she was to write out 2 different checks given information provided by the SLP. Patient completed both checks with 100% accuracy, independently.      Verbal expression targeted. Patient completed abstract divergent naming task. Patient completed with 86% accuracy with min/mod verbal cues to complete. Without verbal cues patient completing with 70% accuracy. Did note less word finding difficulties than in yesterdays session for conversational discourse.      Completed upper and lower body dressing during therapy session. Patient's safety was [facility] during task.  Patient following simple/multi step commands with 100% accuracy.      SHORT TERM GOAL #1:  Goal 1: The patient will demonstrate recall of functional information following a/an (immediate, short term, long term) delay with min cues in order to increase functional integration into environment.     SHORT TERM GOAL #2:  Goal 2: The patient will complete simple/complex naming tasks with min verbal cues at 80% accuracy to improve thought organization and word retrieval skills.     SHORT TERM GOAL #3: Decreased functional mobility ; Decreased ADL status; Decreased strength; Decreased cognition; Decreased endurance; Decreased balance; Decreased high-level IADLs                    NUTRITION  Current Wt: Weight: 167 lb 14.4 oz (76.2 kg) / Body mass index is 33.91 kg/m². Admission Wt: Admission Body Weight: 169 lb 11 oz (77 kg)  Oral Diet Orders:   GENERAL  Oral Nutrition Supplement (ONS) Orders:  None  Pt remains nutritionally stable with PO intake >50% most meals. Wt is stable. Please see nutrition note for details.          RECORD REVIEW: Previous medical records, medications were reviewed at today's visit    IMPRESSION:   1. 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 Baby aspirin and Eliquis  2. DVT prophylaxisEliquis  3. Essential hypertensiontoprol increased again today. 4.  CHF with recent pulmonary edemacontinue Bumex  5. Restless leg syndromecontinue Sinemet and Mirapex. Controlled. 6.  Acute respiratory failure with hypercapniacontinue O2 and monitoring. Stable. Still requiring O2  7. CKDimproving. Edema improved. gabapentin reduced to help with peripheral edema. Patient denies shortness of air. 8.  Herpes labialis. Recurrent. 1 day of acyclovir given. Prescribe l-lysine as an outpatient.   Continues to slowly improve    Staffing this date , mutlidisciplinary with entire team with complex decision making and planning for discharge  ELOS:1/16, saturday

## 2021-01-13 NOTE — PROGRESS NOTES
01/13/21 1123 01/13/21 1124 01/13/21 1131   Transfers   Sit to Stand Stand by assistance; Independent  --   --    Stand to sit Stand by assistance; Independent  --   --    Car Transfer Stand by assistance  (With RW)  --   --    Comment Car transfer with seat height raised to simulate personal vehicle. --   --    Ambulation   Ambulation?  --  Yes  --    More Ambulation?  --   --   --    Ambulation 1   Surface  --  level tile  --    Device  --  Rolling Walker  --    Other Apparatus  --  O2  (1L)  --    Assistance  --  Stand by assistance  --    Quality of Gait  --  Verbal cues at times to stay inside walker, qasim with fatigue. --    Gait Deviations  --  Slow Scarlet  --    Distance  --  79' x2  --    Comments  --  Incorporated turn. --    Ambulation 2   Surface - 2  --   --   --    Device 2  --   --   --    Assistance 2  --   --   --    Quality of Gait 2  --   --   --    Distance  --   --   --    Stairs/Curb   Stairs?  --  Yes  --    Stairs   # Steps   --  4  --    Rails  --  Right ascending  --    Curbs  --  4\"  --    Assistance  --  Stand by assistance;Contact guard assistance  --    Comment  --  Jose hands on one rail. --    Conditions Requiring Skilled Therapeutic Intervention   Assessment  --   --  Pt. on 1L O2 this session. SPO2 after amb was 91%; recovered to 95%. Activity Tolerance   Activity Tolerance  --   --   --    Safety Devices   Type of devices  --   --   --       01/13/21 1147 01/13/21 1149   Transfers   Sit to Stand  --   --    Stand to sit  --   --    Car Transfer  --   --    Comment  --   --    Ambulation   Ambulation?  --   --    More Ambulation?  Yes  --    Ambulation 1   Surface  --   --    Device  --   --    Other Apparatus  --   --    Assistance  --   --    Quality of Gait  --   --    Gait Deviations  --   --    Distance  --   --    Comments  --   --    Ambulation 2   Surface - 2 level tile  --    Device 2 Rolling Walker  --    Assistance 2 Stand by assistance  -- Quality of Gait 2 Amb to/from Bathroom. Ind with hygiene and clothing mgmt. --    Distance 30' x2  --    Stairs/Curb   Stairs?  --   --    Stairs   # Steps   --   --    Rails  --   --    Curbs  --   --    Assistance  --   --    Comment  --   --    Conditions Requiring Skilled Therapeutic Intervention   Assessment  --   --    Activity Tolerance   Activity Tolerance  --  Patient limited by endurance   Safety Devices   Type of devices  --  Call light within reach; Chair alarm in place   Electronically signed by Candy Flores PTA on 1/13/2021 at 11:50 AM

## 2021-01-13 NOTE — PLAN OF CARE
Problem: Falls - Risk of:  Goal: Will remain free from falls  Description: Will remain free from falls  Outcome: Ongoing  Goal: Absence of physical injury  Description: Absence of physical injury  Outcome: Ongoing     Problem: ACTIVITY INTOLERANCE/IMPAIRED MOBILITY  Goal: Mobility/activity is maintained at optimum level for patient  Outcome: Ongoing     Problem: HEMODYNAMIC STATUS  Goal: Patient has stable vital signs and fluid balance  Outcome: Ongoing     Problem: Skin Integrity:  Goal: Will show no infection signs and symptoms  Description: Will show no infection signs and symptoms  Outcome: Ongoing  Goal: Absence of new skin breakdown  Description: Absence of new skin breakdown  Outcome: Ongoing     Problem: Pain:  Goal: Pain level will decrease  Description: Pain level will decrease  Outcome: Ongoing  Goal: Control of acute pain  Description: Control of acute pain  Outcome: Ongoing  Goal: Control of chronic pain  Description: Control of chronic pain  Outcome: Ongoing     Problem: Bleeding:  Goal: Will show no signs and symptoms of excessive bleeding  Description: Will show no signs and symptoms of excessive bleeding  Outcome: Ongoing     Problem: Mood - Altered:  Goal: Mood stable  Description: Mood stable  Outcome: Ongoing

## 2021-01-13 NOTE — PROGRESS NOTES
Occupational Therapy     01/13/21 1000   General   Family / Caregiver Present Yes  (Pt. daughter.)   Diagnosis B  CVAs   Pain Assessment   Patient Currently in Pain No   Pain Assessment 0-10   Pain Level 0   Instrumental ADL's   Instrumental ADLs Yes   Meal Prep   Meal Prep Level Walker   Meal Prep Level of Assistance Stand by assistance   Meal Preparation Educated on energy conservation during IADL tasks. Pt. reported that she has seat at home to sit and rest on if needed during food prep tasks. Balance   Sitting Balance Independent   Standing Balance Supervision   Functional Mobility   Functional - Mobility Device Rolling Walker   Activity To/from bathroom; Retrieve items;Transport items   Assist Level Stand by assistance   Functional Mobility Comments Food prep task in kitchen, required increased assistance d/t fatigue. Transfers   Sit to stand Supervision   Stand to sit Supervision   Transfer Comments w/c and recliner. Toilet Transfers   Toilet - Technique Ambulating   Equipment Used Grab bars   Toilet Transfer Supervision   Tub Transfers   Tub - Transfer From [employer]   Tub - Transfer Type To and From   Tub - Transfer To Transfer tub bench   Tub - Technique Ambulating   Tub Transfers Stand by assistance   Tub Transfers Comments Pt. unable to step over tub lip at this time, able to complete using TTB. Pt. daughter reported that they will be able to remove tub doors. Assessment   Performance deficits / Impairments Decreased functional mobility ; Decreased ADL status; Decreased strength;Decreased cognition;Decreased endurance;Decreased balance;Decreased high-level IADLs   Assessment Pt. daughter present for FTD. Discussed DME needs, completed various transfers in the bathroom, and completed light IADLs. Pt. and daughter demonstrated good understanding of all education. Pt. would benefit from continued skilled therapy to address deficits and increase independence prior to d/c home. Treatment Diagnosis B CVAs   Prognosis Good   Timed Code Treatment Minutes 60 Minutes   Activity Tolerance   Activity Tolerance Patient limited by fatigue   Activity Tolerance Required frequent rest breaks. O2 at 98% at rest on 1L of O2 per nc, dropped to 92% post ambulatory IADL. Safety Devices   Safety Devices in place Yes   Type of devices Gait belt  (Given to PT.)   Plan   Current Treatment Recommendations Strengthening;Balance Training;Functional Mobility Training; Endurance Training; Safety Education & Training;Equipment Evaluation, Education, & procurement;Patient/Caregiver Education & Training;Self-Care / ADL; Home Management Training

## 2021-01-13 NOTE — PROGRESS NOTES
01/13/21 1401 01/13/21 1402 01/13/21 1423   Transfers   Sit to Stand Stand by assistance; Independent  --   --    Stand to sit Stand by assistance; Independent  --   --    Ambulation   Ambulation?  --  Yes  --    More Ambulation?  --  Yes  --    Ambulation 1   Surface  --  level tile  --    Device  --  Rolling Walker  --    Assistance  --  Stand by assistance  --    Quality of Gait  --  Verbal cues at times to stay inside walker, qasim with fatigue. --    Distance  --  79'  --    Comments  --  Incorporated turn. --    Ambulation 2   Surface - 2  --  level tile  --    Device 2  --  Rolling Walker  --    Assistance 2  --  Stand by assistance  --    Quality of Gait 2  --  Amb to/from Bathroom. Ind with hygiene and clothing mgmt. --    Distance  --  27' x2  --    Conditions Requiring Skilled Therapeutic Intervention   Assessment  --  Pt. without O2 upon entering room. Amb 30' x2 to bathroom. SPO2 when back to chair was 83% on RA. Applied 1L O2. At rest SPO2 was 98%. --    Activity Tolerance   Activity Tolerance  --   --  Patient limited by endurance   Safety Devices   Type of devices  --   --  Call light within reach; Chair alarm in place   Electronically signed by Edda Philip PTA on 1/13/2021 at 2:48 PM

## 2021-01-14 LAB
ANION GAP SERPL CALCULATED.3IONS-SCNC: 6 MMOL/L (ref 7–19)
BASOPHILS ABSOLUTE: 0 K/UL (ref 0–0.2)
BASOPHILS RELATIVE PERCENT: 0.5 % (ref 0–1)
BUN BLDV-MCNC: 18 MG/DL (ref 8–23)
CALCIUM SERPL-MCNC: 10.1 MG/DL (ref 8.8–10.2)
CHLORIDE BLD-SCNC: 103 MMOL/L (ref 98–111)
CO2: 33 MMOL/L (ref 22–29)
CREAT SERPL-MCNC: 1.1 MG/DL (ref 0.5–0.9)
EOSINOPHILS ABSOLUTE: 0.2 K/UL (ref 0–0.6)
EOSINOPHILS RELATIVE PERCENT: 3.7 % (ref 0–5)
GFR AFRICAN AMERICAN: 57
GFR NON-AFRICAN AMERICAN: 47
GLUCOSE BLD-MCNC: 105 MG/DL (ref 74–109)
HCT VFR BLD CALC: 35.3 % (ref 37–47)
HEMOGLOBIN: 10.6 G/DL (ref 12–16)
IMMATURE GRANULOCYTES #: 0 K/UL
LYMPHOCYTES ABSOLUTE: 1.4 K/UL (ref 1.1–4.5)
LYMPHOCYTES RELATIVE PERCENT: 24.6 % (ref 20–40)
MCH RBC QN AUTO: 28.6 PG (ref 27–31)
MCHC RBC AUTO-ENTMCNC: 30 G/DL (ref 33–37)
MCV RBC AUTO: 95.1 FL (ref 81–99)
MONOCYTES ABSOLUTE: 0.6 K/UL (ref 0–0.9)
MONOCYTES RELATIVE PERCENT: 10.7 % (ref 0–10)
NEUTROPHILS ABSOLUTE: 3.4 K/UL (ref 1.5–7.5)
NEUTROPHILS RELATIVE PERCENT: 60.1 % (ref 50–65)
PDW BLD-RTO: 14.6 % (ref 11.5–14.5)
PLATELET # BLD: 231 K/UL (ref 130–400)
PMV BLD AUTO: 10.5 FL (ref 9.4–12.3)
POTASSIUM REFLEX MAGNESIUM: 4.5 MMOL/L (ref 3.5–5)
RBC # BLD: 3.71 M/UL (ref 4.2–5.4)
SODIUM BLD-SCNC: 142 MMOL/L (ref 136–145)
WBC # BLD: 5.7 K/UL (ref 4.8–10.8)

## 2021-01-14 PROCEDURE — 97116 GAIT TRAINING THERAPY: CPT

## 2021-01-14 PROCEDURE — 6370000000 HC RX 637 (ALT 250 FOR IP): Performed by: PSYCHIATRY & NEUROLOGY

## 2021-01-14 PROCEDURE — 97130 THER IVNTJ EA ADDL 15 MIN: CPT

## 2021-01-14 PROCEDURE — 80048 BASIC METABOLIC PNL TOTAL CA: CPT

## 2021-01-14 PROCEDURE — 1180000000 HC REHAB R&B

## 2021-01-14 PROCEDURE — 85025 COMPLETE CBC W/AUTO DIFF WBC: CPT

## 2021-01-14 PROCEDURE — 97530 THERAPEUTIC ACTIVITIES: CPT

## 2021-01-14 PROCEDURE — 97110 THERAPEUTIC EXERCISES: CPT

## 2021-01-14 PROCEDURE — 97535 SELF CARE MNGMENT TRAINING: CPT

## 2021-01-14 PROCEDURE — 36415 COLL VENOUS BLD VENIPUNCTURE: CPT

## 2021-01-14 PROCEDURE — 99232 SBSQ HOSP IP/OBS MODERATE 35: CPT | Performed by: PSYCHIATRY & NEUROLOGY

## 2021-01-14 PROCEDURE — 2700000000 HC OXYGEN THERAPY PER DAY

## 2021-01-14 PROCEDURE — 97129 THER IVNTJ 1ST 15 MIN: CPT

## 2021-01-14 RX ADMIN — QUETIAPINE FUMARATE 300 MG: 300 TABLET ORAL at 20:32

## 2021-01-14 RX ADMIN — GABAPENTIN 100 MG: 100 CAPSULE ORAL at 20:30

## 2021-01-14 RX ADMIN — CLONIDINE HYDROCHLORIDE 0.1 MG: 0.1 TABLET ORAL at 20:30

## 2021-01-14 RX ADMIN — APIXABAN 2.5 MG: 2.5 TABLET, FILM COATED ORAL at 20:32

## 2021-01-14 RX ADMIN — PRAMIPEXOLE DIHYDROCHLORIDE 0.5 MG: 0.25 TABLET ORAL at 20:32

## 2021-01-14 RX ADMIN — GABAPENTIN 100 MG: 100 CAPSULE ORAL at 14:06

## 2021-01-14 RX ADMIN — GABAPENTIN 100 MG: 100 CAPSULE ORAL at 08:26

## 2021-01-14 RX ADMIN — OXYBUTYNIN CHLORIDE 10 MG: 5 TABLET, EXTENDED RELEASE ORAL at 08:26

## 2021-01-14 RX ADMIN — FLUOXETINE HYDROCHLORIDE 20 MG: 20 CAPSULE ORAL at 08:26

## 2021-01-14 RX ADMIN — LISINOPRIL 40 MG: 20 TABLET ORAL at 08:26

## 2021-01-14 RX ADMIN — BUMETANIDE 1 MG: 1 TABLET ORAL at 08:26

## 2021-01-14 RX ADMIN — ASPIRIN 81 MG: 81 TABLET, CHEWABLE ORAL at 08:26

## 2021-01-14 RX ADMIN — APIXABAN 2.5 MG: 2.5 TABLET, FILM COATED ORAL at 08:26

## 2021-01-14 RX ADMIN — CLONIDINE HYDROCHLORIDE 0.1 MG: 0.1 TABLET ORAL at 08:26

## 2021-01-14 RX ADMIN — METOPROLOL SUCCINATE 100 MG: 50 TABLET, EXTENDED RELEASE ORAL at 08:26

## 2021-01-14 RX ADMIN — CARBIDOPA AND LEVODOPA 1 TABLET: 25; 100 TABLET ORAL at 20:30

## 2021-01-14 RX ADMIN — Medication 5000 UNITS: at 08:26

## 2021-01-14 RX ADMIN — MULTIPLE VITAMINS W/ MINERALS TAB 1 TABLET: TAB at 08:26

## 2021-01-14 RX ADMIN — ATORVASTATIN CALCIUM 80 MG: 80 TABLET, FILM COATED ORAL at 20:30

## 2021-01-14 NOTE — PROGRESS NOTES
Occupational Therapy     01/14/21 1415   General   Diagnosis B  CVAs   Pain Assessment   Patient Currently in Pain No   Pain Assessment 0-10   Pain Level 0   Instrumental ADL's   Instrumental ADLs Yes   Light Housekeeping   Light Housekeeping Level Walker   Light Housekeeping Level of Assistance Supervision   Light Housekeeping Pt. folded clothing and placed them to a closet, utilized walker basket to transport clothing. Balance   Sitting Balance Independent   Standing Balance Supervision   Functional Mobility   Functional - Mobility Device Rolling Walker   Activity Retrieve items;Transport items; Other   Assist Level Supervision   Transfers   Sit to stand Supervision   Stand to sit Supervision   Toilet Transfers   Toilet - Technique Ambulating   Equipment Used Grab bars   Toilet Transfer Supervision   Type of ROM/Therapeutic Exercise   Type of ROM/Therapeutic Exercise Cane/Wand   Comment 1# 1 set x 15 reps. Assessment   Performance deficits / Impairments Decreased functional mobility ; Decreased ADL status; Decreased strength;Decreased cognition;Decreased endurance;Decreased balance;Decreased high-level IADLs   Treatment Diagnosis B CVAs   Prognosis Good   Timed Code Treatment Minutes 60 Minutes   Activity Tolerance   Activity Tolerance Patient Tolerated treatment well   Activity Tolerance O2 sat at 97%-96% at rest, dropped to 92% post ambulatory IADL task in ADL apartment. Safety Devices   Safety Devices in place Yes   Type of devices Gait belt  (Given to PT.)   Plan   Current Treatment Recommendations Strengthening;Balance Training;Functional Mobility Training; Endurance Training; Safety Education & Training;Equipment Evaluation, Education, & procurement;Patient/Caregiver Education & Training;Self-Care / ADL; Home Management Training      01/14/21 [address] or touching assistance   CARE Score 4

## 2021-01-14 NOTE — PLAN OF CARE
Problem: Falls - Risk of:  Goal: Will remain free from falls  Description: Will remain free from falls  1/13/2021 2204 by Praveena Alcocer RN  Outcome: Ongoing  1/13/2021 0959 by Ruddy Reed LPN  Outcome: Ongoing  Goal: Absence of physical injury  Description: Absence of physical injury  1/13/2021 2204 by Praveena Alcocer RN  Outcome: Ongoing  1/13/2021 0959 by Ruddy Reed LPN  Outcome: Ongoing     Problem: ACTIVITY INTOLERANCE/IMPAIRED MOBILITY  Goal: Mobility/activity is maintained at optimum level for patient  1/13/2021 2204 by Praveena Alcocer RN  Outcome: Ongoing  1/13/2021 0959 by Ruddy Reed LPN  Outcome: Ongoing     Problem: Skin Integrity:  Goal: Will show no infection signs and symptoms  Description: Will show no infection signs and symptoms  1/13/2021 2204 by Praveena Alcocer RN  Outcome: Ongoing  1/13/2021 0959 by Ruddy Reed LPN  Outcome: Ongoing  Goal: Absence of new skin breakdown  Description: Absence of new skin breakdown  1/13/2021 2204 by Praveena Alcocer RN  Outcome: Ongoing  1/13/2021 0959 by Ruddy Reed LPN  Outcome: Ongoing     Problem: Pain:  Goal: Pain level will decrease  Description: Pain level will decrease  1/13/2021 2204 by Praveena Alcocer RN  Outcome: Ongoing  1/13/2021 0959 by Ruddy Reed LPN  Outcome: Ongoing  Goal: Control of acute pain  Description: Control of acute pain  1/13/2021 2204 by Praveena Alcocer RN  Outcome: Ongoing  1/13/2021 0959 by Ruddy Reed LPN  Outcome: Ongoing  Goal: Control of chronic pain  Description: Control of chronic pain  1/13/2021 2204 by Praveena Alcocer RN  Outcome: Ongoing  1/13/2021 0959 by Ruddy Reed LPN  Outcome: Ongoing     Problem: HEMODYNAMIC STATUS  Goal: Patient has stable vital signs and fluid balance  1/13/2021 2204 by Praveena Alcocer RN  Outcome: Ongoing  1/13/2021 0959 by Ruddy Reed LPN  Outcome: Ongoing     Problem: Bleeding: Goal: Will show no signs and symptoms of excessive bleeding  Description: Will show no signs and symptoms of excessive bleeding  1/13/2021 2204 by Cooper Howell RN  Outcome: Ongoing  1/13/2021 0959 by Taz Najera LPN  Outcome: Ongoing     Problem: Mood - Altered:  Goal: Mood stable  Description: Mood stable  1/13/2021 2204 by Cooper Howell RN  Outcome: Ongoing  1/13/2021 0959 by Taz Najera LPN  Outcome: Ongoing

## 2021-01-14 NOTE — PROGRESS NOTES
Patient:   Orlando Rice  MR#:    [id number]   Room:    [facility]/811-01   YOB: 1933  Date of Progress Note: 1/14/2021  Time of Note                           9:24 AM  Consulting Physician:   Josias Blount M.D. Attending Physician:  Josias Blount MD     CHIEF COMPLAINT: Generalized weakness, worse on the right     Subjective  This 80 y.o. female  who presented to [facility] with inability to walk. She has a past medical history significant for breast cancer status post bilateral implants, chronic back pain, tremors, macular degeneration and essential hypertension. Patient had gone to bed on 12/26 around 2030. When she awoke in the morning around 830 she had difficulty getting out of bed. At baseline she is alert and oriented and conversant. Patient continued to have difficulty ambulating with unsteady gait. Her primary care provider advised her to seek evaluation at the emergency department. In the ED, blood pressure was elevated at 186/102. MRI brain was done showing acute stroke left medial precentral gyrus and acute right occipital stroke with chronic microvascular disease and atrophy. IV TPA not considered as last known well was 2030 on 12/26. She was admitted to the hospitalist service. She was seen in consultation by neurology. Aspirin 325 mg daily was changed to dual therapy with Eliquis 2.5 mg twice daily and aspirin 81 mg daily. Per neurology, MRI brain likely embolic with right occipital and left medial precentral gyrus. Also, transthoracic echo findings placed at increased risk of developing atrial fibrillation with mild to moderate left atrial enlargement and TTE limited study suggest possible right to left shunt with rope score 4 suggesting that 38% chance that stroke due to PFO and 12% risk of to urinary recurrence of stroke/TIA. Venous Doppler of bilateral lower extremities did not show thrombus. Given patient age, would need to determine risk/benefits of closure but at this point recommend dual therapy with aspirin and Eliquis. Lipitor 80 mg daily. LDL at goal at 55. She has maintained normal sinus rhythm on continuous telemetry. She will need Zio patch at discharge. She is to follow-up with neurology in 3 to 4 weeks. She was seen in consultation by cardiology. BNP 8,437 on admission. Chest x-ray showed cardiomegaly, bilateral effusions, and pulmonary edema. Transthoracic echocardiogram showed cor pulmonale with normal LVEF. She was started on IV Bumex twice daily will be transitioned to oral to start tomorrow 1 mg Bumex daily. She has had good urine output with diuresis. Patient states lower extremity edema has much improved. Patient denies history of heart failure or pulmonary disease. She is a non-smoker. She is to follow-up with cardiology per recommendations, she will need a right heart cath as outpatient basis. She was seen in consultation by pulmonology. Acute respiratory failure with hypoxia and hypercapnia. ABG on 12/28 showed pH 7.32, PCO2 69, PO2 60 and HCO3 35. She was subsequently placed on BiPAP. Probably chronic CO2 retention. Of note, she had a previous chest x-ray in 2019 that showed interstitial fibrotic changes within the lungs.   VQ scan showed low probability for pulmonary embolus. She will need pulmonary function test, CT of the chest, and right heart cath in the outpatient setting once euvolemic. Creatinine on admission noted to with repeat down to 1. 15. Serum CO2 43she has a contraction alkalosis but renal function appears to be improving and stable. Overall, she is alert and oriented and neurologically back to baseline. She remains on 2 L nasal cannula recommend to wean as tolerated for SPO2 goal greater than 90%, avoid over oxygenating with known CO2 retention. Continue to encourage BiPAP nightly. She denies shortness of breath, chest pain or pressure. She is tolerating a diet. She has had good urine output from diuresis. Labs did show hypokalemia and hypomagnesemia for which she did receive a blood replacement. She continues to have weakness, more so on the right. She was evaluated by SPT for swallow and found to have Oropharyngeal dysphagia but she has now been upgraded to a regular diet with thin liquids. No complaints today  REVIEW OF SYSTEMS:  Constitutional: No fevers No chills  Neck:No stiffness  Respiratory: No shortness of breath  Cardiovascular: No chest pain No palpitations  Gastrointestinal: No abdominal pain    Genitourinary: No Dysuria  Neurological: No headache, no confusion      PHYSICAL EXAM:  BP (!) 165/75   Pulse 67   Temp 97.7 °F (36.5 °C) (Temporal)   Resp 16   Ht 4' 11\" (1.499 m)   Wt 167 lb 14.4 oz (76.2 kg)   SpO2 93%   BMI 33.91 kg/m²     Constitutional: she appears well-developed and well-nourished. Eyes  conjunctiva normal.  Pupils react to light  Ear, nose, throat -hearing intact to voice.  No scars, masses, or lesions over external nose or ears, no atrophy of tongue  Neck-symmetric, no masses noted, no jugular vein distension  Respiration- chest wall appears symmetric, good expansion,   normal effort without use of accessory muscles  Cardiovascular- RRR Musculoskeletal  no significant wasting of muscles noted, no bony deformities, gait no gross ataxia  Extremities-no clubbing, cyanosis or edema  Skin  warm, dry, and intact. No rash, erythema, or pallor. Psychiatric  mood, affect, and behavior appear normal.      Neurology  NEUROLOGICAL EXAM:      Mental status   Awake, alert, fluent oriented x 3 appropriate affect  Attention and concentration appear appropriate  Recent and remote memory appears unremarkable  Speech normal without dysarthria  No clear issues with language       Cranial Nerves   CN II- Visual fields grossly unremarkable  CN III, IV,VI-EOMI, No nystagmus, conjugate eye movements, no ptosis  CN VII-no facial asymmetry  CN VIII-Hearing intact      Motor function  Antigravity x 4             Tremor None present     Gait                  Not tested           Nursing/pcp notes, imaging,labs and vitals reviewed.      PT,OT and/or speech notes reviewed    Lab Results   Component Value Date    WBC 5.7 01/14/2021    HGB 10.6 (L) 01/14/2021    HCT 35.3 (L) 01/14/2021    MCV 95.1 01/14/2021     01/14/2021     Lab Results   Component Value Date     01/14/2021    K 4.5 01/14/2021     01/14/2021    CO2 33 (H) 01/14/2021    BUN 18 01/14/2021    CREATININE 1.1 (H) 01/14/2021    GLUCOSE 105 01/14/2021    CALCIUM 10.1 01/14/2021    LABGLOM 47 (A) 01/14/2021    GFRAA 57 (L) 01/14/2021   No results found for: INRNo results found for: PHENYTOIN, ESR, CRP       PHYSICAL THERAPY  STRENGTH  Strength RLE  Comment: GROSSLY 3+/5  Strength LLE  Comment: GROSSLY 4- TO 4/5  ROM  AROM RLE (degrees)  RLE General AROM: DECREASED HIP AND KNEE FLX; ANKLE DF AND PF WFL   AROM LLE (degrees)  LLE General AROM: DECREASED HIP AND KNEE FLX; ANKLE DF AND PF WFL   BED MOBILITY  Bed Mobility  Bridging: Independent  Rolling: Independent  Supine to Sit: Independent  Sit to Supine: Independent  Scooting: Independent Assessment: Ck'd Pt's O2 stats:  95-96% RA. Pt has improved to Independent for bed mobility and SBA for TF's. Pt continues to fatigue quickly during activities and become SOB needing VC's. Pt may be ready for UP AD RACHEL soon.        SPEECH THERAPY  Precautions: Fall  Swallowing Status/Diet: regular diet with thin liquids        Subjective: Patient alert and cooperative with all therapy tasks. Patient seen in her room sitting upright in recliner.      Objective:   Patient recalling recent/daily events with no difficulty, however did note patient would repeat stories/topics several times throughout the session.      SLP and patient discussed finance management at home. Patient balances check book at home and also writes checks for bills. Patient completed check writing task where she was to write out 2 different checks given information provided by the SLP. Patient completed both checks with 100% accuracy, independently.      Verbal expression targeted. Patient completed abstract divergent naming task. Patient completed with 86% accuracy with min/mod verbal cues to complete. Without verbal cues patient completing with 70% accuracy. Did note less word finding difficulties than in yesterdays session for conversational discourse.      Completed upper and lower body dressing during therapy session. Patient's safety was [facility] during task.  Patient following simple/multi step commands with 100% accuracy.      SHORT TERM GOAL #1:  Goal 1: The patient will demonstrate recall of functional information following a/an (immediate, short term, long term) delay with min cues in order to increase functional integration into environment.     SHORT TERM GOAL #2:  Goal 2: The patient will complete simple/complex naming tasks with min verbal cues at 80% accuracy to improve thought organization and word retrieval skills.     SHORT TERM GOAL #3: Goal 3: The patient will follow multi step commands related to functional living environment with 80% accuracy and min cues in order to increase functional integration into environment. MET     SHORT TERM GOAL #4:  Goal 4: The patient will complete problem solving/reasoning tasks with min verbal cues at 80% accuracy in order to improve safety awareness for functional tasks.        Long-term Goals  Goal 1: The patient will use appropriate memory strategies to schedule and recall weekly events, express needs and recall names to maintain safety and participate socially in functional living environment. Goal 2: The patient will develop functional cognitive linguistic based skills and utilize compensatory strategies to communicate wants and needs effectively to different conversational partners, maintain safety and participate socially in functional living environment     ASSESSMENT:  Assessment: [x]? ?Progressing towards goals           []? ? Not Progressing towards goals     GOALS MET: 1 STG 0 LTG     OCCUPATIONAL THERAPY     CURRENT IRF-MARY BETH SCORES  Eating: CARE Score: 6  Oral Hygiene: CARE Score: 4  Toileting: CARE Score: 4  Shower/Bathe: CARE Score: 3  Upper Body Dressing: CARE Score: 6  Lower Body Dressing: CARE Score: 4  Footwear: CARE Score: 5  Toilet Transfers: CARE Score: 4  Picking Up Object:  CARE Score: 1        UE Functioning:  BUE AROM WFL      Pain Assessment:  Pain Level: 0     STGs:  Short term goals  Time Frame for Short term goals: 1 week  Short term goal 1: MET  Short term goal 2: MET  Short term goal 3: MET  Short term goal 4: MET  Short term goal 5: MET  Short term goal 6: MET     LTGs:  Long term goals  Time Frame for Long term goals : 3 weeks  Long term goal 1: MET  Long term goal 2: MET  Long term goal 3: Supervision with bathing hygiene.   Long term goal 4: MET  Long term goal 5: MET  Long term goals 6: MET     Assessment:

## 2021-01-14 NOTE — PLAN OF CARE
Problem: Falls - Risk of:  Goal: Will remain free from falls  Description: Will remain free from falls  1/14/2021 1119 by Don Wilson RN  Outcome: Ongoing  1/13/2021 2204 by Angela Lyons RN  Outcome: Ongoing  Goal: Absence of physical injury  Description: Absence of physical injury  1/14/2021 1119 by Don Wilson RN  Outcome: Ongoing  1/13/2021 2204 by Angela Lyons RN  Outcome: Ongoing

## 2021-01-14 NOTE — PROGRESS NOTES
01/14/21 1113 01/14/21 1141 01/14/21 1149   Transfers   Sit to Stand  --   --  Stand by assistance; Independent   Stand to sit  --   --  Stand by assistance; Independent   Lateral Transfers  --   --  Stand by assistance; Independent  (W/C to Recliner at end of session.)   Ambulation   Ambulation?  --   --   --    Ambulation 1   Surface  --   --   --    Device  --   --   --    Other Apparatus  --   --   --    Assistance  --   --   --    Quality of Gait  --   --   --    Gait Deviations  --   --   --    Distance  --   --   --    Comments  --   --   --    Exercises   Comments  --   --   --    Conditions Requiring Skilled Therapeutic Intervention   Assessment Pt. without O2 upon entering room. SPO2 at rest on RA was 92%. Dropped to 85% after amb 80'. Recovered to 92% after several minutes. Pt. put on 1L O2 for the rest of session. After 2nd walk on 1L O2 the SPO2 was at 92%. Recovered to 98% after a few minutes. --   --    Activity Tolerance   Activity Tolerance  --  Patient Tolerated treatment well;Patient limited by endurance  --    Safety Devices   Type of devices  --   --   --       01/14/21 1150 01/14/21 1151   Transfers   Sit to Stand  --   --    Stand to sit  --   --    Lateral Transfers  --   --    Ambulation   Ambulation? Yes  --    Ambulation 1   Surface level tile  --    Device Rolling Walker  --    Other Apparatus O2  (1L)  --    Assistance Stand by assistance  --    Quality of Gait Verbal cues at times to stay inside walker, qasim with fatigue. --    Gait Deviations Slow Scarlet  --    Distance [de-identified]' x2  --    Comments [employer] turn. --    Exercises   Comments Sitting BLE ex 20 reps. --    Conditions Requiring Skilled Therapeutic Intervention   Assessment  --   --    Activity Tolerance   Activity Tolerance  --   --    Safety Devices   Type of devices  --  Call light within reach; Chair alarm in place   Electronically signed by Edda Philip PTA on 1/14/2021 at 11:52 AM

## 2021-01-14 NOTE — PROGRESS NOTES
[facility]  INPATIENT SPEECH THERAPY  [facility] 8 REHAB UNIT  TIME   Time In: 1000  Time Out: 1100  Minutes: 60       [x]Daily Note  []Progress Note    Date: 2021  Patient Name: Jamia Calloway        MRN: [id number]    Account #: [de-identified]  : 10/14/1933  (80 y.o.)  Gender: female   Primary Provider: Thea Coker MD  Precautions: Fall  Swallowing Status/Diet: Regular diet with thin liquids      Subjective: Patient alert and cooperative with all therapy tasks. Patient seen upright in her recliner in her room. Objective: Patient was able to recall recent/daily events, however continues to repeat stories/topics frequent during conversational discourse. Patient completed functional reading task, where patient had to read and answer questions related to a prescription label. Patient completed with 86% accuracy, independently. Memory task completed. Patient recalling 3 words unrelated in different time increments. After 5 minutes she recalled 3/3. After 15 minutes she recalled 2/3 and with cues 3/3. After 45 minutes she required moderate cues to recall all of the 3 items. Immediate recall task was completed where 4 picture cards were placed in front of patient. She then had to recall where they were and what they were. Patient completing with 92% accuracy, independently. Abstract convergent naming task completed. Patient had significant difficulty naming an item similar to the category, therefore the task was modified to where the patient had to name the category. Patient was able to name the name of the category with 100% accuracy. SHORT TERM GOAL #1:  Goal 1: The patient will demonstrate recall of functional information following a/an (immediate, short term, long term) delay with min cues in order to increase functional integration into environment.     SHORT TERM GOAL #2:

## 2021-01-15 PROCEDURE — 94761 N-INVAS EAR/PLS OXIMETRY MLT: CPT

## 2021-01-15 PROCEDURE — 97116 GAIT TRAINING THERAPY: CPT

## 2021-01-15 PROCEDURE — 97535 SELF CARE MNGMENT TRAINING: CPT

## 2021-01-15 PROCEDURE — 97110 THERAPEUTIC EXERCISES: CPT

## 2021-01-15 PROCEDURE — 97129 THER IVNTJ 1ST 15 MIN: CPT

## 2021-01-15 PROCEDURE — 97530 THERAPEUTIC ACTIVITIES: CPT

## 2021-01-15 PROCEDURE — 97130 THER IVNTJ EA ADDL 15 MIN: CPT

## 2021-01-15 PROCEDURE — 1180000000 HC REHAB R&B

## 2021-01-15 PROCEDURE — 99232 SBSQ HOSP IP/OBS MODERATE 35: CPT | Performed by: PSYCHIATRY & NEUROLOGY

## 2021-01-15 PROCEDURE — 2700000000 HC OXYGEN THERAPY PER DAY

## 2021-01-15 PROCEDURE — 6370000000 HC RX 637 (ALT 250 FOR IP): Performed by: PSYCHIATRY & NEUROLOGY

## 2021-01-15 RX ADMIN — LISINOPRIL 40 MG: 20 TABLET ORAL at 07:25

## 2021-01-15 RX ADMIN — ONDANSETRON 4 MG: 4 TABLET, ORALLY DISINTEGRATING ORAL at 15:29

## 2021-01-15 RX ADMIN — GABAPENTIN 100 MG: 100 CAPSULE ORAL at 07:25

## 2021-01-15 RX ADMIN — GABAPENTIN 100 MG: 100 CAPSULE ORAL at 20:46

## 2021-01-15 RX ADMIN — APIXABAN 2.5 MG: 2.5 TABLET, FILM COATED ORAL at 20:46

## 2021-01-15 RX ADMIN — OXYBUTYNIN CHLORIDE 10 MG: 5 TABLET, EXTENDED RELEASE ORAL at 07:25

## 2021-01-15 RX ADMIN — FLUOXETINE HYDROCHLORIDE 20 MG: 20 CAPSULE ORAL at 07:25

## 2021-01-15 RX ADMIN — Medication 5000 UNITS: at 07:25

## 2021-01-15 RX ADMIN — CLONIDINE HYDROCHLORIDE 0.1 MG: 0.1 TABLET ORAL at 20:46

## 2021-01-15 RX ADMIN — CARBIDOPA AND LEVODOPA 1 TABLET: 25; 100 TABLET ORAL at 20:46

## 2021-01-15 RX ADMIN — APIXABAN 2.5 MG: 2.5 TABLET, FILM COATED ORAL at 07:26

## 2021-01-15 RX ADMIN — CLONIDINE HYDROCHLORIDE 0.1 MG: 0.1 TABLET ORAL at 07:25

## 2021-01-15 RX ADMIN — GABAPENTIN 100 MG: 100 CAPSULE ORAL at 14:33

## 2021-01-15 RX ADMIN — ASPIRIN 81 MG: 81 TABLET, CHEWABLE ORAL at 07:25

## 2021-01-15 RX ADMIN — ATORVASTATIN CALCIUM 80 MG: 80 TABLET, FILM COATED ORAL at 20:46

## 2021-01-15 RX ADMIN — BUMETANIDE 1 MG: 1 TABLET ORAL at 07:25

## 2021-01-15 RX ADMIN — MULTIPLE VITAMINS W/ MINERALS TAB 1 TABLET: TAB at 07:25

## 2021-01-15 RX ADMIN — QUETIAPINE FUMARATE 300 MG: 300 TABLET ORAL at 20:46

## 2021-01-15 RX ADMIN — METOPROLOL SUCCINATE 100 MG: 50 TABLET, EXTENDED RELEASE ORAL at 07:25

## 2021-01-15 RX ADMIN — PRAMIPEXOLE DIHYDROCHLORIDE 0.5 MG: 0.25 TABLET ORAL at 20:46

## 2021-01-15 ASSESSMENT — PAIN SCALES - GENERAL
PAINLEVEL_OUTOF10: 0

## 2021-01-15 NOTE — PROGRESS NOTES
Occupational Therapy     01/15/21 1300   General   Diagnosis B  CVAs   Pain Assessment   Patient Currently in Pain No   Pain Assessment 0-10   Pain Level 0   Balance   Sitting Balance Independent   Standing Balance Supervision   Functional Mobility   Functional - Mobility Device Rolling Walker   Activity To/from bathroom   Assist Level Supervision   Transfers   Sit to stand Supervision   Stand to sit Supervision   Toilet Transfers   Toilet - Technique Ambulating   Equipment Used Grab bars   Toilet Transfer Supervision   Type of ROM/Therapeutic Exercise   Type of ROM/Therapeutic Exercise Free weights   Comment 1# 1 set x 10 reps, all planes, independent with HEP. Long term goals   Long term goal 3 MET   Assessment   Performance deficits / Impairments Decreased functional mobility ; Decreased ADL status; Decreased strength;Decreased cognition;Decreased endurance;Decreased balance;Decreased high-level IADLs   Treatment Diagnosis B CVAs   Prognosis Good   Timed Code Treatment Minutes 60 Minutes   Activity Tolerance   Activity Tolerance Patient Tolerated treatment well   Activity Tolerance O2 sat dropped to 88% on room air during ADL tasks, increased to 98% on 1L of O2 per nc and 2 minutes rest.   Safety Devices   Safety Devices in place Yes   Type of devices Call light within reach; Chair alarm in place   Plan   Current Treatment Recommendations Strengthening;Balance Training;Functional Mobility Training; Endurance Training; Safety Education & Training;Equipment Evaluation, Education, & procurement;Patient/Caregiver Education & Training;Self-Care / ADL; Home Management Training      01/15/21 1300   Oral Hygiene   Assistance Needed Independent   CARE Score 6   [address] Needed Supervision or touching assistance   CARE Score 4   Shower/Bathe Self   Assistance Needed Supervision or touching assistance   Comment Shower, supervision, LH sponge.    CARE Score 4   Upper Body Dressing Assistance Needed Independent   CARE Score 6   Lower Body Dressing   Assistance Needed Supervision or touching assistance   Comment Supervision, utilizes AE PRN.    CARE Score 4   Putting On/Taking Off Footwear   Assistance Needed Setup or clean-up assistance   CARE Score 5      01/15/21 1300   Toilet Transfer   Assistance Needed Supervision or touching assistance   CARE Score 4

## 2021-01-15 NOTE — PROGRESS NOTES
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 She stating she was not feeling well. Objective:  She refused all mental math problems. She also refused to complete a budgeting task this date. She stated she was very tired and weak. She also reported nausea. Her nurse was notified and her vitals were checked as well. She completed abstract divergent naming tasks and was able to name four items in two minutes. She followed one step directions at 100%. She followed two step directions at 100%. HR 71  /72  Temp 97.1  02 90-92    02 via NC placed at 1LPM.  Lung sounds were good per nursing. Assessment:  Patient presents with mild/mod expressive and receptive language deficits. Mild to moderate cognitive deficits were noted. She is recommended to continue speech therapy services to address deficits.          SHORT TERM GOAL #1:  Goal 1: The patient will demonstrate recall of functional information following a/an (immediate, short term, long term) delay with min cues in order to increase functional integration into environment.     SHORT TERM GOAL #2: Goal 2: The patient will complete simple/complex naming tasks with min verbal cues at 80% accuracy to improve thought organization and word retrieval skills. SHORT TERM GOAL #3:  Goal 3: The patient will follow multi step commands related to functional living environment with 80% accuracy and min cues in order to increase functional integration into environment. SHORT TERM GOAL #4:  Goal 4: The patient will complete problem solving/reasoning tasks with min verbal cues at 80% accuracy in order to improve safety awareness for functional tasks. ASSESSMENT:  Assessment: [x]Progressing towards goals          []Not Progressing towards goals    Patient Tolerance of Treatment:   []Tolerated well [x]Tolerated fair []Required rest breaks []Fatigued    Education:  Learner:  [x]Patient          []Significant Other          []Other  Education provided regarding:  [x]Goals and POC   []Diet and swallowing precautions    []Home Exercise Program  []Progress and/or discharge information  Method of Education:  [x]Discussion          []Demonstration          []Handout          []Other  Evaluation of Education:   [x]Verbalized understanding   []Demonstrates without assistance  []Demonstrates with assistance  []Needs further instruction     []No evidence of learning                  []No family present      Plan: [x]Continue with current plan of care    []Modify current plan of care as follows:    []Discharge patient    Discharge Location:    Services/Supervision Recommended:      [x]Patient continues to require treatment by a licensed therapist to address functional deficits as outlined in the established plan of care.                     .Electronically Signed By:  Miko Stewart  1/15/2021,4:33 PM.

## 2021-01-15 NOTE — PROGRESS NOTES
01/15/21 0900   Restrictions/Precautions   Restrictions/Precautions Weight Bearing; Fall Risk   Lower Extremity Weight Bearing Restrictions   Right Lower Extremity Weight Bearing Weight Bearing As Tolerated   Left Lower Extremity Weight Bearing Weight Bearing As Tolerated   General   Diagnosis CVA L MEDIAL PRECENTRAL GYRUS AND R OCCPITAL STROKE (R SIDE BODY EFFECTED)   Pain Screening   Patient Currently in Pain No   Pain Assessment   Pain Assessment 0-10   Pain Level 0   Vital Signs   Pulse 72   Level of Consciousness Alert (0)   Oxygen Therapy   SpO2 94 %   O2 Device None (Room air)   Bed Mobility   Bridging Independent   Rolling Independent   Supine to Sit Independent   Sit to Supine Independent   Scooting Independent   Transfers   Sit to Stand Independent   Stand to sit Independent   Bed to Chair Stand by assistance   Car Transfer Stand by assistance   Ambulation 1   Surface level tile   Device Rolling Walker   Other Apparatus Wheelchair follow   Assistance Stand by assistance   Quality of Gait VC to stay inside walker, decrese claudy and step length   Gait Deviations Slow Claudy;Decreased step length   Distance 75'   Ambulation 2   Surface - 2 level tile   Device 2 Rolling Walker   Other Apparatus 2 O2;Wheelchair follow   Assistance 2 Stand by assistance   Quality of Gait 2 VC to stay inside walker, slow claudy and decreased step length   Gait Deviations Decreased step length; Slow Claudy   Distance 75'   Comments Pt. destat'd to 87% during 1st amb. w/o supplemental oxygen. Second attempt included 1 L 02 via nasal cannula. Stairs/Curb   Stairs? Yes   Stairs   # Steps  4   Stairs Height 4\"   Rails Right ascending   Device No Device   Assistance Stand by assistance   Comment Bilat. UE on one rail both ascending and descending.    Exercises   Comments Seated BLE exercises w/ min. manual resistance, 2 x 15 reps   Conditions Requiring Skilled Therapeutic Intervention Assessment O2 at rest was 94%. Destat'd to 87% after 75' amb. w/ RW and SBA. Given 1L O2 via nasal cannula, with O2 recovering to 97%. Performed car transfer with SBA. Performed 4 stairs with SBA. Activity Tolerance   Activity Tolerance Patient Tolerated treatment well;Patient limited by endurance   Safety Devices   Type of devices Call light within reach; Chair alarm in place;Gait belt;Left in chair

## 2021-01-15 NOTE — PROGRESS NOTES
Patient:   Alexandra Aggarwal  MR#:    [id number]   Room:    0811/811-01   YOB: 1933  Date of Progress Note: 1/15/2021  Time of Note                           9:58 AM  Consulting Physician:   Saintclair Edin, M.D. Attending Physician:  Saintclair Edin, MD     CHIEF COMPLAINT: Generalized weakness, worse on the right     Subjective  This 80 y.o. female  who presented to [facility] with inability to walk. She has a past medical history significant for breast cancer status post bilateral implants, chronic back pain, tremors, macular degeneration and essential hypertension. Patient had gone to bed on 12/26 around 2030. When she awoke in the morning around 830 she had difficulty getting out of bed. At baseline she is alert and oriented and conversant. Patient continued to have difficulty ambulating with unsteady gait. Her primary care provider advised her to seek evaluation at the emergency department. In the ED, blood pressure was elevated at 186/102. MRI brain was done showing acute stroke left medial precentral gyrus and acute right occipital stroke with chronic microvascular disease and atrophy. IV TPA not considered as last known well was 2030 on 12/26. She was admitted to the hospitalist service. She was seen in consultation by neurology. Aspirin 325 mg daily was changed to dual therapy with Eliquis 2.5 mg twice daily and aspirin 81 mg daily. Per neurology, MRI brain likely embolic with right occipital and left medial precentral gyrus. Also, transthoracic echo findings placed at increased risk of developing atrial fibrillation with mild to moderate left atrial enlargement and TTE limited study suggest possible right to left shunt with rope score 4 suggesting that 38% chance that stroke due to PFO and 12% risk of to urinary recurrence of stroke/TIA. Venous Doppler of bilateral lower extremities did not show thrombus. Given patient age, would need to determine risk/benefits of closure but at this point recommend dual therapy with aspirin and Eliquis. Lipitor 80 mg daily. LDL at goal at 55. She has maintained normal sinus rhythm on continuous telemetry. She will need Zio patch at discharge. She is to follow-up with neurology in 3 to 4 weeks. She was seen in consultation by cardiology. BNP 8,437 on admission. Chest x-ray showed cardiomegaly, bilateral effusions, and pulmonary edema. Transthoracic echocardiogram showed cor pulmonale with normal LVEF. She was started on IV Bumex twice daily will be transitioned to oral to start tomorrow 1 mg Bumex daily. She has had good urine output with diuresis. Patient states lower extremity edema has much improved. Patient denies history of heart failure or pulmonary disease. She is a non-smoker. She is to follow-up with cardiology per recommendations, she will need a right heart cath as outpatient basis. She was seen in consultation by pulmonology. Acute respiratory failure with hypoxia and hypercapnia. ABG on 12/28 showed pH 7.32, PCO2 69, PO2 60 and HCO3 35. She was subsequently placed on BiPAP. Probably chronic CO2 retention. Of note, she had a previous chest x-ray in 2019 that showed interstitial fibrotic changes within the lungs.   VQ scan showed low probability for pulmonary embolus. She will need pulmonary function test, CT of the chest, and right heart cath in the outpatient setting once euvolemic. Creatinine on admission noted to with repeat down to 1. 15. Serum CO2 43she has a contraction alkalosis but renal function appears to be improving and stable. Overall, she is alert and oriented and neurologically back to baseline. She remains on 2 L nasal cannula recommend to wean as tolerated for SPO2 goal greater than 90%, avoid over oxygenating with known CO2 retention. Continue to encourage BiPAP nightly. She denies shortness of breath, chest pain or pressure. She is tolerating a diet. She has had good urine output from diuresis. Labs did show hypokalemia and hypomagnesemia for which she did receive a blood replacement. She continues to have weakness, more so on the right. She was evaluated by SPT for swallow and found to have Oropharyngeal dysphagia but she has now been upgraded to a regular diet with thin liquids. No complaints this morning  REVIEW OF SYSTEMS:  Constitutional: No fevers No chills  Neck:No stiffness  Respiratory: No shortness of breath  Cardiovascular: No chest pain No palpitations  Gastrointestinal: No abdominal pain    Genitourinary: No Dysuria  Neurological: No headache, no confusion      PHYSICAL EXAM:  /66   Pulse 72   Temp 97.5 °F (36.4 °C) (Temporal)   Resp 16   Ht 4' 11\" (1.499 m)   Wt 167 lb 14.4 oz (76.2 kg)   SpO2 97% Comment: 1 L Nasal Cannula  BMI 33.91 kg/m²     Constitutional: she appears well-developed and well-nourished. Eyes  conjunctiva normal.  Pupils react to light  Ear, nose, throat -hearing intact to voice.  No scars, masses, or lesions over external nose or ears, no atrophy of tongue  Neck-symmetric, no masses noted, no jugular vein distension  Respiration- chest wall appears symmetric, good expansion,   normal effort without use of accessory muscles  Cardiovascular- RRR Musculoskeletal  no significant wasting of muscles noted, no bony deformities, gait no gross ataxia  Extremities-no clubbing, cyanosis or edema  Skin  warm, dry, and intact. No rash, erythema, or pallor. Psychiatric  mood, affect, and behavior appear normal.      Neurology  NEUROLOGICAL EXAM:      Mental status   Awake, alert, fluent oriented x 3 appropriate affect  Attention and concentration appear appropriate  Recent and remote memory appears unremarkable  Speech normal without dysarthria  No clear issues with language       Cranial Nerves   CN II- Visual fields grossly unremarkable  CN III, IV,VI-EOMI, No nystagmus, conjugate eye movements, no ptosis  CN VII-no facial asymmetry  CN VIII-Hearing intact      Motor function  Antigravity x 4             Tremor None present     Gait                  Not tested           Nursing/pcp notes, imaging,labs and vitals reviewed. PT,OT and/or speech notes reviewed    Lab Results   Component Value Date    WBC 5.7 01/14/2021    HGB 10.6 (L) 01/14/2021    HCT 35.3 (L) 01/14/2021    MCV 95.1 01/14/2021     01/14/2021     Lab Results   Component Value Date     01/14/2021    K 4.5 01/14/2021     01/14/2021    CO2 33 (H) 01/14/2021    BUN 18 01/14/2021    CREATININE 1.1 (H) 01/14/2021    GLUCOSE 105 01/14/2021    CALCIUM 10.1 01/14/2021    LABGLOM 47 (A) 01/14/2021    GFRAA 57 (L) 01/14/2021   No results found for: INRNo results found for: PHENYTOIN, ESR, CRP         01/14/21 1415   General   Diagnosis B  CVAs   Pain Assessment   Patient Currently in Pain No   Pain Assessment 0-10   Pain Level 0   Instrumental ADL's   Instrumental ADLs Yes   Light Housekeeping   Light Housekeeping Level Walker   Light Housekeeping Level of Assistance Supervision   Light Housekeeping Pt. folded clothing and placed them to a closet, utilized walker basket to transport clothing.    Balance   Sitting Balance Independent   Standing Balance Supervision   Functional Mobility Functional - Mobility Device Rolling Walker   Activity Retrieve items;Transport items; Other   Assist Level Supervision   Transfers   Sit to stand Supervision   Stand to sit Supervision   Toilet Transfers   Toilet - Technique Ambulating   Equipment Used Grab bars   Toilet Transfer Supervision   Type of ROM/Therapeutic Exercise   Type of ROM/Therapeutic Exercise Cane/Wand   Comment 1# 1 set x 15 reps. Assessment   Performance deficits / Impairments Decreased functional mobility ; Decreased ADL status; Decreased strength;Decreased cognition;Decreased endurance;Decreased balance;Decreased high-level IADLs   Treatment Diagnosis B CVAs   Prognosis Good   Timed Code Treatment Minutes 60 Minutes   Activity Tolerance   Activity Tolerance Patient Tolerated treatment well   Activity Tolerance O2 sat at 97%-96% at rest, dropped to 92% post ambulatory IADL task in ADL apartment. Safety Devices   Safety Devices in place Yes   Type of devices Gait belt  (Given to PT.)   Plan   Current Treatment Recommendations Strengthening;Balance Training;Functional Mobility Training; Endurance Training; Safety Education & Training;Equipment Evaluation, Education, & procurement;Patient/Caregiver Education & Training;Self-Care / ADL; Home Management Training       01/14/21 1524 01/14/21 1525 01/14/21 1531   Transfers   Sit to Stand Stand by assistance; Independent  --   --    Stand to sit Stand by assistance; Independent  --   --    Lateral Transfers Stand by assistance; Independent  (W/C to Recliner)  --   --    Ambulation   Ambulation?  --  Yes  --    Ambulation 1   Surface  --  level tile  --    Device  --  Rolling Walker  --    Other Apparatus  --  O2  (1L)  --    Assistance  --  Stand by assistance  --    Distance  --  [de-identified]' x2  --    Comments  --  Incorporated turn. --    Exercises   Comments  --   --  Sitting BLE ex x15 reps.    Conditions Requiring Skilled Therapeutic Intervention   Assessment  --   --   --    Activity Tolerance Activity Tolerance  --   --   --    Safety Devices   Type of devices  --   --   --         01/14/21 1532 01/14/21 1556   Transfers   Sit to Stand  --   --    Stand to sit  --   --    Lateral Transfers  --   --    Ambulation   Ambulation?  --   --    Ambulation 1   Surface  --   --    Device  --   --    Other Apparatus  --   --    Assistance  --   --    Distance  --   --    Comments  --   --    Exercises   Comments  --   --    Conditions Requiring Skilled Therapeutic Intervention   Assessment Radha session with rests  --    Activity Tolerance   Activity Tolerance Patient Tolerated treatment well;Patient limited by endurance  --    Safety Devices   Type of devices  --  Call light within reach; Chair alarm in place           RECORD REVIEW: Previous medical records, medications were reviewed at today's visit    IMPRESSION:   1. 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 Baby aspirin and Eliquis  2. DVT prophylaxisEliquis  3. Essential hypertensiontoprol increased twice since admission. Blood pressure better. 4.  CHF with recent pulmonary edemacontinue Bumex  5. Restless leg syndromecontinue Sinemet and Mirapex. Controlled. 6.  Acute respiratory failure with hypercapniacontinue O2 and monitoring. Stable. Still requiring O2  7. CKDimproving. Edema improved. gabapentin reduced to help with peripheral edema. Patient denies shortness of air. 8.  Herpes labialis. Recurrent. 1 day of acyclovir given. Prescribe l-lysine as an outpatient.   Continues to slowly improve    Plan on discharge tomorrow  ELOS:1/16, saturday

## 2021-01-15 NOTE — PLAN OF CARE
Problem: Falls - Risk of:  Goal: Will remain free from falls  Description: Will remain free from falls  1/15/2021 1038 by Placido Ramírez RN  Outcome: Ongoing  1/14/2021 2329 by Gibran Braun RN  Outcome: Ongoing  Goal: Absence of physical injury  Description: Absence of physical injury  1/15/2021 1038 by Placido Ramírez RN  Outcome: Ongoing  1/14/2021 2329 by Gibran Braun RN  Outcome: Ongoing

## 2021-01-15 NOTE — CARE COORDINATION
Ms. Brenner Postal, looking forward to discharge tomorrow, going home with assistance of adult children. Referral made to [facility] agency for nurse assess and therapies. Oxygen delivered as needed.

## 2021-01-16 VITALS
TEMPERATURE: 97.6 F | OXYGEN SATURATION: 90 % | HEIGHT: 59 IN | RESPIRATION RATE: 16 BRPM | DIASTOLIC BLOOD PRESSURE: 67 MMHG | HEART RATE: 72 BPM | SYSTOLIC BLOOD PRESSURE: 133 MMHG | WEIGHT: 160.02 LBS | BODY MASS INDEX: 32.26 KG/M2

## 2021-01-16 PROCEDURE — 2700000000 HC OXYGEN THERAPY PER DAY

## 2021-01-16 PROCEDURE — 97110 THERAPEUTIC EXERCISES: CPT

## 2021-01-16 PROCEDURE — 6370000000 HC RX 637 (ALT 250 FOR IP): Performed by: PSYCHIATRY & NEUROLOGY

## 2021-01-16 PROCEDURE — 99239 HOSP IP/OBS DSCHRG MGMT >30: CPT | Performed by: PSYCHIATRY & NEUROLOGY

## 2021-01-16 PROCEDURE — 97116 GAIT TRAINING THERAPY: CPT

## 2021-01-16 RX ORDER — METOPROLOL SUCCINATE 100 MG/1
100 TABLET, EXTENDED RELEASE ORAL DAILY
Qty: 30 TABLET | Refills: 3 | Status: SHIPPED | OUTPATIENT
Start: 2021-01-17 | End: 2022-01-01 | Stop reason: SDUPTHER

## 2021-01-16 RX ORDER — ATORVASTATIN CALCIUM 80 MG/1
80 TABLET, FILM COATED ORAL NIGHTLY
Qty: 30 TABLET | Refills: 3 | Status: SHIPPED | OUTPATIENT
Start: 2021-01-16 | End: 2022-01-01 | Stop reason: SDUPTHER

## 2021-01-16 RX ORDER — GABAPENTIN 100 MG/1
100 CAPSULE ORAL 3 TIMES DAILY
Qty: 90 CAPSULE | Refills: 3 | Status: SHIPPED | OUTPATIENT
Start: 2021-01-16 | End: 2022-01-01

## 2021-01-16 RX ORDER — ASPIRIN 81 MG/1
81 TABLET, CHEWABLE ORAL DAILY
Qty: 30 TABLET | Refills: 3 | Status: SHIPPED | OUTPATIENT
Start: 2021-01-16 | End: 2021-01-01

## 2021-01-16 RX ORDER — BUMETANIDE 1 MG/1
1 TABLET ORAL DAILY
Qty: 30 TABLET | Refills: 3 | Status: SHIPPED | OUTPATIENT
Start: 2021-01-16 | End: 2022-01-01 | Stop reason: SDUPTHER

## 2021-01-16 RX ADMIN — GABAPENTIN 100 MG: 100 CAPSULE ORAL at 07:37

## 2021-01-16 RX ADMIN — APIXABAN 2.5 MG: 2.5 TABLET, FILM COATED ORAL at 07:37

## 2021-01-16 RX ADMIN — OXYBUTYNIN CHLORIDE 10 MG: 5 TABLET, EXTENDED RELEASE ORAL at 07:37

## 2021-01-16 RX ADMIN — MULTIPLE VITAMINS W/ MINERALS TAB 1 TABLET: TAB at 07:37

## 2021-01-16 RX ADMIN — ASPIRIN 81 MG: 81 TABLET, CHEWABLE ORAL at 07:37

## 2021-01-16 RX ADMIN — FLUOXETINE HYDROCHLORIDE 20 MG: 20 CAPSULE ORAL at 07:37

## 2021-01-16 RX ADMIN — LISINOPRIL 40 MG: 20 TABLET ORAL at 07:38

## 2021-01-16 RX ADMIN — CLONIDINE HYDROCHLORIDE 0.1 MG: 0.1 TABLET ORAL at 07:37

## 2021-01-16 RX ADMIN — POLYETHYLENE GLYCOL 3350 17 G: 17 POWDER, FOR SOLUTION ORAL at 07:36

## 2021-01-16 RX ADMIN — METOPROLOL SUCCINATE 100 MG: 50 TABLET, EXTENDED RELEASE ORAL at 07:37

## 2021-01-16 RX ADMIN — BUMETANIDE 1 MG: 1 TABLET ORAL at 07:37

## 2021-01-16 RX ADMIN — Medication 5000 UNITS: at 07:37

## 2021-01-16 NOTE — DISCHARGE SUMMARY
Neurology Discharge Summary     Patient Identification:  Alexandra Aggarwal is a 80 y.o. female. :  10/14/1933  Admit Date:  2020  Discharge date : 21   Attending Provider: Saintclair Edin, MD     Account Number: [de-identified]                                   Admission Diagnoses:   Acute ischemic stroke [facility]) [I63.9]    Discharge Diagnoses: Active Problems:    Acute ischemic stroke [facility])  Resolved Problems:    * No resolved hospital problems. *      Discharge Medications:    Current Discharge Medication List           Details   atorvastatin (LIPITOR) 80 MG tablet Take 1 tablet by mouth nightly  Qty: 30 tablet, Refills: 3      Lysine 1000 MG TABS Take 1,000 mg by mouth every evening  Qty: 30 tablet, Refills: 0      OXYGEN Inhale 1 L/min into the lungs continuous  Qty: 1 Bottle, Refills: 5              Details   aspirin 81 MG chewable tablet Take 1 tablet by mouth daily  Qty: 30 tablet, Refills: 3      apixaban (ELIQUIS) 2.5 MG TABS tablet Take 1 tablet by mouth every 12 hours  Qty: 60 tablet, Refills: 0      gabapentin (NEURONTIN) 100 MG capsule Take 1 capsule by mouth 3 times daily for 31 days.   Qty: 90 capsule, Refills: 3    Associated Diagnoses: Acute ischemic stroke (HCC)      metoprolol succinate (TOPROL XL) 100 MG extended release tablet Take 1 tablet by mouth daily  Qty: 30 tablet, Refills: 3      bumetanide (BUMEX) 1 MG tablet Take 1 tablet by mouth daily  Qty: 30 tablet, Refills: 3              Details   carbidopa-levodopa (SINEMET)  MG per tablet Take 1 tablet by mouth nightly      cloNIDine (CATAPRES) 0.1 MG tablet Take 0.1 mg by mouth 2 times daily      Omega-3 Fatty Acids (FISH OIL) 1200 MG CAPS Take 1 capsule by mouth 2 times daily (with meals)      FLUoxetine (PROZAC) 20 MG capsule Take 20 mg by mouth daily      lisinopril (PRINIVIL;ZESTRIL) 40 MG tablet Take 40 mg by mouth daily      Multiple Vitamins-Minerals (MULTIVITAMIN ADULTS PO) Take 1 tablet by mouth daily oxybutynin (DITROPAN-XL) 10 MG extended release tablet Take 10 mg by mouth daily      pramipexole (MIRAPEX) 0.5 MG tablet Take 0.5 mg by mouth nightly      QUEtiapine (SEROQUEL) 300 MG tablet Take 300 mg by mouth nightly      Cholecalciferol (VITAMIN D3) 1.25 MG (03279 UT) CAPS Take 1 capsule by mouth daily      raloxifene (EVISTA) 60 MG tablet Take 60 mg by mouth daily           Current Discharge Medication List      STOP taking these medications       HYDROcodone-acetaminophen (NORCO) 7.5-325 MG per tablet Comments:   Reason for Stopping:                 Consults:   none    Hospital Course: The patient did very well during her stay in the rehab unit. She had no medical complications. She still requiring oxygen intermittently will be sent home on that. Chronic kidney disease has improved. Edema stable. Gabapentin reduced to 100 mg 3 times a day to help with that. Her blood pressure was high at times and her Toprol-XL was increased from 25 mg to 100 mg by the time of discharge. She had herpes labialis and was given 3 doses of acyclovir and will be prescribed lysine upon discharge.   Disposition upon dischargeimproved        Discharge Instructions     Patient Instructions:   Home  Therapy orders PT/OT/SLP  Discharge lab work: none  Code status: Full Code   Activity: activity as tolerated  Diet: DIET GENERAL;    Wound Care: none needed  Equipment: as per therapy      João Lo MD    At least 35 minutes were spent in discharging the patient

## 2021-01-16 NOTE — PROGRESS NOTES
Chato Torrez   [id number]     01/16/21 1003 01/16/21 1004 01/16/21 1005   Restrictions/Precautions   Restrictions/Precautions Weight Bearing; Fall Risk  --   --    Lower Extremity Weight Bearing Restrictions   Right Lower Extremity Weight Bearing Weight Bearing As Tolerated  --   --    Left Lower Extremity Weight Bearing Weight Bearing As Tolerated  --   --    Bed Mobility   Rolling Independent  --   --    Supine to Sit Independent  --   --    Sit to Supine Independent  --   --    Transfers   Sit to Stand  --  Independent  --    Stand to sit  --  Independent  --    Lateral Transfers  --  Stand by assistance; Independent  (W/C to recliner)  --    Ambulation   Ambulation?  --   --  Yes   Ambulation 1   Surface  --   --  level tile   Device  --   --  Rolling Walker   Other Apparatus  --   --  O2  (O2 1L)   Assistance  --   --  Stand by assistance   Quality of Gait  --   --  VC to stay inside walker, decrese claudy and step length   Gait Deviations  --   --  Slow Claudy;Decreased step length   Distance  --   --  76'   Exercises   Comments  --   --   --    Conditions Requiring Skilled Therapeutic Intervention   Body structures, Functions, Activity limitations  --   --   --    Assessment  --   --   --    Activity Tolerance   Activity Tolerance  --   --   --    Safety Devices   Type of devices  --   --   --       01/16/21 1006 01/16/21 1008 01/16/21 1011   Restrictions/Precautions   Restrictions/Precautions  --   --   --    Lower Extremity Weight Bearing Restrictions   Right Lower Extremity Weight Bearing  --   --   --    Left Lower Extremity Weight Bearing  --   --   --    Bed Mobility   Rolling  --   --   --    Supine to Sit  --   --   --    Sit to Supine  --   --   --    Transfers   Sit to Stand  --   --   --    Stand to sit  --   --   --    Lateral Transfers  --   --   --    Ambulation   Ambulation?  --   --   --    Ambulation 1   Surface  --   --   --    Device  --   --   --    Other Apparatus  --   --   --

## 2021-01-18 NOTE — DISCHARGE SUMMARY
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 (WEAKNESS OF R SIDE>L SIDE, VISION IMPAIRED ON L SIDE)    Additional Pertinent Hx: PE with acute cor pulmonale; interstitial pulmonary disease; HTN; ACUTE RESP FAILURE WITH HYPOXIA AND HYPERCAPNIA; POLYNEUROPATHY; NORMAL PRESSURE HYDROCEPHALUS; MACULAR DEGENERATION  RESTRICTIONS/PRECAUTIONS:    Restrictions/Precautions  Restrictions/Precautions: Weight Bearing, Fall Risk  Position Activity Restriction  Other position/activity restrictions: 2 L O2  OVERALL  ORIENTATION STATUS:     PAIN:  Pain Level: 0  Pain Type: Acute pain    Pain Location: Head     Pain Orientation: Anterior      NEUROLOGICAL                    Motor Control  Gross Motor?: Exceptions  Comments: DYSTONIA NOTED WITH MOVEMENT, AND INTERMITTENTLY AT REST      STRENGTH  Strength RLE  Comment: GROSSLY 3+/5  Strength LLE  Comment: GROSSLY 4- TO 4/5  ROM  AROM RLE (degrees)  RLE General AROM: DECREASED HIP AND KNEE FLX; ANKLE DF AND PF WFL   AROM LLE (degrees)  LLE General AROM: DECREASED HIP AND KNEE FLX; ANKLE DF AND PF WFL   POSTURE/BALANCE  Balance  Sitting - Static: Fair, -(NEEDS UE SUPPORT TO MAINTAIN SEATED BALANCE)  Sitting - Dynamic: Poor  Standing - Dynamic: Poor, -  Comments: PT CAN STAND FOR 2MIN WITH RW AND THEN PERFORM STAND STEP TF. (UNSUPPORTED STANDING NOT ASSESSED )       ACTIVITY TOLERANCE  Activity Tolerance  Activity Tolerance: Patient Tolerated treatment well, Patient limited by endurance      BED MOBILITY  Bed Mobility  Bridging: Independent  Rolling: Independent  Supine to Sit: Independent  Sit to Supine: Independent  Scooting: Independent Comment: PT REQUIRED MOD A WITH SUPINE TO SIT FROM R S/L TO SITTING EOB, ASSIST WITH LE AND TRUNK BY THERAPIST  TRANSFERS  Sit to Stand: Independent      Bed to Chair: Stand by assistance  Car Transfer: Stand by assistance     WHEELCHAIR PROPULSION 1  Propulsion 1  Propulsion: Manual  Level: Level Tile  Method: FRANK GARCIA  Level of Assistance: Stand by assistance  Description/ Details: Pt needed cues for proper propulsion techniques. Distance: 50'  WHEELCHAIR PROPULSION 2     AMBULATION 1  Ambulation 1  Surface: level tile  Device: Rolling Walker  Other Apparatus: O2(O2 1L)  Assistance: Stand by assistance  Quality of Gait: VC to stay inside walker, decrese claudy and step length  Gait Deviations: Slow Claudy, Decreased step length  Distance: 76'  Comments: Incorporated turn. AMBULATION 2  Ambulation 2  Surface - 2: level tile  Device 2: Rolling Walker  Other Apparatus 2: O2, Wheelchair follow  Assistance 2: Stand by assistance  Quality of Gait 2: VC to stay inside walker, slow claudy and decreased step length  Gait Deviations: Decreased step length, Slow Claudy  Distance: 75'  Comments: Pt. destat'd to 87% during 1st amb. w/o supplemental oxygen. Second attempt included 1 L 02 via nasal cannula. STAIRS  Stairs  # Steps : 4  Stairs Height: 4\"  Rails: Right ascending  Curbs: 4\"  Device: No Device  Assistance: Stand by assistance  Comment: Bilat. UE on one rail both ascending and descending.     GOALS:  Short term goals  Time Frame for Short term goals: 1 WEEK  Short term goal 1: PT AMB 3X12FT IN II BARS WITH MOD AX1   Short term goal 2: PROPEL W/C 75FT WITH TURNS, SBA   Short term goal 3: COMPLETE CAR TF MOD A FOR LE MANAGEMENT  Short term goal 4: COMPLETE STAND STEP TF TO R AND L WITH RW, CGA  Short term goal 5: PT CGA FOR ALL BED MOBILITY    Long term goals  Time Frame for Long term goals : 3 WEEKS  Long term goal 1: PT ' WITH RW, SUPERVISION Long term goal 2: NEGOTIATE 4 STEPS, 4\", CGA, BILATERAL HANDRAIL  Long term goal 3: COMPLETE STAND STEP TF TO R AND L WITH RW, IND  Long term goal 4: COMPLETE CAR TF WITH SUPERVISION  Long term goal 5: PT IND WITH ALL BED MOBILITY  HOME LIVING:                    ASSESSMENT (IMPAIRMENTS/BARRIERS): Body structures, Functions, Activity limitations: Decreased functional mobility , Decreased ADL status, Decreased strength, Decreased endurance  Assessment: Assisted pt with dressing and ADLs per pt request. Standing balance at sink for brushing hair and teeth. Pulse Ox after BR activity was 88% on RA. Pt rested while seated with O2 at 1L and Pulse Ox was then 96%. Amb amb well with a slow and steady pace with minimal fatigue noted.   Activity Tolerance: Patient Tolerated treatment well, Patient limited by endurance  Specific instructions for Next Treatment: CONTINUE TO WORK ON STAND STEP TF W/ RW AND INITIATE GAIT TRAINING IN II BARS  PLAN:  Plan  Times per week: 5-6  Times per day: (1-2)  Plan weeks: 3  Specific instructions for Next Treatment: CONTINUE TO WORK ON STAND STEP TF W/ RW AND INITIATE GAIT TRAINING IN II [facility]  Current Treatment Recommendations: Strengthening, ROM, Transfer Training, ADL/Self-care Training, IADL Training, Balance Training, Functional Mobility Training, Cognitive/Perceptual Training, Wheelchair Mobility Training, Endurance Training, Gait Training, Stair training, Neuromuscular Re-education, Home Exercise Program, Safety Education & Training, Patient/Caregiver Education & Training, Equipment Evaluation, Education, & procurement, Positioning  Plan Comment: CONTINUE TO ASSESS PENDING PT'S PROGRESS   Discharge Recommendations: Continue to assess pending progress, Patient would benefit from continued therapy after discharge Assistance Needed: Supervision or touching assistance  Reason if not Attempted: Not attempted due to medical condition or safety concerns  CARE Score: 4  Discharge Goal: Supervision or touching assistance    4 Steps  Assistance Needed: Supervision or touching assistance  Reason if not Attempted: Not attempted due to medical condition or safety concerns  CARE Score: 4  Discharge Goal: Supervision or touching assistance    12 Steps  Reason if not Attempted: Not attempted due to medical condition or safety concerns  CARE Score: 88  Discharge Goal: Supervision or touching assistance    Wheel 50 Feet with Two Turns  Assistance Needed: Supervision or touching assistance  Reason if not Attempted: Not attempted due to medical condition or safety concerns  CARE Score: 4  Discharge Goal: Independent    Wheel 150 Feet  Assistance Needed: Supervision or touching assistance  Reason if not Attempted: Not attempted due to medical condition or safety concerns  CARE Score: 4  Discharge Goal: Independent      LAST TREATMENT TIME  PT Individual Minutes  Time In: 0915  Time Out: 1000  Minutes: 39

## 2021-01-18 NOTE — DISCHARGE SUMMARY
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 Adaptive Equipment/DME Status:  Ordered TTB    Pain Assessment:  Pain Level: 0       Remaining Problems:   Decreased functional mobility ; Decreased ADL status; Decreased strength; Decreased cognition; Decreased endurance; Decreased balance; Decreased high-level IADLs     STGs:  Short term goals  Time Frame for Short term goals: 1 week  Short term goal 1: CGA with clothing management/hygiene for toileting. Short term goal 2: CGA with toilet transfers. Short term goal 3: Min A with bathing hygiene. Short term goal 4: Min A with LB dressing, AE PRN. Short term goal 5: Min A with donning/doffing socks and shoes using AE PRN. Short term goal 6: Patient will complete 1-2 handed static standing tasks for 3 minutes, requiring SBA. LTGs:  Long term goals  Time Frame for Long term goals : 3 weeks  Long term goal 1: Supervision with clothing management/hygiene for toileting. Long term goal 2: Supervision with toilet transfers. Long term goal 3: Supervision with bathing hygiene. Long term goal 4: Supervision with LB dressing, AE PRN. Long term goal 5: Supervision with UB dressing. Long term goals 6: Patient verbalize DME needs.       Discharge Setting and Recommendations:  Home with family and homecare    Discharge Care Scores  Eating: CARE Score: 6  Oral Hygiene: CARE Score: 6 Toileting: CARE Score: 4  Shower/Bathe: CARE Score: 4(supervision)  Upper Body Dressing: CARE Score: 6  Lower Body Dressing: CARE Score: 4(supervision, AEprn)  Footwear: CARE Score: 5  Toilet Transfers: CARE Score: 4  Picking Up Object:  CARE Score: 1    Electronically signed by Kelli Aguilera OT on 1/18/21 at 2:25 PM CST

## 2021-01-19 NOTE — DISCHARGE SUMMARY
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 o. female  who presented to [facility] with inability to walk. Kortney Le has a past medical history significant for breast cancer status post bilateral implants, chronic back pain, tremors, macular degeneration and essential hypertension.   Patient had gone to bed on  around .  When she awoke in the morning around 830 she had difficulty getting out of bed.  At baseline she is alert and oriented and conversant.  Patient continued to have difficulty ambulating with unsteady gait.  Her primary care provider advised her to seek evaluation at the emergency department.  In the ED, blood pressure was elevated at 186/102.  MRI brain was done showing acute stroke left medial precentral gyrus and acute right occipital stroke with chronic microvascular disease and atrophy.  IV TPA not considered as last known well was  on .  She was admitted to the hospitalist service. She was seen in consultation by neurology.  Aspirin 325 mg daily was changed to dual therapy with Eliquis 2.5 mg twice daily and aspirin 81 mg daily.  Per neurology, MRI brain likely embolic with right occipital and left medial precentral gyrus.  Also, transthoracic echo findings placed at increased risk of developing atrial fibrillation with mild to moderate left atrial enlargement and TTE limited study suggest possible right to left shunt with rope score 4 suggesting that 38% chance that stroke due to PFO and 12% risk of to urinary recurrence of stroke/TIA.  Venous Doppler of bilateral lower extremities did not show thrombus.  Given patient age, would need to determine risk/benefits of closure but at this point recommend dual therapy with aspirin and Eliquis.  Lipitor 80 mg daily.  LDL at goal at 55.  She has maintained normal sinus rhythm on continuous telemetry.  She will need Zio patch at discharge.   She is to follow-up with neurology in 3 to 4 weeks.  She was seen in consultation by cardiology.  BNP 8,437 on admission.  Chest x-ray showed cardiomegaly, bilateral effusions, and pulmonary edema.  Transthoracic echocardiogram showed cor pulmonale with normal LVEF.  She was started on IV Bumex twice daily will be transitioned to oral to start tomorrow 1 mg Bumex daily.  She has had good urine output with diuresis.  Patient states lower extremity edema has much improved.  Patient denies history of heart failure or pulmonary disease.  She is a non-smoker.  She is to follow-up with cardiology per recommendations, she will need a right heart cath as outpatient basis.  She was seen in consultation by pulmonology.  Acute respiratory failure with hypoxia and hypercapnia.  ABG on 12/28 showed pH 7.32, PCO2 69, PO2 60 and HCO3 35.  She was subsequently placed on BiPAP.  Probably chronic CO2 retention.  Of note, she had a previous chest x-ray in 2019 that showed interstitial fibrotic changes within the lungs.  VQ scan showed low probability for pulmonary embolus. Nigel Samano will need pulmonary function test, CT of the chest, and right heart cath in the outpatient setting once euvolemic.  Creatinine on admission noted to with repeat down to 1. 15.  Serum CO2 43she has a contraction alkalosis but renal function appears to be improving and stable.  Overall, she is alert and oriented and neurologically back to baseline.  She remains on 2 L nasal cannula recommend to wean as tolerated for SPO2 goal greater than 90%, avoid over oxygenating with known CO2 retention.  Continue to encourage BiPAP nightly.  She denies shortness of breath, chest pain or pressure.  She is tolerating a diet.  She has had good urine output from diuresis.  Labs did show hypokalemia and hypomagnesemia for which she did receive a blood replacement. She continues to have weakness, more so on the right. She was evaluated by SPT for swallow and found to have Oropharyngeal dysphagia but she has now been upgraded to a regular diet with thin liquids. No complaints this morning (per neurology note)        Patient continues to have period of word finding difficulties within conversational discourse. Also noted decreased thought organization at times. Patient was able to recall recent/daily events for the most part during session, however patient did continue to repeat topics/statements frequently throughout the session. Patient continues to have mild expressive/receptive language deficits and mild/mod cognitive deficits. Patient tries to mask deficits at times. Recommend continued speech services to address expressive/receptive language and cognition. Patient will be discharging on regular diet with thin liquids. SHORT TERM GOAL #1:  Goal 1: The patient will demonstrate recall of functional information following a/an (immediate, short term, long term) delay with min cues in order to increase functional integration into environment.       SHORT TERM GOAL #2: Goal 2: The patient will complete simple/complex naming tasks with min verbal cues at 80% accuracy to improve thought organization and word retrieval skills. SHORT TERM GOAL #3:  Goal 3: The patient will follow multi step commands related to functional living environment with 80% accuracy and min cues in order to increase functional integration into environment. SHORT TERM GOAL #4:  Goal 4: The patient will complete problem solving/reasoning tasks with min verbal cues at 80% accuracy in order to improve safety awareness for functional tasks. Long-term Goals  Goal 1: The patient will use appropriate memory strategies to schedule and recall weekly events, express needs and recall names to maintain safety and participate socially in functional living environment. Goal 2: The patient will develop functional cognitive linguistic based skills and utilize compensatory strategies to communicate wants and needs effectively to different conversational partners, maintain safety and participate socially in functional living environment      Goals Met: 0  STG's     0  LTG's      Plan:  Discharge patient             Discharge Location:             Further Speech treatment/recommendations: Continue speech services when discharging to further address cognitive deficits and expressive/receptive language deficits.

## 2021-02-09 ENCOUNTER — TELEPHONE (OUTPATIENT)
Dept: PULMONOLOGY | Facility: CLINIC | Age: 86
End: 2021-02-09

## 2021-02-09 NOTE — TELEPHONE ENCOUNTER
I spoke to pt's daughter, Leti.  She was having concerns about the pt having increase CO2 with her still wearing her oxygen.  Pt also hasn't worn oxygen in last couple of days, but having SOA.  I did tell Leti to watch for confusion, sleepy more than normal, or increase SOA on her oxygen.  Pt does have a pulse ox at home.  I did tell Leti pt could not wear her oxygen at rest as long as sat > 90%, but should wear with exercise bassam if SOA.  I also discussed usage of incentive.  Leti stated she understood and would call back or go to ER/urgent care if needed.

## 2021-02-25 ENCOUNTER — OFFICE VISIT (OUTPATIENT)
Dept: PULMONOLOGY | Facility: CLINIC | Age: 86
End: 2021-02-25

## 2021-02-25 VITALS
HEART RATE: 73 BPM | DIASTOLIC BLOOD PRESSURE: 72 MMHG | HEIGHT: 59 IN | BODY MASS INDEX: 33.87 KG/M2 | SYSTOLIC BLOOD PRESSURE: 140 MMHG | OXYGEN SATURATION: 92 % | WEIGHT: 168 LBS | TEMPERATURE: 97.8 F

## 2021-02-25 DIAGNOSIS — Z85.3 HISTORY OF BREAST CANCER: ICD-10-CM

## 2021-02-25 DIAGNOSIS — J96.02 ACUTE RESPIRATORY FAILURE WITH HYPOXIA AND HYPERCAPNIA (HCC): Primary | ICD-10-CM

## 2021-02-25 DIAGNOSIS — J96.01 ACUTE RESPIRATORY FAILURE WITH HYPOXIA AND HYPERCAPNIA (HCC): Primary | ICD-10-CM

## 2021-02-25 DIAGNOSIS — I27.20 PULMONARY HYPERTENSION (HCC): ICD-10-CM

## 2021-02-25 PROCEDURE — 99213 OFFICE O/P EST LOW 20 MIN: CPT | Performed by: NURSE PRACTITIONER

## 2021-02-25 NOTE — PROGRESS NOTES
"Chief Complaint  Hospital Follow Up Visit (tested and negative; hospitalized [facility]; no exposure)    Subjective    History of Present Illness      Dolly Ramirez presents to [facility] for:    Hospital follow-up.  She was seen at [facility] in December with hypercapnic respiratory failure after an acute stroke.  During the hospitalization she was found to have severe pulmonary hypertension with right-sided heart strain.  She is a never smoker.  She has a history of breast cancer with mastectomy and reconstruction.  She was discharged home from the hospital on oxygen but reports that she is no longer using it.  I recommend that she have an overnight pulse ox study to assure that she does not need it at night.  She is accompanied by her daughter today who assist in her care.  She stays up-to-date on vaccines and is awaiting the Covid vaccine.       Objective   Vital Signs:   /72   Pulse 73   Temp 97.8 °F (36.6 °C)   Ht 149.9 cm (59\")   Wt 76.2 kg (168 lb)   SpO2 92% Comment: RA  BMI 33.93 kg/m²     Physical Exam  Vitals signs reviewed.   Constitutional:       General: She is not in acute distress.     Appearance: Normal appearance.   HENT:      Head: Normocephalic and atraumatic.      Comments: Wearing a mask  Neck:      Musculoskeletal: Normal range of motion.   Cardiovascular:      Rate and Rhythm: Normal rate and regular rhythm.      Heart sounds: Murmur present.   Pulmonary:      Effort: Pulmonary effort is normal.      Breath sounds: Normal breath sounds.   Skin:     General: Skin is warm and dry.   Neurological:      Mental Status: She is alert and oriented to person, place, and time.      Comments: Uses a walker   Psychiatric:         Mood and Affect: Mood normal.         Behavior: Behavior normal.        Result Review :       No results found for this or any previous visit.           Assessment and Plan      Diagnoses and all orders for this " visit:    1. Acute respiratory failure with hypoxia and hypercapnia (CMS/HCC) (Primary)  Comments:  Overnight pulse ox study to determine if the patient requires oxygen with sleep.  Orders:  -     Overnight Sleep Oximetry Study; Future    2. Pulmonary hypertension (CMS/HCC)  Comments:  Overnight pulse ox study to determine if nighttime oxygen is needed.    3. History of breast cancer    4. BMI 35.0-35.9,adult      Patient's Body mass index is 33.93 kg/m². BMI is above normal parameters. Recommendations include: referral to primary care.      Jeyson Garcia, APRN  2/25/2021  13:36 CST    Follow Up   Return if symptoms worsen or fail to improve, for overnight pulse ox study.    Patient was given instructions and counseling regarding her condition or for health maintenance advice. Please see specific information pulled into the AVS if appropriate.

## 2021-03-04 DIAGNOSIS — J96.01 ACUTE RESPIRATORY FAILURE WITH HYPOXIA AND HYPERCAPNIA (HCC): ICD-10-CM

## 2021-03-04 DIAGNOSIS — J96.02 ACUTE RESPIRATORY FAILURE WITH HYPOXIA AND HYPERCAPNIA (HCC): ICD-10-CM

## 2021-03-04 NOTE — PROGRESS NOTES
Test results discussed with patient.  Patient voiced understanding.  She would like for me to mail the results to her for her daughter to look over.  I did offer to call daughter to discuss and pt denied.  I did also tell the pt to discuss these results with Keenan Pompa when he sees her end of this month.  She declined the sleep study at this time.

## 2021-03-05 ENCOUNTER — TELEPHONE (OUTPATIENT)
Dept: PULMONOLOGY | Facility: CLINIC | Age: 86
End: 2021-03-05

## 2021-03-05 NOTE — TELEPHONE ENCOUNTER
Patient's daughter has called to ask what liter flow her oxygen is supposed to be on at night?    I don't see this in the chart. I can have her check with her DME company if necessary?

## 2021-03-08 NOTE — TELEPHONE ENCOUNTER
Spoke with patient's daughter and relayed message. She voiced understanding and will inform the patient.

## 2021-03-11 ENCOUNTER — OFFICE VISIT (OUTPATIENT)
Dept: CARDIOLOGY | Facility: CLINIC | Age: 86
End: 2021-03-11

## 2021-03-11 VITALS
SYSTOLIC BLOOD PRESSURE: 138 MMHG | BODY MASS INDEX: 32.05 KG/M2 | OXYGEN SATURATION: 100 % | HEIGHT: 59 IN | DIASTOLIC BLOOD PRESSURE: 60 MMHG | HEART RATE: 68 BPM | WEIGHT: 159 LBS

## 2021-03-11 DIAGNOSIS — I27.20 SEVERE PULMONARY HYPERTENSION (HCC): ICD-10-CM

## 2021-03-11 DIAGNOSIS — I63.10 CEREBROVASCULAR ACCIDENT (CVA) DUE TO EMBOLISM OF PRECEREBRAL ARTERY (HCC): Primary | ICD-10-CM

## 2021-03-11 DIAGNOSIS — J84.9 INTERSTITIAL LUNG DISEASE (HCC): ICD-10-CM

## 2021-03-11 DIAGNOSIS — Z79.01 CURRENT USE OF LONG TERM ANTICOAGULATION: ICD-10-CM

## 2021-03-11 DIAGNOSIS — I50.812 CHRONIC RIGHT-SIDED HEART FAILURE (HCC): ICD-10-CM

## 2021-03-11 PROCEDURE — 99215 OFFICE O/P EST HI 40 MIN: CPT | Performed by: NURSE PRACTITIONER

## 2021-03-11 PROCEDURE — 93000 ELECTROCARDIOGRAM COMPLETE: CPT | Performed by: NURSE PRACTITIONER

## 2021-03-11 NOTE — PROGRESS NOTES
Subjective:     Encounter Date:03/11/2021      Patient ID: Dolly Ramirez is a 87 y.o. female     Chief Complaint:  History of Present Illness  Patient presents today for follow up after recent hospital admission. Patient was admitted for acute stroke and cor pulmonale. She had right occipital and left medial gyrus stroke- concerning for embolic strokes per neurology note. She was started on Eliquis 2.5 BID and Aspirin by neurology. She was placed in a holter monitor. No A-fib was noted on telemetry while admitted. She was diagnosed with cor pulmonale, severe pulmonary hypertension and right sided heart failure- which were new diagnosis. Patient does have a known history of interstitial  lung disease. Patient since discharge has been seen by pulmonary and had a overnight oxymetry which was abnormal and she now wears oxygen at night. Overall patient has been doing well. Breathing stable. There was a holter monitor ordered at discharge but not done. Also she is noted to have possible right to left shunt on echo.     The following portions of the patient's history were reviewed and updated as appropriate: allergies, current medications, past medical history, past social history, past and problem list.    No Known Allergies    Current Outpatient Medications:   •  apixaban (ELIQUIS) 2.5 MG tablet tablet, Take 1 tablet by mouth Every 12 (Twelve) Hours. Indications: embolic stroke, Disp: 60 tablet, Rfl:   •  aspirin 81 MG chewable tablet, Chew 1 tablet Daily., Disp:  , Rfl:   •  atorvastatin (LIPITOR) 80 MG tablet, Take 1 tablet by mouth Every Night., Disp:  , Rfl:   •  bumetanide (BUMEX) 1 MG tablet, Take 1 tablet by mouth Daily., Disp: , Rfl:   •  carbidopa-levodopa (SINEMET)  MG per tablet, Take 1 tablet by mouth Every Night., Disp: , Rfl:   •  Cholecalciferol (VITAMIN D3) 5000 UNITS capsule capsule, Take 5,000 Units by mouth Daily., Disp: , Rfl:   •  CloNIDine (CATAPRES) 0.1 MG tablet, Take 0.1 mg by mouth 2  (Two) Times a Day., Disp: , Rfl:   •  FLUoxetine (PROzac) 20 MG capsule, Take 20 mg by mouth Daily., Disp: , Rfl:   •  gabapentin (NEURONTIN) 300 MG capsule, Take 100 mg by mouth 3 (Three) Times a Day., Disp: , Rfl:   •  lisinopril (PRINIVIL,ZESTRIL) 40 MG tablet, Take 40 mg by mouth Daily., Disp: , Rfl:   •  metoprolol succinate XL (TOPROL-XL) 25 MG 24 hr tablet, Take 1 tablet by mouth Daily., Disp: , Rfl:   •  Multiple Vitamins-Minerals (CENTRUM ADULTS PO), Take  by mouth Daily., Disp: , Rfl:   •  Omega-3 Fatty Acids (FISH OIL) 1200 MG capsule capsule, Take 1,200 mg by mouth 2 (Two) Times a Day With Meals., Disp: , Rfl:   •  oxybutynin XL (DITROPAN-XL) 10 MG 24 hr tablet, Take 10 mg by mouth Daily., Disp: , Rfl:   •  pramipexole (MIRAPEX) 0.5 MG tablet, Take 0.5 mg by mouth Every Night., Disp: , Rfl:   •  QUEtiapine (SEROquel) 300 MG tablet, Take 300 mg by mouth Every Night., Disp: , Rfl:     Social History     Socioeconomic History   • Marital status:      Spouse name: Not on file   • Number of children: Not on file   • Years of education: Not on file   • Highest education level: Not on file   Tobacco Use   • Smoking status: Never Smoker   • Smokeless tobacco: Never Used   Vaping Use   • Vaping Use: Never used   Substance and Sexual Activity   • Alcohol use: No   • Drug use: No   • Sexual activity: Defer       Review of Systems   Constitutional: Positive for malaise/fatigue. Negative for chills, decreased appetite, fever, weight gain and weight loss.   HENT: Negative for nosebleeds.    Eyes: Negative for visual disturbance.   Cardiovascular: Positive for dyspnea on exertion. Negative for chest pain, leg swelling, near-syncope, orthopnea, palpitations, paroxysmal nocturnal dyspnea and syncope.   Respiratory: Positive for shortness of breath. Negative for cough, hemoptysis and snoring.    Endocrine: Negative for cold intolerance and heat intolerance.   Hematologic/Lymphatic: Negative for bleeding problem.  "Does not bruise/bleed easily.   Skin: Negative for rash.   Musculoskeletal: Negative for back pain and falls.   Gastrointestinal: Negative for abdominal pain, constipation, diarrhea, heartburn, melena, nausea and vomiting.   Genitourinary: Negative for hematuria.   Neurological: Negative for dizziness, headaches and light-headedness.   Psychiatric/Behavioral: Negative for altered mental status.   Allergic/Immunologic: Negative for persistent infections.              Objective:     Constitutional:       Appearance: Healthy appearance. Not in distress.   Eyes:      Pupils: Pupils are equal, round, and reactive to light.   HENT:      Head: Normocephalic and atraumatic.      Nose: Nose normal.    Mouth/Throat:      Dentition: Normal.   Pulmonary:      Effort: Pulmonary effort is normal.      Breath sounds: Normal breath sounds.   Chest:      Chest wall: Not tender to palpatation.   Cardiovascular:      PMI at left midclavicular line. Normal rate. Regular rhythm.      Murmurs: There is no murmur.   Pulses:     Intact distal pulses.   Edema:     Peripheral edema absent.   Abdominal:      General: Bowel sounds are normal.      Palpations: Abdomen is soft.   Musculoskeletal: Normal range of motion.      Cervical back: Normal range of motion. Skin:     General: Skin is warm and dry.   Neurological:      Mental Status: Alert, oriented to person, place, and time and oriented to person, place and time.   Psychiatric:         Attention and Perception: Attention normal.         Mood and Affect: Mood normal.             ECG 12 Lead    Date/Time: 3/11/2021 12:02 PM  Performed by: Jaron Espinoza APRN  Authorized by: Jaron Espinoza APRN   Comparison: compared with previous ECG from 12/28/2020  Similar to previous ECG  Rhythm: sinus rhythm  Conduction: 1st degree AV block          /60   Pulse 68   Ht 149.9 cm (59\")   Wt 72.1 kg (159 lb)   SpO2 100%   BMI 32.11 kg/m²   Lab Review:   I have reviewed       Lab Results "   Component Value Date    CHOL 130 12/28/2020    TRIG 113 12/28/2020    HDL 55 12/28/2020    LDL 55 12/28/2020      Results for orders placed during the hospital encounter of 12/27/20    Adult Transthoracic Echo Complete W/ Cont if Necessary Per Protocol (With Agitated Saline)    Interpretation Summary  · The right atrial cavity is mild to moderately dilated.  · Moderate aortic valve regurgitation is present.  · Estimated right ventricular systolic pressure from tricuspid regurgitation is markedly elevated (>55 mmHg).  · Severe pulmonary hypertension is present.  · The right ventricular cavity is severely dilated.  · Severely reduced right ventricular systolic function noted.  · Left ventricular wall thickness is consistent with concentric hypertrophy.  · Estimated left ventricular EF = 65% Left ventricular systolic function is normal.  · Left ventricular diastolic function is consistent with age.  · The study is very limited and a small right to left shunt can not be excluded     Assessment:          Diagnosis Plan   1. Cerebrovascular accident (CVA) due to embolism of precerebral artery (CMS/HCC)  Holter Monitor - 72 Hour Up To 15 Days   2. Chronic right-sided heart failure (CMS/HCC)     3. Severe pulmonary hypertension (CMS/HCC)     4. Current use of long term anticoagulation     5. Interstitial lung disease (CMS/HCC)            Plan:       1. CVA- thought to be embolic per Neurology. Started on Eliquis 2.5 BID. Patient is 87 years old, 72 kg and despite CKD Creatinine is less than 1.5. I have provided patient with Eliquis 5 mg BID samples today. She will discuss with Neurology at their appointment in 2 weeks. If she is requiring full dose anticoagulation she will need to be in Eliquis 5 mg BID unless her Creatinine is greater than 1.5 I discussed this with patient and daughter. Will place 14 day monitor to assess for A-fib. Also noted to possibly have shunt on echo- will defer to neurology for now for any  further recommendations.   2. Right Sided Heart Failure- secondary to lung disease. Work up ongoing for sleep apnea- newly started on nocturnal oxygen. Discussed low salt diet. Hare weights. Discussed symptoms to monitor. On Bumex.   3. Severe pulmonary hypertension - following with pulmonary. Work up for sleep apnea ongoing. Interstitial lung disease  4. Currently on Eliquis 2.5 BID for recent embolic stroke. I have increased to 5 mg today as patient is >60 kg and Creatinine less than 1.5 She will discuss with neurology.   5. Interstitial lung disease- follows with pulmonary   6. Patient's Body mass index is 32.11 kg/m². BMI is above normal parameters. Recommendations include: exercise counseling and nutrition counseling.      Current outpatient and discharge medications have been reconciled for the patient.  Reviewed by: BERNADINE Latham    I spent 40 minutes caring for Dolly on this date of service. This time includes time spent by me in the following activities:preparing for the visit, reviewing tests, obtaining and/or reviewing a separately obtained history, performing a medically appropriate examination and/or evaluation , counseling and educating the patient/family/caregiver, ordering medications, tests, or procedures, referring and communicating with other health care professionals , documenting information in the medical record, independently interpreting results and communicating that information with the patient/family/caregiver and care coordination

## 2021-03-26 ENCOUNTER — LAB (OUTPATIENT)
Dept: LAB | Facility: HOSPITAL | Age: 86
End: 2021-03-26

## 2021-03-26 ENCOUNTER — OFFICE VISIT (OUTPATIENT)
Dept: NEUROLOGY | Facility: CLINIC | Age: 86
End: 2021-03-26

## 2021-03-26 VITALS
WEIGHT: 169.8 LBS | SYSTOLIC BLOOD PRESSURE: 100 MMHG | HEIGHT: 59 IN | BODY MASS INDEX: 34.23 KG/M2 | DIASTOLIC BLOOD PRESSURE: 62 MMHG | HEART RATE: 98 BPM

## 2021-03-26 DIAGNOSIS — N18.9 CHRONIC KIDNEY DISEASE, UNSPECIFIED CKD STAGE: ICD-10-CM

## 2021-03-26 DIAGNOSIS — I10 ESSENTIAL HYPERTENSION: ICD-10-CM

## 2021-03-26 DIAGNOSIS — I48.91 ATRIAL FIBRILLATION, UNSPECIFIED TYPE (HCC): ICD-10-CM

## 2021-03-26 DIAGNOSIS — I69.30 CHRONIC ARTERIAL ISCHEMIC STROKE: Primary | ICD-10-CM

## 2021-03-26 LAB
ANION GAP SERPL CALCULATED.3IONS-SCNC: 6 MMOL/L (ref 5–15)
BUN SERPL-MCNC: 28 MG/DL (ref 8–23)
BUN/CREAT SERPL: 20 (ref 7–25)
CALCIUM SPEC-SCNC: 9.9 MG/DL (ref 8.6–10.5)
CHLORIDE SERPL-SCNC: 99 MMOL/L (ref 98–107)
CO2 SERPL-SCNC: 36 MMOL/L (ref 22–29)
CREAT SERPL-MCNC: 1.4 MG/DL (ref 0.57–1)
GFR SERPL CREATININE-BSD FRML MDRD: 36 ML/MIN/1.73
GLUCOSE SERPL-MCNC: 106 MG/DL (ref 65–99)
POTASSIUM SERPL-SCNC: 3.9 MMOL/L (ref 3.5–5.2)
SODIUM SERPL-SCNC: 141 MMOL/L (ref 136–145)

## 2021-03-26 PROCEDURE — 80048 BASIC METABOLIC PNL TOTAL CA: CPT

## 2021-03-26 PROCEDURE — 99213 OFFICE O/P EST LOW 20 MIN: CPT | Performed by: PHYSICIAN ASSISTANT

## 2021-03-26 PROCEDURE — 36415 COLL VENOUS BLD VENIPUNCTURE: CPT

## 2021-04-02 NOTE — TELEPHONE ENCOUNTER
----- Message from BERNADINE Latham sent at 4/2/2021  8:24 AM CDT -----  Regarding: FW:  Can you please let her know she can stop her aspirin. She needs to continue Eliquis 5 bid  ----- Message -----  From: Keenan Pompa PA-C  Sent: 4/2/2021   8:15 AM CDT  To: BERNADINE Latham    I appreciate the note.  As long as she is on full dose anticoagulation, she does not have to have the aspirin.Thank you,Keenan  ----- Message -----  From: Jaron Espinoza APRN  Sent: 4/1/2021   2:06 PM CDT  To: JUNIE Ennis,   I have sent in Eliquis 5 mg BID. Her Creatinine function remains good enough that her appropriate dose of full dose anticoagulation is 5mg BID. I also noted she on Aspirin. Does she need to continue the Aspirin? From our stand point if we are treating her with anticoagulation for A-fib she would not require Aspirin.

## 2021-05-14 ENCOUNTER — OFFICE VISIT (OUTPATIENT)
Dept: CARDIOLOGY | Facility: CLINIC | Age: 86
End: 2021-05-14

## 2021-05-14 VITALS
RESPIRATION RATE: 20 BRPM | WEIGHT: 166 LBS | OXYGEN SATURATION: 74 % | TEMPERATURE: 98 F | BODY MASS INDEX: 33.47 KG/M2 | HEART RATE: 73 BPM | SYSTOLIC BLOOD PRESSURE: 130 MMHG | HEIGHT: 59 IN | DIASTOLIC BLOOD PRESSURE: 70 MMHG

## 2021-05-14 DIAGNOSIS — J96.22 ACUTE ON CHRONIC RESPIRATORY FAILURE WITH HYPOXIA AND HYPERCAPNIA (HCC): Primary | ICD-10-CM

## 2021-05-14 DIAGNOSIS — I63.419 CEREBROVASCULAR ACCIDENT (CVA) DUE TO EMBOLISM OF MIDDLE CEREBRAL ARTERY, UNSPECIFIED BLOOD VESSEL LATERALITY (HCC): ICD-10-CM

## 2021-05-14 DIAGNOSIS — I10 BENIGN HYPERTENSION: Chronic | ICD-10-CM

## 2021-05-14 DIAGNOSIS — J96.21 ACUTE ON CHRONIC RESPIRATORY FAILURE WITH HYPOXIA AND HYPERCAPNIA (HCC): Primary | ICD-10-CM

## 2021-05-14 DIAGNOSIS — I50.812 CHRONIC RIGHT-SIDED HEART FAILURE (HCC): ICD-10-CM

## 2021-05-14 DIAGNOSIS — I27.20 SEVERE PULMONARY HYPERTENSION (HCC): ICD-10-CM

## 2021-05-14 PROCEDURE — 99215 OFFICE O/P EST HI 40 MIN: CPT | Performed by: NURSE PRACTITIONER

## 2021-05-14 NOTE — PROGRESS NOTES
Subjective:     Encounter Date:05/14/2021      Patient ID: Dolly Ramirez is a 87 y.o. female     Chief Complaint:  History of Present Illness  Patient presents today for routine follow up for right sided heart failure and pulmonary hypertension. Patient was admitted for acute stroke and cor pulmonale in December. Prior to this she denies any significant medical issues - other than a report of interstitial lung disease.  She was diagnosed with right occipital and left medial gyrus stroke- concerning for embolic strokes per neurology note. She was started on Eliquis 2.5 BID and Aspirin by neurology. This was switched to Eliquis 5 mg BID according to dosing labels. Aspirin was stopped.  She was placed in a holter monitor. No A-fib was noted on telemetry while admitted or 14 day monitor. She was diagnosed with cor pulmonale, severe pulmonary hypertension and right sided heart failure- which were new diagnosis. She was found to have overnight hypoxia and was started on nocturnal oxygen. [facility] oxygen all the time- but this has been stopped- difficult to determine when. She does wear it at night. She presents today and is hypoxic. Upon ambulating from waiting room to the exam room her oxygen dropped into the 70's- initially 74. After resting a few minutes her oxygen came up to 88%. At the end of my exam oxygen was up to 90%. She was hypertensive upon initial evaluation but was improved to 130/70 on repeat. Patient does not check oxygen levels, weights or blood pressures at home. She does note that she has had an increase in shortness of breath on exertion over the last couple of weeks. Her daughter also noted that her legs were swollen earlier this week. Her weight is up 7 pounds form my last office visit in March. She reports chronic orthopnea- not a change. She notes she has not followed up with pulmonary recently and doesn't have a future appointment. Denies chest pain or  "palpitations. She also has concerns over \"busted breast implants\" and wants to know if anything can be done. She has had \"busted breast implants\" for over a year and she notes that the right one appears to be more swollen.     The following portions of the patient's history were reviewed and updated as appropriate: allergies, current medications, past medical history, past social history, past and problem list.    No Known Allergies    Current Outpatient Medications:   •  apixaban (ELIQUIS) 5 MG tablet tablet, Take 1 tablet by mouth Every 12 (Twelve) Hours. Indications: embolic stroke, Disp: 180 tablet, Rfl: 3  •  aspirin 81 MG chewable tablet, Chew 1 tablet Daily., Disp:  , Rfl:   •  atorvastatin (LIPITOR) 80 MG tablet, Take 1 tablet by mouth Every Night., Disp:  , Rfl:   •  bumetanide (BUMEX) 1 MG tablet, Take 1 tablet by mouth Daily., Disp: , Rfl:   •  carbidopa-levodopa (SINEMET)  MG per tablet, Take 1 tablet by mouth Every Night., Disp: , Rfl:   •  Cholecalciferol (VITAMIN D3) 5000 UNITS capsule capsule, Take 5,000 Units by mouth Daily., Disp: , Rfl:   •  CloNIDine (CATAPRES) 0.1 MG tablet, Take 0.1 mg by mouth 2 (Two) Times a Day., Disp: , Rfl:   •  FLUoxetine (PROzac) 20 MG capsule, Take 20 mg by mouth Daily., Disp: , Rfl:   •  gabapentin (NEURONTIN) 300 MG capsule, Take 100 mg by mouth 3 (Three) Times a Day., Disp: , Rfl:   •  lisinopril (PRINIVIL,ZESTRIL) 40 MG tablet, Take 40 mg by mouth Daily., Disp: , Rfl:   •  metoprolol succinate XL (TOPROL-XL) 25 MG 24 hr tablet, Take 1 tablet by mouth Daily., Disp: , Rfl:   •  Multiple Vitamins-Minerals (CENTRUM ADULTS PO), Take  by mouth Daily., Disp: , Rfl:   •  Omega-3 Fatty Acids (FISH OIL) 1200 MG capsule capsule, Take 1,200 mg by mouth 2 (Two) Times a Day With Meals., Disp: , Rfl:   •  oxybutynin XL (DITROPAN-XL) 10 MG 24 hr tablet, Take 10 mg by mouth Daily., Disp: , Rfl:   •  pramipexole (MIRAPEX) 0.5 MG tablet, Take 0.5 mg by mouth Every Night., Disp: " , Rfl:   •  QUEtiapine (SEROquel) 300 MG tablet, Take 300 mg by mouth Every Night., Disp: , Rfl:     Social History     Socioeconomic History   • Marital status:      Spouse name: Not on file   • Number of children: Not on file   • Years of education: Not on file   • Highest education level: Not on file   Tobacco Use   • Smoking status: Never Smoker   • Smokeless tobacco: Never Used   Vaping Use   • Vaping Use: Never used   Substance and Sexual Activity   • Alcohol use: No   • Drug use: No   • Sexual activity: Defer       Review of Systems   Constitutional: Positive for malaise/fatigue. Negative for decreased appetite, weight gain and weight loss.   HENT: Negative for nosebleeds.    Eyes: Negative for visual disturbance.   Cardiovascular: Positive for dyspnea on exertion, leg swelling and orthopnea. Negative for near-syncope and palpitations.   Respiratory: Positive for shortness of breath. Negative for snoring.    Endocrine: Negative for cold intolerance and heat intolerance.   Hematologic/Lymphatic: Negative for bleeding problem. Does not bruise/bleed easily.   Musculoskeletal: Negative for back pain and falls.   Gastrointestinal: Negative for constipation, diarrhea, heartburn and melena.   Genitourinary: Negative for hematuria.   Neurological: Negative for dizziness and light-headedness.   Psychiatric/Behavioral: Negative for altered mental status.   Allergic/Immunologic: Negative for persistent infections.              Objective:     Vitals reviewed.   Constitutional:       Appearance: Frail. Chronically ill-appearing.   Eyes:      Pupils: Pupils are equal, round, and reactive to light.   HENT:      Head: Normocephalic and atraumatic.      Nose: Nose normal.    Mouth/Throat:      Dentition: Normal.      Pharynx: Oropharynx is clear.   Pulmonary:      Effort: Tachypnea present.      Breath sounds: Rhonchi present.   Chest:      Chest wall: Not tender to palpatation.   Cardiovascular:      PMI at left  "midclavicular line. Normal rate. Regular rhythm.      Murmurs: There is no murmur.   Pulses:     Intact distal pulses.   Edema:     Peripheral edema present.     Ankle: bilateral 1+ edema of the ankle.     Feet: bilateral 1+ edema of the feet.  Abdominal:      General: Bowel sounds are normal.      Palpations: Abdomen is soft.   Musculoskeletal: Normal range of motion.      Cervical back: Normal range of motion. Skin:     General: Skin is warm and dry.   Neurological:      Mental Status: Alert, oriented to person, place, and time and oriented to person, place and time.   Psychiatric:         Attention and Perception: Attention normal.         Mood and Affect: Mood normal.           Procedures  /70 (BP Location: Right arm)   Pulse 73   Temp 98 °F (36.7 °C) (Skin)   Resp 20   Ht 149.9 cm (59\")   Wt 75.3 kg (166 lb)   SpO2 (!) 74%   BMI 33.53 kg/m²   Lab Review:   I have reviewed       Lab Results   Component Value Date    CHOL 130 12/28/2020    TRIG 113 12/28/2020    HDL 55 12/28/2020    LDL 55 12/28/2020      Results for orders placed during the hospital encounter of 12/27/20    Adult Transthoracic Echo Complete W/ Cont if Necessary Per Protocol (With Agitated Saline)    Interpretation Summary  · The right atrial cavity is mild to moderately dilated.  · Moderate aortic valve regurgitation is present.  · Estimated right ventricular systolic pressure from tricuspid regurgitation is markedly elevated (>55 mmHg).  · Severe pulmonary hypertension is present.  · The right ventricular cavity is severely dilated.  · Severely reduced right ventricular systolic function noted.  · Left ventricular wall thickness is consistent with concentric hypertrophy.  · Estimated left ventricular EF = 65% Left ventricular systolic function is normal.  · Left ventricular diastolic function is consistent with age.  · The study is very limited and a small right to left shunt can not be excluded     Assessment:          Diagnosis " Plan   1. Acute on chronic respiratory failure with hypoxia and hypercapnia (CMS/HCC)     2. Chronic right-sided heart failure (CMS/HCC)     3. Severe pulmonary hypertension (CMS/HCC)     4. Cerebrovascular accident (CVA) due to embolism of middle cerebral artery, unspecified blood vessel laterality (CMS/HCC)     5. Benign hypertension            Plan:       1. Acute on chronic Respiratory Failure- was discharged in December on 2LNC- however this was weaned off at some point. She does wear nocturnal oxygen. Oxygen upon entry into exam room 74- improved quickly to high 80s at rest. At end of visit 90. Educated her on importance of monitoring oxygen at home. Also strongly encouraged her to follow up with pulmonary. Educated her an avoiding over oxygenation and risks of this. She will monitor and use oxygen as needed for oxygen sats in the low 80s and 70s. Suggest she might need on exertion. Both daughter and patient verbalizes understanding. I am concerned patient is not reporting severity of symptoms. If shortness of breath gets worse I recommended seek immediate care. Also suspect she is borderline   2. Right Sided Heart Failure- secondary to lung disease. Discussed importance of low salt diet. Daily weights. Discussed symptoms to monitor. On Bumex. Patient will take extra Bumex today. I do suspect she is mildly volume overloaded. She will monitor response. Also discussed flex dosing as needed moving forward.   3. Severe pulmonary hypertension - following with pulmonary. Work up for sleep apnea ongoing. Interstitial lung disease. Encouraged follow up with pulmonary.   4. CVA- thought to be embolic per Neurology. Started on Eliquis 2.5 BID. Patient is 87 years old, 72 kg and despite CKD Creatinine is less than 1.5. She is now on apporpriate dosing Eliquis and Aspirin has been stopped.   5. Hypertension- blood pressure elevated on initial reading but improved after resting. Monitor at home.     Parameters set for  patient. Her daughter will monitor closely and verbalized understanding of when to seek help. Also verbalizes understanding of flex dosing Bumex.     Needs to follow up with pulmonary. Also discuss breast pain with PCP.     I spent 45 minutes caring for Dolly on this date of service. This time includes time spent by me in the following activities:preparing for the visit, reviewing tests, obtaining and/or reviewing a separately obtained history, performing a medically appropriate examination and/or evaluation , counseling and educating the patient/family/caregiver, ordering medications, tests, or procedures, referring and communicating with other health care professionals , documenting information in the medical record, independently interpreting results and communicating that information with the patient/family/caregiver and care coordination

## 2021-06-11 ENCOUNTER — OFFICE VISIT (OUTPATIENT)
Dept: CARDIOLOGY | Facility: CLINIC | Age: 86
End: 2021-06-11

## 2021-06-11 VITALS
WEIGHT: 161 LBS | HEIGHT: 59 IN | HEART RATE: 64 BPM | BODY MASS INDEX: 32.46 KG/M2 | OXYGEN SATURATION: 90 % | DIASTOLIC BLOOD PRESSURE: 61 MMHG | SYSTOLIC BLOOD PRESSURE: 119 MMHG

## 2021-06-11 DIAGNOSIS — J96.11 CHRONIC RESPIRATORY FAILURE WITH HYPOXIA AND HYPERCAPNIA (HCC): Primary | ICD-10-CM

## 2021-06-11 DIAGNOSIS — J96.12 CHRONIC RESPIRATORY FAILURE WITH HYPOXIA AND HYPERCAPNIA (HCC): Primary | ICD-10-CM

## 2021-06-11 DIAGNOSIS — I27.20 SEVERE PULMONARY HYPERTENSION (HCC): ICD-10-CM

## 2021-06-11 DIAGNOSIS — I10 BENIGN HYPERTENSION: Chronic | ICD-10-CM

## 2021-06-11 DIAGNOSIS — I63.419 CEREBROVASCULAR ACCIDENT (CVA) DUE TO EMBOLISM OF MIDDLE CEREBRAL ARTERY, UNSPECIFIED BLOOD VESSEL LATERALITY (HCC): ICD-10-CM

## 2021-06-11 DIAGNOSIS — I50.812 CHRONIC RIGHT-SIDED HEART FAILURE (HCC): ICD-10-CM

## 2021-06-11 PROCEDURE — 99214 OFFICE O/P EST MOD 30 MIN: CPT | Performed by: NURSE PRACTITIONER

## 2021-06-11 NOTE — PROGRESS NOTES
Subjective:     Encounter Date:06/11/2021      Patient ID: Dolly Ramirez is a 87 y.o. female     Chief Complaint:  Congestive Heart Failure  Presents for follow-up visit. Associated symptoms include shortness of breath. Pertinent negatives include no edema, fatigue, near-syncope or palpitations. Compliance with diet is %. Compliance with exercise is 51-75%. Compliance with medications is %.   Hypertension  This is a chronic problem. The current episode started more than 1 year ago. The problem is controlled. Associated symptoms include malaise/fatigue and shortness of breath. Pertinent negatives include no orthopnea or palpitations.   Shortness of Breath  This is a chronic problem. The current episode started more than 1 year ago. The problem has been gradually improving. Pertinent negatives include no leg swelling or orthopnea. The symptoms are aggravated by exercise and any activity.     Patient presents today for routine follow up for right sided heart failure and pulmonary hypertension. Patient was admitted for acute stroke and cor pulmonale in December. Prior to this she denies any significant medical issues - other than a report of interstitial lung disease.  She was diagnosed with right occipital and left medial gyrus stroke- concerning for embolic strokes per neurology note. She was started on Eliquis 2.5 BID and Aspirin by neurology. This was switched to Eliquis 5 mg BID according to dosing labels. Aspirin was stopped.  She was placed in a holter monitor. No A-fib was noted on telemetry while admitted or 14 day monitor. She was diagnosed with cor pulmonale, severe pulmonary hypertension and right sided heart failure- which were new diagnosis. She was found to have overnight hypoxia and was started on nocturnal oxygen. At last office visit she was hypoxic, short of breath with rales and lower leg edema. She took an extra Bumex for 2-3 days with improvement in oxygen. Today oxygen is stable on  "room air. Down 5 pounds. Breath sounds improved and leg swelling resolved. She has no complaints today. At last OV we set her up to follow up with pulmonary and she has an appointment next week. Overall doing much better. She does report some left hand numbness and tingling that is intermittent. She also has continued issues with old breast implants which she notes are busted and \"they tell me there is nothing that can be done.\" Her daughter is in the room with her today.     The following portions of the patient's history were reviewed and updated as appropriate: allergies, current medications, past medical history, past social history, past and problem list.    No Known Allergies    Current Outpatient Medications:   •  apixaban (ELIQUIS) 5 MG tablet tablet, Take 1 tablet by mouth Every 12 (Twelve) Hours. Indications: embolic stroke, Disp: 180 tablet, Rfl: 3  •  aspirin 81 MG chewable tablet, Chew 1 tablet Daily., Disp:  , Rfl:   •  atorvastatin (LIPITOR) 80 MG tablet, Take 1 tablet by mouth Every Night., Disp:  , Rfl:   •  bumetanide (BUMEX) 1 MG tablet, Take 1 tablet by mouth Daily., Disp: , Rfl:   •  carbidopa-levodopa (SINEMET)  MG per tablet, Take 1 tablet by mouth Every Night., Disp: , Rfl:   •  Cholecalciferol (VITAMIN D3) 5000 UNITS capsule capsule, Take 5,000 Units by mouth Daily., Disp: , Rfl:   •  CloNIDine (CATAPRES) 0.1 MG tablet, Take 0.1 mg by mouth 2 (Two) Times a Day., Disp: , Rfl:   •  FLUoxetine (PROzac) 20 MG capsule, Take 20 mg by mouth Daily., Disp: , Rfl:   •  gabapentin (NEURONTIN) 300 MG capsule, Take 100 mg by mouth 3 (Three) Times a Day., Disp: , Rfl:   •  lisinopril (PRINIVIL,ZESTRIL) 40 MG tablet, Take 40 mg by mouth Daily., Disp: , Rfl:   •  metoprolol succinate XL (TOPROL-XL) 25 MG 24 hr tablet, Take 1 tablet by mouth Daily., Disp: , Rfl:   •  Multiple Vitamins-Minerals (CENTRUM ADULTS PO), Take  by mouth Daily., Disp: , Rfl:   •  Omega-3 Fatty Acids (FISH OIL) 1200 MG capsule " capsule, Take 1,200 mg by mouth 2 (Two) Times a Day With Meals., Disp: , Rfl:   •  oxybutynin XL (DITROPAN-XL) 10 MG 24 hr tablet, Take 10 mg by mouth Daily., Disp: , Rfl:   •  pramipexole (MIRAPEX) 0.5 MG tablet, Take 0.5 mg by mouth Every Night., Disp: , Rfl:   •  QUEtiapine (SEROquel) 300 MG tablet, Take 300 mg by mouth Every Night., Disp: , Rfl:     Social History     Socioeconomic History   • Marital status:      Spouse name: Not on file   • Number of children: Not on file   • Years of education: Not on file   • Highest education level: Not on file   Tobacco Use   • Smoking status: Never Smoker   • Smokeless tobacco: Never Used   Vaping Use   • Vaping Use: Never used   Substance and Sexual Activity   • Alcohol use: No   • Drug use: No   • Sexual activity: Defer       Review of Systems   Constitutional: Positive for malaise/fatigue. Negative for decreased appetite, fatigue, weight gain and weight loss.   HENT: Negative for nosebleeds.    Eyes: Negative for visual disturbance.   Cardiovascular: Positive for dyspnea on exertion. Negative for leg swelling, near-syncope, orthopnea and palpitations.   Respiratory: Positive for shortness of breath. Negative for snoring.    Endocrine: Negative for cold intolerance and heat intolerance.   Hematologic/Lymphatic: Negative for bleeding problem. Does not bruise/bleed easily.   Musculoskeletal: Negative for back pain and falls.   Gastrointestinal: Negative for constipation, diarrhea, heartburn and melena.   Genitourinary: Negative for hematuria.   Neurological: Negative for dizziness and light-headedness.   Psychiatric/Behavioral: Negative for altered mental status.   Allergic/Immunologic: Negative for persistent infections.              Objective:     Vitals reviewed.   Constitutional:       Appearance: Frail. Chronically ill-appearing.   Eyes:      Pupils: Pupils are equal, round, and reactive to light.   HENT:      Head: Normocephalic and atraumatic.      Nose:  "Nose normal.    Mouth/Throat:      Dentition: Normal.      Pharynx: Oropharynx is clear.   Pulmonary:      Effort: Pulmonary effort is normal. Tachypnea present.      Breath sounds: Normal breath sounds.   Chest:      Chest wall: Not tender to palpatation.   Cardiovascular:      PMI at left midclavicular line. Normal rate. Regular rhythm.      Murmurs: There is no murmur.   Pulses:     Intact distal pulses.   Edema:     Peripheral edema absent.   Abdominal:      General: Bowel sounds are normal.      Palpations: Abdomen is soft.   Musculoskeletal: Normal range of motion.      Cervical back: Normal range of motion. Skin:     General: Skin is warm and dry.   Neurological:      Mental Status: Alert, oriented to person, place, and time and oriented to person, place and time.   Psychiatric:         Attention and Perception: Attention normal.         Mood and Affect: Mood normal.           Procedures  /61   Pulse 64   Ht 149.9 cm (59\")   Wt 73 kg (161 lb)   SpO2 90%   BMI 32.52 kg/m²   Lab Review:   I have reviewed       Lab Results   Component Value Date    CHOL 130 12/28/2020    TRIG 113 12/28/2020    HDL 55 12/28/2020    LDL 55 12/28/2020      Results for orders placed during the hospital encounter of 12/27/20    Adult Transthoracic Echo Complete W/ Cont if Necessary Per Protocol (With Agitated Saline)    Interpretation Summary  · The right atrial cavity is mild to moderately dilated.  · Moderate aortic valve regurgitation is present.  · Estimated right ventricular systolic pressure from tricuspid regurgitation is markedly elevated (>55 mmHg).  · Severe pulmonary hypertension is present.  · The right ventricular cavity is severely dilated.  · Severely reduced right ventricular systolic function noted.  · Left ventricular wall thickness is consistent with concentric hypertrophy.  · Estimated left ventricular EF = 65% Left ventricular systolic function is normal.  · Left ventricular diastolic function is " consistent with age.  · The study is very limited and a small right to left shunt can not be excluded     Assessment:          Diagnosis Plan   1. Chronic respiratory failure with hypoxia and hypercapnia (CMS/HCC)     2. Chronic right-sided heart failure (CMS/HCC)     3. Severe pulmonary hypertension (CMS/HCC)     4. Cerebrovascular accident (CVA) due to embolism of middle cerebral artery, unspecified blood vessel laterality (CMS/HCC)     5. Benign hypertension            Plan:       1. Acute on chronic Respiratory Failure- was discharged on oxygen but only wears at night now. Oxygen has improved since last OV. Has appointment next week to set up care with [facility]. Suggested sleep apnea work up.   2. Right Sided Heart Failure- secondary to lung disease and severe pulmonary hypertension. Discussed importance of low salt diet. Daily weights. Discussed symptoms to monitor. On Bumex.   Volume status much improved after a few days of flex bumex dosing. We discussed this option again to take extra as needed. Low Salt Diet. Monitor daily weights.   3. Severe pulmonary hypertension - Reports history of interstitial lung disease. New diagnosis of respiratory failure and severe pulmonary hypertension in December. Has appointment to establish care with pulmonary next week.  4. CVA- thought to be embolic per Neurology. Started on Eliquis 2.5 BID. Patient is 87 years old, 72 kg and despite CKD Creatinine is less than 1.5. She is now on apporpriate dosing Eliquis and Aspirin has been stopped.   5. Hypertension- blood pressure much better today in office.     Follow up in 3 months or sooner if needed    I spent 30 minutes caring for Dolly on this date of service. This time includes time spent by me in the following activities:preparing for the visit, reviewing tests, obtaining and/or reviewing a separately obtained history, performing a medically appropriate examination and/or evaluation , counseling and educating  the patient/family/caregiver, ordering medications, tests, or procedures, referring and communicating with other health care professionals , documenting information in the medical record, independently interpreting results and communicating that information with the patient/family/caregiver and care coordination

## 2021-06-24 ENCOUNTER — OFFICE VISIT (OUTPATIENT)
Dept: PULMONOLOGY | Facility: CLINIC | Age: 86
End: 2021-06-24

## 2021-06-24 VITALS
OXYGEN SATURATION: 93 % | WEIGHT: 161 LBS | HEIGHT: 59 IN | BODY MASS INDEX: 32.46 KG/M2 | HEART RATE: 63 BPM | SYSTOLIC BLOOD PRESSURE: 124 MMHG | DIASTOLIC BLOOD PRESSURE: 78 MMHG

## 2021-06-24 DIAGNOSIS — I27.20 PULMONARY HYPERTENSION (HCC): Chronic | ICD-10-CM

## 2021-06-24 DIAGNOSIS — J96.11 CHRONIC HYPOXEMIC RESPIRATORY FAILURE (HCC): Primary | Chronic | ICD-10-CM

## 2021-06-24 DIAGNOSIS — Z85.3 HISTORY OF BREAST CANCER: ICD-10-CM

## 2021-06-24 PROCEDURE — 99214 OFFICE O/P EST MOD 30 MIN: CPT | Performed by: NURSE PRACTITIONER

## 2021-06-24 NOTE — PROGRESS NOTES
"Chief Complaint  acute respiratory failure with hypoxia and hypercapnia    Subjective    History of Present Illness      Dolly Ramirez presents to [facility] for:    She returns today with complaints of shortness of breath and leg weakness.  Rest and exercise sats qualifies her for daytime oxygen.  She is very concerned about being able to manage at home with her current set up that includes a regular concentrator and small portable tanks.  She does not feel that she can be mobile and manage that set up.  She has been given a prescription to obtain a portable concentrator.  She has known chronic respiratory failure with hypoxia and hypercapnia but up to now has only been using oxygen at night.  She also has pulmonary hypertension and history of breast cancer.  She is a never smoker.  Her overnight pulse ox study from March of this year showed a very elevated oxygen desaturation index and I had recommended a sleep study but she has declined.  She is up-to-date on all vaccines.  She does not require any bronchodilator therapy.       Objective   Vital Signs:   /78   Pulse 63   Ht 149.9 cm (59\")   Wt 73 kg (161 lb)   SpO2 93% Comment: RA  BMI 32.52 kg/m²     Physical Exam  Vitals reviewed.   Constitutional:       General: She is not in acute distress.     Appearance: Normal appearance. She is overweight.   HENT:      Head: Normocephalic and atraumatic.      Comments: Wearing a mask  Cardiovascular:      Rate and Rhythm: Normal rate and regular rhythm.      Heart sounds: Murmur heard.     Pulmonary:      Effort: Pulmonary effort is normal.      Breath sounds: Normal breath sounds.   Musculoskeletal:      Cervical back: Normal range of motion.   Skin:     General: Skin is warm and dry.   Neurological:      Mental Status: She is alert and oriented to person, place, and time.      Motor: Weakness present.      Comments: Uses a walker or wheelchair "   Psychiatric:         Mood and Affect: Mood normal.         Behavior: Behavior normal.        Result Review :   The following data was reviewed by: BERNADINE Hirsch on 06/24/2021:  Scan on 3/1/2021 by Jeyson Garcia APRN: PULSE OXIMETRY, [facility]-03/01/21          No results found for this or any previous visit.    Rest/Exercise Pulse Ox Values        Some values may be hidden. Unless noted otherwise, only the newest values recorded on each date are displayed.         Rest/Exercise Pulse Ox Results 6/24/21   Rest room air SAT % 92   Exercise room air SAT % 87   Rest on O2 @ Liters 2   Rest on O2 SAT % 98   Exercise on O2 @ Liters 2   Exercise on O2 SAT % 98                  Assessment and Plan      Diagnoses and all orders for this visit:    1. Chronic hypoxemic respiratory failure (CMS/HCC) (Primary)  Comments:  She has qualified for daytime oxygen.  She will plan on obtaining a portable concentrator.  Wear 2 L with ambulation and at night.  Orders:  -     Oxygen Therapy  -     Walking Oximetry; Future  -     Walking Oximetry    2. Pulmonary hypertension (CMS/HCC)  Comments:  Continue to wear nocturnal oxygen.  Orders:  -     Oxygen Therapy    3. History of breast cancer  Comments:  Without recurrence.    4. BMI 32.0-32.9,adult  Comments:  Weight loss would be beneficial however patient is very weak so her ability to do physical activity is limited.          BERNADINE Hirsch  6/24/2021  14:44 CDT    Follow Up   Return in about 1 year (around 6/24/2022).    Patient was given instructions and counseling regarding her condition or for health maintenance advice. Please see specific information pulled into the AVS if appropriate.

## 2021-09-17 ENCOUNTER — OFFICE VISIT (OUTPATIENT)
Dept: CARDIOLOGY | Facility: CLINIC | Age: 86
End: 2021-09-17

## 2021-09-17 VITALS
SYSTOLIC BLOOD PRESSURE: 122 MMHG | HEART RATE: 68 BPM | DIASTOLIC BLOOD PRESSURE: 78 MMHG | BODY MASS INDEX: 34.92 KG/M2 | RESPIRATION RATE: 19 BRPM | HEIGHT: 59 IN | TEMPERATURE: 98.5 F | OXYGEN SATURATION: 100 % | WEIGHT: 173.2 LBS

## 2021-09-17 DIAGNOSIS — I10 BENIGN HYPERTENSION: Chronic | ICD-10-CM

## 2021-09-17 DIAGNOSIS — I27.20 SEVERE PULMONARY HYPERTENSION (HCC): ICD-10-CM

## 2021-09-17 DIAGNOSIS — I63.419 CEREBROVASCULAR ACCIDENT (CVA) DUE TO EMBOLISM OF MIDDLE CEREBRAL ARTERY, UNSPECIFIED BLOOD VESSEL LATERALITY (HCC): ICD-10-CM

## 2021-09-17 DIAGNOSIS — I50.812 CHRONIC RIGHT-SIDED HEART FAILURE (HCC): ICD-10-CM

## 2021-09-17 DIAGNOSIS — I35.1 NONRHEUMATIC AORTIC VALVE INSUFFICIENCY: ICD-10-CM

## 2021-09-17 DIAGNOSIS — J96.12 CHRONIC RESPIRATORY FAILURE WITH HYPOXIA AND HYPERCAPNIA (HCC): ICD-10-CM

## 2021-09-17 DIAGNOSIS — J96.11 CHRONIC RESPIRATORY FAILURE WITH HYPOXIA AND HYPERCAPNIA (HCC): ICD-10-CM

## 2021-09-17 DIAGNOSIS — R42 DIZZINESS: Primary | ICD-10-CM

## 2021-09-17 PROCEDURE — 99214 OFFICE O/P EST MOD 30 MIN: CPT | Performed by: NURSE PRACTITIONER

## 2021-09-17 PROCEDURE — 93000 ELECTROCARDIOGRAM COMPLETE: CPT | Performed by: NURSE PRACTITIONER

## 2021-09-17 NOTE — PROGRESS NOTES
Subjective:     Encounter Date:09/17/2021      Patient ID: Dolly Ramirez is a 87 y.o. female     Chief Complaint:  Congestive Heart Failure  Presents for follow-up visit. Associated symptoms include shortness of breath. Pertinent negatives include no edema, fatigue, near-syncope or palpitations. Compliance with diet is %. Compliance with exercise is 51-75%. Compliance with medications is %.   Hypertension  This is a chronic problem. The current episode started more than 1 year ago. The problem is controlled. Associated symptoms include malaise/fatigue and shortness of breath. Pertinent negatives include no orthopnea or palpitations.   Shortness of Breath  This is a chronic problem. The current episode started more than 1 year ago. The problem has been gradually improving. Pertinent negatives include no leg swelling or orthopnea. The symptoms are aggravated by exercise and any activity.     Patient presents today for routine follow up for right sided heart failure and pulmonary hypertension. Patient was admitted for acute stroke and cor pulmonale in December 2020. Prior to this she denies any significant medical issues - other than a report of interstitial lung disease.  She was diagnosed with right occipital and left medial gyrus stroke- concerning for embolic strokes per neurology note. She was started on Eliquis 2.5 BID and Aspirin by neurology. This was switched to Eliquis 5 mg BID according to dosing labels. Aspirin was stopped.  She was placed in a holter monitor. No A-fib was noted on telemetry while admitted or 14 day monitor. She was diagnosed with cor pulmonale, severe pulmonary hypertension, aortic valve regurgitation and right sided heart failure- which were new diagnosis. She was found to have overnight hypoxia and was started on nocturnal oxygen. She since last OV has been seen by pulmonary and started on continuous oxygen. She notes her breathing is much better. Patient takes Bumex daily.  "Attempts a low salt diet. Denies chest pain, palpitations or leg swelling. She does report chronic numbness of her fingers- she notes she has lost her sensation and has hard time picking up small objects. Overall stable. She follows with Dr. Vidal Elizondo as he PCP. And Follows with [facility].     She did have an episode of dizziness today after sitting up quickly after EKG. A repeat BP showed 90/60 while dizzy. A repeat with improvement in symptoms showed 110/70. She notes she doesn't experience dizziness routinely but notes \"I don't normally move that quickly.\" At end of visit she is returned to normal and is ready to go. Daughter is with her and we assister her out to car with minimal assistance.     The following portions of the patient's history were reviewed and updated as appropriate: allergies, current medications, past medical history, past social history, past and problem list.    No Known Allergies    Current Outpatient Medications:   •  apixaban (ELIQUIS) 5 MG tablet tablet, Take 1 tablet by mouth Every 12 (Twelve) Hours. Indications: embolic stroke, Disp: 180 tablet, Rfl: 3  •  atorvastatin (LIPITOR) 80 MG tablet, Take 1 tablet by mouth Every Night., Disp:  , Rfl:   •  bumetanide (BUMEX) 1 MG tablet, Take 1 tablet by mouth Daily., Disp: , Rfl:   •  carbidopa-levodopa (SINEMET)  MG per tablet, Take 1 tablet by mouth Every Night., Disp: , Rfl:   •  Cholecalciferol (VITAMIN D3) 5000 UNITS capsule capsule, Take 5,000 Units by mouth Daily., Disp: , Rfl:   •  CloNIDine (CATAPRES) 0.1 MG tablet, Take 0.1 mg by mouth 2 (Two) Times a Day., Disp: , Rfl:   •  FLUoxetine (PROzac) 20 MG capsule, Take 20 mg by mouth Daily., Disp: , Rfl:   •  gabapentin (NEURONTIN) 300 MG capsule, Take 100 mg by mouth 3 (Three) Times a Day., Disp: , Rfl:   •  lisinopril (PRINIVIL,ZESTRIL) 40 MG tablet, Take 40 mg by mouth Daily., Disp: , Rfl:   •  metoprolol succinate XL (TOPROL-XL) 25 MG 24 hr tablet, Take 1 " tablet by mouth Daily., Disp: , Rfl:   •  Multiple Vitamins-Minerals (CENTRUM ADULTS PO), Take  by mouth Daily., Disp: , Rfl:   •  Omega-3 Fatty Acids (FISH OIL) 1200 MG capsule capsule, Take 1,200 mg by mouth 2 (Two) Times a Day With Meals., Disp: , Rfl:   •  oxybutynin XL (DITROPAN-XL) 10 MG 24 hr tablet, Take 10 mg by mouth Daily., Disp: , Rfl:   •  pramipexole (MIRAPEX) 0.5 MG tablet, Take 0.5 mg by mouth Every Night., Disp: , Rfl:   •  QUEtiapine (SEROquel) 300 MG tablet, Take 300 mg by mouth Every Night., Disp: , Rfl:     Social History     Socioeconomic History   • Marital status:      Spouse name: Not on file   • Number of children: Not on file   • Years of education: Not on file   • Highest education level: Not on file   Tobacco Use   • Smoking status: Never Smoker   • Smokeless tobacco: Never Used   Vaping Use   • Vaping Use: Never used   Substance and Sexual Activity   • Alcohol use: No   • Drug use: No   • Sexual activity: Defer       Review of Systems   Constitutional: Positive for malaise/fatigue. Negative for decreased appetite, fatigue, weight gain and weight loss.   HENT: Negative for nosebleeds.    Eyes: Negative for visual disturbance.   Cardiovascular: Positive for dyspnea on exertion. Negative for leg swelling, near-syncope, orthopnea and palpitations.   Respiratory: Positive for shortness of breath. Negative for snoring.    Endocrine: Negative for cold intolerance and heat intolerance.   Hematologic/Lymphatic: Negative for bleeding problem. Does not bruise/bleed easily.   Musculoskeletal: Negative for back pain and falls.        Numbness in fingers for several months    Gastrointestinal: Negative for constipation, diarrhea, heartburn and melena.   Genitourinary: Negative for hematuria.   Neurological: Negative for dizziness and light-headedness.   Psychiatric/Behavioral: Negative for altered mental status.   Allergic/Immunologic: Negative for persistent infections.             "  Objective:     Vitals reviewed.   Constitutional:       Appearance: Frail. Chronically ill-appearing.   Eyes:      Pupils: Pupils are equal, round, and reactive to light.   HENT:      Head: Normocephalic and atraumatic.      Nose: Nose normal.    Mouth/Throat:      Dentition: Normal.      Pharynx: Oropharynx is clear.   Pulmonary:      Effort: Pulmonary effort is normal. Tachypnea present.      Breath sounds: Normal breath sounds.   Chest:      Chest wall: Not tender to palpatation.   Cardiovascular:      PMI at left midclavicular line. Normal rate. Regular rhythm.      Murmurs: There is a diastolic murmur.   Pulses:     Intact distal pulses.   Edema:     Peripheral edema absent.   Abdominal:      General: Bowel sounds are normal.      Palpations: Abdomen is soft.   Musculoskeletal: Normal range of motion.      Cervical back: Normal range of motion. Skin:     General: Skin is warm and dry.   Neurological:      Mental Status: Alert, oriented to person, place, and time and oriented to person, place and time.   Psychiatric:         Attention and Perception: Attention normal.         Mood and Affect: Mood normal.             ECG 12 Lead    Date/Time: 9/17/2021 11:10 AM  Performed by: Jaron Espinoza APRN  Authorized by: Jaron Espinoza APRN   Comparison: compared with previous ECG from 3/11/2021  Similar to previous ECG  Comparison to previous ECG: Poor quality   Rhythm: sinus rhythm  Conduction: 1st degree AV block          /78 (BP Location: Left arm, Patient Position: Sitting, Cuff Size: Adult)   Pulse 68   Temp 98.5 °F (36.9 °C) (Skin)   Resp 19   Ht 149.9 cm (59\")   Wt 78.6 kg (173 lb 3.2 oz)   SpO2 100%   BMI 34.98 kg/m²   Lab Review:   I have reviewed       Lab Results   Component Value Date    CHOL 130 12/28/2020    TRIG 113 12/28/2020    HDL 55 12/28/2020    LDL 55 12/28/2020      Results for orders placed during the hospital encounter of 12/27/20    Adult Transthoracic Echo Complete W/ Cont if " Necessary Per Protocol (With Agitated Saline)    Interpretation Summary  · The right atrial cavity is mild to moderately dilated.  · Moderate aortic valve regurgitation is present.  · Estimated right ventricular systolic pressure from tricuspid regurgitation is markedly elevated (>55 mmHg).  · Severe pulmonary hypertension is present.  · The right ventricular cavity is severely dilated.  · Severely reduced right ventricular systolic function noted.  · Left ventricular wall thickness is consistent with concentric hypertrophy.  · Estimated left ventricular EF = 65% Left ventricular systolic function is normal.  · Left ventricular diastolic function is consistent with age.  · The study is very limited and a small right to left shunt can not be excluded     Assessment:          Diagnosis Plan   1. Dizziness     2. Nonrheumatic aortic valve insufficiency  Adult Transthoracic Echo Complete W/ Cont if Necessary Per Protocol   3. Chronic respiratory failure with hypoxia and hypercapnia (CMS/HCC)     4. Chronic right-sided heart failure (CMS/HCC)     5. Severe pulmonary hypertension (CMS/HCC)     6. Cerebrovascular accident (CVA) due to embolism of middle cerebral artery, unspecified blood vessel laterality (CMS/HCC)     7. Benign hypertension            Plan:       1. Dizziness- episode with sitting up to quickly in office today. After 30 minutes has recovered. She denies any other episodes. Change positions slowly. Go to ER for any unrelieved symptoms. Encouraged hydration.   2. Moderate AS- check echo prior to next appointment.   3. Chronic Respiratory Failure- Now on continuous oxygen and notes breathing better  4. Right Sided Heart Failure- secondary to lung disease and severe pulmonary hypertension. Discussed importance of low salt diet. Daily weights. Discussed symptoms to monitor. On Bumex.    Low Salt Diet. Monitor daily weights. Euvolemic today. Flex dosing of Bumex PRN.  5. Severe pulmonary hypertension - History  of interstitial lung disease. New diagnosis of respiratory failure and severe pulmonary hypertension in December. [facility]  6. CVA- thought to be embolic per Neurology. Started on Eliquis 2.5 BID. Patient is 87 years old, 72 kg and despite CKD Creatinine is less than 1.5. She is now on apporpriate dosing Eliquis and Aspirin has been stopped. Need to monitor renal function and continue surveillance for appropriate dose.  7. Hypertension- stable. With good control.      Follow up in 6 months or sooner if needed. Echo prior to appointment    I spent 38 minutes caring for Dolly on this date of service. This time includes time spent by me in the following activities:preparing for the visit, reviewing tests, obtaining and/or reviewing a separately obtained history, performing a medically appropriate examination and/or evaluation , counseling and educating the patient/family/caregiver, ordering medications, tests, or procedures, referring and communicating with other health care professionals , documenting information in the medical record, independently interpreting results and communicating that information with the patient/family/caregiver and care coordination

## 2021-09-27 NOTE — PROGRESS NOTES
How Severe Is Your Skin Lesion?: mild Occupational Therapy     01/01/21 1300   General   Diagnosis B  CVAs   Pain Assessment   Patient Currently in Pain No   Pain Assessment 0-10   Pain Level 0   Balance   Sitting Balance Supervision   Standing Balance Minimal assistance   Standing Balance   Time 2 minutes x2 trials. Activity 1 handed static standing task   Transfers   Stand Step Transfers Minimal assistance   Sit to stand Minimal assistance   Stand to sit Minimal assistance   Toilet Transfers   Toilet - Technique Stand step   Equipment Used Grab bars   Toilet Transfer Minimal assistance   Type of ROM/Therapeutic Exercise   Type of ROM/Therapeutic Exercise AROM   Comment BUE 2 sets x 10 reps. Assessment   Performance deficits / Impairments Decreased functional mobility ; Decreased ADL status; Decreased strength;Decreased cognition;Decreased endurance;Decreased balance;Decreased high-level IADLs   Treatment Diagnosis B CVAs   Prognosis Good   Timed Code Treatment Minutes 60 Minutes   Activity Tolerance   Activity Tolerance Patient limited by fatigue   Safety Devices   Safety Devices in place Yes   Type of devices Call light within reach; Bed alarm in place   Plan   Current Treatment Recommendations Strengthening;Balance Training;Functional Mobility Training; Endurance Training; Safety Education & Training;Equipment Evaluation, Education, & procurement;Patient/Caregiver Education & Training;Self-Care / ADL; Home Management Training      01/01/21 [address] Needed Substantial/maximal assistance   CARE Score 2 Have Your Skin Lesions Been Treated?: not been treated Is This A New Presentation, Or A Follow-Up?: Skin Lesions Which Family Member (Optional)?: Mom Which Family Member (Optional)?: Sister

## 2021-12-28 PROBLEM — I63.9 CEREBROVASCULAR ACCIDENT (CVA) (HCC): Status: ACTIVE | Noted: 2021-01-01

## 2021-12-28 PROBLEM — I50.812 CHRONIC RIGHT-SIDED HEART FAILURE (HCC): Status: ACTIVE | Noted: 2021-01-01

## 2021-12-28 PROBLEM — I35.1 NONRHEUMATIC AORTIC VALVE INSUFFICIENCY: Status: ACTIVE | Noted: 2021-01-01

## 2021-12-28 PROBLEM — G91.2 NPH (NORMAL PRESSURE HYDROCEPHALUS) (HCC): Status: ACTIVE | Noted: 2021-01-01

## 2021-12-28 NOTE — PROGRESS NOTES
SUBJECTIVE:    Patient ID: Josep Escobedo is a 80 y.o. female. HPI:     Patient is seen today to establish care. She has a history of normal pressure hydrocephalus and stroke. She was seen by Dr. Violet Gamble in the hospital back in December of last year but is not regularly followed by neurologist.  She states that she has not had any new headaches or strokelike symptoms in the past several months. She does have a history of nonrheumatic aortic valve insufficiency and is followed by cardiology. She sees [facility] cardio. She just had them in September of this year and they did not make any changes to her medications. She also has a history of right-sided heart failure. She does have a history of chronic respiratory failure and pulmonary hypertension. She is followed by pulmonology at [facility] disease clinic as well. Past Medical History:   Diagnosis Date    Blindness of right eye     Cancer ([facility] [address]     breast, kidney    Heart failure ([facility] [address]     right sided    Hypertension     Macular degeneration     bilateral    Normal pressure hydrocephalus (HCC)     Stroke [facility])      Current Outpatient Medications on File Prior to Visit   Medication Sig Dispense Refill    Multiple Vitamins-Minerals (EYE VITAMINS PO) Take by mouth daily      apixaban (ELIQUIS) 2.5 MG TABS tablet Take 1 tablet by mouth every 12 hours 60 tablet 0    gabapentin (NEURONTIN) 100 MG capsule Take 1 capsule by mouth 3 times daily for 31 days.  90 capsule 3    atorvastatin (LIPITOR) 80 MG tablet Take 1 tablet by mouth nightly 30 tablet 3    metoprolol succinate (TOPROL XL) 100 MG extended release tablet Take 1 tablet by mouth daily 30 tablet 3    bumetanide (BUMEX) 1 MG tablet Take 1 tablet by mouth daily 30 tablet 3    OXYGEN Inhale 1 L/min into the lungs continuous 1 Bottle 5    carbidopa-levodopa (SINEMET)  MG per tablet Take 1 tablet by mouth nightly      cloNIDine (CATAPRES) 0.1 MG tablet Take 0.1 mg by mouth 2 times daily      Omega-3 Fatty Acids (FISH OIL) 1200 MG CAPS Take 1 capsule by mouth 2 times daily (with meals)      FLUoxetine (PROZAC) 20 MG capsule Take 20 mg by mouth daily      lisinopril (PRINIVIL;ZESTRIL) 40 MG tablet Take 40 mg by mouth daily      Multiple Vitamins-Minerals (MULTIVITAMIN ADULTS PO) Take 1 tablet by mouth daily      oxybutynin (DITROPAN-XL) 10 MG extended release tablet Take 10 mg by mouth daily      pramipexole (MIRAPEX) 0.5 MG tablet Take 0.5 mg by mouth nightly      QUEtiapine (SEROQUEL) 300 MG tablet Take 300 mg by mouth nightly      Cholecalciferol (VITAMIN D3) 1.25 MG (92033 UT) CAPS Take 1 capsule by mouth daily       No current facility-administered medications on file prior to visit. No Known Allergies  Past Surgical History:   Procedure Laterality Date    BACK SURGERY      MASTECTOMY, BILATERAL      PARTIAL NEPHRECTOMY Right     TOTAL KNEE ARTHROPLASTY Bilateral      Family History   Problem Relation Age of Onset    Heart Attack Mother      Social History     Socioeconomic History    Marital status:      Spouse name: Not on file    Number of children: Not on file    Years of education: Not on file    Highest education level: Not on file   Occupational History    Not on file   Tobacco Use    Smoking status: Never Smoker    Smokeless tobacco: Never Used   Vaping Use    Vaping Use: Never used   Substance and Sexual Activity    Alcohol use: Never    Drug use: Not on file    Sexual activity: Not on file   Other Topics Concern    Not on file   Social History Narrative    Not on file     Social Determinants of Health     Financial Resource Strain:     Difficulty of Paying Living Expenses: Not on file   Food Insecurity:     Worried About Running Out of Food in the Last Year: Not on file    Nahid of Food in the Last Year: Not on file   Transportation Needs:     Lack of Transportation (Medical): Not on file    Lack of Transportation (Non-Medical):  Not on file   Physical Activity:     Days of Exercise per Week: Not on file    Minutes of Exercise per Session: Not on file   Stress:     Feeling of Stress : Not on file   Social Connections:     Frequency of Communication with Friends and Family: Not on file    Frequency of Social Gatherings with Friends and Family: Not on file    Attends [employer] Services: Not on file    Active Member of [address] [place] [address] or Organizations: Not on file    Attends Club or Organization Meetings: Not on file    Marital Status: Not on file   Intimate Partner Violence:     Fear of Current or Ex-Partner: Not on file    Emotionally Abused: Not on file    Physically Abused: Not on file    Sexually Abused: Not on file   Housing Stability:     Unable to Pay for Housing in the Last Year: Not on file    Number of [place] in the Last Year: Not on file    Unstable Housing in the Last Year: Not on file       Review of Systems   Constitutional: Negative for activity change, appetite change and fever. HENT: Negative for congestion and nosebleeds. Eyes: Negative for discharge. Respiratory: Positive for shortness of breath. Negative for cough and wheezing. Cardiovascular: Negative for chest pain and leg swelling. Gastrointestinal: Negative for abdominal pain, diarrhea, nausea and vomiting. Genitourinary: Negative for difficulty urinating, frequency and urgency. Musculoskeletal: Negative for back pain and gait problem. Skin: Negative for color change and rash. Neurological: Negative for dizziness and headaches. Hematological: Does not bruise/bleed easily. Psychiatric/Behavioral: Negative for sleep disturbance and suicidal ideas. OBJECTIVE:    Physical Exam  Vitals reviewed. Constitutional:       General: She is not in acute distress. Appearance: Normal appearance. She is well-developed. She is not diaphoretic. HENT:      Head: Normocephalic and atraumatic.       Right Ear: External ear normal.      Left Ear: External ear normal.   Cardiovascular:      Rate and Rhythm: Normal rate and regular rhythm. Pulses: Normal pulses. Heart sounds: Murmur heard. Pulmonary:      Effort: Pulmonary effort is normal. No respiratory distress. Breath sounds: Normal breath sounds. Abdominal:      General: Abdomen is flat. Bowel sounds are normal.   Musculoskeletal:      Cervical back: Normal range of motion and neck supple. Skin:     General: Skin is warm and dry. Neurological:      General: No focal deficit present. Mental Status: She is alert and oriented to person, place, and time. Mental status is at baseline. Psychiatric:         Mood and Affect: Mood normal.         Behavior: Behavior normal.         Thought Content: Thought content normal.         Judgment: Judgment normal.        /80   Pulse 66   Temp 97.7 °F (36.5 °C)   Resp 16   Ht 4' 11\" (1.499 m)   Wt 172 lb 12.8 oz (78.4 kg)   SpO2 100% Comment: on 2 Liters  BMI 34.90 kg/m²      ASSESSMENT/PLAN:    1. NPH (normal pressure hydrocephalus) (HCC)  2. Cerebrovascular accident (CVA), unspecified mechanism ([address]  3. Chronic right-sided heart failure ([address]  4. Nonrheumatic aortic valve insufficiency       Continue to follow with cardiology regarding her chronic right-sided heart failure. Continue lisinopril 40 mg for blood pressure control. Continue Bumex for diuresis. Continue Sinemet for the NPH and CVA. Continue Lipitor for hyperlipidemia. Continue Eliquis for cardiovascular protection. Follow up with us in 3 months for check up unless needed sooner. EMR Dragon/transcription disclaimer:  Much of this encounter note is electronictranscription/translation of spoken language to printed texts. The electronic translation of spoken language may be erroneous, or at times, nonsensical words or phrases may be inadvertently transcribed.   Although I havereviewed the note for such errors, some may still exist.

## 2022-01-01 ENCOUNTER — APPOINTMENT (OUTPATIENT)
Dept: INFUSION THERAPY | Age: 87
End: 2022-01-01
Payer: MEDICARE

## 2022-01-01 ENCOUNTER — HOSPITAL ENCOUNTER (OUTPATIENT)
Dept: INFUSION THERAPY | Age: 87
Setting detail: INFUSION SERIES
Discharge: HOME | End: 2022-04-07
Payer: MEDICARE

## 2022-01-01 ENCOUNTER — HOSPITAL ENCOUNTER (OUTPATIENT)
Dept: INFUSION THERAPY | Age: 87
Discharge: HOME | End: 2022-09-02
Payer: MEDICARE

## 2022-01-01 ENCOUNTER — HOSPITAL ENCOUNTER (INPATIENT)
Facility: HOSPITAL | Age: 87
LOS: 10 days | End: 2022-10-04
Attending: FAMILY MEDICINE | Admitting: INTERNAL MEDICINE

## 2022-01-01 ENCOUNTER — HOSPITAL ENCOUNTER (OUTPATIENT)
Dept: INFUSION THERAPY | Age: 87
Discharge: HOME | End: 2022-03-01
Payer: MEDICARE

## 2022-01-01 ENCOUNTER — APPOINTMENT (OUTPATIENT)
Dept: GENERAL RADIOLOGY | Facility: HOSPITAL | Age: 87
End: 2022-01-01

## 2022-01-01 ENCOUNTER — HOSPITAL ENCOUNTER (OUTPATIENT)
Dept: CARDIOLOGY | Facility: HOSPITAL | Age: 87
Discharge: HOME | End: 2022-03-25
Admitting: NURSE PRACTITIONER

## 2022-01-01 ENCOUNTER — HOSPITAL ENCOUNTER (OUTPATIENT)
Dept: MRI IMAGING | Facility: HOSPITAL | Age: 87
Discharge: HOME | End: 2022-02-21
Admitting: FAMILY MEDICINE

## 2022-01-01 ENCOUNTER — HOSPITAL ENCOUNTER (OUTPATIENT)
Dept: INFUSION THERAPY | Age: 87
Discharge: HOME | End: 2022-04-21
Payer: MEDICARE

## 2022-01-01 ENCOUNTER — OFFICE VISIT (OUTPATIENT)
Dept: HEMATOLOGY | Age: 87
End: 2022-01-01
Payer: MEDICARE

## 2022-01-01 ENCOUNTER — HOSPITAL ENCOUNTER (OUTPATIENT)
Dept: INFUSION THERAPY | Age: 87
Setting detail: INFUSION SERIES
Discharge: HOME | End: 2022-03-31
Payer: MEDICARE

## 2022-01-01 ENCOUNTER — HOSPITAL ENCOUNTER (OUTPATIENT)
Dept: INFUSION THERAPY | Age: 87
Discharge: HOME | End: 2022-07-27
Payer: MEDICARE

## 2022-01-01 ENCOUNTER — TELEPHONE (OUTPATIENT)
Dept: PRIMARY CARE CLINIC | Age: 87
End: 2022-01-01

## 2022-01-01 ENCOUNTER — OFFICE VISIT (OUTPATIENT)
Dept: CARDIOLOGY | Facility: CLINIC | Age: 87
End: 2022-01-01

## 2022-01-01 ENCOUNTER — OFFICE VISIT (OUTPATIENT)
Dept: PULMONOLOGY | Facility: CLINIC | Age: 87
End: 2022-01-01

## 2022-01-01 ENCOUNTER — OFFICE VISIT (OUTPATIENT)
Dept: PRIMARY CARE CLINIC | Age: 87
End: 2022-01-01
Payer: MEDICARE

## 2022-01-01 ENCOUNTER — HOSPITAL ENCOUNTER (EMERGENCY)
Facility: HOSPITAL | Age: 87
Discharge: HOME | End: 2022-01-13
Attending: EMERGENCY MEDICINE | Admitting: EMERGENCY MEDICINE

## 2022-01-01 ENCOUNTER — HOSPITAL ENCOUNTER (OUTPATIENT)
Dept: INFUSION THERAPY | Age: 87
Discharge: HOME | End: 2022-05-05
Payer: MEDICARE

## 2022-01-01 ENCOUNTER — HOSPITAL ENCOUNTER (OUTPATIENT)
Dept: INFUSION THERAPY | Age: 87
Discharge: HOME | End: 2022-07-13
Payer: MEDICARE

## 2022-01-01 ENCOUNTER — HOSPITAL ENCOUNTER (OUTPATIENT)
Dept: INFUSION THERAPY | Age: 87
Discharge: HOME | End: 2022-05-19
Payer: MEDICARE

## 2022-01-01 ENCOUNTER — APPOINTMENT (OUTPATIENT)
Dept: CT IMAGING | Facility: HOSPITAL | Age: 87
End: 2022-01-01

## 2022-01-01 ENCOUNTER — TELEPHONE (OUTPATIENT)
Dept: HEMATOLOGY | Age: 87
End: 2022-01-01

## 2022-01-01 ENCOUNTER — HOSPITAL ENCOUNTER (OUTPATIENT)
Dept: MRI IMAGING | Facility: HOSPITAL | Age: 87
End: 2022-01-01

## 2022-01-01 ENCOUNTER — NURSE TRIAGE (OUTPATIENT)
Dept: CALL CENTER | Facility: HOSPITAL | Age: 87
End: 2022-01-01

## 2022-01-01 ENCOUNTER — HOSPITAL ENCOUNTER (OUTPATIENT)
Dept: INFUSION THERAPY | Age: 87
Setting detail: INFUSION SERIES
Discharge: HOME | End: 2022-04-14
Payer: MEDICARE

## 2022-01-01 ENCOUNTER — TRANSCRIBE ORDERS (OUTPATIENT)
Dept: ADMINISTRATIVE | Facility: HOSPITAL | Age: 87
End: 2022-01-01

## 2022-01-01 ENCOUNTER — HOSPITAL ENCOUNTER (OUTPATIENT)
Dept: INFUSION THERAPY | Age: 87
Discharge: HOME | End: 2022-09-16
Payer: MEDICARE

## 2022-01-01 ENCOUNTER — HOSPITAL ENCOUNTER (OUTPATIENT)
Dept: INFUSION THERAPY | Age: 87
Discharge: HOME | End: 2022-06-29
Payer: MEDICARE

## 2022-01-01 ENCOUNTER — HOSPITAL ENCOUNTER (OUTPATIENT)
Dept: INFUSION THERAPY | Age: 87
Discharge: HOME | End: 2022-08-19
Payer: MEDICARE

## 2022-01-01 VITALS
TEMPERATURE: 97.6 F | HEART RATE: 67 BPM | SYSTOLIC BLOOD PRESSURE: 111 MMHG | OXYGEN SATURATION: 99 % | RESPIRATION RATE: 18 BRPM | DIASTOLIC BLOOD PRESSURE: 64 MMHG

## 2022-01-01 VITALS
SYSTOLIC BLOOD PRESSURE: 130 MMHG | OXYGEN SATURATION: 98 % | BODY MASS INDEX: 35.6 KG/M2 | HEIGHT: 59 IN | HEART RATE: 70 BPM | DIASTOLIC BLOOD PRESSURE: 72 MMHG | WEIGHT: 176.6 LBS

## 2022-01-01 VITALS
WEIGHT: 177.7 LBS | OXYGEN SATURATION: 94 % | BODY MASS INDEX: 35.82 KG/M2 | SYSTOLIC BLOOD PRESSURE: 126 MMHG | HEIGHT: 59 IN | DIASTOLIC BLOOD PRESSURE: 80 MMHG | HEART RATE: 63 BPM

## 2022-01-01 VITALS
BODY MASS INDEX: 35.08 KG/M2 | OXYGEN SATURATION: 98 % | SYSTOLIC BLOOD PRESSURE: 110 MMHG | WEIGHT: 174 LBS | DIASTOLIC BLOOD PRESSURE: 60 MMHG | HEIGHT: 59 IN | HEART RATE: 52 BPM

## 2022-01-01 VITALS
WEIGHT: 177.3 LBS | SYSTOLIC BLOOD PRESSURE: 130 MMHG | DIASTOLIC BLOOD PRESSURE: 80 MMHG | OXYGEN SATURATION: 100 % | HEIGHT: 59 IN | HEART RATE: 63 BPM | BODY MASS INDEX: 35.74 KG/M2

## 2022-01-01 VITALS
DIASTOLIC BLOOD PRESSURE: 72 MMHG | HEIGHT: 59 IN | BODY MASS INDEX: 36.15 KG/M2 | SYSTOLIC BLOOD PRESSURE: 100 MMHG | HEART RATE: 66 BPM | OXYGEN SATURATION: 96 %

## 2022-01-01 VITALS
RESPIRATION RATE: 18 BRPM | TEMPERATURE: 98.3 F | BODY MASS INDEX: 31.65 KG/M2 | WEIGHT: 172 LBS | HEART RATE: 62 BPM | OXYGEN SATURATION: 100 % | HEIGHT: 62 IN | SYSTOLIC BLOOD PRESSURE: 109 MMHG | DIASTOLIC BLOOD PRESSURE: 84 MMHG

## 2022-01-01 VITALS
HEIGHT: 62 IN | DIASTOLIC BLOOD PRESSURE: 87 MMHG | SYSTOLIC BLOOD PRESSURE: 129 MMHG | OXYGEN SATURATION: 99 % | WEIGHT: 180 LBS | BODY MASS INDEX: 33.13 KG/M2 | RESPIRATION RATE: 16 BRPM | HEART RATE: 70 BPM | TEMPERATURE: 98.2 F

## 2022-01-01 VITALS
HEART RATE: 73 BPM | TEMPERATURE: 97.7 F | BODY MASS INDEX: 36.36 KG/M2 | OXYGEN SATURATION: 99 % | RESPIRATION RATE: 16 BRPM | DIASTOLIC BLOOD PRESSURE: 72 MMHG | WEIGHT: 180 LBS | SYSTOLIC BLOOD PRESSURE: 118 MMHG

## 2022-01-01 VITALS
HEART RATE: 86 BPM | OXYGEN SATURATION: 95 % | HEIGHT: 60 IN | RESPIRATION RATE: 20 BRPM | SYSTOLIC BLOOD PRESSURE: 111 MMHG | TEMPERATURE: 97.9 F | WEIGHT: 209 LBS | DIASTOLIC BLOOD PRESSURE: 53 MMHG | BODY MASS INDEX: 41.03 KG/M2

## 2022-01-01 VITALS
BODY MASS INDEX: 33.13 KG/M2 | HEIGHT: 62 IN | WEIGHT: 180 LBS | SYSTOLIC BLOOD PRESSURE: 129 MMHG | DIASTOLIC BLOOD PRESSURE: 87 MMHG

## 2022-01-01 VITALS
HEIGHT: 62 IN | WEIGHT: 176 LBS | BODY MASS INDEX: 32.39 KG/M2 | OXYGEN SATURATION: 98 % | DIASTOLIC BLOOD PRESSURE: 60 MMHG | HEART RATE: 62 BPM | SYSTOLIC BLOOD PRESSURE: 126 MMHG

## 2022-01-01 VITALS
SYSTOLIC BLOOD PRESSURE: 122 MMHG | BODY MASS INDEX: 36.61 KG/M2 | HEIGHT: 59 IN | WEIGHT: 181.6 LBS | DIASTOLIC BLOOD PRESSURE: 80 MMHG | OXYGEN SATURATION: 98 % | TEMPERATURE: 98.2 F | HEART RATE: 88 BPM | RESPIRATION RATE: 16 BRPM

## 2022-01-01 VITALS
DIASTOLIC BLOOD PRESSURE: 68 MMHG | RESPIRATION RATE: 16 BRPM | BODY MASS INDEX: 36.15 KG/M2 | TEMPERATURE: 97.6 F | SYSTOLIC BLOOD PRESSURE: 132 MMHG | OXYGEN SATURATION: 98 % | HEART RATE: 66 BPM | WEIGHT: 179 LBS

## 2022-01-01 VITALS
TEMPERATURE: 97.8 F | HEART RATE: 63 BPM | DIASTOLIC BLOOD PRESSURE: 58 MMHG | RESPIRATION RATE: 18 BRPM | SYSTOLIC BLOOD PRESSURE: 124 MMHG | OXYGEN SATURATION: 98 %

## 2022-01-01 VITALS
BODY MASS INDEX: 35.56 KG/M2 | OXYGEN SATURATION: 98 % | HEART RATE: 78 BPM | DIASTOLIC BLOOD PRESSURE: 72 MMHG | HEIGHT: 59 IN | WEIGHT: 176.4 LBS | SYSTOLIC BLOOD PRESSURE: 120 MMHG

## 2022-01-01 VITALS
WEIGHT: 175 LBS | BODY MASS INDEX: 35.28 KG/M2 | HEART RATE: 58 BPM | OXYGEN SATURATION: 97 % | SYSTOLIC BLOOD PRESSURE: 106 MMHG | DIASTOLIC BLOOD PRESSURE: 66 MMHG | HEIGHT: 59 IN

## 2022-01-01 VITALS
HEART RATE: 68 BPM | DIASTOLIC BLOOD PRESSURE: 52 MMHG | HEIGHT: 59 IN | OXYGEN SATURATION: 96 % | WEIGHT: 178.1 LBS | BODY MASS INDEX: 35.9 KG/M2 | SYSTOLIC BLOOD PRESSURE: 112 MMHG

## 2022-01-01 VITALS
DIASTOLIC BLOOD PRESSURE: 60 MMHG | BODY MASS INDEX: 35.55 KG/M2 | WEIGHT: 176 LBS | HEART RATE: 76 BPM | SYSTOLIC BLOOD PRESSURE: 105 MMHG | OXYGEN SATURATION: 98 %

## 2022-01-01 VITALS
TEMPERATURE: 96.1 F | SYSTOLIC BLOOD PRESSURE: 120 MMHG | HEIGHT: 59 IN | BODY MASS INDEX: 36.08 KG/M2 | HEART RATE: 66 BPM | DIASTOLIC BLOOD PRESSURE: 74 MMHG | OXYGEN SATURATION: 96 % | WEIGHT: 179 LBS

## 2022-01-01 VITALS
SYSTOLIC BLOOD PRESSURE: 110 MMHG | DIASTOLIC BLOOD PRESSURE: 56 MMHG | BODY MASS INDEX: 36.08 KG/M2 | WEIGHT: 179 LBS | OXYGEN SATURATION: 93 % | HEART RATE: 89 BPM | HEIGHT: 59 IN

## 2022-01-01 VITALS
HEART RATE: 65 BPM | SYSTOLIC BLOOD PRESSURE: 125 MMHG | TEMPERATURE: 97.2 F | DIASTOLIC BLOOD PRESSURE: 57 MMHG | OXYGEN SATURATION: 99 % | RESPIRATION RATE: 17 BRPM

## 2022-01-01 DIAGNOSIS — I35.1 NONRHEUMATIC AORTIC VALVE INSUFFICIENCY: ICD-10-CM

## 2022-01-01 DIAGNOSIS — I63.9 CEREBROVASCULAR ACCIDENT (CVA), UNSPECIFIED MECHANISM (HCC): Primary | ICD-10-CM

## 2022-01-01 DIAGNOSIS — R53.83 FATIGUE, UNSPECIFIED TYPE: ICD-10-CM

## 2022-01-01 DIAGNOSIS — D63.1 ANEMIA DUE TO STAGE 3B CHRONIC KIDNEY DISEASE (HCC): ICD-10-CM

## 2022-01-01 DIAGNOSIS — N17.9 ACUTE KIDNEY INJURY: ICD-10-CM

## 2022-01-01 DIAGNOSIS — Z78.9 IMPAIRED MOBILITY AND ADLS: ICD-10-CM

## 2022-01-01 DIAGNOSIS — R25.1 TREMORS OF NERVOUS SYSTEM: Primary | ICD-10-CM

## 2022-01-01 DIAGNOSIS — D64.9 ANEMIA, UNSPECIFIED TYPE: Primary | ICD-10-CM

## 2022-01-01 DIAGNOSIS — J96.11 CHRONIC HYPOXEMIC RESPIRATORY FAILURE: Primary | Chronic | ICD-10-CM

## 2022-01-01 DIAGNOSIS — I50.812 CHRONIC RIGHT-SIDED HEART FAILURE: ICD-10-CM

## 2022-01-01 DIAGNOSIS — Z74.09 IMPAIRED MOBILITY: ICD-10-CM

## 2022-01-01 DIAGNOSIS — G91.2 NPH (NORMAL PRESSURE HYDROCEPHALUS) (HCC): Primary | ICD-10-CM

## 2022-01-01 DIAGNOSIS — Z79.01 LONG TERM CURRENT USE OF ANTICOAGULANT: ICD-10-CM

## 2022-01-01 DIAGNOSIS — D50.9 IRON DEFICIENCY ANEMIA, UNSPECIFIED IRON DEFICIENCY ANEMIA TYPE: Primary | ICD-10-CM

## 2022-01-01 DIAGNOSIS — N18.32 STAGE 3B CHRONIC KIDNEY DISEASE (HCC): ICD-10-CM

## 2022-01-01 DIAGNOSIS — I10 BENIGN HYPERTENSION: Chronic | ICD-10-CM

## 2022-01-01 DIAGNOSIS — N18.32 ANEMIA DUE TO STAGE 3B CHRONIC KIDNEY DISEASE (HCC): ICD-10-CM

## 2022-01-01 DIAGNOSIS — D64.9 ENCOUNTER FOR ESA (ERYTHROPOIETIN STIMULATING AGENT) ANEMIA MANAGEMENT: ICD-10-CM

## 2022-01-01 DIAGNOSIS — D64.9 ANEMIA, UNSPECIFIED TYPE: ICD-10-CM

## 2022-01-01 DIAGNOSIS — R25.1 TREMORS OF NERVOUS SYSTEM: ICD-10-CM

## 2022-01-01 DIAGNOSIS — N17.9 AKI (ACUTE KIDNEY INJURY): ICD-10-CM

## 2022-01-01 DIAGNOSIS — K52.9 COLITIS PRESUMED INFECTIOUS: Primary | ICD-10-CM

## 2022-01-01 DIAGNOSIS — K90.9 IRON MALABSORPTION: ICD-10-CM

## 2022-01-01 DIAGNOSIS — N18.32 STAGE 3B CHRONIC KIDNEY DISEASE (HCC): Primary | ICD-10-CM

## 2022-01-01 DIAGNOSIS — I63.9 CEREBROVASCULAR ACCIDENT (CVA), UNSPECIFIED MECHANISM (HCC): ICD-10-CM

## 2022-01-01 DIAGNOSIS — I63.9 ACUTE ISCHEMIC STROKE (HCC): ICD-10-CM

## 2022-01-01 DIAGNOSIS — I10 ESSENTIAL HYPERTENSION: ICD-10-CM

## 2022-01-01 DIAGNOSIS — Z79.01 CHRONIC ANTICOAGULATION: ICD-10-CM

## 2022-01-01 DIAGNOSIS — D63.8 ANEMIA OF CHRONIC DISEASE: Primary | ICD-10-CM

## 2022-01-01 DIAGNOSIS — E66.01 SEVERE OBESITY (BMI 35.0-39.9) WITH COMORBIDITY (HCC): ICD-10-CM

## 2022-01-01 DIAGNOSIS — G91.2 NPH (NORMAL PRESSURE HYDROCEPHALUS) (HCC): ICD-10-CM

## 2022-01-01 DIAGNOSIS — Z85.3 HISTORY OF BREAST CANCER: ICD-10-CM

## 2022-01-01 DIAGNOSIS — I27.20 SEVERE PULMONARY HYPERTENSION: ICD-10-CM

## 2022-01-01 DIAGNOSIS — R53.1 GENERALIZED WEAKNESS: Primary | ICD-10-CM

## 2022-01-01 DIAGNOSIS — Z74.09 IMPAIRED MOBILITY AND ADLS: ICD-10-CM

## 2022-01-01 DIAGNOSIS — I27.20 PULMONARY HYPERTENSION: Chronic | ICD-10-CM

## 2022-01-01 DIAGNOSIS — J96.11 CHRONIC RESPIRATORY FAILURE WITH HYPOXIA AND HYPERCAPNIA: ICD-10-CM

## 2022-01-01 DIAGNOSIS — D50.9 IRON DEFICIENCY ANEMIA, UNSPECIFIED IRON DEFICIENCY ANEMIA TYPE: ICD-10-CM

## 2022-01-01 DIAGNOSIS — I35.0 AORTIC STENOSIS, MODERATE: Primary | ICD-10-CM

## 2022-01-01 DIAGNOSIS — I50.812 CHRONIC RIGHT-SIDED HEART FAILURE (HCC): Primary | ICD-10-CM

## 2022-01-01 DIAGNOSIS — I63.419 CEREBROVASCULAR ACCIDENT (CVA) DUE TO EMBOLISM OF MIDDLE CEREBRAL ARTERY, UNSPECIFIED BLOOD VESSEL LATERALITY: ICD-10-CM

## 2022-01-01 DIAGNOSIS — Z79.899 ENCOUNTER FOR ESA (ERYTHROPOIETIN STIMULATING AGENT) ANEMIA MANAGEMENT: ICD-10-CM

## 2022-01-01 DIAGNOSIS — Z23 NEED FOR PNEUMOCOCCAL VACCINATION: ICD-10-CM

## 2022-01-01 DIAGNOSIS — R93.5 ABNORMAL CT OF THE ABDOMEN: ICD-10-CM

## 2022-01-01 DIAGNOSIS — J96.12 CHRONIC RESPIRATORY FAILURE WITH HYPOXIA AND HYPERCAPNIA: ICD-10-CM

## 2022-01-01 DIAGNOSIS — Z00.00 ENCOUNTER FOR SUBSEQUENT ANNUAL WELLNESS VISIT (AWV) IN MEDICARE PATIENT: Primary | ICD-10-CM

## 2022-01-01 DIAGNOSIS — I50.812 CHRONIC RIGHT-SIDED HEART FAILURE (HCC): ICD-10-CM

## 2022-01-01 DIAGNOSIS — Z86.73 HISTORY OF CVA (CEREBROVASCULAR ACCIDENT): ICD-10-CM

## 2022-01-01 DIAGNOSIS — K55.9 ISCHEMIC COLITIS: ICD-10-CM

## 2022-01-01 DIAGNOSIS — R42 DIZZINESS: ICD-10-CM

## 2022-01-01 DIAGNOSIS — N28.89 RENAL MASS: ICD-10-CM

## 2022-01-01 LAB
+IMM: ABNORMAL
ABO GROUP BLD: NORMAL
ABO GROUP BLD: NORMAL
ACANTHOCYTES BLD QL SMEAR: ABNORMAL
ADV 40+41 DNA STL QL NAA+NON-PROBE: NOT DETECTED
ALBUMIN SERPL-MCNC: 2.4 G/DL (ref 3.5–5.2)
ALBUMIN SERPL-MCNC: 2.9 G/DL (ref 3.5–5.2)
ALBUMIN SERPL-MCNC: 3 G/DL (ref 3.5–5.2)
ALBUMIN SERPL-MCNC: 3.1 G/DL (ref 3.5–5.2)
ALBUMIN SERPL-MCNC: 3.4 G/DL (ref 3.5–5.2)
ALBUMIN SERPL-MCNC: 3.84 G/DL (ref 3.75–5.01)
ALBUMIN SERPL-MCNC: 3.9 G/DL (ref 3.5–5.2)
ALBUMIN SERPL-MCNC: 3.9 G/DL (ref 3.5–5.2)
ALBUMIN SERPL-MCNC: 4 G/DL (ref 3.5–5.2)
ALBUMIN SERPL-MCNC: 4.1 G/DL (ref 3.5–5.2)
ALBUMIN/GLOB SERPL: 1.2 G/DL
ALBUMIN/GLOB SERPL: 1.3 G/DL
ALBUMIN/GLOB SERPL: 1.3 G/DL
ALBUMIN/GLOB SERPL: 1.4 G/DL
ALBUMIN/GLOB SERPL: 1.5 G/DL
ALBUMIN/GLOB SERPL: 1.5 G/DL
ALBUMIN/GLOB SERPL: 1.6 G/DL
ALP BLD-CCNC: 77 U/L (ref 35–104)
ALP BLD-CCNC: 78 U/L (ref 35–104)
ALP BLD-CCNC: 92 U/L (ref 35–104)
ALP SERPL-CCNC: 103 U/L (ref 39–117)
ALP SERPL-CCNC: 146 U/L (ref 39–117)
ALP SERPL-CCNC: 67 U/L (ref 39–117)
ALP SERPL-CCNC: 68 U/L (ref 39–117)
ALP SERPL-CCNC: 70 U/L (ref 39–117)
ALP SERPL-CCNC: 74 U/L (ref 39–117)
ALP SERPL-CCNC: 85 U/L (ref 39–117)
ALP SERPL-CCNC: 89 U/L (ref 39–117)
ALP SERPL-CCNC: 91 U/L (ref 39–117)
ALP SERPL-CCNC: 91 U/L (ref 39–117)
ALP SERPL-CCNC: 97 U/L (ref 39–117)
ALPHA-1-GLOBULIN: 0.41 G/DL (ref 0.19–0.46)
ALPHA-2-GLOBULIN: 1.02 G/DL (ref 0.48–1.05)
ALT SERPL W P-5'-P-CCNC: 15 U/L (ref 1–33)
ALT SERPL W P-5'-P-CCNC: 6 U/L (ref 1–33)
ALT SERPL W P-5'-P-CCNC: 7 U/L (ref 1–33)
ALT SERPL W P-5'-P-CCNC: 9 U/L (ref 1–33)
ALT SERPL W P-5'-P-CCNC: <5 U/L (ref 1–33)
ALT SERPL-CCNC: 12 U/L (ref 9–52)
ALT SERPL-CCNC: 15 U/L (ref 9–52)
ALT SERPL-CCNC: 25 U/L (ref 5–33)
ANION GAP SERPL CALCULATED.3IONS-SCNC: 10 MMOL/L (ref 5–15)
ANION GAP SERPL CALCULATED.3IONS-SCNC: 11 MMOL/L (ref 5–15)
ANION GAP SERPL CALCULATED.3IONS-SCNC: 11 MMOL/L (ref 5–15)
ANION GAP SERPL CALCULATED.3IONS-SCNC: 11 MMOL/L (ref 7–19)
ANION GAP SERPL CALCULATED.3IONS-SCNC: 12 MMOL/L (ref 5–15)
ANION GAP SERPL CALCULATED.3IONS-SCNC: 14 MMOL/L (ref 5–15)
ANION GAP SERPL CALCULATED.3IONS-SCNC: 14 MMOL/L (ref 5–15)
ANION GAP SERPL CALCULATED.3IONS-SCNC: 16 MMOL/L (ref 5–15)
ANION GAP SERPL CALCULATED.3IONS-SCNC: 5 MMOL/L (ref 7–19)
ANION GAP SERPL CALCULATED.3IONS-SCNC: 7 MMOL/L (ref 5–15)
ANION GAP SERPL CALCULATED.3IONS-SCNC: 8 MMOL/L (ref 7–19)
ANISOCYTOSIS BLD QL: ABNORMAL
ANTI-D: NORMAL
ANTI-D: NORMAL
APTT PPP: 33.3 SECONDS (ref 24.1–35)
ARTERIAL PATENCY WRIST A: POSITIVE
AST SERPL-CCNC: 30 U/L (ref 5–32)
AST SERPL-CCNC: 32 U/L (ref 1–32)
AST SERPL-CCNC: 33 U/L (ref 1–32)
AST SERPL-CCNC: 35 U/L (ref 14–36)
AST SERPL-CCNC: 39 U/L (ref 1–32)
AST SERPL-CCNC: 39 U/L (ref 1–32)
AST SERPL-CCNC: 41 U/L (ref 1–32)
AST SERPL-CCNC: 45 U/L (ref 1–32)
AST SERPL-CCNC: 46 U/L (ref 1–32)
AST SERPL-CCNC: 48 U/L (ref 1–32)
AST SERPL-CCNC: 51 U/L (ref 1–32)
AST SERPL-CCNC: 52 U/L (ref 14–36)
AST SERPL-CCNC: 62 U/L (ref 1–32)
AST SERPL-CCNC: 72 U/L (ref 1–32)
ASTRO TYP 1-8 RNA STL QL NAA+NON-PROBE: NOT DETECTED
ATMOSPHERIC PRESS: 747 MMHG
ATMOSPHERIC PRESS: 751 MMHG
ATMOSPHERIC PRESS: 751 MMHG
BACTERIA SPEC AEROBE CULT: NO GROWTH
BACTERIA SPEC AEROBE CULT: NORMAL
BACTERIA UR QL AUTO: ABNORMAL /HPF
BASE EXCESS BLDA CALC-SCNC: -0.6 MMOL/L (ref 0–2)
BASE EXCESS BLDA CALC-SCNC: 0.4 MMOL/L (ref 0–2)
BASE EXCESS BLDA CALC-SCNC: 2.7 MMOL/L (ref 0–2)
BASOPHILS # BLD AUTO: 0.02 10*3/MM3 (ref 0–0.2)
BASOPHILS # BLD AUTO: 0.03 10*3/MM3 (ref 0–0.2)
BASOPHILS # BLD AUTO: 0.03 10*3/MM3 (ref 0–0.2)
BASOPHILS # BLD AUTO: 0.06 10*3/MM3 (ref 0–0.2)
BASOPHILS ABSOLUTE: 0 K/UL (ref 0–0.2)
BASOPHILS ABSOLUTE: 0.03 K/UL (ref 0.01–0.08)
BASOPHILS ABSOLUTE: 0.04 K/UL (ref 0.01–0.08)
BASOPHILS NFR BLD AUTO: 0.2 % (ref 0–1.5)
BASOPHILS NFR BLD AUTO: 0.3 % (ref 0–1.5)
BASOPHILS NFR BLD AUTO: 0.3 % (ref 0–1.5)
BASOPHILS RELATIVE PERCENT: 0.3 % (ref 0–1)
BASOPHILS RELATIVE PERCENT: 0.5 % (ref 0.1–1.2)
BASOPHILS RELATIVE PERCENT: 0.6 % (ref 0.1–1.2)
BDY SITE: ABNORMAL
BETA GLOBULIN: 0.78 G/DL (ref 0.48–1.1)
BETA-2 MICROGLOBULIN: 7.7 MG/L
BH BB BLOOD EXPIRATION DATE: NORMAL
BH BB BLOOD TYPE BARCODE: 600
BH BB BLOOD TYPE BARCODE: 600
BH BB BLOOD TYPE BARCODE: 9500
BH BB BLOOD TYPE BARCODE: 9500
BH BB DISPENSE STATUS: NORMAL
BH BB PRODUCT CODE: NORMAL
BH BB UNIT NUMBER: NORMAL
BH CV ECHO MEAS - AO MAX PG (FULL): 37.8 MMHG
BH CV ECHO MEAS - AO MAX PG: 39.9 MMHG
BH CV ECHO MEAS - AO MEAN PG (FULL): 15.3 MMHG
BH CV ECHO MEAS - AO MEAN PG: 16.3 MMHG
BH CV ECHO MEAS - AO ROOT AREA (BSA CORRECTED): 2
BH CV ECHO MEAS - AO ROOT AREA: 10.5 CM^2
BH CV ECHO MEAS - AO ROOT DIAM: 3.7 CM
BH CV ECHO MEAS - AO V2 MAX: 316 CM/SEC
BH CV ECHO MEAS - AO V2 MEAN: 183.3 CM/SEC
BH CV ECHO MEAS - AO V2 VTI: 67.8 CM
BH CV ECHO MEAS - AVA(I,A): 0.94 CM^2
BH CV ECHO MEAS - AVA(I,D): 0.94 CM^2
BH CV ECHO MEAS - AVA(V,A): 0.72 CM^2
BH CV ECHO MEAS - AVA(V,D): 0.72 CM^2
BH CV ECHO MEAS - BSA(HAYCOCK): 1.9 M^2
BH CV ECHO MEAS - BSA: 1.8 M^2
BH CV ECHO MEAS - BZI_BMI: 32.9 KILOGRAMS/M^2
BH CV ECHO MEAS - BZI_METRIC_HEIGHT: 157.5 CM
BH CV ECHO MEAS - BZI_METRIC_WEIGHT: 81.6 KG
BH CV ECHO MEAS - EDV(CUBED): 121.3 ML
BH CV ECHO MEAS - EDV(MOD-SP4): 125 ML
BH CV ECHO MEAS - EDV(TEICH): 115.5 ML
BH CV ECHO MEAS - EF(CUBED): 67.5 %
BH CV ECHO MEAS - EF(MOD-SP4): 68 %
BH CV ECHO MEAS - EF(TEICH): 58.8 %
BH CV ECHO MEAS - ESV(CUBED): 39.4 ML
BH CV ECHO MEAS - ESV(MOD-SP4): 40 ML
BH CV ECHO MEAS - ESV(TEICH): 47.5 ML
BH CV ECHO MEAS - FS: 31.2 %
BH CV ECHO MEAS - IVS/LVPW: 0.99
BH CV ECHO MEAS - IVSD: 1.1 CM
BH CV ECHO MEAS - LA DIMENSION: 6.1 CM
BH CV ECHO MEAS - LA/AO: 1.7
BH CV ECHO MEAS - LAT PEAK E' VEL: 4.7 CM/SEC
BH CV ECHO MEAS - LV DIASTOLIC VOL/BSA (35-75): 68.4 ML/M^2
BH CV ECHO MEAS - LV MASS(C)D: 195 GRAMS
BH CV ECHO MEAS - LV MASS(C)DI: 106.7 GRAMS/M^2
BH CV ECHO MEAS - LV MAX PG: 2.1 MMHG
BH CV ECHO MEAS - LV MEAN PG: 1 MMHG
BH CV ECHO MEAS - LV SYSTOLIC VOL/BSA (12-30): 21.9 ML/M^2
BH CV ECHO MEAS - LV V1 MAX: 72.4 CM/SEC
BH CV ECHO MEAS - LV V1 MEAN: 50.8 CM/SEC
BH CV ECHO MEAS - LV V1 VTI: 20.3 CM
BH CV ECHO MEAS - LVIDD: 5 CM
BH CV ECHO MEAS - LVIDS: 3.4 CM
BH CV ECHO MEAS - LVLD AP4: 8.3 CM
BH CV ECHO MEAS - LVLS AP4: 6.2 CM
BH CV ECHO MEAS - LVOT AREA (M): 3.1 CM^2
BH CV ECHO MEAS - LVOT AREA: 3.1 CM^2
BH CV ECHO MEAS - LVOT DIAM: 2 CM
BH CV ECHO MEAS - LVPWD: 1.1 CM
BH CV ECHO MEAS - MED PEAK E' VEL: 4.6 CM/SEC
BH CV ECHO MEAS - MV A MAX VEL: 108 CM/SEC
BH CV ECHO MEAS - MV DEC SLOPE: 400 CM/SEC^2
BH CV ECHO MEAS - MV DEC TIME: 0.21 SEC
BH CV ECHO MEAS - MV E MAX VEL: 76.6 CM/SEC
BH CV ECHO MEAS - MV E/A: 0.71
BH CV ECHO MEAS - MV P1/2T MAX VEL: 105 CM/SEC
BH CV ECHO MEAS - MV P1/2T: 76.9 MSEC
BH CV ECHO MEAS - MVA P1/2T LCG: 2.1 CM^2
BH CV ECHO MEAS - MVA(P1/2T): 2.9 CM^2
BH CV ECHO MEAS - RAP SYSTOLE: 5 MMHG
BH CV ECHO MEAS - RVSP: 31 MMHG
BH CV ECHO MEAS - SI(AO): 387.9 ML/M^2
BH CV ECHO MEAS - SI(CUBED): 44.8 ML/M^2
BH CV ECHO MEAS - SI(LVOT): 34.9 ML/M^2
BH CV ECHO MEAS - SI(MOD-SP4): 46.5 ML/M^2
BH CV ECHO MEAS - SI(TEICH): 37.2 ML/M^2
BH CV ECHO MEAS - SV(AO): 709.1 ML
BH CV ECHO MEAS - SV(CUBED): 81.9 ML
BH CV ECHO MEAS - SV(LVOT): 63.8 ML
BH CV ECHO MEAS - SV(MOD-SP4): 85 ML
BH CV ECHO MEAS - SV(TEICH): 68 ML
BH CV ECHO MEAS - TR MAX VEL: 255 CM/SEC
BH CV ECHO MEASUREMENTS AVERAGE E/E' RATIO: 16.47
BILIRUB SERPL-MCNC: 0.3 MG/DL (ref 0.2–1.2)
BILIRUB SERPL-MCNC: 0.3 MG/DL (ref 0–1.2)
BILIRUB SERPL-MCNC: 0.4 MG/DL (ref 0–1.2)
BILIRUB SERPL-MCNC: 0.5 MG/DL (ref 0.2–1.3)
BILIRUB SERPL-MCNC: 0.5 MG/DL (ref 0.2–1.3)
BILIRUB SERPL-MCNC: 0.5 MG/DL (ref 0–1.2)
BILIRUB SERPL-MCNC: 0.5 MG/DL (ref 0–1.2)
BILIRUB SERPL-MCNC: 0.6 MG/DL (ref 0–1.2)
BILIRUB UR QL STRIP: NEGATIVE
BILIRUB UR QL STRIP: NEGATIVE
BLD GP AB SCN SERPL QL: POSITIVE
BLD GP AB SCN SERPL QL: POSITIVE
BODY TEMPERATURE: 37 C
BUN BLDV-MCNC: 22 MG/DL (ref 7–17)
BUN BLDV-MCNC: 31 MG/DL (ref 8–23)
BUN BLDV-MCNC: 34 MG/DL (ref 7–17)
BUN SERPL-MCNC: 27 MG/DL (ref 8–23)
BUN SERPL-MCNC: 28 MG/DL (ref 8–23)
BUN SERPL-MCNC: 31 MG/DL (ref 8–23)
BUN SERPL-MCNC: 32 MG/DL (ref 8–23)
BUN SERPL-MCNC: 32 MG/DL (ref 8–23)
BUN SERPL-MCNC: 34 MG/DL (ref 8–23)
BUN SERPL-MCNC: 37 MG/DL (ref 8–23)
BUN SERPL-MCNC: 38 MG/DL (ref 8–23)
BUN SERPL-MCNC: 40 MG/DL (ref 8–23)
BUN SERPL-MCNC: 46 MG/DL (ref 8–23)
BUN SERPL-MCNC: 49 MG/DL (ref 8–23)
BUN/CREAT SERPL: 14 (ref 7–25)
BUN/CREAT SERPL: 14.7 (ref 7–25)
BUN/CREAT SERPL: 15.6 (ref 7–25)
BUN/CREAT SERPL: 15.6 (ref 7–25)
BUN/CREAT SERPL: 16.3 (ref 7–25)
BUN/CREAT SERPL: 17.3 (ref 7–25)
BUN/CREAT SERPL: 17.9 (ref 7–25)
BUN/CREAT SERPL: 18.9 (ref 7–25)
BUN/CREAT SERPL: 19.5 (ref 7–25)
BUN/CREAT SERPL: 20.7 (ref 7–25)
BUN/CREAT SERPL: 22.3 (ref 7–25)
BURR CELLS BLD QL SMEAR: ABNORMAL
BURR CELLS BLD QL SMEAR: ABNORMAL
C CAYETANENSIS DNA STL QL NAA+NON-PROBE: NOT DETECTED
C COLI+JEJ+UPSA DNA STL QL NAA+NON-PROBE: NOT DETECTED
C-REACTIVE PROTEIN: <0 MG/DL (ref 0–1)
CALCIUM SERPL-MCNC: 10.2 MG/DL (ref 8.4–10.2)
CALCIUM SERPL-MCNC: 10.2 MG/DL (ref 8.4–10.2)
CALCIUM SERPL-MCNC: 9.7 MG/DL (ref 8.8–10.2)
CALCIUM SPEC-SCNC: 10.7 MG/DL (ref 8.6–10.5)
CALCIUM SPEC-SCNC: 7.5 MG/DL (ref 8.6–10.5)
CALCIUM SPEC-SCNC: 7.8 MG/DL (ref 8.6–10.5)
CALCIUM SPEC-SCNC: 7.8 MG/DL (ref 8.6–10.5)
CALCIUM SPEC-SCNC: 7.9 MG/DL (ref 8.6–10.5)
CALCIUM SPEC-SCNC: 7.9 MG/DL (ref 8.6–10.5)
CALCIUM SPEC-SCNC: 8 MG/DL (ref 8.6–10.5)
CALCIUM SPEC-SCNC: 8 MG/DL (ref 8.6–10.5)
CALCIUM SPEC-SCNC: 8.2 MG/DL (ref 8.6–10.5)
CALCIUM SPEC-SCNC: 8.5 MG/DL (ref 8.6–10.5)
CALCIUM SPEC-SCNC: 9.3 MG/DL (ref 8.6–10.5)
CHLORIDE BLD-SCNC: 101 MMOL/L (ref 98–111)
CHLORIDE BLD-SCNC: 95 MMOL/L (ref 98–111)
CHLORIDE BLD-SCNC: 97 MMOL/L (ref 98–111)
CHLORIDE SERPL-SCNC: 101 MMOL/L (ref 98–107)
CHLORIDE SERPL-SCNC: 103 MMOL/L (ref 98–107)
CHLORIDE SERPL-SCNC: 106 MMOL/L (ref 98–107)
CHLORIDE SERPL-SCNC: 107 MMOL/L (ref 98–107)
CHLORIDE SERPL-SCNC: 107 MMOL/L (ref 98–107)
CHLORIDE SERPL-SCNC: 108 MMOL/L (ref 98–107)
CHLORIDE SERPL-SCNC: 109 MMOL/L (ref 98–107)
CHLORIDE SERPL-SCNC: 110 MMOL/L (ref 98–107)
CHLORIDE SERPL-SCNC: 97 MMOL/L (ref 98–107)
CHOLEST SERPL-MCNC: 96 MG/DL (ref 0–200)
CLARITY UR: CLEAR
CLARITY UR: CLEAR
CO2 SERPL-SCNC: 23 MMOL/L (ref 22–29)
CO2 SERPL-SCNC: 24 MMOL/L (ref 22–29)
CO2 SERPL-SCNC: 25 MMOL/L (ref 22–29)
CO2 SERPL-SCNC: 26 MMOL/L (ref 22–29)
CO2 SERPL-SCNC: 27 MMOL/L (ref 22–29)
CO2 SERPL-SCNC: 28 MMOL/L (ref 22–29)
CO2 SERPL-SCNC: 28 MMOL/L (ref 22–29)
CO2 SERPL-SCNC: 30 MMOL/L (ref 22–29)
CO2 SERPL-SCNC: 31 MMOL/L (ref 22–29)
CO2: 31 MMOL/L (ref 22–29)
CO2: 38 MMOL/L (ref 22–29)
CO2: 39 MMOL/L (ref 22–29)
COLOR UR: YELLOW
COLOR UR: YELLOW
COPPER: 124.3 UG/DL (ref 80–155)
CREAT SERPL-MCNC: 1.3 MG/DL (ref 0.5–1)
CREAT SERPL-MCNC: 1.4 MG/DL (ref 0.5–0.9)
CREAT SERPL-MCNC: 1.5 MG/DL (ref 0.5–1)
CREAT SERPL-MCNC: 1.66 MG/DL (ref 0.57–1)
CREAT SERPL-MCNC: 1.66 MG/DL (ref 0.57–1)
CREAT SERPL-MCNC: 1.79 MG/DL (ref 0.57–1)
CREAT SERPL-MCNC: 1.85 MG/DL (ref 0.57–1)
CREAT SERPL-MCNC: 2.01 MG/DL (ref 0.57–1)
CREAT SERPL-MCNC: 2.05 MG/DL (ref 0.57–1)
CREAT SERPL-MCNC: 2.22 MG/DL (ref 0.57–1)
CREAT SERPL-MCNC: 2.24 MG/DL (ref 0.57–1)
CREAT SERPL-MCNC: 2.32 MG/DL (ref 0.57–1)
CREAT SERPL-MCNC: 2.36 MG/DL (ref 0.57–1)
CREAT SERPL-MCNC: 2.37 MG/DL (ref 0.57–1)
CROSSMATCH INTERPRETATION: NORMAL
CRP SERPL-MCNC: 0.93 MG/DL (ref 0–0.5)
CRYPTOSP DNA STL QL NAA+NON-PROBE: NOT DETECTED
D-LACTATE SERPL-SCNC: 0.9 MMOL/L (ref 0.5–2)
D-LACTATE SERPL-SCNC: 0.9 MMOL/L (ref 0.5–2)
D-LACTATE SERPL-SCNC: 2.1 MMOL/L (ref 0.5–2)
D-LACTATE SERPL-SCNC: 2.5 MMOL/L (ref 0.5–2)
D-LACTATE SERPL-SCNC: 3.1 MMOL/L (ref 0.5–2)
D-LACTATE SERPL-SCNC: 3.6 MMOL/L (ref 0.5–2)
D-LACTATE SERPL-SCNC: 4 MMOL/L (ref 0.5–2)
D-LACTATE SERPL-SCNC: 5.4 MMOL/L (ref 0.5–2)
DEPRECATED RDW RBC AUTO: 52.9 FL (ref 37–54)
DEPRECATED RDW RBC AUTO: 58.1 FL (ref 37–54)
DEPRECATED RDW RBC AUTO: 58.5 FL (ref 37–54)
DEPRECATED RDW RBC AUTO: 58.7 FL (ref 37–54)
DEPRECATED RDW RBC AUTO: 59.1 FL (ref 37–54)
DEPRECATED RDW RBC AUTO: 59.7 FL (ref 37–54)
DEPRECATED RDW RBC AUTO: 60.3 FL (ref 37–54)
DEPRECATED RDW RBC AUTO: 60.5 FL (ref 37–54)
DEPRECATED RDW RBC AUTO: 60.6 FL (ref 37–54)
DEPRECATED RDW RBC AUTO: 61.2 FL (ref 37–54)
DEVELOPER EXPIRATION DATE: NORMAL
DEVELOPER EXPIRATION DATE: NORMAL
DEVELOPER LOT NUMBER: 203
DEVELOPER LOT NUMBER: 225
E HISTOLYT DNA STL QL NAA+NON-PROBE: NOT DETECTED
EAEC PAA PLAS AGGR+AATA ST NAA+NON-PRB: NOT DETECTED
EC STX1+STX2 GENES STL QL NAA+NON-PROBE: NOT DETECTED
EGFRCR SERPLBLD CKD-EPI 2021: 19.3 ML/MIN/1.73
EGFRCR SERPLBLD CKD-EPI 2021: 19.4 ML/MIN/1.73
EGFRCR SERPLBLD CKD-EPI 2021: 19.8 ML/MIN/1.73
EGFRCR SERPLBLD CKD-EPI 2021: 20.6 ML/MIN/1.73
EGFRCR SERPLBLD CKD-EPI 2021: 20.9 ML/MIN/1.73
EGFRCR SERPLBLD CKD-EPI 2021: 22.9 ML/MIN/1.73
EGFRCR SERPLBLD CKD-EPI 2021: 23.5 ML/MIN/1.73
EGFRCR SERPLBLD CKD-EPI 2021: 26 ML/MIN/1.73
EGFRCR SERPLBLD CKD-EPI 2021: 27 ML/MIN/1.73
EGFRCR SERPLBLD CKD-EPI 2021: 29.6 ML/MIN/1.73
ELLIPTOCYTES BLD QL SMEAR: ABNORMAL
ELLIPTOCYTES BLD QL SMEAR: ABNORMAL
EOSINOPHIL # BLD AUTO: 0.01 10*3/MM3 (ref 0–0.4)
EOSINOPHIL # BLD AUTO: 0.01 10*3/MM3 (ref 0–0.4)
EOSINOPHIL # BLD AUTO: 0.03 10*3/MM3 (ref 0–0.4)
EOSINOPHIL # BLD AUTO: 0.04 10*3/MM3 (ref 0–0.4)
EOSINOPHIL # BLD AUTO: 0.04 10*3/MM3 (ref 0–0.4)
EOSINOPHIL # BLD AUTO: 0.11 10*3/MM3 (ref 0–0.4)
EOSINOPHIL # BLD AUTO: 0.16 10*3/MM3 (ref 0–0.4)
EOSINOPHIL # BLD MANUAL: 0.18 10*3/MM3 (ref 0–0.4)
EOSINOPHIL NFR BLD AUTO: 0.1 % (ref 0.3–6.2)
EOSINOPHIL NFR BLD AUTO: 0.1 % (ref 0.3–6.2)
EOSINOPHIL NFR BLD AUTO: 0.2 % (ref 0.3–6.2)
EOSINOPHIL NFR BLD AUTO: 0.4 % (ref 0.3–6.2)
EOSINOPHIL NFR BLD AUTO: 0.4 % (ref 0.3–6.2)
EOSINOPHIL NFR BLD AUTO: 0.8 % (ref 0.3–6.2)
EOSINOPHIL NFR BLD AUTO: 2.6 % (ref 0.3–6.2)
EOSINOPHIL NFR BLD MANUAL: 1 % (ref 0.3–6.2)
EOSINOPHILS ABSOLUTE: 0.11 K/UL (ref 0.04–0.54)
EOSINOPHILS ABSOLUTE: 0.13 K/UL (ref 0.04–0.54)
EOSINOPHILS ABSOLUTE: 0.14 K/UL (ref 0.04–0.54)
EOSINOPHILS ABSOLUTE: 0.16 K/UL (ref 0.04–0.54)
EOSINOPHILS ABSOLUTE: 0.2 K/UL (ref 0–0.6)
EOSINOPHILS RELATIVE PERCENT: 1.7 % (ref 0.7–7)
EOSINOPHILS RELATIVE PERCENT: 2 % (ref 0.7–7)
EOSINOPHILS RELATIVE PERCENT: 2.1 % (ref 0.7–7)
EOSINOPHILS RELATIVE PERCENT: 2.2 % (ref 0.7–7)
EOSINOPHILS RELATIVE PERCENT: 2.6 % (ref 0–5)
EPEC EAE GENE STL QL NAA+NON-PROBE: NOT DETECTED
ERYTHROCYTE [DISTWIDTH] IN BLOOD BY AUTOMATED COUNT: 14.4 % (ref 12.3–15.4)
ERYTHROCYTE [DISTWIDTH] IN BLOOD BY AUTOMATED COUNT: 16 % (ref 12.3–15.4)
ERYTHROCYTE [DISTWIDTH] IN BLOOD BY AUTOMATED COUNT: 16.1 % (ref 12.3–15.4)
ERYTHROCYTE [DISTWIDTH] IN BLOOD BY AUTOMATED COUNT: 16.4 % (ref 12.3–15.4)
ERYTHROCYTE [DISTWIDTH] IN BLOOD BY AUTOMATED COUNT: 16.7 % (ref 12.3–15.4)
ERYTHROCYTE [DISTWIDTH] IN BLOOD BY AUTOMATED COUNT: 17.9 % (ref 12.3–15.4)
ERYTHROCYTE [DISTWIDTH] IN BLOOD BY AUTOMATED COUNT: 18.5 % (ref 12.3–15.4)
ERYTHROPOIETIN: 23 MU/ML (ref 4–27)
ETEC LTA+ST1A+ST1B TOX ST NAA+NON-PROBE: NOT DETECTED
EXPIRATION DATE: NORMAL
EXPIRATION DATE: NORMAL
FECAL OCCULT BLOOD SCREEN, POC: NEGATIVE
FECAL OCCULT BLOOD SCREEN, POC: NEGATIVE
FERRITIN: 265.7 NG/ML (ref 13–150)
FERRITIN: 93 NG/ML (ref 13–150)
FERRITIN: 95 NG/ML (ref 13–150)
FOLATE: >20 NG/ML (ref 4.8–37.3)
G LAMBLIA DNA STL QL NAA+NON-PROBE: NOT DETECTED
GAMMA GLOBULIN: 0.94 G/DL (ref 0.62–1.51)
GAS FLOW AIRWAY: 2.5 LPM
GAS FLOW AIRWAY: 20 LPM
GAS FLOW AIRWAY: 4 LPM
GFR AFRICAN AMERICAN: 43
GFR NON-AFRICAN AMERICAN: 33
GFR NON-AFRICAN AMERICAN: 35
GFR NON-AFRICAN AMERICAN: 39
GFR SERPL CREATININE-BSD FRML MDRD: 29 ML/MIN/1.73
GLOBULIN UR ELPH-MCNC: 1.9 GM/DL
GLOBULIN UR ELPH-MCNC: 2 GM/DL
GLOBULIN UR ELPH-MCNC: 2.1 GM/DL
GLOBULIN UR ELPH-MCNC: 2.1 GM/DL
GLOBULIN UR ELPH-MCNC: 2.3 GM/DL
GLOBULIN UR ELPH-MCNC: 2.3 GM/DL
GLOBULIN UR ELPH-MCNC: 2.4 GM/DL
GLOBULIN UR ELPH-MCNC: 2.5 GM/DL
GLOBULIN UR ELPH-MCNC: 2.5 GM/DL
GLOBULIN UR ELPH-MCNC: 2.8 GM/DL
GLOBULIN UR ELPH-MCNC: 2.8 GM/DL
GLOBULIN: 2.7 G/DL
GLOBULIN: 2.9 G/DL
GLUCOSE BLD-MCNC: 100 MG/DL (ref 74–106)
GLUCOSE BLD-MCNC: 109 MG/DL (ref 74–109)
GLUCOSE BLD-MCNC: 119 MG/DL (ref 74–106)
GLUCOSE BLDC GLUCOMTR-MCNC: 118 MG/DL (ref 70–130)
GLUCOSE BLDC GLUCOMTR-MCNC: 131 MG/DL (ref 70–130)
GLUCOSE SERPL-MCNC: 100 MG/DL (ref 65–99)
GLUCOSE SERPL-MCNC: 107 MG/DL (ref 65–99)
GLUCOSE SERPL-MCNC: 110 MG/DL (ref 65–99)
GLUCOSE SERPL-MCNC: 111 MG/DL (ref 65–99)
GLUCOSE SERPL-MCNC: 113 MG/DL (ref 65–99)
GLUCOSE SERPL-MCNC: 116 MG/DL (ref 65–99)
GLUCOSE SERPL-MCNC: 117 MG/DL (ref 65–99)
GLUCOSE SERPL-MCNC: 129 MG/DL (ref 65–99)
GLUCOSE SERPL-MCNC: 143 MG/DL (ref 65–99)
GLUCOSE SERPL-MCNC: 157 MG/DL (ref 65–99)
GLUCOSE SERPL-MCNC: 183 MG/DL (ref 65–99)
GLUCOSE UR STRIP-MCNC: ABNORMAL MG/DL
GLUCOSE UR STRIP-MCNC: NEGATIVE MG/DL
HAPTOGLOBIN: 169 MG/DL (ref 30–200)
HBA1C MFR BLD: 5.2 % (ref 4.8–5.6)
HCO3 BLDA-SCNC: 25.4 MMOL/L (ref 20–26)
HCO3 BLDA-SCNC: 26.3 MMOL/L (ref 20–26)
HCO3 BLDA-SCNC: 28.6 MMOL/L (ref 20–26)
HCT VFR BLD AUTO: 18.2 % (ref 34–46.6)
HCT VFR BLD AUTO: 22.6 % (ref 34–46.6)
HCT VFR BLD AUTO: 23.3 % (ref 34–46.6)
HCT VFR BLD AUTO: 23.5 % (ref 34–46.6)
HCT VFR BLD AUTO: 24.2 % (ref 34–46.6)
HCT VFR BLD AUTO: 24.3 % (ref 34–46.6)
HCT VFR BLD AUTO: 24.8 % (ref 34–46.6)
HCT VFR BLD AUTO: 24.9 % (ref 34–46.6)
HCT VFR BLD AUTO: 25.5 % (ref 34–46.6)
HCT VFR BLD AUTO: 25.9 % (ref 34–46.6)
HCT VFR BLD AUTO: 27.2 % (ref 34–46.6)
HCT VFR BLD AUTO: 28.2 % (ref 34–46.6)
HCT VFR BLD AUTO: 30.4 % (ref 34–46.6)
HCT VFR BLD AUTO: 30.7 % (ref 34–46.6)
HCT VFR BLD AUTO: 35.7 % (ref 34–46.6)
HCT VFR BLD AUTO: 46 % (ref 34–46.6)
HCT VFR BLD CALC: 26.8 % (ref 37–47)
HCT VFR BLD CALC: 27 % (ref 34.1–44.9)
HCT VFR BLD CALC: 27.5 % (ref 34.1–44.9)
HCT VFR BLD CALC: 27.7 % (ref 34.1–44.9)
HCT VFR BLD CALC: 28.4 % (ref 37–47)
HCT VFR BLD CALC: 28.8 % (ref 34.1–44.9)
HCT VFR BLD CALC: 30.5 % (ref 34.1–44.9)
HCT VFR BLD CALC: 32.2 % (ref 34.1–44.9)
HCT VFR BLD CALC: 32.5 % (ref 34.1–44.9)
HCT VFR BLD CALC: 34.2 % (ref 34.1–44.9)
HCT VFR BLD CALC: 34.4 % (ref 34.1–44.9)
HCT VFR BLD CALC: 37.5 % (ref 34.1–44.9)
HDLC SERPL-MCNC: 26 MG/DL (ref 40–60)
HEMOCCULT STL QL: NEGATIVE
HEMOGLOBIN: 10 G/DL (ref 11.2–15.7)
HEMOGLOBIN: 10 G/DL (ref 11.2–15.7)
HEMOGLOBIN: 10.9 G/DL (ref 11.2–15.7)
HEMOGLOBIN: 7.7 G/DL (ref 12–16)
HEMOGLOBIN: 8.1 G/DL (ref 11.2–15.7)
HEMOGLOBIN: 8.4 G/DL (ref 11.2–15.7)
HEMOGLOBIN: 8.6 G/DL (ref 11.2–15.7)
HEMOGLOBIN: 8.6 G/DL (ref 11.2–15.7)
HEMOGLOBIN: 8.9 G/DL (ref 11.2–15.7)
HEMOGLOBIN: 9.3 G/DL (ref 11.2–15.7)
HEMOGLOBIN: 9.6 G/DL (ref 11.2–15.7)
HGB BLD-MCNC: 10.8 G/DL (ref 12–15.9)
HGB BLD-MCNC: 13.7 G/DL (ref 12–15.9)
HGB BLD-MCNC: 5.6 G/DL (ref 12–15.9)
HGB BLD-MCNC: 7.1 G/DL (ref 12–15.9)
HGB BLD-MCNC: 7.4 G/DL (ref 12–15.9)
HGB BLD-MCNC: 7.5 G/DL (ref 12–15.9)
HGB BLD-MCNC: 7.7 G/DL (ref 12–15.9)
HGB BLD-MCNC: 8 G/DL (ref 12–15.9)
HGB BLD-MCNC: 8 G/DL (ref 12–15.9)
HGB BLD-MCNC: 8.1 G/DL (ref 12–15.9)
HGB BLD-MCNC: 8.7 G/DL (ref 12–15.9)
HGB BLD-MCNC: 8.9 G/DL (ref 12–15.9)
HGB UR QL STRIP.AUTO: NEGATIVE
HGB UR QL STRIP.AUTO: NEGATIVE
HOLD SPECIMEN: NORMAL
HOLD SPECIMEN: NORMAL
HYALINE CASTS UR QL AUTO: ABNORMAL /LPF
HYPOCHROMIA: ABNORMAL
IGA: 126 MG/DL (ref 68–408)
IGG: 942 MG/DL (ref 768–1632)
IGM: 88 MG/DL (ref 35–263)
IMM GRANULOCYTES # BLD AUTO: 0.02 10*3/MM3 (ref 0–0.05)
IMM GRANULOCYTES # BLD AUTO: 0.09 10*3/MM3 (ref 0–0.05)
IMM GRANULOCYTES # BLD AUTO: 0.09 10*3/MM3 (ref 0–0.05)
IMM GRANULOCYTES # BLD AUTO: 0.18 10*3/MM3 (ref 0–0.05)
IMM GRANULOCYTES # BLD AUTO: 0.2 10*3/MM3 (ref 0–0.05)
IMM GRANULOCYTES # BLD AUTO: 0.39 10*3/MM3 (ref 0–0.05)
IMM GRANULOCYTES # BLD AUTO: 0.5 10*3/MM3 (ref 0–0.05)
IMM GRANULOCYTES NFR BLD AUTO: 0.3 % (ref 0–0.5)
IMM GRANULOCYTES NFR BLD AUTO: 0.5 % (ref 0–0.5)
IMM GRANULOCYTES NFR BLD AUTO: 0.7 % (ref 0–0.5)
IMM GRANULOCYTES NFR BLD AUTO: 1.9 % (ref 0–0.5)
IMM GRANULOCYTES NFR BLD AUTO: 2.2 % (ref 0–0.5)
IMM GRANULOCYTES NFR BLD AUTO: 3.6 % (ref 0–0.5)
IMM GRANULOCYTES NFR BLD AUTO: 3.7 % (ref 0–0.5)
IMMATURE GRANULOCYTES #: 0 K/UL
INHALED O2 CONCENTRATION: 40 %
INR PPP: 1.23 (ref 0.91–1.09)
INR PPP: 1.76 (ref 0.91–1.09)
IRON SATURATION: 17 % (ref 14–50)
IRON SATURATION: 17 % (ref 14–50)
IRON SATURATION: 21 % (ref 14–50)
IRON: 49 UG/DL (ref 37–145)
IRON: 52 UG/DL (ref 37–145)
IRON: 54 UG/DL (ref 37–145)
KAPPA FREE LIGHT CHAINS QNT: 90.44 MG/L (ref 3.3–19.4)
KAPPA/LAMBDA FREE LIGHT CHAIN RATIO: 2.23 (ref 0.26–1.65)
KETONES UR QL STRIP: NEGATIVE
KETONES UR QL STRIP: NEGATIVE
LACTATE DEHYDROGENASE: 656 U/L (ref 313–618)
LAMBDA FREE LIGHT CHAINS QNT: 40.6 MG/L (ref 5.71–26.3)
LDLC SERPL CALC-MCNC: 37 MG/DL (ref 0–100)
LDLC/HDLC SERPL: 1.09 {RATIO}
LEFT ATRIUM VOLUME INDEX: 78 ML/M2
LEUKOCYTE ESTERASE UR QL STRIP.AUTO: NEGATIVE
LEUKOCYTE ESTERASE UR QL STRIP.AUTO: NEGATIVE
LIPASE SERPL-CCNC: 67 U/L (ref 13–60)
LV EF 2D ECHO EST: 65 %
LYMPHOCYTES # BLD AUTO: 0.8 10*3/MM3 (ref 0.7–3.1)
LYMPHOCYTES # BLD AUTO: 0.92 10*3/MM3 (ref 0.7–3.1)
LYMPHOCYTES # BLD AUTO: 1.11 10*3/MM3 (ref 0.7–3.1)
LYMPHOCYTES # BLD AUTO: 1.18 10*3/MM3 (ref 0.7–3.1)
LYMPHOCYTES # BLD AUTO: 1.24 10*3/MM3 (ref 0.7–3.1)
LYMPHOCYTES # BLD AUTO: 1.25 10*3/MM3 (ref 0.7–3.1)
LYMPHOCYTES # BLD AUTO: 1.31 10*3/MM3 (ref 0.7–3.1)
LYMPHOCYTES # BLD MANUAL: 0.14 10*3/MM3 (ref 0.7–3.1)
LYMPHOCYTES # BLD MANUAL: 0.36 10*3/MM3 (ref 0.7–3.1)
LYMPHOCYTES # BLD MANUAL: 0.59 10*3/MM3 (ref 0.7–3.1)
LYMPHOCYTES ABSOLUTE: 0.8 K/UL (ref 1.1–4.5)
LYMPHOCYTES ABSOLUTE: 1.12 K/UL (ref 1.18–3.74)
LYMPHOCYTES ABSOLUTE: 1.17 K/UL (ref 1.18–3.74)
LYMPHOCYTES ABSOLUTE: 1.2 K/UL (ref 1.18–3.74)
LYMPHOCYTES ABSOLUTE: 1.25 K/UL (ref 1.18–3.74)
LYMPHOCYTES ABSOLUTE: 1.38 K/UL (ref 1.18–3.74)
LYMPHOCYTES ABSOLUTE: 1.44 K/UL (ref 1.18–3.74)
LYMPHOCYTES ABSOLUTE: 1.49 K/UL (ref 1.18–3.74)
LYMPHOCYTES ABSOLUTE: 1.68 K/UL (ref 1.18–3.74)
LYMPHOCYTES NFR BLD AUTO: 11.1 % (ref 19.6–45.3)
LYMPHOCYTES NFR BLD AUTO: 14 % (ref 19.6–45.3)
LYMPHOCYTES NFR BLD AUTO: 18.3 % (ref 19.6–45.3)
LYMPHOCYTES NFR BLD AUTO: 6.7 % (ref 19.6–45.3)
LYMPHOCYTES NFR BLD AUTO: 7.5 % (ref 19.6–45.3)
LYMPHOCYTES NFR BLD AUTO: 8.5 % (ref 19.6–45.3)
LYMPHOCYTES NFR BLD AUTO: 9.5 % (ref 19.6–45.3)
LYMPHOCYTES NFR BLD MANUAL: 2 % (ref 5–12)
LYMPHOCYTES NFR BLD MANUAL: 3 % (ref 5–12)
LYMPHOCYTES RELATIVE PERCENT: 13.5 % (ref 20–40)
LYMPHOCYTES RELATIVE PERCENT: 18.6 % (ref 19.3–53.1)
LYMPHOCYTES RELATIVE PERCENT: 19 % (ref 19.3–53.1)
LYMPHOCYTES RELATIVE PERCENT: 19 % (ref 19.3–53.1)
LYMPHOCYTES RELATIVE PERCENT: 20 % (ref 19.3–53.1)
LYMPHOCYTES RELATIVE PERCENT: 21.6 % (ref 19.3–53.1)
LYMPHOCYTES RELATIVE PERCENT: 21.7 % (ref 19.3–53.1)
LYMPHOCYTES RELATIVE PERCENT: 22.5 % (ref 19.3–53.1)
LYMPHOCYTES RELATIVE PERCENT: 23.4 % (ref 19.3–53.1)
Lab: 203
Lab: 225
Lab: ABNORMAL
MACROCYTES BLD QL SMEAR: ABNORMAL
MAGNESIUM SERPL-MCNC: 2.1 MG/DL (ref 1.6–2.4)
MAGNESIUM SERPL-MCNC: 2.4 MG/DL (ref 1.6–2.4)
MAXIMAL PREDICTED HEART RATE: 132 BPM
MCH RBC QN AUTO: 28.6 PG (ref 26.6–33)
MCH RBC QN AUTO: 28.6 PG (ref 26.6–33)
MCH RBC QN AUTO: 28.7 PG (ref 26.6–33)
MCH RBC QN AUTO: 28.7 PG (ref 26.6–33)
MCH RBC QN AUTO: 29.4 PG (ref 26.6–33)
MCH RBC QN AUTO: 29.4 PG (ref 27–31)
MCH RBC QN AUTO: 29.5 PG (ref 25.6–32.2)
MCH RBC QN AUTO: 29.5 PG (ref 26.6–33)
MCH RBC QN AUTO: 29.6 PG (ref 25.6–32.2)
MCH RBC QN AUTO: 29.7 PG (ref 26.6–33)
MCH RBC QN AUTO: 29.8 PG (ref 25.6–32.2)
MCH RBC QN AUTO: 29.9 PG (ref 26.6–33)
MCH RBC QN AUTO: 30.1 PG (ref 25.6–32.2)
MCH RBC QN AUTO: 30.1 PG (ref 25.6–32.2)
MCH RBC QN AUTO: 30.1 PG (ref 26.6–33)
MCH RBC QN AUTO: 30.1 PG (ref 26.6–33)
MCH RBC QN AUTO: 30.4 PG (ref 25.6–32.2)
MCH RBC QN AUTO: 30.5 PG (ref 25.6–32.2)
MCH RBC QN AUTO: 30.8 PG (ref 25.6–32.2)
MCH RBC QN AUTO: 30.8 PG (ref 25.6–32.2)
MCH RBC QN AUTO: 31.1 PG (ref 25.6–32.2)
MCHC RBC AUTO-ENTMCNC: 28.3 G/DL (ref 31.5–35.7)
MCHC RBC AUTO-ENTMCNC: 28.4 G/DL (ref 31.5–35.7)
MCHC RBC AUTO-ENTMCNC: 28.6 G/DL (ref 31.5–35.7)
MCHC RBC AUTO-ENTMCNC: 28.7 G/DL (ref 33–37)
MCHC RBC AUTO-ENTMCNC: 28.9 G/DL (ref 32.3–35.5)
MCHC RBC AUTO-ENTMCNC: 29 G/DL (ref 31.5–35.7)
MCHC RBC AUTO-ENTMCNC: 29.1 G/DL (ref 32.3–35.5)
MCHC RBC AUTO-ENTMCNC: 29.1 G/DL (ref 32.3–35.5)
MCHC RBC AUTO-ENTMCNC: 29.2 G/DL (ref 32.3–35.5)
MCHC RBC AUTO-ENTMCNC: 29.2 G/DL (ref 32.3–35.5)
MCHC RBC AUTO-ENTMCNC: 29.5 G/DL (ref 32.3–35.5)
MCHC RBC AUTO-ENTMCNC: 29.8 G/DL (ref 31.5–35.7)
MCHC RBC AUTO-ENTMCNC: 29.9 G/DL (ref 32.3–35.5)
MCHC RBC AUTO-ENTMCNC: 30 G/DL (ref 32.3–35.5)
MCHC RBC AUTO-ENTMCNC: 30.2 G/DL (ref 31.5–35.7)
MCHC RBC AUTO-ENTMCNC: 30.3 G/DL (ref 31.5–35.7)
MCHC RBC AUTO-ENTMCNC: 30.3 G/DL (ref 32.3–35.5)
MCHC RBC AUTO-ENTMCNC: 30.8 G/DL (ref 31.5–35.7)
MCHC RBC AUTO-ENTMCNC: 31 G/DL (ref 31.5–35.7)
MCHC RBC AUTO-ENTMCNC: 31.3 G/DL (ref 32.3–35.5)
MCHC RBC AUTO-ENTMCNC: 32.1 G/DL (ref 31.5–35.7)
MCV RBC AUTO: 100.3 FL (ref 79.4–94.8)
MCV RBC AUTO: 100.3 FL (ref 79–97)
MCV RBC AUTO: 100.7 FL (ref 79–97)
MCV RBC AUTO: 101.1 FL (ref 79–97)
MCV RBC AUTO: 101.4 FL (ref 79.4–94.8)
MCV RBC AUTO: 102.3 FL (ref 81–99)
MCV RBC AUTO: 103 FL (ref 79.4–94.8)
MCV RBC AUTO: 103.2 FL (ref 79.4–94.8)
MCV RBC AUTO: 104.9 FL (ref 79.4–94.8)
MCV RBC AUTO: 106.6 FL (ref 79.4–94.8)
MCV RBC AUTO: 106.6 FL (ref 79.4–94.8)
MCV RBC AUTO: 91.9 FL (ref 79–97)
MCV RBC AUTO: 94.9 FL (ref 79–97)
MCV RBC AUTO: 97.2 FL (ref 79.4–94.8)
MCV RBC AUTO: 97.3 FL (ref 79–97)
MCV RBC AUTO: 99 FL (ref 79–97)
MCV RBC AUTO: 99.3 FL (ref 79.4–94.8)
MCV RBC AUTO: 99.3 FL (ref 79.4–94.8)
MCV RBC AUTO: 99.4 FL (ref 79–97)
MCV RBC AUTO: 99.6 FL (ref 79–97)
MCV RBC AUTO: 99.6 FL (ref 79–97)
MICROCYTES BLD QL: ABNORMAL
MODALITY: ABNORMAL
MONOCYTES # BLD AUTO: 0.67 10*3/MM3 (ref 0.1–0.9)
MONOCYTES # BLD AUTO: 0.77 10*3/MM3 (ref 0.1–0.9)
MONOCYTES # BLD AUTO: 0.95 10*3/MM3 (ref 0.1–0.9)
MONOCYTES # BLD AUTO: 1.02 10*3/MM3 (ref 0.1–0.9)
MONOCYTES # BLD AUTO: 1.12 10*3/MM3 (ref 0.1–0.9)
MONOCYTES # BLD AUTO: 1.52 10*3/MM3 (ref 0.1–0.9)
MONOCYTES # BLD AUTO: 1.69 10*3/MM3 (ref 0.1–0.9)
MONOCYTES # BLD: 0.36 10*3/MM3 (ref 0.1–0.9)
MONOCYTES # BLD: 0.44 10*3/MM3 (ref 0.1–0.9)
MONOCYTES ABSOLUTE: 0.5 K/UL (ref 0–0.9)
MONOCYTES ABSOLUTE: 0.59 K/UL (ref 0.24–0.82)
MONOCYTES ABSOLUTE: 0.62 K/UL (ref 0.24–0.82)
MONOCYTES ABSOLUTE: 0.62 K/UL (ref 0.24–0.82)
MONOCYTES ABSOLUTE: 0.65 K/UL (ref 0.24–0.82)
MONOCYTES ABSOLUTE: 0.65 K/UL (ref 0.24–0.82)
MONOCYTES ABSOLUTE: 0.7 K/UL (ref 0.24–0.82)
MONOCYTES ABSOLUTE: 0.72 K/UL (ref 0.24–0.82)
MONOCYTES ABSOLUTE: 0.76 K/UL (ref 0.24–0.82)
MONOCYTES NFR BLD AUTO: 11 % (ref 5–12)
MONOCYTES NFR BLD AUTO: 11.4 % (ref 5–12)
MONOCYTES NFR BLD AUTO: 11.9 % (ref 5–12)
MONOCYTES NFR BLD AUTO: 12.3 % (ref 5–12)
MONOCYTES NFR BLD AUTO: 14.2 % (ref 5–12)
MONOCYTES NFR BLD AUTO: 4.2 % (ref 5–12)
MONOCYTES NFR BLD AUTO: 7.7 % (ref 5–12)
MONOCYTES RELATIVE PERCENT: 10.3 % (ref 4.7–12.5)
MONOCYTES RELATIVE PERCENT: 10.4 % (ref 4.7–12.5)
MONOCYTES RELATIVE PERCENT: 10.5 % (ref 4.7–12.5)
MONOCYTES RELATIVE PERCENT: 10.6 % (ref 4.7–12.5)
MONOCYTES RELATIVE PERCENT: 10.8 % (ref 4.7–12.5)
MONOCYTES RELATIVE PERCENT: 10.9 % (ref 4.7–12.5)
MONOCYTES RELATIVE PERCENT: 8.9 % (ref 0–10)
MONOCYTES RELATIVE PERCENT: 8.9 % (ref 4.7–12.5)
MONOCYTES RELATIVE PERCENT: 9.7 % (ref 4.7–12.5)
MYELOCYTES NFR BLD MANUAL: 1 % (ref 0–0)
NEGATIVE CONTROL: NEGATIVE
NEGATIVE CONTROL: NEGATIVE
NEUTROPHILS # BLD AUTO: 13.6 10*3/MM3 (ref 1.7–7)
NEUTROPHILS # BLD AUTO: 13.68 10*3/MM3 (ref 1.7–7)
NEUTROPHILS # BLD AUTO: 16.89 10*3/MM3 (ref 1.7–7)
NEUTROPHILS ABSOLUTE: 4.12 K/UL (ref 1.56–6.13)
NEUTROPHILS ABSOLUTE: 4.15 K/UL (ref 1.56–6.13)
NEUTROPHILS ABSOLUTE: 4.15 K/UL (ref 1.56–6.13)
NEUTROPHILS ABSOLUTE: 4.22 K/UL (ref 1.56–6.13)
NEUTROPHILS ABSOLUTE: 4.24 K/UL (ref 1.56–6.13)
NEUTROPHILS ABSOLUTE: 4.3 K/UL (ref 1.5–7.5)
NEUTROPHILS ABSOLUTE: 4.4 K/UL (ref 1.56–6.13)
NEUTROPHILS ABSOLUTE: 4.45 K/UL (ref 1.56–6.13)
NEUTROPHILS ABSOLUTE: 4.52 K/UL (ref 1.56–6.13)
NEUTROPHILS NFR BLD AUTO: 10.13 10*3/MM3 (ref 1.7–7)
NEUTROPHILS NFR BLD AUTO: 10.28 10*3/MM3 (ref 1.7–7)
NEUTROPHILS NFR BLD AUTO: 16.2 10*3/MM3 (ref 1.7–7)
NEUTROPHILS NFR BLD AUTO: 4.1 10*3/MM3 (ref 1.7–7)
NEUTROPHILS NFR BLD AUTO: 6.4 10*3/MM3 (ref 1.7–7)
NEUTROPHILS NFR BLD AUTO: 67.5 % (ref 42.7–76)
NEUTROPHILS NFR BLD AUTO: 7.3 10*3/MM3 (ref 1.7–7)
NEUTROPHILS NFR BLD AUTO: 7.52 10*3/MM3 (ref 1.7–7)
NEUTROPHILS NFR BLD AUTO: 70.4 % (ref 42.7–76)
NEUTROPHILS NFR BLD AUTO: 71.8 % (ref 42.7–76)
NEUTROPHILS NFR BLD AUTO: 73.6 % (ref 42.7–76)
NEUTROPHILS NFR BLD AUTO: 77.4 % (ref 42.7–76)
NEUTROPHILS NFR BLD AUTO: 83.8 % (ref 42.7–76)
NEUTROPHILS NFR BLD AUTO: 88.1 % (ref 42.7–76)
NEUTROPHILS NFR BLD MANUAL: 84 % (ref 42.7–76)
NEUTROPHILS NFR BLD MANUAL: 84.7 % (ref 42.7–76)
NEUTROPHILS NFR BLD MANUAL: 86.1 % (ref 42.7–76)
NEUTROPHILS RELATIVE PERCENT: 62.9 % (ref 34–71.1)
NEUTROPHILS RELATIVE PERCENT: 63.9 % (ref 34–71.1)
NEUTROPHILS RELATIVE PERCENT: 66 % (ref 34–71.1)
NEUTROPHILS RELATIVE PERCENT: 66.6 % (ref 34–71.1)
NEUTROPHILS RELATIVE PERCENT: 67 % (ref 34–71.1)
NEUTROPHILS RELATIVE PERCENT: 67.3 % (ref 34–71.1)
NEUTROPHILS RELATIVE PERCENT: 68.2 % (ref 34–71.1)
NEUTROPHILS RELATIVE PERCENT: 70.2 % (ref 34–71.1)
NEUTROPHILS RELATIVE PERCENT: 74.2 % (ref 50–65)
NEUTS BAND NFR BLD MANUAL: 10.2 % (ref 0–5)
NEUTS BAND NFR BLD MANUAL: 15 % (ref 0–5)
NEUTS BAND NFR BLD MANUAL: 5.9 % (ref 0–5)
NEUTS VAC BLD QL SMEAR: ABNORMAL
NITRITE UR QL STRIP: NEGATIVE
NITRITE UR QL STRIP: NEGATIVE
NOROVIRUS GI+II RNA STL QL NAA+NON-PROBE: NOT DETECTED
NRBC BLD AUTO-RTO: 0 /100 WBC (ref 0–0.2)
NRBC BLD AUTO-RTO: 0 /100 WBC (ref 0–0.2)
NRBC BLD AUTO-RTO: 0.1 /100 WBC (ref 0–0.2)
NRBC BLD AUTO-RTO: 0.3 /100 WBC (ref 0–0.2)
NRBC BLD AUTO-RTO: 0.8 /100 WBC (ref 0–0.2)
NRBC BLD AUTO-RTO: 0.9 /100 WBC (ref 0–0.2)
NRBC BLD AUTO-RTO: 1.1 /100 WBC (ref 0–0.2)
NT-PROBNP SERPL-MCNC: 779.7 PG/ML (ref 0–1800)
NT-PROBNP SERPL-MCNC: ABNORMAL PG/ML (ref 0–1800)
OVALOCYTES BLD QL SMEAR: ABNORMAL
OVALOCYTES: ABNORMAL
P SHIGELLOIDES DNA STL QL NAA+NON-PROBE: NOT DETECTED
PCO2 BLDA: 48.3 MM HG (ref 35–45)
PCO2 BLDA: 48.4 MM HG (ref 35–45)
PCO2 BLDA: 48.8 MM HG (ref 35–45)
PCO2 TEMP ADJ BLD: 48.3 MM HG (ref 35–45)
PCO2 TEMP ADJ BLD: 48.4 MM HG (ref 35–45)
PCO2 TEMP ADJ BLD: 48.8 MM HG (ref 35–45)
PDW BLD-RTO: 13.1 % (ref 11.7–14.4)
PDW BLD-RTO: 13.7 % (ref 11.7–14.4)
PDW BLD-RTO: 13.9 % (ref 11.7–14.4)
PDW BLD-RTO: 14.4 % (ref 11.7–14.4)
PDW BLD-RTO: 14.6 % (ref 11.5–14.5)
PDW BLD-RTO: 14.6 % (ref 11.7–14.4)
PDW BLD-RTO: 15 % (ref 11.7–14.4)
PDW BLD-RTO: 15.1 % (ref 11.7–14.4)
PDW BLD-RTO: 15.5 % (ref 11.7–14.4)
PDW BLD-RTO: 15.7 % (ref 11.7–14.4)
PDW BLD-RTO: 15.7 % (ref 11.7–14.4)
PH BLDA: 7.33 PH UNITS (ref 7.35–7.45)
PH BLDA: 7.34 PH UNITS (ref 7.35–7.45)
PH BLDA: 7.38 PH UNITS (ref 7.35–7.45)
PH UR STRIP.AUTO: 6 [PH] (ref 5–8)
PH UR STRIP.AUTO: 8 [PH] (ref 5–8)
PH, TEMP CORRECTED: 7.33 PH UNITS (ref 7.35–7.45)
PH, TEMP CORRECTED: 7.34 PH UNITS (ref 7.35–7.45)
PH, TEMP CORRECTED: 7.38 PH UNITS (ref 7.35–7.45)
PHOSPHATE SERPL-MCNC: 5.7 MG/DL (ref 2.5–4.5)
PLAT MORPH BLD: NORMAL
PLATELET # BLD AUTO: 191 10*3/MM3 (ref 140–450)
PLATELET # BLD AUTO: 202 10*3/MM3 (ref 140–450)
PLATELET # BLD AUTO: 202 10*3/MM3 (ref 140–450)
PLATELET # BLD AUTO: 204 10*3/MM3 (ref 140–450)
PLATELET # BLD AUTO: 210 10*3/MM3 (ref 140–450)
PLATELET # BLD AUTO: 216 10*3/MM3 (ref 140–450)
PLATELET # BLD AUTO: 234 10*3/MM3 (ref 140–450)
PLATELET # BLD AUTO: 236 10*3/MM3 (ref 140–450)
PLATELET # BLD AUTO: 245 10*3/MM3 (ref 140–450)
PLATELET # BLD AUTO: 357 10*3/MM3 (ref 140–450)
PLATELET # BLD: 146 K/UL (ref 182–369)
PLATELET # BLD: 179 K/UL (ref 182–369)
PLATELET # BLD: 181 K/UL (ref 182–369)
PLATELET # BLD: 181 K/UL (ref 182–369)
PLATELET # BLD: 190 K/UL (ref 182–369)
PLATELET # BLD: 196 K/UL (ref 182–369)
PLATELET # BLD: 196 K/UL (ref 182–369)
PLATELET # BLD: 197 K/UL (ref 182–369)
PLATELET # BLD: 203 K/UL (ref 182–369)
PLATELET # BLD: 208 K/UL (ref 130–400)
PLATELET # BLD: 227 K/UL (ref 182–369)
PLATELET SLIDE REVIEW: ADEQUATE
PMV BLD AUTO: 10 FL (ref 6–12)
PMV BLD AUTO: 10.1 FL (ref 6–12)
PMV BLD AUTO: 10.1 FL (ref 7.4–10.4)
PMV BLD AUTO: 10.2 FL (ref 6–12)
PMV BLD AUTO: 10.2 FL (ref 7.4–10.4)
PMV BLD AUTO: 8.7 FL (ref 7.4–10.4)
PMV BLD AUTO: 8.8 FL (ref 7.4–10.4)
PMV BLD AUTO: 9 FL (ref 7.4–10.4)
PMV BLD AUTO: 9.1 FL (ref 7.4–10.4)
PMV BLD AUTO: 9.2 FL (ref 6–12)
PMV BLD AUTO: 9.3 FL (ref 6–12)
PMV BLD AUTO: 9.7 FL (ref 6–12)
PMV BLD AUTO: 9.8 FL (ref 6–12)
PMV BLD AUTO: 9.8 FL (ref 7.4–10.4)
PMV BLD AUTO: 9.9 FL (ref 6–12)
PMV BLD AUTO: 9.9 FL (ref 7.4–10.4)
PMV BLD AUTO: 9.9 FL (ref 9.4–12.3)
PO2 BLDA: 76.2 MM HG (ref 83–108)
PO2 BLDA: 88.1 MM HG (ref 83–108)
PO2 BLDA: 88.8 MM HG (ref 83–108)
PO2 TEMP ADJ BLD: 76.2 MM HG (ref 83–108)
PO2 TEMP ADJ BLD: 88.1 MM HG (ref 83–108)
PO2 TEMP ADJ BLD: 88.8 MM HG (ref 83–108)
POIKILOCYTOSIS BLD QL SMEAR: ABNORMAL
POLYCHROMASIA BLD QL SMEAR: ABNORMAL
POLYCHROMASIA BLD QL SMEAR: ABNORMAL
POSITIVE CONTROL: POSITIVE
POSITIVE CONTROL: POSITIVE
POTASSIUM SERPL-SCNC: 3.4 MMOL/L (ref 3.5–5.2)
POTASSIUM SERPL-SCNC: 3.4 MMOL/L (ref 3.5–5.2)
POTASSIUM SERPL-SCNC: 3.5 MMOL/L (ref 3.5–5.2)
POTASSIUM SERPL-SCNC: 3.6 MMOL/L (ref 3.5–5.2)
POTASSIUM SERPL-SCNC: 3.7 MMOL/L (ref 3.5–5.2)
POTASSIUM SERPL-SCNC: 4 MMOL/L (ref 3.5–5.2)
POTASSIUM SERPL-SCNC: 4.1 MMOL/L (ref 3.5–5.2)
POTASSIUM SERPL-SCNC: 4.4 MMOL/L (ref 3.5–5.2)
POTASSIUM SERPL-SCNC: 4.5 MMOL/L (ref 3.5–5)
POTASSIUM SERPL-SCNC: 4.5 MMOL/L (ref 3.5–5.1)
POTASSIUM SERPL-SCNC: 4.5 MMOL/L (ref 3.5–5.2)
POTASSIUM SERPL-SCNC: 4.6 MMOL/L (ref 3.5–5.2)
POTASSIUM SERPL-SCNC: 4.7 MMOL/L (ref 3.5–5.1)
POTASSIUM SERPL-SCNC: 5.2 MMOL/L (ref 3.5–5.2)
PROCALCITONIN SERPL-MCNC: >100 NG/ML (ref 0–0.25)
PROT SERPL-MCNC: 4.4 G/DL (ref 6–8.5)
PROT SERPL-MCNC: 4.9 G/DL (ref 6–8.5)
PROT SERPL-MCNC: 5.1 G/DL (ref 6–8.5)
PROT SERPL-MCNC: 5.2 G/DL (ref 6–8.5)
PROT SERPL-MCNC: 5.3 G/DL (ref 6–8.5)
PROT SERPL-MCNC: 5.6 G/DL (ref 6–8.5)
PROT SERPL-MCNC: 5.6 G/DL (ref 6–8.5)
PROT SERPL-MCNC: 6.2 G/DL (ref 6–8.5)
PROT SERPL-MCNC: 6.9 G/DL (ref 6–8.5)
PROT UR QL STRIP: ABNORMAL
PROT UR QL STRIP: NEGATIVE
PROTHROMBIN TIME: 15 SECONDS (ref 11.9–14.6)
PROTHROMBIN TIME: 19.9 SECONDS (ref 11.9–14.6)
QT INTERVAL: 378 MS
QTC INTERVAL: 467 MS
RBC # BLD AUTO: 1.87 10*6/MM3 (ref 3.77–5.28)
RBC # BLD AUTO: 2.55 10*6/MM3 (ref 3.77–5.28)
RBC # BLD AUTO: 2.56 10*6/MM3 (ref 3.77–5.28)
RBC # BLD AUTO: 2.69 10*6/MM3 (ref 3.77–5.28)
RBC # BLD AUTO: 2.71 10*6/MM3 (ref 3.77–5.28)
RBC # BLD AUTO: 2.8 10*6/MM3 (ref 3.77–5.28)
RBC # BLD AUTO: 3.03 10*6/MM3 (ref 3.77–5.28)
RBC # BLD AUTO: 3.1 10*6/MM3 (ref 3.77–5.28)
RBC # BLD AUTO: 3.59 10*6/MM3 (ref 3.77–5.28)
RBC # BLD AUTO: 4.62 10*6/MM3 (ref 3.77–5.28)
RBC # BLD: 2.62 M/UL (ref 4.2–5.4)
RBC # BLD: 2.72 M/UL (ref 3.93–5.22)
RBC # BLD: 2.79 M/UL (ref 3.93–5.22)
RBC # BLD: 2.79 M/UL (ref 3.93–5.22)
RBC # BLD: 2.83 M/UL (ref 3.93–5.22)
RBC # BLD: 2.86 M/UL (ref 3.93–5.22)
RBC # BLD: 3.02 M/UL (ref 3.93–5.22)
RBC # BLD: 3.24 M/UL (ref 3.93–5.22)
RBC # BLD: 3.28 M/UL (ref 3.93–5.22)
RBC # BLD: 3.32 M/UL (ref 3.93–5.22)
RBC # BLD: 3.7 M/UL (ref 3.93–5.22)
RBC # UR STRIP: ABNORMAL /HPF
REF LAB TEST METHOD: ABNORMAL
RETICULOCYTE ABSOLUTE COUNT: 0.06 M/UL (ref 0.03–0.12)
RETICULOCYTE COUNT PCT: 2.23 % (ref 0.5–1.5)
RH BLD: NEGATIVE
RH BLD: NEGATIVE
RVA RNA STL QL NAA+NON-PROBE: NOT DETECTED
S ENT+BONG DNA STL QL NAA+NON-PROBE: NOT DETECTED
SAO2 % BLDCOA: 96.3 % (ref 94–99)
SAO2 % BLDCOA: 97.4 % (ref 94–99)
SAO2 % BLDCOA: 97.8 % (ref 94–99)
SAPO I+II+IV+V RNA STL QL NAA+NON-PROBE: NOT DETECTED
SARS-COV-2 RNA PNL SPEC NAA+PROBE: NOT DETECTED
SEDIMENTATION RATE, ERYTHROCYTE: 44 MM/HR (ref 0–25)
SHIGELLA SP+EIEC IPAH ST NAA+NON-PROBE: NOT DETECTED
SODIUM BLD-SCNC: 141 MMOL/L (ref 137–145)
SODIUM BLD-SCNC: 141 MMOL/L (ref 137–145)
SODIUM BLD-SCNC: 143 MMOL/L (ref 136–145)
SODIUM SERPL-SCNC: 139 MMOL/L (ref 136–145)
SODIUM SERPL-SCNC: 141 MMOL/L (ref 136–145)
SODIUM SERPL-SCNC: 141 MMOL/L (ref 136–145)
SODIUM SERPL-SCNC: 142 MMOL/L (ref 136–145)
SODIUM SERPL-SCNC: 143 MMOL/L (ref 136–145)
SODIUM SERPL-SCNC: 144 MMOL/L (ref 136–145)
SODIUM SERPL-SCNC: 145 MMOL/L (ref 136–145)
SODIUM SERPL-SCNC: 145 MMOL/L (ref 136–145)
SODIUM SERPL-SCNC: 146 MMOL/L (ref 136–145)
SODIUM SERPL-SCNC: 147 MMOL/L (ref 136–145)
SODIUM SERPL-SCNC: 148 MMOL/L (ref 136–145)
SP GR UR STRIP: 1.01 (ref 1–1.03)
SP GR UR STRIP: 1.02 (ref 1–1.03)
SPE/IFE INTERPRETATION: ABNORMAL
SQUAMOUS #/AREA URNS HPF: ABNORMAL /HPF
STRESS TARGET HR: 112 BPM
T&S EXPIRATION DATE: NORMAL
T&S EXPIRATION DATE: NORMAL
TOTAL IRON BINDING CAPACITY: 247 UG/DL (ref 250–400)
TOTAL IRON BINDING CAPACITY: 292 UG/DL (ref 250–400)
TOTAL IRON BINDING CAPACITY: 311 UG/DL (ref 250–400)
TOTAL PROTEIN: 6.6 G/DL (ref 6.3–8.2)
TOTAL PROTEIN: 6.6 G/DL (ref 6.6–8.7)
TOTAL PROTEIN: 6.9 G/DL (ref 6.3–8.2)
TOTAL PROTEIN: 7 G/DL (ref 6.3–8.2)
TRIGL SERPL-MCNC: 208 MG/DL (ref 0–150)
TSH SERPL DL<=0.05 MIU/L-ACNC: 1.84 UIU/ML (ref 0.27–4.2)
TSH SERPL DL<=0.05 MIU/L-ACNC: 1.99 UIU/ML (ref 0.27–4.2)
UNIT  ABO: NORMAL
UNIT  RH: NORMAL
UROBILINOGEN UR QL STRIP: ABNORMAL
UROBILINOGEN UR QL STRIP: NORMAL
V CHOL+PARA+VUL DNA STL QL NAA+NON-PROBE: NOT DETECTED
V CHOLERAE DNA STL QL NAA+NON-PROBE: NOT DETECTED
VARIANT LYMPHS NFR BLD MANUAL: 1 % (ref 19.6–45.3)
VARIANT LYMPHS NFR BLD MANUAL: 2 % (ref 19.6–45.3)
VARIANT LYMPHS NFR BLD MANUAL: 4 % (ref 19.6–45.3)
VENTILATOR MODE: ABNORMAL
VITAMIN B-12: 891 PG/ML (ref 211–946)
VITAMIN B-12: 953 PG/ML (ref 211–946)
VLDLC SERPL-MCNC: 33 MG/DL (ref 5–40)
WBC # BLD: 5.9 K/UL (ref 4.8–10.8)
WBC # BLD: 6.03 K/UL (ref 3.98–10.04)
WBC # BLD: 6.17 K/UL (ref 3.98–10.04)
WBC # BLD: 6.24 K/UL (ref 3.98–10.04)
WBC # BLD: 6.3 K/UL (ref 3.98–10.04)
WBC # BLD: 6.3 K/UL (ref 3.98–10.04)
WBC # BLD: 6.39 K/UL (ref 3.98–10.04)
WBC # BLD: 6.62 K/UL (ref 3.98–10.04)
WBC # BLD: 6.64 K/UL (ref 3.98–10.04)
WBC # BLD: 7.18 K/UL (ref 3.98–10.04)
WBC # BLD: 7.3 K/UL (ref 3.98–10.04)
WBC # UR STRIP: ABNORMAL /HPF
WBC MORPH BLD: NORMAL
WBC MORPH BLD: NORMAL
WBC NRBC COR # BLD: 10.67 10*3/MM3 (ref 3.4–10.8)
WBC NRBC COR # BLD: 12.28 10*3/MM3 (ref 3.4–10.8)
WBC NRBC COR # BLD: 13.77 10*3/MM3 (ref 3.4–10.8)
WBC NRBC COR # BLD: 13.82 10*3/MM3 (ref 3.4–10.8)
WBC NRBC COR # BLD: 14.77 10*3/MM3 (ref 3.4–10.8)
WBC NRBC COR # BLD: 17.8 10*3/MM3 (ref 3.4–10.8)
WBC NRBC COR # BLD: 18.39 10*3/MM3 (ref 3.4–10.8)
WBC NRBC COR # BLD: 6.08 10*3/MM3 (ref 3.4–10.8)
WBC NRBC COR # BLD: 8.93 10*3/MM3 (ref 3.4–10.8)
WBC NRBC COR # BLD: 9.43 10*3/MM3 (ref 3.4–10.8)
WHOLE BLOOD HOLD COAG: NORMAL
WHOLE BLOOD HOLD SPECIMEN: NORMAL
Y ENTEROCOL DNA STL QL NAA+NON-PROBE: NOT DETECTED

## 2022-01-01 PROCEDURE — 85027 COMPLETE CBC AUTOMATED: CPT

## 2022-01-01 PROCEDURE — 86850 RBC ANTIBODY SCREEN: CPT | Performed by: PHYSICIAN ASSISTANT

## 2022-01-01 PROCEDURE — G8482 FLU IMMUNIZE ORDER/ADMIN: HCPCS | Performed by: FAMILY MEDICINE

## 2022-01-01 PROCEDURE — 87507 IADNA-DNA/RNA PROBE TQ 12-25: CPT | Performed by: FAMILY MEDICINE

## 2022-01-01 PROCEDURE — 0 POTASSIUM CHLORIDE 10 MEQ/100ML SOLUTION: Performed by: FAMILY MEDICINE

## 2022-01-01 PROCEDURE — 25010000002 PIPERACILLIN SOD-TAZOBACTAM PER 1 G: Performed by: INTERNAL MEDICINE

## 2022-01-01 PROCEDURE — 25010000002 AMIODARONE IN DEXTROSE 5% 360-4.14 MG/200ML-% SOLUTION: Performed by: INTERNAL MEDICINE

## 2022-01-01 PROCEDURE — 87086 URINE CULTURE/COLONY COUNT: CPT | Performed by: PHYSICIAN ASSISTANT

## 2022-01-01 PROCEDURE — 85025 COMPLETE CBC W/AUTO DIFF WBC: CPT | Performed by: NURSE PRACTITIONER

## 2022-01-01 PROCEDURE — 25010000002 LEVOFLOXACIN PER 250 MG: Performed by: PHYSICIAN ASSISTANT

## 2022-01-01 PROCEDURE — 99223 1ST HOSP IP/OBS HIGH 75: CPT

## 2022-01-01 PROCEDURE — 94799 UNLISTED PULMONARY SVC/PX: CPT

## 2022-01-01 PROCEDURE — G8427 DOCREV CUR MEDS BY ELIG CLIN: HCPCS | Performed by: NURSE PRACTITIONER

## 2022-01-01 PROCEDURE — 80061 LIPID PANEL: CPT | Performed by: FAMILY MEDICINE

## 2022-01-01 PROCEDURE — 85025 COMPLETE CBC W/AUTO DIFF WBC: CPT

## 2022-01-01 PROCEDURE — 86870 RBC ANTIBODY IDENTIFICATION: CPT | Performed by: PHYSICIAN ASSISTANT

## 2022-01-01 PROCEDURE — 85025 COMPLETE CBC W/AUTO DIFF WBC: CPT | Performed by: EMERGENCY MEDICINE

## 2022-01-01 PROCEDURE — 1036F TOBACCO NON-USER: CPT | Performed by: FAMILY MEDICINE

## 2022-01-01 PROCEDURE — 99232 SBSQ HOSP IP/OBS MODERATE 35: CPT | Performed by: STUDENT IN AN ORGANIZED HEALTH CARE EDUCATION/TRAINING PROGRAM

## 2022-01-01 PROCEDURE — 99211 OFF/OP EST MAY X REQ PHY/QHP: CPT

## 2022-01-01 PROCEDURE — 80053 COMPREHEN METABOLIC PANEL: CPT | Performed by: NURSE PRACTITIONER

## 2022-01-01 PROCEDURE — 94664 DEMO&/EVAL PT USE INHALER: CPT

## 2022-01-01 PROCEDURE — 96366 THER/PROPH/DIAG IV INF ADDON: CPT

## 2022-01-01 PROCEDURE — 85610 PROTHROMBIN TIME: CPT | Performed by: FAMILY MEDICINE

## 2022-01-01 PROCEDURE — 85610 PROTHROMBIN TIME: CPT | Performed by: EMERGENCY MEDICINE

## 2022-01-01 PROCEDURE — 1123F ACP DISCUSS/DSCN MKR DOCD: CPT | Performed by: NURSE PRACTITIONER

## 2022-01-01 PROCEDURE — 71275 CT ANGIOGRAPHY CHEST: CPT

## 2022-01-01 PROCEDURE — 94761 N-INVAS EAR/PLS OXIMETRY MLT: CPT

## 2022-01-01 PROCEDURE — 25010000002 FUROSEMIDE PER 20 MG: Performed by: FAMILY MEDICINE

## 2022-01-01 PROCEDURE — 36415 COLL VENOUS BLD VENIPUNCTURE: CPT

## 2022-01-01 PROCEDURE — 97166 OT EVAL MOD COMPLEX 45 MIN: CPT

## 2022-01-01 PROCEDURE — G0009 ADMIN PNEUMOCOCCAL VACCINE: HCPCS | Performed by: FAMILY MEDICINE

## 2022-01-01 PROCEDURE — 1090F PRES/ABSN URINE INCON ASSESS: CPT | Performed by: NURSE PRACTITIONER

## 2022-01-01 PROCEDURE — 96372 THER/PROPH/DIAG INJ SC/IM: CPT

## 2022-01-01 PROCEDURE — 97110 THERAPEUTIC EXERCISES: CPT

## 2022-01-01 PROCEDURE — 96365 THER/PROPH/DIAG IV INF INIT: CPT

## 2022-01-01 PROCEDURE — 25010000002 FENTANYL CITRATE (PF) 50 MCG/ML SOLUTION: Performed by: EMERGENCY MEDICINE

## 2022-01-01 PROCEDURE — 93306 TTE W/DOPPLER COMPLETE: CPT

## 2022-01-01 PROCEDURE — 97530 THERAPEUTIC ACTIVITIES: CPT

## 2022-01-01 PROCEDURE — 80053 COMPREHEN METABOLIC PANEL: CPT | Performed by: FAMILY MEDICINE

## 2022-01-01 PROCEDURE — 70450 CT HEAD/BRAIN W/O DYE: CPT

## 2022-01-01 PROCEDURE — 25010000002 ONDANSETRON PER 1 MG: Performed by: PHYSICIAN ASSISTANT

## 2022-01-01 PROCEDURE — 83735 ASSAY OF MAGNESIUM: CPT | Performed by: EMERGENCY MEDICINE

## 2022-01-01 PROCEDURE — 1090F PRES/ABSN URINE INCON ASSESS: CPT | Performed by: FAMILY MEDICINE

## 2022-01-01 PROCEDURE — 82270 OCCULT BLOOD FECES: CPT | Performed by: PHYSICIAN ASSISTANT

## 2022-01-01 PROCEDURE — 36600 WITHDRAWAL OF ARTERIAL BLOOD: CPT

## 2022-01-01 PROCEDURE — 6360000002 HC RX W HCPCS: Performed by: NURSE PRACTITIONER

## 2022-01-01 PROCEDURE — 0 IOPAMIDOL PER 1 ML: Performed by: FAMILY MEDICINE

## 2022-01-01 PROCEDURE — P9612 CATHETERIZE FOR URINE SPEC: HCPCS

## 2022-01-01 PROCEDURE — 97110 THERAPEUTIC EXERCISES: CPT | Performed by: OCCUPATIONAL THERAPIST

## 2022-01-01 PROCEDURE — 1123F ACP DISCUSS/DSCN MKR DOCD: CPT | Performed by: FAMILY MEDICINE

## 2022-01-01 PROCEDURE — 81003 URINALYSIS AUTO W/O SCOPE: CPT | Performed by: EMERGENCY MEDICINE

## 2022-01-01 PROCEDURE — 83605 ASSAY OF LACTIC ACID: CPT | Performed by: FAMILY MEDICINE

## 2022-01-01 PROCEDURE — 36430 TRANSFUSION BLD/BLD COMPNT: CPT

## 2022-01-01 PROCEDURE — G8417 CALC BMI ABV UP PARAM F/U: HCPCS | Performed by: FAMILY MEDICINE

## 2022-01-01 PROCEDURE — 02HV33Z INSERTION OF INFUSION DEVICE INTO SUPERIOR VENA CAVA, PERCUTANEOUS APPROACH: ICD-10-PCS | Performed by: EMERGENCY MEDICINE

## 2022-01-01 PROCEDURE — 25010000002 PERFLUTREN 6.52 MG/ML SUSPENSION: Performed by: INTERNAL MEDICINE

## 2022-01-01 PROCEDURE — 83690 ASSAY OF LIPASE: CPT | Performed by: PHYSICIAN ASSISTANT

## 2022-01-01 PROCEDURE — 25010000002 THIAMINE PER 100 MG: Performed by: INTERNAL MEDICINE

## 2022-01-01 PROCEDURE — 97535 SELF CARE MNGMENT TRAINING: CPT | Performed by: OCCUPATIONAL THERAPIST

## 2022-01-01 PROCEDURE — 2580000003 HC RX 258: Performed by: NURSE PRACTITIONER

## 2022-01-01 PROCEDURE — 25010000002 PIPERACILLIN SOD-TAZOBACTAM PER 1 G: Performed by: STUDENT IN AN ORGANIZED HEALTH CARE EDUCATION/TRAINING PROGRAM

## 2022-01-01 PROCEDURE — 71045 X-RAY EXAM CHEST 1 VIEW: CPT

## 2022-01-01 PROCEDURE — 83605 ASSAY OF LACTIC ACID: CPT | Performed by: STUDENT IN AN ORGANIZED HEALTH CARE EDUCATION/TRAINING PROGRAM

## 2022-01-01 PROCEDURE — 86900 BLOOD TYPING SEROLOGIC ABO: CPT | Performed by: PHYSICIAN ASSISTANT

## 2022-01-01 PROCEDURE — 94640 AIRWAY INHALATION TREATMENT: CPT

## 2022-01-01 PROCEDURE — 25010000002 ALBUMIN HUMAN 25% PER 50 ML: Performed by: INTERNAL MEDICINE

## 2022-01-01 PROCEDURE — 99285 EMERGENCY DEPT VISIT HI MDM: CPT

## 2022-01-01 PROCEDURE — 80053 COMPREHEN METABOLIC PANEL: CPT

## 2022-01-01 PROCEDURE — 85018 HEMOGLOBIN: CPT | Performed by: FAMILY MEDICINE

## 2022-01-01 PROCEDURE — 83880 ASSAY OF NATRIURETIC PEPTIDE: CPT | Performed by: FAMILY MEDICINE

## 2022-01-01 PROCEDURE — 99497 ADVNCD CARE PLAN 30 MIN: CPT

## 2022-01-01 PROCEDURE — 70551 MRI BRAIN STEM W/O DYE: CPT

## 2022-01-01 PROCEDURE — 99214 OFFICE O/P EST MOD 30 MIN: CPT | Performed by: NURSE PRACTITIONER

## 2022-01-01 PROCEDURE — 86922 COMPATIBILITY TEST ANTIGLOB: CPT

## 2022-01-01 PROCEDURE — 1036F TOBACCO NON-USER: CPT | Performed by: NURSE PRACTITIONER

## 2022-01-01 PROCEDURE — P9016 RBC LEUKOCYTES REDUCED: HCPCS

## 2022-01-01 PROCEDURE — 82270 OCCULT BLOOD FECES: CPT | Performed by: EMERGENCY MEDICINE

## 2022-01-01 PROCEDURE — 86900 BLOOD TYPING SEROLOGIC ABO: CPT

## 2022-01-01 PROCEDURE — 84100 ASSAY OF PHOSPHORUS: CPT | Performed by: FAMILY MEDICINE

## 2022-01-01 PROCEDURE — 86920 COMPATIBILITY TEST SPIN: CPT

## 2022-01-01 PROCEDURE — 83735 ASSAY OF MAGNESIUM: CPT | Performed by: FAMILY MEDICINE

## 2022-01-01 PROCEDURE — 99214 OFFICE O/P EST MOD 30 MIN: CPT | Performed by: FAMILY MEDICINE

## 2022-01-01 PROCEDURE — G8427 DOCREV CUR MEDS BY ELIG CLIN: HCPCS | Performed by: FAMILY MEDICINE

## 2022-01-01 PROCEDURE — G8417 CALC BMI ABV UP PARAM F/U: HCPCS | Performed by: NURSE PRACTITIONER

## 2022-01-01 PROCEDURE — 80053 COMPREHEN METABOLIC PANEL: CPT | Performed by: INTERNAL MEDICINE

## 2022-01-01 PROCEDURE — 4040F PNEUMOC VAC/ADMIN/RCVD: CPT | Performed by: NURSE PRACTITIONER

## 2022-01-01 PROCEDURE — 74019 RADEX ABDOMEN 2 VIEWS: CPT

## 2022-01-01 PROCEDURE — 86901 BLOOD TYPING SEROLOGIC RH(D): CPT

## 2022-01-01 PROCEDURE — 86140 C-REACTIVE PROTEIN: CPT | Performed by: FAMILY MEDICINE

## 2022-01-01 PROCEDURE — G8482 FLU IMMUNIZE ORDER/ADMIN: HCPCS | Performed by: NURSE PRACTITIONER

## 2022-01-01 PROCEDURE — 85007 BL SMEAR W/DIFF WBC COUNT: CPT | Performed by: FAMILY MEDICINE

## 2022-01-01 PROCEDURE — 99232 SBSQ HOSP IP/OBS MODERATE 35: CPT | Performed by: SPECIALIST

## 2022-01-01 PROCEDURE — 84145 PROCALCITONIN (PCT): CPT | Performed by: INTERNAL MEDICINE

## 2022-01-01 PROCEDURE — 99284 EMERGENCY DEPT VISIT MOD MDM: CPT

## 2022-01-01 PROCEDURE — 99213 OFFICE O/P EST LOW 20 MIN: CPT | Performed by: NURSE PRACTITIONER

## 2022-01-01 PROCEDURE — 85014 HEMATOCRIT: CPT | Performed by: FAMILY MEDICINE

## 2022-01-01 PROCEDURE — 99221 1ST HOSP IP/OBS SF/LOW 40: CPT | Performed by: SPECIALIST

## 2022-01-01 PROCEDURE — 86850 RBC ANTIBODY SCREEN: CPT

## 2022-01-01 PROCEDURE — 82803 BLOOD GASES ANY COMBINATION: CPT

## 2022-01-01 PROCEDURE — 82272 OCCULT BLD FECES 1-3 TESTS: CPT

## 2022-01-01 PROCEDURE — 82962 GLUCOSE BLOOD TEST: CPT

## 2022-01-01 PROCEDURE — 97530 THERAPEUTIC ACTIVITIES: CPT | Performed by: OCCUPATIONAL THERAPIST

## 2022-01-01 PROCEDURE — 0 LIDOCAINE 1 % SOLUTION

## 2022-01-01 PROCEDURE — 83036 HEMOGLOBIN GLYCOSYLATED A1C: CPT | Performed by: FAMILY MEDICINE

## 2022-01-01 PROCEDURE — 99204 OFFICE O/P NEW MOD 45 MIN: CPT | Performed by: NURSE PRACTITIONER

## 2022-01-01 PROCEDURE — 25010000002 FUROSEMIDE PER 20 MG: Performed by: INTERNAL MEDICINE

## 2022-01-01 PROCEDURE — 25010000002 ONDANSETRON PER 1 MG: Performed by: INTERNAL MEDICINE

## 2022-01-01 PROCEDURE — 93010 ELECTROCARDIOGRAM REPORT: CPT | Performed by: INTERNAL MEDICINE

## 2022-01-01 PROCEDURE — P9047 ALBUMIN (HUMAN), 25%, 50ML: HCPCS | Performed by: INTERNAL MEDICINE

## 2022-01-01 PROCEDURE — 85007 BL SMEAR W/DIFF WBC COUNT: CPT | Performed by: NURSE PRACTITIONER

## 2022-01-01 PROCEDURE — 4040F PNEUMOC VAC/ADMIN/RCVD: CPT | Performed by: FAMILY MEDICINE

## 2022-01-01 PROCEDURE — 99213 OFFICE O/P EST LOW 20 MIN: CPT | Performed by: FAMILY MEDICINE

## 2022-01-01 PROCEDURE — 86901 BLOOD TYPING SEROLOGIC RH(D): CPT | Performed by: PHYSICIAN ASSISTANT

## 2022-01-01 PROCEDURE — 83605 ASSAY OF LACTIC ACID: CPT | Performed by: SPECIALIST

## 2022-01-01 PROCEDURE — 87040 BLOOD CULTURE FOR BACTERIA: CPT

## 2022-01-01 PROCEDURE — 96373 THER/PROPH/DIAG INJ IA: CPT

## 2022-01-01 PROCEDURE — 51702 INSERT TEMP BLADDER CATH: CPT

## 2022-01-01 PROCEDURE — 85025 COMPLETE CBC W/AUTO DIFF WBC: CPT | Performed by: FAMILY MEDICINE

## 2022-01-01 PROCEDURE — 93005 ELECTROCARDIOGRAM TRACING: CPT | Performed by: FAMILY MEDICINE

## 2022-01-01 PROCEDURE — 93306 TTE W/DOPPLER COMPLETE: CPT | Performed by: INTERNAL MEDICINE

## 2022-01-01 PROCEDURE — 84443 ASSAY THYROID STIM HORMONE: CPT | Performed by: FAMILY MEDICINE

## 2022-01-01 PROCEDURE — 87635 SARS-COV-2 COVID-19 AMP PRB: CPT | Performed by: EMERGENCY MEDICINE

## 2022-01-01 PROCEDURE — 0 POTASSIUM CHLORIDE 10 MEQ/100ML SOLUTION

## 2022-01-01 PROCEDURE — 86870 RBC ANTIBODY IDENTIFICATION: CPT

## 2022-01-01 PROCEDURE — 85730 THROMBOPLASTIN TIME PARTIAL: CPT | Performed by: FAMILY MEDICINE

## 2022-01-01 PROCEDURE — 97162 PT EVAL MOD COMPLEX 30 MIN: CPT | Performed by: PHYSICAL THERAPIST

## 2022-01-01 PROCEDURE — 81001 URINALYSIS AUTO W/SCOPE: CPT | Performed by: PHYSICIAN ASSISTANT

## 2022-01-01 PROCEDURE — 04HY32Z INSERTION OF MONITORING DEVICE INTO LOWER ARTERY, PERCUTANEOUS APPROACH: ICD-10-PCS | Performed by: EMERGENCY MEDICINE

## 2022-01-01 PROCEDURE — 83615 LACTATE (LD) (LDH) ENZYME: CPT

## 2022-01-01 PROCEDURE — 83880 ASSAY OF NATRIURETIC PEPTIDE: CPT | Performed by: EMERGENCY MEDICINE

## 2022-01-01 PROCEDURE — 25010000002 ENOXAPARIN PER 10 MG: Performed by: FAMILY MEDICINE

## 2022-01-01 PROCEDURE — 74174 CTA ABD&PLVS W/CONTRAST: CPT

## 2022-01-01 PROCEDURE — 80053 COMPREHEN METABOLIC PANEL: CPT | Performed by: EMERGENCY MEDICINE

## 2022-01-01 PROCEDURE — 90732 PPSV23 VACC 2 YRS+ SUBQ/IM: CPT | Performed by: FAMILY MEDICINE

## 2022-01-01 PROCEDURE — C1751 CATH, INF, PER/CENT/MIDLINE: HCPCS

## 2022-01-01 PROCEDURE — 99212 OFFICE O/P EST SF 10 MIN: CPT

## 2022-01-01 PROCEDURE — G0439 PPPS, SUBSEQ VISIT: HCPCS | Performed by: FAMILY MEDICINE

## 2022-01-01 RX ORDER — EPINEPHRINE 1 MG/ML
0.3 INJECTION, SOLUTION, CONCENTRATE INTRAVENOUS PRN
Status: CANCELLED | OUTPATIENT
Start: 2022-01-01

## 2022-01-01 RX ORDER — GABAPENTIN 100 MG/1
CAPSULE ORAL
Qty: 120 CAPSULE | Refills: 0 | Status: SHIPPED | OUTPATIENT
Start: 2022-01-01 | End: 2022-01-01 | Stop reason: SDUPTHER

## 2022-01-01 RX ORDER — BISACODYL 10 MG
10 SUPPOSITORY, RECTAL RECTAL ONCE
Status: COMPLETED | OUTPATIENT
Start: 2022-01-01 | End: 2022-01-01

## 2022-01-01 RX ORDER — HEPARIN SODIUM (PORCINE) LOCK FLUSH IV SOLN 100 UNIT/ML 100 UNIT/ML
500 SOLUTION INTRAVENOUS PRN
Status: CANCELLED | OUTPATIENT
Start: 2022-01-01

## 2022-01-01 RX ORDER — IPRATROPIUM BROMIDE AND ALBUTEROL SULFATE 2.5; .5 MG/3ML; MG/3ML
3 SOLUTION RESPIRATORY (INHALATION) EVERY 4 HOURS PRN
Status: DISCONTINUED | OUTPATIENT
Start: 2022-01-01 | End: 2022-01-01

## 2022-01-01 RX ORDER — ONDANSETRON 2 MG/ML
4 INJECTION INTRAMUSCULAR; INTRAVENOUS EVERY 6 HOURS PRN
Status: DISCONTINUED | OUTPATIENT
Start: 2022-01-01 | End: 2022-01-01 | Stop reason: HOSPADM

## 2022-01-01 RX ORDER — SODIUM CHLORIDE 9 MG/ML
25 INJECTION, SOLUTION INTRAVENOUS PRN
Status: CANCELLED | OUTPATIENT
Start: 2022-01-01

## 2022-01-01 RX ORDER — ONDANSETRON 2 MG/ML
4 INJECTION INTRAMUSCULAR; INTRAVENOUS ONCE
Status: COMPLETED | OUTPATIENT
Start: 2022-01-01 | End: 2022-01-01

## 2022-01-01 RX ORDER — ACETAMINOPHEN 325 MG/1
650 TABLET ORAL
Status: CANCELLED | OUTPATIENT
Start: 2022-01-01

## 2022-01-01 RX ORDER — SODIUM CHLORIDE 9 MG/ML
INJECTION, SOLUTION INTRAVENOUS CONTINUOUS
Status: CANCELLED | OUTPATIENT
Start: 2022-01-01

## 2022-01-01 RX ORDER — ALBUTEROL SULFATE 90 UG/1
4 AEROSOL, METERED RESPIRATORY (INHALATION) PRN
Status: CANCELLED | OUTPATIENT
Start: 2022-01-01

## 2022-01-01 RX ORDER — FUROSEMIDE 10 MG/ML
10 INJECTION INTRAMUSCULAR; INTRAVENOUS ONCE
Status: COMPLETED | OUTPATIENT
Start: 2022-01-01 | End: 2022-01-01

## 2022-01-01 RX ORDER — METRONIDAZOLE 500 MG/100ML
500 INJECTION, SOLUTION INTRAVENOUS EVERY 8 HOURS
Status: DISCONTINUED | OUTPATIENT
Start: 2022-01-01 | End: 2022-01-01

## 2022-01-01 RX ORDER — LORAZEPAM 2 MG/ML
1 CONCENTRATE ORAL
Status: DISCONTINUED | OUTPATIENT
Start: 2022-01-01 | End: 2022-01-01 | Stop reason: HOSPADM

## 2022-01-01 RX ORDER — MEPERIDINE HYDROCHLORIDE 25 MG/ML
12.5 INJECTION INTRAMUSCULAR; INTRAVENOUS; SUBCUTANEOUS PRN
Status: CANCELLED | OUTPATIENT
Start: 2022-01-01

## 2022-01-01 RX ORDER — BUMETANIDE 1 MG/1
1 TABLET ORAL DAILY
Qty: 90 TABLET | Refills: 3 | Status: SHIPPED | OUTPATIENT
Start: 2022-01-01

## 2022-01-01 RX ORDER — FAMOTIDINE 10 MG/ML
20 INJECTION, SOLUTION INTRAVENOUS DAILY
Status: DISCONTINUED | OUTPATIENT
Start: 2022-01-01 | End: 2022-01-01

## 2022-01-01 RX ORDER — IPRATROPIUM BROMIDE AND ALBUTEROL SULFATE 2.5; .5 MG/3ML; MG/3ML
3 SOLUTION RESPIRATORY (INHALATION) EVERY 4 HOURS PRN
Status: DISCONTINUED | OUTPATIENT
Start: 2022-01-01 | End: 2022-01-01 | Stop reason: HOSPADM

## 2022-01-01 RX ORDER — FLUOXETINE HYDROCHLORIDE 20 MG/1
20 CAPSULE ORAL DAILY
Qty: 90 CAPSULE | Refills: 3 | Status: SHIPPED | OUTPATIENT
Start: 2022-01-01 | End: 2022-01-01 | Stop reason: SDUPTHER

## 2022-01-01 RX ORDER — GABAPENTIN 100 MG/1
CAPSULE ORAL
Qty: 360 CAPSULE | Refills: 0 | Status: SHIPPED | OUTPATIENT
Start: 2022-01-01 | End: 2022-01-01 | Stop reason: SDUPTHER

## 2022-01-01 RX ORDER — ATROPINE SULFATE 10 MG/ML
2 SOLUTION/ DROPS OPHTHALMIC 2 TIMES DAILY PRN
Status: DISCONTINUED | OUTPATIENT
Start: 2022-01-01 | End: 2022-01-01 | Stop reason: HOSPADM

## 2022-01-01 RX ORDER — IPRATROPIUM BROMIDE AND ALBUTEROL SULFATE 2.5; .5 MG/3ML; MG/3ML
3 SOLUTION RESPIRATORY (INHALATION)
Status: DISCONTINUED | OUTPATIENT
Start: 2022-01-01 | End: 2022-01-01

## 2022-01-01 RX ORDER — MORPHINE SULFATE 20 MG/ML
5 SOLUTION ORAL
Status: DISCONTINUED | OUTPATIENT
Start: 2022-01-01 | End: 2022-01-01 | Stop reason: HOSPADM

## 2022-01-01 RX ORDER — PROCHLORPERAZINE MALEATE 10 MG
5 TABLET ORAL EVERY 6 HOURS PRN
Status: DISCONTINUED | OUTPATIENT
Start: 2022-01-01 | End: 2022-01-01 | Stop reason: HOSPADM

## 2022-01-01 RX ORDER — FAMOTIDINE 10 MG/ML
20 INJECTION, SOLUTION INTRAVENOUS
Status: CANCELLED | OUTPATIENT
Start: 2022-01-01

## 2022-01-01 RX ORDER — SODIUM CHLORIDE, SODIUM LACTATE, POTASSIUM CHLORIDE, CALCIUM CHLORIDE 600; 310; 30; 20 MG/100ML; MG/100ML; MG/100ML; MG/100ML
50 INJECTION, SOLUTION INTRAVENOUS CONTINUOUS
Status: DISCONTINUED | OUTPATIENT
Start: 2022-01-01 | End: 2022-01-01

## 2022-01-01 RX ORDER — OXYBUTYNIN CHLORIDE 10 MG/1
10 TABLET, EXTENDED RELEASE ORAL DAILY
Qty: 90 TABLET | Refills: 1 | Status: SHIPPED | OUTPATIENT
Start: 2022-01-01 | End: 2022-01-01

## 2022-01-01 RX ORDER — DIPHENHYDRAMINE HYDROCHLORIDE 50 MG/ML
50 INJECTION INTRAMUSCULAR; INTRAVENOUS
Status: CANCELLED | OUTPATIENT
Start: 2022-01-01

## 2022-01-01 RX ORDER — SODIUM CHLORIDE, SODIUM LACTATE, POTASSIUM CHLORIDE, CALCIUM CHLORIDE 600; 310; 30; 20 MG/100ML; MG/100ML; MG/100ML; MG/100ML
125 INJECTION, SOLUTION INTRAVENOUS CONTINUOUS
Status: DISCONTINUED | OUTPATIENT
Start: 2022-01-01 | End: 2022-01-01

## 2022-01-01 RX ORDER — POLYETHYLENE GLYCOL 3350 17 G/17G
17 POWDER, FOR SOLUTION ORAL DAILY
Status: DISCONTINUED | OUTPATIENT
Start: 2022-01-01 | End: 2022-01-01 | Stop reason: HOSPADM

## 2022-01-01 RX ORDER — SODIUM CHLORIDE 0.9 % (FLUSH) 0.9 %
10 SYRINGE (ML) INJECTION AS NEEDED
Status: DISCONTINUED | OUTPATIENT
Start: 2022-01-01 | End: 2022-01-01 | Stop reason: HOSPADM

## 2022-01-01 RX ORDER — ONDANSETRON 2 MG/ML
8 INJECTION INTRAMUSCULAR; INTRAVENOUS
Status: CANCELLED | OUTPATIENT
Start: 2022-01-01

## 2022-01-01 RX ORDER — LIDOCAINE HYDROCHLORIDE 10 MG/ML
INJECTION, SOLUTION INFILTRATION; PERINEURAL
Status: COMPLETED
Start: 2022-01-01 | End: 2022-01-01

## 2022-01-01 RX ORDER — APIXABAN 5 MG/1
TABLET, FILM COATED ORAL
Qty: 180 TABLET | Refills: 3 | Status: ON HOLD | OUTPATIENT
Start: 2022-01-01 | End: 2022-01-01

## 2022-01-01 RX ORDER — METRONIDAZOLE 500 MG/100ML
500 INJECTION, SOLUTION INTRAVENOUS ONCE
Status: COMPLETED | OUTPATIENT
Start: 2022-01-01 | End: 2022-01-01

## 2022-01-01 RX ORDER — METOPROLOL SUCCINATE 100 MG/1
100 TABLET, EXTENDED RELEASE ORAL DAILY
Qty: 90 TABLET | Refills: 3 | Status: SHIPPED | OUTPATIENT
Start: 2022-01-01

## 2022-01-01 RX ORDER — CLONIDINE HYDROCHLORIDE 0.1 MG/1
TABLET ORAL
Qty: 180 TABLET | Refills: 3 | Status: SHIPPED | OUTPATIENT
Start: 2022-01-01

## 2022-01-01 RX ORDER — FENTANYL CITRATE 50 UG/ML
15 INJECTION, SOLUTION INTRAMUSCULAR; INTRAVENOUS ONCE
Status: COMPLETED | OUTPATIENT
Start: 2022-01-01 | End: 2022-01-01

## 2022-01-01 RX ORDER — DILTIAZEM HCL IN NACL,ISO-OSM 125 MG/125
5-15 PLASTIC BAG, INJECTION (ML) INTRAVENOUS
Status: DISCONTINUED | OUTPATIENT
Start: 2022-01-01 | End: 2022-01-01

## 2022-01-01 RX ORDER — LORAZEPAM 0.5 MG/1
0.5 TABLET ORAL
Status: DISCONTINUED | OUTPATIENT
Start: 2022-01-01 | End: 2022-01-01 | Stop reason: HOSPADM

## 2022-01-01 RX ORDER — CLONIDINE HYDROCHLORIDE 0.1 MG/1
0.1 TABLET ORAL 2 TIMES DAILY
Qty: 180 TABLET | Refills: 1 | Status: SHIPPED | OUTPATIENT
Start: 2022-01-01 | End: 2022-01-01

## 2022-01-01 RX ORDER — SODIUM CHLORIDE 9 MG/ML
INJECTION, SOLUTION INTRAVENOUS CONTINUOUS
Status: ACTIVE | OUTPATIENT
Start: 2022-01-01 | End: 2022-01-01

## 2022-01-01 RX ORDER — ATORVASTATIN CALCIUM 80 MG/1
80 TABLET, FILM COATED ORAL NIGHTLY
Qty: 7 TABLET | Refills: 0 | Status: SHIPPED | OUTPATIENT
Start: 2022-01-01

## 2022-01-01 RX ORDER — LORAZEPAM 2 MG/ML
0.5 CONCENTRATE ORAL
Status: DISCONTINUED | OUTPATIENT
Start: 2022-01-01 | End: 2022-01-01 | Stop reason: HOSPADM

## 2022-01-01 RX ORDER — ATORVASTATIN CALCIUM 40 MG/1
80 TABLET, FILM COATED ORAL NIGHTLY
Status: DISCONTINUED | OUTPATIENT
Start: 2022-01-01 | End: 2022-01-01

## 2022-01-01 RX ORDER — PROCHLORPERAZINE EDISYLATE 5 MG/ML
5 INJECTION INTRAMUSCULAR; INTRAVENOUS EVERY 6 HOURS PRN
Status: DISCONTINUED | OUTPATIENT
Start: 2022-01-01 | End: 2022-01-01 | Stop reason: HOSPADM

## 2022-01-01 RX ORDER — LEVOFLOXACIN 5 MG/ML
250 INJECTION, SOLUTION INTRAVENOUS EVERY 24 HOURS
Status: DISCONTINUED | OUTPATIENT
Start: 2022-01-01 | End: 2022-01-01

## 2022-01-01 RX ORDER — LISINOPRIL 40 MG/1
40 TABLET ORAL DAILY
Qty: 90 TABLET | Refills: 3 | Status: SHIPPED | OUTPATIENT
Start: 2022-01-01

## 2022-01-01 RX ORDER — FUROSEMIDE 10 MG/ML
40 INJECTION INTRAMUSCULAR; INTRAVENOUS EVERY 12 HOURS
Status: COMPLETED | OUTPATIENT
Start: 2022-01-01 | End: 2022-01-01

## 2022-01-01 RX ORDER — LIDOCAINE HYDROCHLORIDE 10 MG/ML
10 INJECTION, SOLUTION INFILTRATION; PERINEURAL ONCE
Status: COMPLETED | OUTPATIENT
Start: 2022-01-01 | End: 2022-01-01

## 2022-01-01 RX ORDER — SODIUM CHLORIDE 0.9 % (FLUSH) 0.9 %
10 SYRINGE (ML) INJECTION EVERY 12 HOURS SCHEDULED
Status: DISCONTINUED | OUTPATIENT
Start: 2022-01-01 | End: 2022-01-01

## 2022-01-01 RX ORDER — SODIUM CHLORIDE 0.9 % (FLUSH) 0.9 %
10 SYRINGE (ML) INJECTION AS NEEDED
Status: DISCONTINUED | OUTPATIENT
Start: 2022-01-01 | End: 2022-01-01

## 2022-01-01 RX ORDER — POTASSIUM CHLORIDE 7.45 MG/ML
10 INJECTION INTRAVENOUS ONCE
Status: COMPLETED | OUTPATIENT
Start: 2022-01-01 | End: 2022-01-01

## 2022-01-01 RX ORDER — ENOXAPARIN SODIUM 100 MG/ML
1 INJECTION SUBCUTANEOUS EVERY 24 HOURS
Status: DISCONTINUED | OUTPATIENT
Start: 2022-01-01 | End: 2022-01-01

## 2022-01-01 RX ORDER — PROCHLORPERAZINE 25 MG
25 SUPPOSITORY, RECTAL RECTAL EVERY 12 HOURS PRN
Status: DISCONTINUED | OUTPATIENT
Start: 2022-01-01 | End: 2022-01-01 | Stop reason: HOSPADM

## 2022-01-01 RX ORDER — SODIUM CHLORIDE 0.9 % (FLUSH) 0.9 %
5-40 SYRINGE (ML) INJECTION PRN
Status: CANCELLED | OUTPATIENT
Start: 2022-01-01

## 2022-01-01 RX ORDER — QUETIAPINE FUMARATE 300 MG/1
TABLET, FILM COATED ORAL
Qty: 90 TABLET | Refills: 3 | Status: SHIPPED | OUTPATIENT
Start: 2022-01-01

## 2022-01-01 RX ORDER — MIDODRINE HYDROCHLORIDE 2.5 MG/1
5 TABLET ORAL
Status: DISCONTINUED | OUTPATIENT
Start: 2022-01-01 | End: 2022-01-01 | Stop reason: HOSPADM

## 2022-01-01 RX ORDER — MIDODRINE HYDROCHLORIDE 2.5 MG/1
10 TABLET ORAL ONCE
Status: DISCONTINUED | OUTPATIENT
Start: 2022-01-01 | End: 2022-01-01

## 2022-01-01 RX ORDER — QUETIAPINE FUMARATE 300 MG/1
300 TABLET, FILM COATED ORAL NIGHTLY
Qty: 90 TABLET | Refills: 1 | Status: SHIPPED | OUTPATIENT
Start: 2022-01-01 | End: 2022-01-01 | Stop reason: SDUPTHER

## 2022-01-01 RX ORDER — OXYBUTYNIN CHLORIDE 10 MG/1
TABLET, EXTENDED RELEASE ORAL
Qty: 90 TABLET | Refills: 3 | Status: SHIPPED | OUTPATIENT
Start: 2022-01-01

## 2022-01-01 RX ORDER — FAMOTIDINE 10 MG/ML
20 INJECTION, SOLUTION INTRAVENOUS ONCE
Status: COMPLETED | OUTPATIENT
Start: 2022-01-01 | End: 2022-01-01

## 2022-01-01 RX ORDER — DILTIAZEM HYDROCHLORIDE 5 MG/ML
10 INJECTION INTRAVENOUS ONCE
Status: COMPLETED | OUTPATIENT
Start: 2022-01-01 | End: 2022-01-01

## 2022-01-01 RX ORDER — QUETIAPINE FUMARATE 300 MG/1
300 TABLET, FILM COATED ORAL NIGHTLY
Qty: 7 TABLET | Refills: 0 | Status: SHIPPED | OUTPATIENT
Start: 2022-01-01 | End: 2022-01-01

## 2022-01-01 RX ORDER — ACETAMINOPHEN 650 MG/1
650 SUPPOSITORY RECTAL EVERY 4 HOURS PRN
Status: DISCONTINUED | OUTPATIENT
Start: 2022-01-01 | End: 2022-01-01 | Stop reason: HOSPADM

## 2022-01-01 RX ORDER — ACETAMINOPHEN 325 MG/1
650 TABLET ORAL EVERY 4 HOURS PRN
Status: DISCONTINUED | OUTPATIENT
Start: 2022-01-01 | End: 2022-01-01 | Stop reason: HOSPADM

## 2022-01-01 RX ORDER — FAMOTIDINE 20 MG/1
20 TABLET, FILM COATED ORAL DAILY
Status: DISCONTINUED | OUTPATIENT
Start: 2022-01-01 | End: 2022-01-01

## 2022-01-01 RX ORDER — SODIUM CHLORIDE 0.9 % (FLUSH) 0.9 %
5-40 SYRINGE (ML) INJECTION PRN
Status: DISCONTINUED | OUTPATIENT
Start: 2022-01-01 | End: 2022-01-01 | Stop reason: HOSPADM

## 2022-01-01 RX ORDER — GABAPENTIN 100 MG/1
CAPSULE ORAL
Qty: 360 CAPSULE | Refills: 0 | Status: SHIPPED | OUTPATIENT
Start: 2022-01-01 | End: 2022-10-21

## 2022-01-01 RX ORDER — NOREPINEPHRINE BIT/0.9 % NACL 8 MG/250ML
.02-.3 INFUSION BOTTLE (ML) INTRAVENOUS
Status: DISCONTINUED | OUTPATIENT
Start: 2022-01-01 | End: 2022-01-01

## 2022-01-01 RX ORDER — AMOXICILLIN 250 MG
1 CAPSULE ORAL 2 TIMES DAILY
Status: DISCONTINUED | OUTPATIENT
Start: 2022-01-01 | End: 2022-01-01 | Stop reason: HOSPADM

## 2022-01-01 RX ORDER — PRAMIPEXOLE DIHYDROCHLORIDE 0.5 MG/1
0.5 TABLET ORAL NIGHTLY
Qty: 90 TABLET | Refills: 3 | Status: SHIPPED | OUTPATIENT
Start: 2022-01-01

## 2022-01-01 RX ORDER — METOPROLOL SUCCINATE 25 MG/1
25 TABLET, EXTENDED RELEASE ORAL
Status: DISCONTINUED | OUTPATIENT
Start: 2022-01-01 | End: 2022-01-01

## 2022-01-01 RX ORDER — GABAPENTIN 100 MG/1
100 CAPSULE ORAL 4 TIMES DAILY
COMMUNITY

## 2022-01-01 RX ORDER — MORPHINE SULFATE 20 MG/ML
10 SOLUTION ORAL
Status: DISCONTINUED | OUTPATIENT
Start: 2022-01-01 | End: 2022-01-01 | Stop reason: HOSPADM

## 2022-01-01 RX ORDER — PANTOPRAZOLE SODIUM 40 MG/10ML
40 INJECTION, POWDER, LYOPHILIZED, FOR SOLUTION INTRAVENOUS
Status: DISCONTINUED | OUTPATIENT
Start: 2022-01-01 | End: 2022-01-01

## 2022-01-01 RX ORDER — SCOLOPAMINE TRANSDERMAL SYSTEM 1 MG/1
1 PATCH, EXTENDED RELEASE TRANSDERMAL
Status: DISCONTINUED | OUTPATIENT
Start: 2022-01-01 | End: 2022-01-01 | Stop reason: HOSPADM

## 2022-01-01 RX ORDER — QUETIAPINE FUMARATE 25 MG/1
50 TABLET, FILM COATED ORAL NIGHTLY
Status: DISCONTINUED | OUTPATIENT
Start: 2022-01-01 | End: 2022-01-01 | Stop reason: HOSPADM

## 2022-01-01 RX ORDER — ACETAMINOPHEN 160 MG/5ML
650 SOLUTION ORAL EVERY 4 HOURS PRN
Status: DISCONTINUED | OUTPATIENT
Start: 2022-01-01 | End: 2022-01-01 | Stop reason: HOSPADM

## 2022-01-01 RX ORDER — LORAZEPAM 1 MG/1
1 TABLET ORAL
Status: DISCONTINUED | OUTPATIENT
Start: 2022-01-01 | End: 2022-01-01 | Stop reason: HOSPADM

## 2022-01-01 RX ORDER — POTASSIUM CHLORIDE 7.45 MG/ML
10 INJECTION INTRAVENOUS
Status: DISPENSED | OUTPATIENT
Start: 2022-01-01 | End: 2022-01-01

## 2022-01-01 RX ORDER — GABAPENTIN 100 MG/1
CAPSULE ORAL
Qty: 120 CAPSULE | Refills: 0 | Status: SHIPPED | OUTPATIENT
Start: 2022-01-01 | End: 2022-01-01

## 2022-01-01 RX ORDER — FLUOXETINE HYDROCHLORIDE 20 MG/1
20 CAPSULE ORAL DAILY
Qty: 90 CAPSULE | Refills: 3 | Status: SHIPPED | OUTPATIENT
Start: 2022-01-01

## 2022-01-01 RX ORDER — ALBUMIN (HUMAN) 12.5 G/50ML
25 SOLUTION INTRAVENOUS ONCE
Status: COMPLETED | OUTPATIENT
Start: 2022-01-01 | End: 2022-01-01

## 2022-01-01 RX ORDER — LEVOFLOXACIN 5 MG/ML
500 INJECTION, SOLUTION INTRAVENOUS ONCE
Status: COMPLETED | OUTPATIENT
Start: 2022-01-01 | End: 2022-01-01

## 2022-01-01 RX ORDER — ATORVASTATIN CALCIUM 80 MG/1
80 TABLET, FILM COATED ORAL NIGHTLY
Qty: 90 TABLET | Refills: 3 | Status: SHIPPED | OUTPATIENT
Start: 2022-01-01 | End: 2022-01-01 | Stop reason: SDUPTHER

## 2022-01-01 RX ADMIN — IPRATROPIUM BROMIDE 0.5 MG: 0.5 SOLUTION RESPIRATORY (INHALATION) at 14:36

## 2022-01-01 RX ADMIN — ALBUMIN HUMAN 25 G: 0.25 SOLUTION INTRAVENOUS at 06:11

## 2022-01-01 RX ADMIN — FENTANYL CITRATE 15 MCG: 50 INJECTION, SOLUTION INTRAMUSCULAR; INTRAVENOUS at 18:11

## 2022-01-01 RX ADMIN — METOPROLOL SUCCINATE 25 MG: 25 TABLET, EXTENDED RELEASE ORAL at 09:13

## 2022-01-01 RX ADMIN — Medication 10 ML: at 09:11

## 2022-01-01 RX ADMIN — DILTIAZEM HYDROCHLORIDE 30 MG: 30 TABLET, FILM COATED ORAL at 21:14

## 2022-01-01 RX ADMIN — TAZOBACTAM SODIUM AND PIPERACILLIN SODIUM 3.38 G: 375; 3 INJECTION, SOLUTION INTRAVENOUS at 16:08

## 2022-01-01 RX ADMIN — APIXABAN 2.5 MG: 2.5 TABLET, FILM COATED ORAL at 09:00

## 2022-01-01 RX ADMIN — DILTIAZEM HYDROCHLORIDE 10 MG: 5 INJECTION INTRAVENOUS at 19:32

## 2022-01-01 RX ADMIN — FUROSEMIDE 40 MG: 10 INJECTION, SOLUTION INTRAVENOUS at 17:13

## 2022-01-01 RX ADMIN — TAZOBACTAM SODIUM AND PIPERACILLIN SODIUM 3.38 G: 375; 3 INJECTION, SOLUTION INTRAVENOUS at 04:12

## 2022-01-01 RX ADMIN — Medication 10 ML: at 21:21

## 2022-01-01 RX ADMIN — METOPROLOL SUCCINATE 25 MG: 25 TABLET, EXTENDED RELEASE ORAL at 08:09

## 2022-01-01 RX ADMIN — DOCUSATE SODIUM 50 MG AND SENNOSIDES 8.6 MG 1 TABLET: 8.6; 5 TABLET, FILM COATED ORAL at 21:21

## 2022-01-01 RX ADMIN — SODIUM CHLORIDE, POTASSIUM CHLORIDE, SODIUM LACTATE AND CALCIUM CHLORIDE 75 ML/HR: 600; 310; 30; 20 INJECTION, SOLUTION INTRAVENOUS at 08:32

## 2022-01-01 RX ADMIN — METRONIDAZOLE 500 MG: 500 INJECTION, SOLUTION INTRAVENOUS at 20:21

## 2022-01-01 RX ADMIN — LORAZEPAM 0.5 MG: 2 SOLUTION, CONCENTRATE ORAL at 23:09

## 2022-01-01 RX ADMIN — APIXABAN 2.5 MG: 2.5 TABLET, FILM COATED ORAL at 09:13

## 2022-01-01 RX ADMIN — SODIUM CHLORIDE, POTASSIUM CHLORIDE, SODIUM LACTATE AND CALCIUM CHLORIDE 100 ML/HR: 600; 310; 30; 20 INJECTION, SOLUTION INTRAVENOUS at 04:46

## 2022-01-01 RX ADMIN — Medication 0.02 MCG/KG/MIN: at 20:23

## 2022-01-01 RX ADMIN — DOCUSATE SODIUM 50 MG AND SENNOSIDES 8.6 MG 1 TABLET: 8.6; 5 TABLET, FILM COATED ORAL at 21:09

## 2022-01-01 RX ADMIN — DOCUSATE SODIUM 50 MG AND SENNOSIDES 8.6 MG 1 TABLET: 8.6; 5 TABLET, FILM COATED ORAL at 21:15

## 2022-01-01 RX ADMIN — TAZOBACTAM SODIUM AND PIPERACILLIN SODIUM 3.38 G: 375; 3 INJECTION, SOLUTION INTRAVENOUS at 04:50

## 2022-01-01 RX ADMIN — IOPAMIDOL 100 ML: 755 INJECTION, SOLUTION INTRAVENOUS at 18:45

## 2022-01-01 RX ADMIN — EPOETIN ALFA-EPBX 40000 UNITS: 40000 INJECTION, SOLUTION INTRAVENOUS; SUBCUTANEOUS at 14:56

## 2022-01-01 RX ADMIN — CARBIDOPA AND LEVODOPA 2 TABLET: 25; 100 TABLET ORAL at 21:59

## 2022-01-01 RX ADMIN — ONDANSETRON 4 MG: 2 INJECTION INTRAMUSCULAR; INTRAVENOUS at 23:47

## 2022-01-01 RX ADMIN — DOCUSATE SODIUM 50 MG AND SENNOSIDES 8.6 MG 1 TABLET: 8.6; 5 TABLET, FILM COATED ORAL at 09:12

## 2022-01-01 RX ADMIN — EPOETIN ALFA-EPBX 40000 UNITS: 40000 INJECTION, SOLUTION INTRAVENOUS; SUBCUTANEOUS at 15:54

## 2022-01-01 RX ADMIN — Medication 10 ML: at 22:52

## 2022-01-01 RX ADMIN — THIAMINE HYDROCHLORIDE 300 MG: 100 INJECTION, SOLUTION INTRAMUSCULAR; INTRAVENOUS at 10:03

## 2022-01-01 RX ADMIN — IPRATROPIUM BROMIDE 0.5 MG: 0.5 SOLUTION RESPIRATORY (INHALATION) at 14:38

## 2022-01-01 RX ADMIN — APIXABAN 2.5 MG: 2.5 TABLET, FILM COATED ORAL at 21:56

## 2022-01-01 RX ADMIN — LIDOCAINE HYDROCHLORIDE 10 ML: 10 INJECTION, SOLUTION INFILTRATION; PERINEURAL at 17:55

## 2022-01-01 RX ADMIN — EPOETIN ALFA-EPBX 20000 UNITS: 10000 INJECTION, SOLUTION INTRAVENOUS; SUBCUTANEOUS at 15:00

## 2022-01-01 RX ADMIN — ENOXAPARIN SODIUM 80 MG: 100 INJECTION SUBCUTANEOUS at 21:55

## 2022-01-01 RX ADMIN — POTASSIUM CHLORIDE 10 MEQ: 7.46 INJECTION, SOLUTION INTRAVENOUS at 10:55

## 2022-01-01 RX ADMIN — ATORVASTATIN CALCIUM 80 MG: 40 TABLET, FILM COATED ORAL at 21:06

## 2022-01-01 RX ADMIN — Medication 10 ML: at 08:31

## 2022-01-01 RX ADMIN — TAZOBACTAM SODIUM AND PIPERACILLIN SODIUM 3.38 G: 375; 3 INJECTION, SOLUTION INTRAVENOUS at 05:14

## 2022-01-01 RX ADMIN — SODIUM CHLORIDE, POTASSIUM CHLORIDE, SODIUM LACTATE AND CALCIUM CHLORIDE 100 ML/HR: 600; 310; 30; 20 INJECTION, SOLUTION INTRAVENOUS at 23:10

## 2022-01-01 RX ADMIN — SODIUM CHLORIDE, POTASSIUM CHLORIDE, SODIUM LACTATE AND CALCIUM CHLORIDE 100 ML/HR: 600; 310; 30; 20 INJECTION, SOLUTION INTRAVENOUS at 15:53

## 2022-01-01 RX ADMIN — SODIUM CHLORIDE, POTASSIUM CHLORIDE, SODIUM LACTATE AND CALCIUM CHLORIDE 50 ML/HR: 600; 310; 30; 20 INJECTION, SOLUTION INTRAVENOUS at 04:34

## 2022-01-01 RX ADMIN — CARBIDOPA AND LEVODOPA 2 TABLET: 25; 100 TABLET ORAL at 21:56

## 2022-01-01 RX ADMIN — ACETAMINOPHEN 650 MG: 325 TABLET, FILM COATED ORAL at 21:23

## 2022-01-01 RX ADMIN — BISACODYL 10 MG: 10 SUPPOSITORY RECTAL at 13:51

## 2022-01-01 RX ADMIN — QUETIAPINE FUMARATE 50 MG: 25 TABLET, FILM COATED ORAL at 21:59

## 2022-01-01 RX ADMIN — FAMOTIDINE 20 MG: 10 INJECTION INTRAVENOUS at 20:17

## 2022-01-01 RX ADMIN — TAZOBACTAM SODIUM AND PIPERACILLIN SODIUM 3.38 G: 375; 3 INJECTION, SOLUTION INTRAVENOUS at 04:18

## 2022-01-01 RX ADMIN — BISACODYL 10 MG: 10 SUPPOSITORY RECTAL at 11:59

## 2022-01-01 RX ADMIN — QUETIAPINE FUMARATE 50 MG: 25 TABLET, FILM COATED ORAL at 21:14

## 2022-01-01 RX ADMIN — TAZOBACTAM SODIUM AND PIPERACILLIN SODIUM 3.38 G: 375; 3 INJECTION, SOLUTION INTRAVENOUS at 16:15

## 2022-01-01 RX ADMIN — TAZOBACTAM SODIUM AND PIPERACILLIN SODIUM 3.38 G: 375; 3 INJECTION, SOLUTION INTRAVENOUS at 04:09

## 2022-01-01 RX ADMIN — Medication 10 ML: at 21:26

## 2022-01-01 RX ADMIN — Medication 10 ML: at 09:00

## 2022-01-01 RX ADMIN — QUETIAPINE FUMARATE 50 MG: 25 TABLET, FILM COATED ORAL at 21:21

## 2022-01-01 RX ADMIN — POLYETHYLENE GLYCOL 3350 17 G: 17 POWDER, FOR SOLUTION ORAL at 09:12

## 2022-01-01 RX ADMIN — EPOETIN ALFA-EPBX 20000 UNITS: 10000 INJECTION, SOLUTION INTRAVENOUS; SUBCUTANEOUS at 14:11

## 2022-01-01 RX ADMIN — BISACODYL 10 MG: 10 SUPPOSITORY RECTAL at 12:12

## 2022-01-01 RX ADMIN — APIXABAN 2.5 MG: 2.5 TABLET, FILM COATED ORAL at 14:39

## 2022-01-01 RX ADMIN — TAZOBACTAM SODIUM AND PIPERACILLIN SODIUM 3.38 G: 375; 3 INJECTION, SOLUTION INTRAVENOUS at 15:07

## 2022-01-01 RX ADMIN — LEVOFLOXACIN 500 MG: 500 INJECTION, SOLUTION INTRAVENOUS at 20:53

## 2022-01-01 RX ADMIN — Medication 10 ML: at 09:09

## 2022-01-01 RX ADMIN — POTASSIUM CHLORIDE 10 MEQ: 7.46 INJECTION, SOLUTION INTRAVENOUS at 08:51

## 2022-01-01 RX ADMIN — CARBIDOPA AND LEVODOPA 2 TABLET: 25; 100 TABLET ORAL at 21:08

## 2022-01-01 RX ADMIN — EPOETIN ALFA-EPBX 40000 UNITS: 40000 INJECTION, SOLUTION INTRAVENOUS; SUBCUTANEOUS at 16:09

## 2022-01-01 RX ADMIN — QUETIAPINE FUMARATE 50 MG: 25 TABLET, FILM COATED ORAL at 21:08

## 2022-01-01 RX ADMIN — ATORVASTATIN CALCIUM 80 MG: 40 TABLET, FILM COATED ORAL at 21:21

## 2022-01-01 RX ADMIN — SODIUM CHLORIDE, POTASSIUM CHLORIDE, SODIUM LACTATE AND CALCIUM CHLORIDE 100 ML/HR: 600; 310; 30; 20 INJECTION, SOLUTION INTRAVENOUS at 04:50

## 2022-01-01 RX ADMIN — ONDANSETRON 4 MG: 2 INJECTION INTRAMUSCULAR; INTRAVENOUS at 06:36

## 2022-01-01 RX ADMIN — SODIUM CHLORIDE: 9 INJECTION, SOLUTION INTRAVENOUS at 10:58

## 2022-01-01 RX ADMIN — SODIUM CHLORIDE, POTASSIUM CHLORIDE, SODIUM LACTATE AND CALCIUM CHLORIDE 75 ML/HR: 600; 310; 30; 20 INJECTION, SOLUTION INTRAVENOUS at 21:56

## 2022-01-01 RX ADMIN — SODIUM CHLORIDE, POTASSIUM CHLORIDE, SODIUM LACTATE AND CALCIUM CHLORIDE 50 ML/HR: 600; 310; 30; 20 INJECTION, SOLUTION INTRAVENOUS at 06:09

## 2022-01-01 RX ADMIN — SODIUM CHLORIDE, POTASSIUM CHLORIDE, SODIUM LACTATE AND CALCIUM CHLORIDE 250 ML: 600; 310; 30; 20 INJECTION, SOLUTION INTRAVENOUS at 12:10

## 2022-01-01 RX ADMIN — ACETAMINOPHEN 650 MG: 325 TABLET, FILM COATED ORAL at 16:34

## 2022-01-01 RX ADMIN — IPRATROPIUM BROMIDE 0.5 MG: 0.5 SOLUTION RESPIRATORY (INHALATION) at 14:22

## 2022-01-01 RX ADMIN — ACETAMINOPHEN 650 MG: 325 TABLET, FILM COATED ORAL at 21:08

## 2022-01-01 RX ADMIN — SODIUM CHLORIDE: 9 INJECTION, SOLUTION INTRAVENOUS at 10:43

## 2022-01-01 RX ADMIN — PANTOPRAZOLE SODIUM 40 MG: 40 INJECTION, POWDER, FOR SOLUTION INTRAVENOUS at 12:19

## 2022-01-01 RX ADMIN — TAZOBACTAM SODIUM AND PIPERACILLIN SODIUM 3.38 G: 375; 3 INJECTION, SOLUTION INTRAVENOUS at 16:56

## 2022-01-01 RX ADMIN — IPRATROPIUM BROMIDE AND ALBUTEROL SULFATE 3 ML: .5; 3 SOLUTION RESPIRATORY (INHALATION) at 21:41

## 2022-01-01 RX ADMIN — IPRATROPIUM BROMIDE 0.5 MG: 0.5 SOLUTION RESPIRATORY (INHALATION) at 10:40

## 2022-01-01 RX ADMIN — IPRATROPIUM BROMIDE 0.5 MG: 0.5 SOLUTION RESPIRATORY (INHALATION) at 18:25

## 2022-01-01 RX ADMIN — CARBIDOPA AND LEVODOPA 2 TABLET: 25; 100 TABLET ORAL at 21:10

## 2022-01-01 RX ADMIN — ATORVASTATIN CALCIUM 80 MG: 40 TABLET, FILM COATED ORAL at 19:53

## 2022-01-01 RX ADMIN — CARBIDOPA AND LEVODOPA 2 TABLET: 25; 100 TABLET ORAL at 21:15

## 2022-01-01 RX ADMIN — SODIUM CHLORIDE, POTASSIUM CHLORIDE, SODIUM LACTATE AND CALCIUM CHLORIDE 100 ML/HR: 600; 310; 30; 20 INJECTION, SOLUTION INTRAVENOUS at 15:50

## 2022-01-01 RX ADMIN — SODIUM CHLORIDE, POTASSIUM CHLORIDE, SODIUM LACTATE AND CALCIUM CHLORIDE 125 ML/HR: 600; 310; 30; 20 INJECTION, SOLUTION INTRAVENOUS at 19:55

## 2022-01-01 RX ADMIN — ATORVASTATIN CALCIUM 80 MG: 40 TABLET, FILM COATED ORAL at 21:10

## 2022-01-01 RX ADMIN — Medication 10 ML: at 09:12

## 2022-01-01 RX ADMIN — DOCUSATE SODIUM 50 MG AND SENNOSIDES 8.6 MG 1 TABLET: 8.6; 5 TABLET, FILM COATED ORAL at 21:56

## 2022-01-01 RX ADMIN — DOCUSATE SODIUM 50 MG AND SENNOSIDES 8.6 MG 1 TABLET: 8.6; 5 TABLET, FILM COATED ORAL at 14:44

## 2022-01-01 RX ADMIN — POLYETHYLENE GLYCOL 3350 17 G: 17 POWDER, FOR SOLUTION ORAL at 09:10

## 2022-01-01 RX ADMIN — SODIUM CHLORIDE, POTASSIUM CHLORIDE, SODIUM LACTATE AND CALCIUM CHLORIDE 100 ML/HR: 600; 310; 30; 20 INJECTION, SOLUTION INTRAVENOUS at 19:10

## 2022-01-01 RX ADMIN — METOPROLOL SUCCINATE 25 MG: 25 TABLET, EXTENDED RELEASE ORAL at 12:09

## 2022-01-01 RX ADMIN — PERFLUTREN 8.48 MG: 6.52 INJECTION, SUSPENSION INTRAVENOUS at 12:57

## 2022-01-01 RX ADMIN — IPRATROPIUM BROMIDE 0.5 MG: 0.5 SOLUTION RESPIRATORY (INHALATION) at 19:31

## 2022-01-01 RX ADMIN — POLYETHYLENE GLYCOL 3350 17 G: 17 POWDER, FOR SOLUTION ORAL at 09:00

## 2022-01-01 RX ADMIN — APIXABAN 2.5 MG: 2.5 TABLET, FILM COATED ORAL at 08:09

## 2022-01-01 RX ADMIN — AMIODARONE HYDROCHLORIDE 0.5 MG/MIN: 1.8 INJECTION, SOLUTION INTRAVENOUS at 00:53

## 2022-01-01 RX ADMIN — EPOETIN ALFA-EPBX 40000 UNITS: 40000 INJECTION, SOLUTION INTRAVENOUS; SUBCUTANEOUS at 15:41

## 2022-01-01 RX ADMIN — EPOETIN ALFA-EPBX 40000 UNITS: 40000 INJECTION, SOLUTION INTRAVENOUS; SUBCUTANEOUS at 14:52

## 2022-01-01 RX ADMIN — IRON SUCROSE 300 MG: 20 INJECTION, SOLUTION INTRAVENOUS at 10:59

## 2022-01-01 RX ADMIN — MIDODRINE HYDROCHLORIDE 5 MG: 2.5 TABLET ORAL at 09:10

## 2022-01-01 RX ADMIN — ATORVASTATIN CALCIUM 80 MG: 40 TABLET, FILM COATED ORAL at 21:54

## 2022-01-01 RX ADMIN — TAZOBACTAM SODIUM AND PIPERACILLIN SODIUM 3.38 G: 375; 3 INJECTION, SOLUTION INTRAVENOUS at 15:15

## 2022-01-01 RX ADMIN — POLYETHYLENE GLYCOL 3350 17 G: 17 POWDER, FOR SOLUTION ORAL at 09:09

## 2022-01-01 RX ADMIN — DOCUSATE SODIUM 50 MG AND SENNOSIDES 8.6 MG 1 TABLET: 8.6; 5 TABLET, FILM COATED ORAL at 08:09

## 2022-01-01 RX ADMIN — APIXABAN 2.5 MG: 2.5 TABLET, FILM COATED ORAL at 09:09

## 2022-01-01 RX ADMIN — EPOETIN ALFA-EPBX 20000 UNITS: 10000 INJECTION, SOLUTION INTRAVENOUS; SUBCUTANEOUS at 14:05

## 2022-01-01 RX ADMIN — DOCUSATE SODIUM 50 MG AND SENNOSIDES 8.6 MG 1 TABLET: 8.6; 5 TABLET, FILM COATED ORAL at 21:06

## 2022-01-01 RX ADMIN — APIXABAN 2.5 MG: 2.5 TABLET, FILM COATED ORAL at 19:56

## 2022-01-01 RX ADMIN — IPRATROPIUM BROMIDE 0.5 MG: 0.5 SOLUTION RESPIRATORY (INHALATION) at 10:56

## 2022-01-01 RX ADMIN — QUETIAPINE FUMARATE 50 MG: 25 TABLET, FILM COATED ORAL at 21:56

## 2022-01-01 RX ADMIN — PANTOPRAZOLE SODIUM 40 MG: 40 INJECTION, POWDER, FOR SOLUTION INTRAVENOUS at 06:09

## 2022-01-01 RX ADMIN — SODIUM CHLORIDE, POTASSIUM CHLORIDE, SODIUM LACTATE AND CALCIUM CHLORIDE 100 ML/HR: 600; 310; 30; 20 INJECTION, SOLUTION INTRAVENOUS at 08:13

## 2022-01-01 RX ADMIN — FUROSEMIDE 40 MG: 10 INJECTION, SOLUTION INTRAVENOUS at 04:18

## 2022-01-01 RX ADMIN — SODIUM CHLORIDE, POTASSIUM CHLORIDE, SODIUM LACTATE AND CALCIUM CHLORIDE 100 ML/HR: 600; 310; 30; 20 INJECTION, SOLUTION INTRAVENOUS at 09:16

## 2022-01-01 RX ADMIN — IPRATROPIUM BROMIDE AND ALBUTEROL SULFATE 3 ML: .5; 3 SOLUTION RESPIRATORY (INHALATION) at 04:44

## 2022-01-01 RX ADMIN — CARBIDOPA AND LEVODOPA 2 TABLET: 25; 100 TABLET ORAL at 21:21

## 2022-01-01 RX ADMIN — APIXABAN 2.5 MG: 2.5 TABLET, FILM COATED ORAL at 21:06

## 2022-01-01 RX ADMIN — TAZOBACTAM SODIUM AND PIPERACILLIN SODIUM 3.38 G: 375; 3 INJECTION, SOLUTION INTRAVENOUS at 15:53

## 2022-01-01 RX ADMIN — TAZOBACTAM SODIUM AND PIPERACILLIN SODIUM 3.38 G: 375; 3 INJECTION, SOLUTION INTRAVENOUS at 17:12

## 2022-01-01 RX ADMIN — METRONIDAZOLE 500 MG: 500 INJECTION, SOLUTION INTRAVENOUS at 08:41

## 2022-01-01 RX ADMIN — ONDANSETRON 4 MG: 2 INJECTION INTRAMUSCULAR; INTRAVENOUS at 20:17

## 2022-01-01 RX ADMIN — FAMOTIDINE 20 MG: 10 INJECTION INTRAVENOUS at 08:31

## 2022-01-01 RX ADMIN — Medication 10 ML: at 08:09

## 2022-01-01 RX ADMIN — SODIUM CHLORIDE, POTASSIUM CHLORIDE, SODIUM LACTATE AND CALCIUM CHLORIDE 100 ML/HR: 600; 310; 30; 20 INJECTION, SOLUTION INTRAVENOUS at 13:09

## 2022-01-01 RX ADMIN — Medication 10 ML: at 21:56

## 2022-01-01 RX ADMIN — SODIUM CHLORIDE: 9 INJECTION, SOLUTION INTRAVENOUS at 11:40

## 2022-01-01 RX ADMIN — FUROSEMIDE 40 MG: 10 INJECTION, SOLUTION INTRAVENOUS at 05:14

## 2022-01-01 RX ADMIN — IPRATROPIUM BROMIDE 0.5 MG: 0.5 SOLUTION RESPIRATORY (INHALATION) at 06:18

## 2022-01-01 RX ADMIN — ATORVASTATIN CALCIUM 80 MG: 40 TABLET, FILM COATED ORAL at 21:15

## 2022-01-01 RX ADMIN — IPRATROPIUM BROMIDE 0.5 MG: 0.5 SOLUTION RESPIRATORY (INHALATION) at 10:35

## 2022-01-01 RX ADMIN — FAMOTIDINE 20 MG: 10 INJECTION INTRAVENOUS at 09:14

## 2022-01-01 RX ADMIN — ATORVASTATIN CALCIUM 80 MG: 40 TABLET, FILM COATED ORAL at 21:56

## 2022-01-01 RX ADMIN — FAMOTIDINE 20 MG: 10 INJECTION INTRAVENOUS at 09:13

## 2022-01-01 RX ADMIN — TAZOBACTAM SODIUM AND PIPERACILLIN SODIUM 3.38 G: 375; 3 INJECTION, SOLUTION INTRAVENOUS at 03:58

## 2022-01-01 RX ADMIN — FAMOTIDINE 20 MG: 10 INJECTION INTRAVENOUS at 09:08

## 2022-01-01 RX ADMIN — DOCUSATE SODIUM 50 MG AND SENNOSIDES 8.6 MG 1 TABLET: 8.6; 5 TABLET, FILM COATED ORAL at 09:13

## 2022-01-01 RX ADMIN — PANTOPRAZOLE SODIUM 40 MG: 40 INJECTION, POWDER, FOR SOLUTION INTRAVENOUS at 06:12

## 2022-01-01 RX ADMIN — DOCUSATE SODIUM 50 MG AND SENNOSIDES 8.6 MG 1 TABLET: 8.6; 5 TABLET, FILM COATED ORAL at 09:08

## 2022-01-01 RX ADMIN — APIXABAN 2.5 MG: 2.5 TABLET, FILM COATED ORAL at 21:09

## 2022-01-01 RX ADMIN — SODIUM CHLORIDE 1464 ML: 9 INJECTION, SOLUTION INTRAVENOUS at 17:42

## 2022-01-01 RX ADMIN — DOCUSATE SODIUM 50 MG AND SENNOSIDES 8.6 MG 1 TABLET: 8.6; 5 TABLET, FILM COATED ORAL at 09:10

## 2022-01-01 RX ADMIN — MIDODRINE HYDROCHLORIDE 5 MG: 2.5 TABLET ORAL at 06:50

## 2022-01-01 RX ADMIN — FUROSEMIDE 10 MG: 10 INJECTION, SOLUTION INTRAMUSCULAR; INTRAVENOUS at 01:25

## 2022-01-01 RX ADMIN — DOCUSATE SODIUM 50 MG AND SENNOSIDES 8.6 MG 1 TABLET: 8.6; 5 TABLET, FILM COATED ORAL at 22:00

## 2022-01-01 RX ADMIN — Medication 10 ML: at 08:14

## 2022-01-01 RX ADMIN — FUROSEMIDE 40 MG: 10 INJECTION, SOLUTION INTRAVENOUS at 16:14

## 2022-01-01 RX ADMIN — TAZOBACTAM SODIUM AND PIPERACILLIN SODIUM 3.38 G: 375; 3 INJECTION, SOLUTION INTRAVENOUS at 10:04

## 2022-01-01 RX ADMIN — PANTOPRAZOLE SODIUM 40 MG: 40 INJECTION, POWDER, FOR SOLUTION INTRAVENOUS at 05:14

## 2022-01-01 RX ADMIN — ACETAMINOPHEN 650 MG: 325 TABLET, FILM COATED ORAL at 15:16

## 2022-01-01 RX ADMIN — DOCUSATE SODIUM 50 MG AND SENNOSIDES 8.6 MG 1 TABLET: 8.6; 5 TABLET, FILM COATED ORAL at 21:10

## 2022-01-01 RX ADMIN — FAMOTIDINE 20 MG: 10 INJECTION INTRAVENOUS at 09:00

## 2022-01-01 RX ADMIN — TAZOBACTAM SODIUM AND PIPERACILLIN SODIUM 3.38 G: 375; 3 INJECTION, SOLUTION INTRAVENOUS at 04:47

## 2022-01-01 RX ADMIN — IPRATROPIUM BROMIDE 0.5 MG: 0.5 SOLUTION RESPIRATORY (INHALATION) at 07:03

## 2022-01-01 RX ADMIN — DOCUSATE SODIUM 50 MG AND SENNOSIDES 8.6 MG 1 TABLET: 8.6; 5 TABLET, FILM COATED ORAL at 08:15

## 2022-01-01 RX ADMIN — DOCUSATE SODIUM 50 MG AND SENNOSIDES 8.6 MG 1 TABLET: 8.6; 5 TABLET, FILM COATED ORAL at 09:00

## 2022-01-01 RX ADMIN — TAZOBACTAM SODIUM AND PIPERACILLIN SODIUM 3.38 G: 375; 3 INJECTION, SOLUTION INTRAVENOUS at 15:51

## 2022-01-01 RX ADMIN — DOCUSATE SODIUM 50 MG AND SENNOSIDES 8.6 MG 1 TABLET: 8.6; 5 TABLET, FILM COATED ORAL at 19:53

## 2022-01-01 RX ADMIN — ATORVASTATIN CALCIUM 80 MG: 40 TABLET, FILM COATED ORAL at 21:08

## 2022-01-01 RX ADMIN — EPOETIN ALFA-EPBX 20000 UNITS: 10000 INJECTION, SOLUTION INTRAVENOUS; SUBCUTANEOUS at 13:02

## 2022-01-01 RX ADMIN — POLYETHYLENE GLYCOL 3350 17 G: 17 POWDER, FOR SOLUTION ORAL at 14:39

## 2022-01-01 RX ADMIN — MIDODRINE HYDROCHLORIDE 5 MG: 2.5 TABLET ORAL at 18:15

## 2022-01-01 RX ADMIN — IPRATROPIUM BROMIDE AND ALBUTEROL SULFATE 3 ML: .5; 3 SOLUTION RESPIRATORY (INHALATION) at 05:03

## 2022-01-01 RX ADMIN — IPRATROPIUM BROMIDE 0.5 MG: 0.5 SOLUTION RESPIRATORY (INHALATION) at 18:50

## 2022-01-01 RX ADMIN — IRON SUCROSE 400 MG: 20 INJECTION, SOLUTION INTRAVENOUS at 11:41

## 2022-01-01 RX ADMIN — TAZOBACTAM SODIUM AND PIPERACILLIN SODIUM 3.38 G: 375; 3 INJECTION, SOLUTION INTRAVENOUS at 03:56

## 2022-01-01 RX ADMIN — FAMOTIDINE 20 MG: 20 TABLET, FILM COATED ORAL at 08:09

## 2022-01-01 RX ADMIN — THIAMINE HYDROCHLORIDE 300 MG: 100 INJECTION, SOLUTION INTRAMUSCULAR; INTRAVENOUS at 17:09

## 2022-01-01 RX ADMIN — IRON SUCROSE 300 MG: 20 INJECTION, SOLUTION INTRAVENOUS at 10:44

## 2022-01-01 RX ADMIN — SODIUM CHLORIDE, POTASSIUM CHLORIDE, SODIUM LACTATE AND CALCIUM CHLORIDE 100 ML/HR: 600; 310; 30; 20 INJECTION, SOLUTION INTRAVENOUS at 22:13

## 2022-01-01 ASSESSMENT — PATIENT HEALTH QUESTIONNAIRE - PHQ9
SUM OF ALL RESPONSES TO PHQ QUESTIONS 1-9: 0
SUM OF ALL RESPONSES TO PHQ QUESTIONS 1-9: 0
1. LITTLE INTEREST OR PLEASURE IN DOING THINGS: 0
SUM OF ALL RESPONSES TO PHQ QUESTIONS 1-9: 0
SUM OF ALL RESPONSES TO PHQ QUESTIONS 1-9: 0
SUM OF ALL RESPONSES TO PHQ9 QUESTIONS 1 & 2: 0
SUM OF ALL RESPONSES TO PHQ QUESTIONS 1-9: 0
SUM OF ALL RESPONSES TO PHQ9 QUESTIONS 1 & 2: 0
2. FEELING DOWN, DEPRESSED OR HOPELESS: 0
SUM OF ALL RESPONSES TO PHQ QUESTIONS 1-9: 0
SUM OF ALL RESPONSES TO PHQ QUESTIONS 1-9: 0
2. FEELING DOWN, DEPRESSED OR HOPELESS: 0
SUM OF ALL RESPONSES TO PHQ QUESTIONS 1-9: 0
SUM OF ALL RESPONSES TO PHQ9 QUESTIONS 1 & 2: 0
SUM OF ALL RESPONSES TO PHQ QUESTIONS 1-9: 0
2. FEELING DOWN, DEPRESSED OR HOPELESS: 0
1. LITTLE INTEREST OR PLEASURE IN DOING THINGS: 0
1. LITTLE INTEREST OR PLEASURE IN DOING THINGS: 0

## 2022-01-01 ASSESSMENT — ENCOUNTER SYMPTOMS
EYE DISCHARGE: 0
BACK PAIN: 0
EYE DISCHARGE: 0
WHEEZING: 0
COUGH: 0
DIARRHEA: 0
SHORTNESS OF BREATH: 1
EYE PAIN: 0
BLOOD IN STOOL: 0
GASTROINTESTINAL NEGATIVE: 1
DIARRHEA: 0
VOMITING: 0
VOMITING: 0
DIARRHEA: 0
BACK PAIN: 0
NAUSEA: 0
NAUSEA: 0
ABDOMINAL PAIN: 0
COUGH: 0
EYE REDNESS: 0
WHEEZING: 0
WHEEZING: 0
BACK PAIN: 0
ABDOMINAL PAIN: 0
NAUSEA: 0
SHORTNESS OF BREATH: 1
VOMITING: 0
EYE PAIN: 0
EYE PAIN: 0
SHORTNESS OF BREATH: 1
BLOOD IN STOOL: 0
ABDOMINAL PAIN: 0
BACK PAIN: 0
COUGH: 0
EYE DISCHARGE: 0
DIARRHEA: 0
BACK PAIN: 1
WHEEZING: 0
EYES NEGATIVE: 1
VOMITING: 0
NAUSEA: 0
VOMITING: 0
SORE THROAT: 0
EYE DISCHARGE: 0
COUGH: 0
NAUSEA: 0
NAUSEA: 0
BLOOD IN STOOL: 0
ABDOMINAL PAIN: 0
WHEEZING: 0
DIARRHEA: 0
DIARRHEA: 1
COLOR CHANGE: 0
GASTROINTESTINAL NEGATIVE: 1
EYE REDNESS: 0
COLOR CHANGE: 0
CONSTIPATION: 0
CONSTIPATION: 0
CONSTIPATION: 1
VOMITING: 0
ABDOMINAL PAIN: 0
ABDOMINAL PAIN: 0
COLOR CHANGE: 0
EYE DISCHARGE: 0
WHEEZING: 0
COUGH: 0
EYE REDNESS: 0
SORE THROAT: 0
EYE DISCHARGE: 0
EYES NEGATIVE: 1
COUGH: 0
BACK PAIN: 0
SORE THROAT: 0

## 2022-01-01 ASSESSMENT — LIFESTYLE VARIABLES: HOW OFTEN DO YOU HAVE A DRINK CONTAINING ALCOHOL: NEVER

## 2022-01-13 NOTE — TELEPHONE ENCOUNTER
Daughter is concerned about parent, weakness of legs and jerking. Was confused this morning but better, similar symptoms when has a CVA. Coming to the ED    Reason for Disposition  • [1] Weakness (i.e., paralysis, loss of muscle strength) of the face, arm / hand, or leg / foot on one side of the body AND [2] sudden onset AND [3] brief (now gone)    Additional Information  • Negative: [1] SEVERE weakness (i.e., unable to walk or barely able to walk, requires support) AND [2] new-onset or worsening  • Negative: [1] Weakness (i.e., paralysis, loss of muscle strength) of the face, arm / hand, or leg / foot on one side of the body AND [2] sudden onset AND [3] present now (Exception: Bell's palsy suspected [i.e., weakness only one side of the face, developing over hours to days, no other symptoms])  • Negative: [1] Numbness (i.e., loss of sensation) of the face, arm / hand, or leg / foot on one side of the body AND [2] sudden onset AND [3] present now  • Negative: [1] Loss of speech or garbled speech AND [2] sudden onset AND [3] present now  • Negative: Difficult to awaken or acting confused (e.g., disoriented, slurred speech)  • Negative: Sounds like a life-threatening emergency to the triager  • Negative: Confusion, disorientation, or hallucinations is main symptom  • Negative: Neck pain is main symptom (and having weakness, numbness, or tingling in arm / hand because of neck pain)  • Negative: Back pain is main symptom (and having weakness, numbness, or tingling in leg because of back pain)  • Negative: Hand pain is main symptom (and having mild weakness, numbness, or tingling in hand related to hand pain)  • Negative: Dizziness is main symptom  • Negative: Vision loss or change is main symptom  • Negative: Followed a head injury within last 3 days  • Negative: Followed a neck injury within last 3 days  • Negative: [1] Tingling in both hands and/or feet AND [2] breathing faster than normal AND [3] feels similar to  "prior panic attack or hyperventilation episode  • Negative: Weakness in both sides of the body or weakness all over  • Negative: Headache  (and neurologic deficit)  • Negative: [1] Back pain AND [2] numbness (loss of sensation) in groin or rectal area  • Negative: [1] Unable to urinate (or only a few drops) > 4 hours AND [2] bladder feels very full (e.g., palpable bladder or strong urge to urinate)  • Negative: [1] Loss of bladder or bowel control (urine or bowel incontinence; wetting self, leaking stool) AND [2] new-onset    Answer Assessment - Initial Assessment Questions  1. SYMPTOM: \"What is the main symptom you are concerned about?\" (e.g., weakness, numbness)  Jerking movement and weakness of legs  2. ONSET: \"When did this start?\" (minutes, hours, days; while sleeping)    This morning  3. LAST NORMAL: \"When was the last time you were normal (no symptoms)?\"      01/12  4. PATTERN \"Does this come and go, or has it been constant since it started?\"  \"Is it present now?\"    constant  5. CARDIAC SYMPTOMS: \"Have you had any of the following symptoms: chest pain, difficulty breathing, palpitations?\"    no  6. NEUROLOGIC SYMPTOMS: \"Have you had any of the following symptoms: headache, dizziness, vision loss, double vision, changes in speech, unsteady on your feet?\"     weakness  7. OTHER SYMPTOMS: \"Do you have any other symptoms?\"   jerking of legs  8. PREGNANCY: \"Is there any chance you are pregnant?\" \"When was your last menstrual period?\"   na    Protocols used: NEUROLOGIC DEFICIT-ADULT-AH      "

## 2022-01-14 NOTE — ED NOTES
The following fall interventions were initiated:    [x] Patient and/or family given education   [x] Call light within reach and educated on how to use   [x] Bed rails up per protocol    [x] Bed locked and in the lowest position   [] Bed alarm set and on loudest setting   [x] Fall wrist band applied   [x] Non skid footwear applied   [x] Room free of clutter   [x] Patient items within reach   [x] Adequate lighting provided  [x] Falls sign present    [] Patient moved closer to nursing station   [] Restraints applied        Erica Kelley, RN  01/13/22 3631

## 2022-01-14 NOTE — ED PROVIDER NOTES
"Subjective   88-year-old female presents emergency department with complaint of bilateral lower extremity \"twitching\", generalized weakness and fatigue. 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  Patient reports 2 to 3-day history of worsening twitching of both legs. Having a hard time ambulating secondary to the twitching and feeling generally weak. She has shortness of breath at baseline but states she feels a bit more short of breath with exertion. No new swelling of her legs. No recent travel, reports compliance with her Eliquis. Has had similar episodes of twitching in the past that resolved spontaneously.      History provided by:  Patient      Review of Systems   All other systems reviewed and are negative.      Past Medical History:   Diagnosis Date   • Acute cor pulmonale (HCC)    • Arthritis    • Blind one eye    • Cancer (HCC) 1985    breast cancer   • Chronic joint pain    • Chronic respiratory failure (HCC)    • Chronic right-sided CHF (congestive heart failure) (HCC)    • CVA (cerebral vascular accident) (HCC)    • Hypertension    • Interstitial lung disease (HCC)    • Neuropathy    • Normal pressure hydrocephalus syndrome (HCC)    • Pulmonary hypertension (HCC)        No Known Allergies    Past Surgical History:   Procedure Laterality Date   • BREAST SURGERY  1985    breast cancer   • KIDNEY SURGERY      cancer   • TOTAL KNEE ARTHROPLASTY         No family history on file.    Social History     Socioeconomic History   • Marital status:    Tobacco Use   • Smoking status: Never Smoker   • Smokeless tobacco: Never Used   Vaping Use   • Vaping Use: Never used   Substance and Sexual Activity   • Alcohol use: No   • Drug use: No   • Sexual activity: Defer           Objective   Physical Exam  Vitals and nursing note " reviewed.   Constitutional:       General: She is not in acute distress.     Appearance: Normal appearance. She is normal weight. She is not ill-appearing.   HENT:      Head: Normocephalic and atraumatic.      Nose: Nose normal.      Mouth/Throat:      Mouth: Mucous membranes are moist.      Pharynx: Oropharynx is clear. No oropharyngeal exudate or posterior oropharyngeal erythema.   Eyes:      Extraocular Movements: Extraocular movements intact.      Conjunctiva/sclera: Conjunctivae normal.      Pupils: Pupils are equal, round, and reactive to light.   Cardiovascular:      Rate and Rhythm: Normal rate and regular rhythm.      Heart sounds: Murmur heard.       Pulmonary:      Effort: Pulmonary effort is normal.      Breath sounds: Normal breath sounds. No wheezing, rhonchi or rales.   Abdominal:      General: Abdomen is flat. Bowel sounds are normal. There is no distension.      Palpations: Abdomen is soft.      Tenderness: There is no abdominal tenderness.   Musculoskeletal:         General: No tenderness. Normal range of motion.      Cervical back: Normal range of motion and neck supple. No rigidity. No muscular tenderness.      Right lower leg: No edema.      Left lower leg: No edema.   Skin:     General: Skin is warm and dry.      Capillary Refill: Capillary refill takes less than 2 seconds.      Findings: No rash.   Neurological:      General: No focal deficit present.      Mental Status: She is alert and oriented to person, place, and time. Mental status is at baseline.      Cranial Nerves: No cranial nerve deficit.      Sensory: No sensory deficit.      Motor: No weakness.   Psychiatric:         Mood and Affect: Mood normal.         Behavior: Behavior normal.         Thought Content: Thought content normal.         Procedures       Lab Results (last 24 hours)     Procedure Component Value Units Date/Time    COVID-19,[facility] IN-HOUSE,Nasal Swab No Transport Media 3-4 HR TAT - Swab, Nasal Cavity [[id number]]   (Normal) Collected: 01/13/22 1844    Specimen: Swab from Nasal Cavity Updated: 01/13/22 1949     COVID19 Not Detected    Narrative:      Fact sheet for providers: https://www.fda.gov/media/304080/download     Fact sheet for patients: https://www.fda.gov/media/100317/download    Test performed by PCR.    Consider negative results in combination with clinical observations, patient history, and epidemiological information.    CBC & Differential [[id number]]  (Abnormal) Collected: 01/13/22 1847    Specimen: Blood Updated: 01/13/22 1902    Narrative:      The following orders were created for panel order CBC & Differential.  Procedure                               Abnormality         Status                     ---------                               -----------         ------                     CBC Auto Differential[[id number]]        Abnormal            Final result                 Please view results for these tests on the individual orders.    Comprehensive Metabolic Panel [[id number]]  (Abnormal) Collected: 01/13/22 1847    Specimen: Blood Updated: 01/13/22 1926     Glucose 117 mg/dL      BUN 37 mg/dL      Creatinine 1.66 mg/dL      Sodium 139 mmol/L      Potassium 5.2 mmol/L      Comment: Specimen hemolyzed.  Results may be affected.        Chloride 101 mmol/L      CO2 31.0 mmol/L      Calcium 9.3 mg/dL      Total Protein 6.2 g/dL      Albumin 3.40 g/dL      ALT (SGPT) 6 U/L      Comment: Specimen hemolyzed.  Results may be affected.        AST (SGOT) 39 U/L      Comment: Specimen hemolyzed.  Results may be affected.        Alkaline Phosphatase 70 U/L      Total Bilirubin 0.3 mg/dL      eGFR Non African Amer 29 mL/min/1.73      Globulin 2.8 gm/dL      A/G Ratio 1.2 g/dL      BUN/Creatinine Ratio 22.3     Anion Gap 7.0 mmol/L     Narrative:      GFR Normal >60  Chronic Kidney Disease <60  Kidney Failure <15      Protime-INR [[id number]]  (Abnormal) Collected: 01/13/22 1847    Specimen: Blood Updated: 01/13/22 1910      Protime 15.0 Seconds      INR 1.23    BNP [[id number]]  (Normal) Collected: 01/13/22 1847    Specimen: Blood Updated: 01/13/22 1918     proBNP 779.7 pg/mL     Narrative:      Among patients with dyspnea, NT-proBNP is highly sensitive for the detection of acute congestive heart failure. In addition NT-proBNP of <300 pg/ml effectively rules out acute congestive heart failure with 99% negative predictive value.    Results may be falsely decreased if patient taking Biotin.      Magnesium [[id number]]  (Normal) Collected: 01/13/22 1847    Specimen: Blood Updated: 01/13/22 1916     Magnesium 2.1 mg/dL     CBC Auto Differential [[id number]]  (Abnormal) Collected: 01/13/22 1847    Specimen: Blood Updated: 01/13/22 1902     WBC 6.08 10*3/mm3      RBC 2.56 10*6/mm3      Hemoglobin 7.7 g/dL      Hematocrit 25.5 %      MCV 99.6 fL      MCH 30.1 pg      MCHC 30.2 g/dL      RDW 14.4 %      RDW-SD 52.9 fl      MPV 9.7 fL      Platelets 204 10*3/mm3      Neutrophil % 67.5 %      Lymphocyte % 18.3 %      Monocyte % 11.0 %      Eosinophil % 2.6 %      Basophil % 0.3 %      Immature Grans % 0.3 %      Neutrophils, Absolute 4.10 10*3/mm3      Lymphocytes, Absolute 1.11 10*3/mm3      Monocytes, Absolute 0.67 10*3/mm3      Eosinophils, Absolute 0.16 10*3/mm3      Basophils, Absolute 0.02 10*3/mm3      Immature Grans, Absolute 0.02 10*3/mm3      nRBC 0.0 /100 WBC     Urinalysis With Culture If Indicated - Urine, Catheter In/Out [[id number]]  (Normal) Collected: 01/13/22 2013    Specimen: Urine, Catheter In/Out Updated: 01/13/22 2027     Color, UA Yellow     Appearance, UA Clear     pH, UA 6.0     Specific Gravity, UA 1.008     Glucose, UA Negative     Ketones, UA Negative     Bilirubin, UA Negative     Blood, UA Negative     Protein, UA Negative     Leuk Esterase, UA Negative     Nitrite, UA Negative     Urobilinogen, UA 0.2 E.U./dL    Narrative:      Urine microscopic not indicated.    POC Occult Blood Stool [[id number]]  (Normal) Collected:  01/13/22 2033    Specimen: Stool Updated: 01/13/22 2035     Fecal Occult Blood Negative     Lot Number [id number]     Expiration Date 04/30/2022     DEVELOPER LOT NUMBER [id number]     DEVELOPER EXPIRATION DATE 04/30/2022     Positive Control Positive     Negative Control Negative      CT Head Without Contrast    Result Date: 1/13/2022  EXAM: CT HEAD WO CONTRAST-  INDICATION: hx of hydrocephalus, difficulty ambulating  COMPARISON: Brain MR 12/27/2020, 6/3/2012  DOSE LENGTH PRODUCT: 684 mGy cm. Automated exposure control was also utilized to decrease patient radiation dose.  FINDINGS:  No evidence of intracranial hemorrhage. Gray-white differentiation is maintained. Chronic microvascular ischemic white matter change and global cerebral volume loss. Old LEFT STARLA infarct noted. Ex vacuo ventricular dilatation. Ventricular caliber is unchanged compared to studies dating back to 6/3/2012. Ventricular dilatation is disproportionate to the background of underlying volume loss raising concern for normal pressure hydrocephalus. No midline shift or mass effect. Basilar cisterns are patent. Chronic calcification in the RIGHT globe. LEFT globe appears unremarkable. Mastoid air cells are clear. Visualized paranasal sinuses are clear.      1.  No acute intracranial findings. 2.  Chronic microvascular ischemic white matter change and global cerebral volume loss. 3.  Old small LEFT STARLA infarct. 4.  Stable ventricular caliber. Ventricular caliber is disproportionate to the degree of overall volume loss, raising concern for normal pressure hydrocephalus.  This report was finalized on 01/13/2022 21:09 by Dr. Ziggy Jimenez MD.    XR Chest 1 View    Result Date: 1/13/2022  EXAM: XR CHEST 1 VW-  INDICATION: dyspnea  COMPARISON: 12/27/2020, 4/23/2019  FINDINGS:  Cardiac silhouette is enlarged but stable. Faint bilateral peripheral interstitial opacities are noted. No definite focal consolidation. No pleural effusion or pneumothorax. No acute osseous  "finding.       Appearance of faint peripheral interstitial opacities. Uncertain whether this represents a true finding or is secondary to extensive overlying soft tissues. Differential includes viral pneumonia. This report was finalized on 01/13/2022 19:21 by Dr. Ziggy Jimenez MD.      ED Course  ED Course as of 01/13/22 2129   Thu Jan 13, 2022 2125 88-year-old female presents to the emergency department with complaint of bilateral lower extremity \"twitching\", generalized weakness and fatigue.  Does have previous history of normal pressure hydrocephalus, previous stroke on Eliquis, pulmonary hypertension and right-sided heart failure.    Patient on 2 L home oxygen, currently maintaining normal oxygen saturations on her home dose.    ER work-up reveals mild ARMINDA as well as mild anemia.  Does not meet requirement for blood transfusion.  She has no history of GI bleeding, stool was brown and negative for occult blood.  My suspicion is this likely chronic.  The patient is not tachycardic and she is not hypotensive.  Recommend that she follow-up with her primary doctor in 2 days for repeat blood work and can return to the ER for worsening symptoms. [AW]      ED Course User Index  [AW] Shaun Morales MD                                                 [facility]    Final diagnoses:   Generalized weakness   Acute kidney injury (HCC)   Anemia, unspecified type       ED Disposition  ED Disposition     ED Disposition Condition Comment    Discharge Stable           Meals, Vidal Alejandro MD  [address] [facility] [id number] [phone]    Schedule an appointment as soon as possible for a visit in 2 days           Medication List      No changes were made to your prescriptions during this visit.          Shaun Morales MD  01/13/22 2129    "

## 2022-01-25 NOTE — PROGRESS NOTES
SUBJECTIVE:    Patient ID: Zulma Sánchez is a 80 y.o. female. HPI:   Patient is seen today after she was recently seen at [facility] ER because she states that she woke up and her legs were trembling more and she felt weak. She states that she has a history of normal pressure hydrocephalus and so she states that she had not had any flareups of her legs trembling recently but they were concerned that she might have had another stroke. She is still taking her Eliquis. She takes this twice a day. She states that they did do a CT scan on her which showed no acute stroke. They did not do an MRI. She has not seen neurology in a long time because they wanted to do lumbar punctures on her but she really did not want to do those. She states that she is still taking all of her medications as prescribed and has not had any changes in her medications recently. Her family member who is with her today states that she is still having a lot of trembling and especially at night. She is currently on Sinemet 25 mg / 100 mg nightly. She states that she is also taking Mirapex to help with the restless leg symptoms. She has been on these for a while. They do feel like the trembling has gotten worse. She has not had any new focal neurological deficits. The trembling is bilateral.  She is also still taking gabapentin. They did do a CBC on her which showed a hemoglobin of 7.7. She normally is in the tens. She states that she has not noticed any blood in her stool or dark tarry stools. She has not had any blood in her urine. She states that she does feel little more weak and rundown. She does have follow-up appointment scheduled with cardiology as she does have a history of congestive heart failure. Her kidney function was also decreased whenever they checked at the ER as well. She states that she normally drinks very well and is eating good. She denies any recent illness.     Past Medical History:   Diagnosis Date    Blindness of right eye     Cancer ([facility] [address]     breast, kidney    Heart failure ([facility] [address]     right sided    Hypertension     Macular degeneration     bilateral    Normal pressure hydrocephalus (HCC)     Stroke [facility])       Current Outpatient Medications on File Prior to Visit   Medication Sig Dispense Refill    Multiple Vitamins-Minerals (EYE VITAMINS PO) Take by mouth daily      apixaban (ELIQUIS) 2.5 MG TABS tablet Take 1 tablet by mouth every 12 hours 60 tablet 0    gabapentin (NEURONTIN) 100 MG capsule Take 1 capsule by mouth 3 times daily for 31 days. 90 capsule 3    atorvastatin (LIPITOR) 80 MG tablet Take 1 tablet by mouth nightly 30 tablet 3    metoprolol succinate (TOPROL XL) 100 MG extended release tablet Take 1 tablet by mouth daily 30 tablet 3    bumetanide (BUMEX) 1 MG tablet Take 1 tablet by mouth daily 30 tablet 3    OXYGEN Inhale 1 L/min into the lungs continuous 1 Bottle 5    cloNIDine (CATAPRES) 0.1 MG tablet Take 0.1 mg by mouth 2 times daily      Omega-3 Fatty Acids (FISH OIL) 1200 MG CAPS Take 1 capsule by mouth 2 times daily (with meals)      FLUoxetine (PROZAC) 20 MG capsule Take 20 mg by mouth daily      lisinopril (PRINIVIL;ZESTRIL) 40 MG tablet Take 40 mg by mouth daily      Multiple Vitamins-Minerals (MULTIVITAMIN ADULTS PO) Take 1 tablet by mouth daily      oxybutynin (DITROPAN-XL) 10 MG extended release tablet Take 10 mg by mouth daily      pramipexole (MIRAPEX) 0.5 MG tablet Take 0.5 mg by mouth nightly      QUEtiapine (SEROQUEL) 300 MG tablet Take 300 mg by mouth nightly      Cholecalciferol (VITAMIN D3) 1.25 MG (01550 UT) CAPS Take 1 capsule by mouth daily       No current facility-administered medications on file prior to visit. No Known Allergies    Review of Systems   Constitutional: Positive for fatigue. Negative for activity change, appetite change and fever. HENT: Negative for congestion and nosebleeds. Eyes: Negative for discharge.    Respiratory: Negative for cough and wheezing. Cardiovascular: Negative for chest pain and leg swelling. Gastrointestinal: Negative for abdominal pain, diarrhea, nausea and vomiting. Genitourinary: Negative for difficulty urinating, frequency and urgency. Musculoskeletal: Negative for back pain and gait problem. Skin: Negative for color change and rash. Neurological: Positive for tremors and weakness. Negative for dizziness and headaches. Hematological: Does not bruise/bleed easily. Psychiatric/Behavioral: Negative for sleep disturbance and suicidal ideas. OBJECTIVE:    Physical Exam  Vitals reviewed. Constitutional:       General: She is not in acute distress. Appearance: Normal appearance. She is well-developed. She is not diaphoretic. HENT:      Head: Normocephalic and atraumatic. Right Ear: External ear normal.      Left Ear: External ear normal.   Cardiovascular:      Rate and Rhythm: Normal rate and regular rhythm. Pulses: Normal pulses. Heart sounds: Murmur (3/6 systolic) heard. Pulmonary:      Effort: Pulmonary effort is normal. No respiratory distress. Breath sounds: Normal breath sounds. Musculoskeletal:      Cervical back: Normal range of motion and neck supple. Skin:     General: Skin is warm and dry. Neurological:      General: No focal deficit present. Mental Status: She is alert and oriented to person, place, and time. Mental status is at baseline. Psychiatric:         Mood and Affect: Mood normal.         Behavior: Behavior normal.         Thought Content: Thought content normal.         Judgment: Judgment normal.        /80   Pulse 88   Temp 98.2 °F (36.8 °C) (Temporal)   Resp 16   Ht 4' 11\" (1.499 m)   Wt 181 lb 9.6 oz (82.4 kg)   SpO2 98%   Breastfeeding No   BMI 36.68 kg/m²      ASSESSMENT/PLAN:    1. NPH (normal pressure hydrocephalus) (HCC)  -     MRI BRAIN WO CONTRAST; Future  -     Comprehensive Metabolic Panel  2.  Need for pneumococcal vaccination  -     Pneumococcal polysaccharide vaccine 23-valent greater than or equal to 1yo subcutaneous/IM  -     Comprehensive Metabolic Panel  3. Tremors of nervous system  -     MRI BRAIN WO CONTRAST; Future  -     Comprehensive Metabolic Panel  4. Chronic right-sided heart failure (HCC)  -     Comprehensive Metabolic Panel  5. Cerebrovascular accident (CVA), unspecified mechanism ([facility] [address]  -     MRI BRAIN WO CONTRAST; Future  -     Comprehensive Metabolic Panel  6. Anemia, unspecified type  -     CBC Auto Differential  -     Ferritin  -     Iron and TIBC  -     Vitamin B12  -     Comprehensive Metabolic Panel  7. Nonrheumatic aortic valve insufficiency       See lab orders. Will notify results. Continue Mirapex and we are going to increase her Sinemet dosage to 50/200 mg at bedtime to see if this helps with the tremors. Discussed that if the MRI that we have ordered to rule out stroke comes back showing no acute stroke and she continues to have issues with the tremors that we may need to send her back to neurology but we will determine further management once we receive the results back on this. I have ordered labs on her today for her anemia including iron and B12 and repeat CBC. If the hemoglobin has dropped below 7 we will get her set up for transfusion. We did discuss the risks and benefits of this today. If she is holding stable and slightly increasing and her levels come back normal then discussed that we may need to figure out where her anemia is coming from when of having to send her to hematology. We are going to repeat her CMP today due to her decreased kidney function. I have reviewed her labs that she had done at [facility] in the ER. I also reviewed her CT scan as well as her chest x-ray. PDMP Monitoring:    Last PDMP Brenden as Reviewed [facility]):  Review User Review Instant Review Result            Urine Drug Screenings (1 yr)    No resulted procedures found.        Medication Contract and Consent for Opioid Use Documents Filed      No documents found                 EMR Dragon/transcription disclaimer:  Much of this encounter note is electronic transcription/translation of spoken language toprinted texts. The electronic translation of spoken language may be erroneous, or at times, nonsensical words or phrases may be inadvertently transcribed.   Although I have reviewed the note for such errors, some may stillexist.

## 2022-02-28 NOTE — PROGRESS NOTES
Progress Note      Pt Name: Juliana Sever: 10/14/1933  MRN: [id number]    Date of evaluation: 3/2/2022  History Obtained From:  patient, electronic medical record    CHIEF COMPLAINT:    Chief Complaint   Patient presents with    New Patient    Anemia     HISTORY OF PRESENT ILLNESS:    Maurice Santoro is a 80 y.o.  female who is here today in initial hematology consultation for further evaluation of normocytic anemia, referred by Delvin Kelly MD for further evaluation and recommendations. 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 She resides with her daughter Michael Weathers after the death of her spouse and experiencing the CVA. She presented in a wheelchair and reported that she uses a walker while she is at home and wheelchair for mobility otherwise. Alexys Mccartney denied any bleeding to include melena, epistaxis, hemoptysis, hematuria or hematochezia. She denied any known history of iron or B12 deficiency requiring treatment with supplements. She reported having diarrhea to constipation chronically. On chronic anticoagulation with Eliquis 2.5 mg twice daily due to history of stroke.     I reviewed the following at initial consultation on 3/1/2022:  CBC results:  04/23/2019 WBC 6.2, Hgb 12.1, Hct 38.8, platelets 248,453, MCV 95.8  12/27/2020 WBC 5.66, Hgb 12.1, Hct 38.8, platelets 175,562, MCV 94.9  01/04/2021 WBC 5.0, Hgb 10.0, Hct 34.6, platelets 984,280, MCV 99.4, creatinine 1.6  01/07/2021 WBC 4.9, Hgb 10.5, Hct 35.1, platelets 398,636, MCV 98.6, creatinine 1.3  2021 WBC 5.1, Hgb 10.1, Hct 33.5, platelets 730,584, MCV 96.0, creatinine 1.2  2021 WBC 5.7, Hgb 10.6, Hct 35.3, platelets 605,789, MCV 95.1, creatinine 1.1  2022 WBC 6.08, Hgb 7.7, Hct 25.5, platelets 926,864, MCV 99.6, Creat 1.66, GFR 29  2022 WBC 5.9, Hgb   7.7, Hct 26.8, platelets 799,081, .3, creatinine 1.4    2022 Serology results  · Creat 1.66  · GFR 29  · AST 39  · ALT 6  · Alk Phos 70  · Bili 0.3    2022 Serology results  · Iron 49  · TIBC 292  · Saturation 17%  · Ferritin 95  · Vitamin B12:  891  · Creat 1.4  · GFR 35  · AST 30  · ALT 25  · Alk Phos 78  · Bili 0.3    2022 MRI Brain [facility])- No acute intracranial abnormalities. Chronic microvascular changes    CBC at initial consultation on 3/1/2022 reported a WBC of 6.3, ANC 4.5, lymphocytes 15.9%, hemoglobin 8.6, hematocrit 27.5, MCV 97.2 and a platelet count of 150,712. ONCOLOGIC HISTORY:  Diagnosis:  · Lobular in situ breast cancer,   · Clear-cell renal carcinoma, pT1a, pNx, pMx, 3/9/2010    Treatment summary:  · Partial right nephrectomy, 3/9/2010    1985- Lobular in situ breast cancer-bilateral mastectomy per Dr Jason Hodges; reconstruction and implants: Unfortunately medical records not available to provide further information. 2010- Clear cell renal cancer with partial right nephrectomy Dr Maylin Aceves. Pathology-Clear cell renal carcinoma, conventional type. [place] nuclear grade 2. No obvious invasion beyond renal capsule. Surgical margins of excision negative for evidence of malignancy. Indeterminate for lymphovascular invasion. Patchy mild chronic subcapsular inflammation involving non-neoplastic renal parenchyma.  AJCC STAGE:  pT1a, pNx, pMx       HEMATOLOGIC HISTORY:   Chronic anemia in the setting of chronic kidney disease stage III    Age-appropriate health screenin/15/2014 Colonoscopy [facility])- polyp x2- tubular adenomatous      Past Medical History:    Past Medical History: Diagnosis Date    Blindness of right eye     Cancer ([facility]     breast, kidney    Heart failure ([facility])     right sided    Hypertension     Macular degeneration     bilateral    Normal pressure hydrocephalus (HCC)     Stroke [facility])        Past Surgical History:    Past Surgical History:   Procedure Laterality Date    BACK SURGERY      MASTECTOMY, BILATERAL      PARTIAL NEPHRECTOMY Right     TOTAL KNEE ARTHROPLASTY Bilateral        Current Medications:    Current Outpatient Medications   Medication Sig Dispense Refill    FLUoxetine (PROZAC) 20 MG capsule Take 1 capsule by mouth daily 90 capsule 3    carbidopa-levodopa (SINEMET)  MG per tablet Take 2 tablets by mouth nightly 180 tablet 3    Multiple Vitamins-Minerals (EYE VITAMINS PO) Take by mouth daily      apixaban (ELIQUIS) 2.5 MG TABS tablet Take 1 tablet by mouth every 12 hours 60 tablet 0    atorvastatin (LIPITOR) 80 MG tablet Take 1 tablet by mouth nightly 30 tablet 3    metoprolol succinate (TOPROL XL) 100 MG extended release tablet Take 1 tablet by mouth daily 30 tablet 3    bumetanide (BUMEX) 1 MG tablet Take 1 tablet by mouth daily 30 tablet 3    OXYGEN Inhale 1 L/min into the lungs continuous 1 Bottle 5    cloNIDine (CATAPRES) 0.1 MG tablet Take 0.1 mg by mouth 2 times daily      Omega-3 Fatty Acids (FISH OIL) 1200 MG CAPS Take 1 capsule by mouth 2 times daily (with meals)      lisinopril (PRINIVIL;ZESTRIL) 40 MG tablet Take 40 mg by mouth daily      Multiple Vitamins-Minerals (MULTIVITAMIN ADULTS PO) Take 1 tablet by mouth daily      oxybutynin (DITROPAN-XL) 10 MG extended release tablet Take 10 mg by mouth daily      pramipexole (MIRAPEX) 0.5 MG tablet Take 0.5 mg by mouth nightly      QUEtiapine (SEROQUEL) 300 MG tablet Take 300 mg by mouth nightly      Cholecalciferol (VITAMIN D3) 1.25 MG (71951 UT) CAPS Take 1 capsule by mouth daily      gabapentin (NEURONTIN) 100 MG capsule Take 1 capsule by mouth 3 times daily for 31 days. 90 capsule 3     No current facility-administered medications for this visit. Allergies: No Known Allergies    Social History:    Social History     Tobacco Use    Smoking status: Never Smoker    Smokeless tobacco: Never Used   Vaping Use    Vaping Use: Never used   Substance Use Topics    Alcohol use: Never    Drug use: Not on file       Family History:   Family History   Problem Relation Age of Onset    Heart Attack Mother     Breast Cancer Maternal Aunt     Kidney Cancer Daughter        Vitals:  Vitals:    03/01/22 0933   BP: 130/80   Pulse: 63   SpO2: 100%  Comment: 2 Lt O2   Weight: 177 lb 4.8 oz (80.4 kg)   Height: 4' 11\" (1.499 m)        Subjective   REVIEW OF SYSTEMS:   Review of Systems   Constitutional: Positive for fatigue. Negative for chills, diaphoresis and fever. HENT: Negative. Negative for congestion, ear pain, hearing loss, nosebleeds, sore throat and tinnitus. Eyes: Positive for visual disturbance (blind in one eye). Negative for pain, discharge and redness. Respiratory: Positive for shortness of breath (O2 at 2L/NC). Negative for cough and wheezing. Cardiovascular: Negative. Negative for chest pain, palpitations and leg swelling. Gastrointestinal: Positive for constipation and diarrhea. Negative for abdominal pain, blood in stool, nausea and vomiting. Endocrine: Negative for polydipsia. Genitourinary: Negative for dysuria, flank pain, frequency, hematuria and urgency. Musculoskeletal: Positive for back pain and gait problem. Negative for myalgias and neck pain. Skin: Negative. Negative for rash. Neurological: Positive for weakness. Negative for dizziness, tremors, seizures and headaches. Hematological: Bruises/bleeds easily. Psychiatric/Behavioral: Negative. The patient is not nervous/anxious. Objective   PHYSICAL EXAM:  Physical Exam  Vitals reviewed. Constitutional:       General: She is not in acute distress.      Appearance: She is well-developed. HENT:      Head: Normocephalic and atraumatic. Mouth/Throat:      Pharynx: Uvula midline. Tonsils: No tonsillar exudate. Eyes:      General: Lids are normal.      Comments: Reported being blind in right eye   Neck:      Thyroid: No thyroid mass or thyromegaly. Vascular: No JVD. Trachea: Trachea normal. No tracheal deviation. Cardiovascular:      Rate and Rhythm: Normal rate and regular rhythm. Pulses: Normal pulses. Heart sounds: Murmur heard. Pulmonary:      Effort: Pulmonary effort is normal. No respiratory distress. Breath sounds: Normal breath sounds. No wheezing or rales. Chest:      Chest wall: No tenderness. Abdominal:      General: Bowel sounds are normal. There is no distension. Palpations: Abdomen is soft. There is no mass. Tenderness: There is no abdominal tenderness. There is no guarding. Musculoskeletal:         General: No tenderness or deformity. Cervical back: Normal range of motion and neck supple. Comments: Range of motion within normal limits x4 extremities   Skin:     General: Skin is warm. Findings: Bruising present. No erythema or rash. Neurological:      Mental Status: She is alert and oriented to person, place, and time. Cranial Nerves: No cranial nerve deficit. Motor: Weakness (Bilateral lower extremities) present. Coordination: Coordination normal.      Gait: Gait abnormal (Uses walker and wheelchair for stability). Psychiatric:         Behavior: Behavior normal.         Thought Content: Thought content normal.           Labs reviewed today:  Lab Results   Component Value Date    WBC 6.30 03/01/2022    HGB 8.6 (L) 03/01/2022    HCT 27.5 (L) 03/01/2022    MCV 97.2 (H) 03/01/2022     03/01/2022     Lab Results   Component Value Date    NEUTROABS 4.3 01/25/2022       ASSESSMENT/PLAN:      1. Anemia of chronic disease with a history of CKD stage IIIb.   Hemoglobin has remained 10 or less over the past 13 months, normal WBC and platelet count. Denied any known history of iron or B12 deficiency. Denies any bleeding to include melena, epistaxis, hemoptysis, hematuria or hematochezia. Hemoglobin 8.6, hematocrit 27.5 with an MCV of 97.2. Labs to be completed today for further evaluation, will evaluate for hemolysis, mineral deficiency, chronic inflammatory process, acute GI blood loss:  - CBC  - C-Reactive Protein; Future  - Haptoglobin; Future  - Reticulocytes; Future  - Erythropoietin; Future  - Copper, Serum; Future  - Lactate Dehydrogenase; Future  - Blood Occult Stool Screen #1; Future  - Ferritin; Future  - Iron and TIBC; Future  - Folate; Future  - Vitamin B12; Future  - Sedimentation Rate; Future    -Plan to initiate erythropoietin stimulating therapy (MISA) with Retacrit 20,000 units subcu every 2 weeks x 4 to be followed by Retacrit 40,000 units monthly for anemia of chronic kidney disease . If ferritin level is not 50 or greater and at least and iron saturation of 20% will need to give IV iron replacement prior to initiating MISA therapy. 2. Stage 3b chronic kidney disease, creatinine 1.5 with a GFR of 33. Managed by Dr. Samira Diamond to be obtained to rule out multiple myeloma:  - Comprehensive Metabolic Panel; Future  - Beta 2 Microglobulin, Serum; Future  - Electropropheresis with reflex  - Kappa/lambda light chains  - Quantitative immunoglobulins    3. Fatigue, chronic Will evaluate thyroid function  - TSH; Future    4. Chronic anticoagulation, History of CVA (cerebrovascular accident)  -Currently on Eliquis 2.5 mg p.o. twice daily    I discussed all of the above findings included in the assessment and plan with the patient and the patient is in agreement to move forward with current recommendations/treatment. I have addressed all of their questions and concerns that were verbalized. FOLLOW UP:  1.  Follow up given for months or sooner proximately 3 months, if needed  2. Continue to follow with other medical providers as recommended  3. Be contacted after labs are reviewed to receive IV iron replacement and initiate Retacrit  4. Labs at next visit: CBC, CMP, iron panel and ferritin    EMR Dragon/Transcription disclaimer:   Much of this encounter note is an electronic transcription/translation of spoken language to printed text. The electronic translation of spoken language may permit erroneous, or at times, nonsensical words or phrases to be inadvertently transcribed; although attempts have made to review the note for such errors, some may still exist.  Please excuse any unrecognized transcription errors and contact us if the air is unintelligible or needs documented correction. Also, portions of this note have been copied forward, however, changed to reflect the most current clinical status of this patient. Electronically signed by GRIFFIN Rivera on 3/2/2022 at 1:23 PM  Carmen HIRSCH am pre-charting as a registered nurse for GRIFFIN Romero.

## 2022-03-07 PROBLEM — D50.9 IRON DEFICIENCY ANEMIA, UNSPECIFIED: Status: ACTIVE | Noted: 2022-01-01

## 2022-03-07 PROBLEM — K90.9 IRON MALABSORPTION: Status: ACTIVE | Noted: 2022-01-01

## 2022-03-07 NOTE — TELEPHONE ENCOUNTER
----- Message from GRIFFIN Franco sent at 3/2/2022  2:20 PM CST -----  Please build supportive plan for Venofer for a total of 1000 mg IV, given over 3 doses. Please contact patient once insurance approval has been obtained and appointments have been arranged. Then will need to initiate MISA therapy with Retacrit 20,000 units subcu every 2 weeks x 4 followed by Retacrit 40,000 units monthly.

## 2022-03-07 NOTE — TELEPHONE ENCOUNTER
Called daughter, [facility] and reviewed lab results, iron saturation 17%. Informed that Nabil Dies, APRN would like for her to get 3 doses of IV iron and then we will set up for Retacrit injections every 2 weeks following completion of iron infusions. Daughter states that it would be easier to do the infusions once a week instead of 3 days in a row. Instructed that we will call here to set up appointments when approved with insurance. Daughter v/u and is agreeable to plan.

## 2022-03-17 NOTE — PROGRESS NOTES
SUBJECTIVE:    Patient ID: Radha Simms is a 80 y.o. female. HPI:   Patient is seen today for 3-month follow-up. She has a history of hypertension. She is taking her blood pressure medication as prescribed. She is on lisinopril, Toprol, clonidine. She is tolerating this well. Blood pressure is well controlled in office today. She does have a history of anemia of chronic disease. She is being followed by Ruba Santiago. They are working on try to get her set up for iron infusions. She states that she does feel weak and rundown some. She does have a history of normal pressure hydrocephalus. She states that she gets more jerking of her extremities. She states that her extremities are weak. She has done physical therapy and is not like this is helped. She states that she really does not want to do any more physical therapy if possible.     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    Macular degeneration     bilateral    Normal pressure hydrocephalus (HCC)     Stroke [facility])       Current Outpatient Medications on File Prior to Visit   Medication Sig Dispense Refill    atorvastatin (LIPITOR) 80 MG tablet Take 1 tablet by mouth nightly 7 tablet 0    QUEtiapine (SEROQUEL) 300 MG tablet Take 1 tablet by mouth nightly 7 tablet 0    pramipexole (MIRAPEX) 0.5 MG tablet Take 1 tablet by mouth nightly (Patient taking differently: Take 0.5 mg by mouth daily ) 90 tablet 3    lisinopril (PRINIVIL;ZESTRIL) 40 MG tablet Take 1 tablet by mouth daily 90 tablet 3    cloNIDine (CATAPRES) 0.1 MG tablet Take 1 tablet by mouth 2 times daily 180 tablet 1    metoprolol succinate (TOPROL XL) 100 MG extended release tablet Take 1 tablet by mouth daily 90 tablet 3    oxybutynin (DITROPAN-XL) 10 MG extended release tablet Take 1 tablet by mouth daily 90 tablet 1    FLUoxetine (PROZAC) 20 MG capsule Take 1 capsule by mouth daily 90 capsule 3    carbidopa-levodopa (SINEMET)  MG per tablet Take 2 tablets by mouth nightly 180 tablet 3    Multiple Vitamins-Minerals (EYE VITAMINS PO) Take by mouth daily      apixaban (ELIQUIS) 2.5 MG TABS tablet Take 1 tablet by mouth every 12 hours 60 tablet 0    gabapentin (NEURONTIN) 100 MG capsule Take 1 capsule by mouth 3 times daily for 31 days. 90 capsule 3    bumetanide (BUMEX) 1 MG tablet Take 1 tablet by mouth daily 30 tablet 3    OXYGEN Inhale 1 L/min into the lungs continuous 1 Bottle 5    Omega-3 Fatty Acids (FISH OIL) 1200 MG CAPS Take 1 capsule by mouth 2 times daily (with meals)      Multiple Vitamins-Minerals (MULTIVITAMIN ADULTS PO) Take 1 tablet by mouth daily      Cholecalciferol (VITAMIN D3) 1.25 MG (44064 UT) CAPS Take 1 capsule by mouth daily       No current facility-administered medications on file prior to visit. No Known Allergies    Review of Systems   Constitutional: Negative for activity change, appetite change and fever. HENT: Negative for congestion and nosebleeds. Eyes: Negative for discharge. Respiratory: Negative for cough and wheezing. Cardiovascular: Negative for chest pain and leg swelling. Gastrointestinal: Negative for abdominal pain, diarrhea, nausea and vomiting. Genitourinary: Negative for difficulty urinating, frequency and urgency. Musculoskeletal: Negative for back pain and gait problem. Skin: Negative for color change and rash. Neurological: Positive for weakness. Negative for dizziness and headaches. Hematological: Does not bruise/bleed easily. Psychiatric/Behavioral: Negative for sleep disturbance and suicidal ideas. OBJECTIVE:    Physical Exam  Vitals reviewed. Constitutional:       General: She is not in acute distress. Appearance: Normal appearance. She is well-developed. She is not diaphoretic. HENT:      Head: Normocephalic and atraumatic. Right Ear: External ear normal.      Left Ear: External ear normal.   Cardiovascular:      Rate and Rhythm: Normal rate and regular rhythm. Pulses: Normal pulses. Heart sounds: Normal heart sounds. No murmur heard. Pulmonary:      Effort: Pulmonary effort is normal. No respiratory distress. Breath sounds: Normal breath sounds. Musculoskeletal:      Cervical back: Normal range of motion and neck supple. Skin:     General: Skin is warm and dry. Neurological:      General: No focal deficit present. Mental Status: She is alert and oriented to person, place, and time. Mental status is at baseline. Psychiatric:         Mood and Affect: Mood normal.         Behavior: Behavior normal.         Thought Content: Thought content normal.         Judgment: Judgment normal.        /72 (Site: Right Upper Arm, Position: Sitting, Cuff Size: Large Adult)   Pulse 73   Temp 97.7 °F (36.5 °C) (Temporal)   Resp 16   Wt 180 lb (81.6 kg)   SpO2 99%   BMI 36.36 kg/m²      ASSESSMENT/PLAN:    1. Cerebrovascular accident (CVA), unspecified mechanism ([address]  2. NPH (normal pressure hydrocephalus) ([address]  3. Anemia, unspecified type       Continue lisinopril Toprol and clonidine for blood pressure control. Continue Lipitor for hyperlipidemia. Continue to follow with neurology for her history of normal pressure hydrocephalus and CVA. She does not want to do any physical therapy at this time. PDMP Monitoring:    Last PDMP [place] as Reviewed [facility]):  Review User Review Instant Review Result            Urine Drug Screenings (1 yr)    No resulted procedures found. Medication Contract and Consent for Opioid Use Documents Filed      No documents found                 EMR Dragon/transcription disclaimer:  Much of this encounter note is electronic transcription/translation of spoken language toprinted texts.   The electronic translation of spoken language may be erroneous, or at times, nonsensical words or phrases may be inadvertently transcribed.   Although I have reviewed the note for such errors, some may stillexist.

## 2022-03-18 PROBLEM — I35.0 AORTIC STENOSIS, MODERATE: Status: ACTIVE | Noted: 2022-01-01

## 2022-03-18 NOTE — PROGRESS NOTES
Subjective:     Encounter Date:03/18/2022      Patient ID: Dolly Ramirez is a 88 y.o. female     Chief Complaint:  Congestive Heart Failure  Presents for follow-up visit. Associated symptoms include shortness of breath. Pertinent negatives include no abdominal pain, chest pain, edema, near-syncope or palpitations. Compliance with diet is %. Compliance with exercise is 51-75%. Compliance with medications is %.   Hypertension  This is a chronic problem. The current episode started more than 1 year ago. The problem is controlled. Associated symptoms include malaise/fatigue and shortness of breath. Pertinent negatives include no chest pain, headaches, orthopnea, palpitations or PND.   Shortness of Breath  This is a chronic problem. The current episode started more than 1 year ago. The problem has been gradually improving. Pertinent negatives include no abdominal pain, chest pain, fever, headaches, hemoptysis, leg swelling, orthopnea, PND, rash, syncope or vomiting. The symptoms are aggravated by exercise and any activity.     Patient presents today for routine follow up for right sided heart failure and pulmonary hypertension. Patient was admitted for acute stroke and cor pulmonale in December 2020. Prior to this she denies any significant medical issues - other than a report of interstitial lung disease.  She was diagnosed with right occipital and left medial gyrus stroke- concerning for embolic strokes per neurology note. She was started on Eliquis 2.5 BID and Aspirin by neurology. This was switched to Eliquis 5 mg BID according to dosing labels. Aspirin was stopped.  She was placed in a holter monitor. No A-fib was noted on telemetry while admitted or 14 day monitor. She was diagnosed with cor pulmonale, severe pulmonary hypertension, aortic valve regurgitation and right sided heart failure- which were new diagnosis. She follows with Dr. Vidal Elizondo as he PCP. And Follows with [facility]. At last OV she was having dizziness- which she notes has completely resolved. Overall she is doing well. Breathing is at baseline. She is getting comfortable wearing oxygen.     The following portions of the patient's history were reviewed and updated as appropriate: allergies, current medications, past medical history, past social history, past and problem list.    No Known Allergies    Current Outpatient Medications:   •  apixaban (ELIQUIS) 5 MG tablet tablet, Take 1 tablet by mouth Every 12 (Twelve) Hours. Indications: embolic stroke, Disp: 180 tablet, Rfl: 3  •  atorvastatin (LIPITOR) 80 MG tablet, Take 1 tablet by mouth Every Night., Disp:  , Rfl:   •  bumetanide (BUMEX) 1 MG tablet, Take 1 tablet by mouth Daily., Disp: , Rfl:   •  carbidopa-levodopa (SINEMET)  MG per tablet, Take 2 tablets by mouth Every Night., Disp: , Rfl:   •  Cholecalciferol (VITAMIN D3) 5000 UNITS capsule capsule, Take 5,000 Units by mouth Daily., Disp: , Rfl:   •  CloNIDine (CATAPRES) 0.1 MG tablet, Take 0.1 mg by mouth 2 (Two) Times a Day., Disp: , Rfl:   •  FLUoxetine (PROzac) 20 MG capsule, Take 20 mg by mouth Daily., Disp: , Rfl:   •  gabapentin (NEURONTIN) 300 MG capsule, Take 100 mg by mouth 3 (Three) Times a Day., Disp: , Rfl:   •  lisinopril (PRINIVIL,ZESTRIL) 40 MG tablet, Take 40 mg by mouth Daily., Disp: , Rfl:   •  metoprolol succinate XL (TOPROL-XL) 25 MG 24 hr tablet, Take 1 tablet by mouth Daily., Disp: , Rfl:   •  Multiple Vitamins-Minerals (CENTRUM ADULTS PO), Take  by mouth Daily., Disp: , Rfl:   •  Omega-3 Fatty Acids (FISH OIL) 1200 MG capsule capsule, Take 1,200 mg by mouth 2 (Two) Times a Day With Meals., Disp: , Rfl:   •  oxybutynin XL (DITROPAN-XL) 10 MG 24 hr tablet, Take 10 mg by mouth Daily., Disp: , Rfl:   •  pramipexole (MIRAPEX) 0.5 MG tablet, Take 0.5 mg by mouth Every Night., Disp: , Rfl:   •  QUEtiapine (SEROquel) 300 MG tablet, Take 300 mg by mouth Every Night., Disp: , Rfl:     Social  History     Socioeconomic History   • Marital status:    Tobacco Use   • Smoking status: Never Smoker   • Smokeless tobacco: Never Used   Vaping Use   • Vaping Use: Never used   Substance and Sexual Activity   • Alcohol use: No   • Drug use: No   • Sexual activity: Defer       Review of Systems   Constitutional: Positive for malaise/fatigue. Negative for chills, decreased appetite, fever, weight gain and weight loss.   HENT: Negative for nosebleeds.    Eyes: Negative for visual disturbance.   Cardiovascular: Positive for dyspnea on exertion. Negative for chest pain, leg swelling, near-syncope, orthopnea, palpitations, paroxysmal nocturnal dyspnea and syncope.   Respiratory: Positive for shortness of breath. Negative for cough, hemoptysis and snoring.    Endocrine: Negative for cold intolerance and heat intolerance.   Hematologic/Lymphatic: Negative for bleeding problem. Does not bruise/bleed easily.   Skin: Negative for rash.   Musculoskeletal: Negative for back pain and falls.        Numbness in fingers for several months    Gastrointestinal: Negative for abdominal pain, constipation, diarrhea, heartburn, melena, nausea and vomiting.   Genitourinary: Negative for hematuria.   Neurological: Negative for dizziness, headaches and light-headedness.   Psychiatric/Behavioral: Negative for altered mental status.   Allergic/Immunologic: Negative for persistent infections.              Objective:     Vitals reviewed.   Constitutional:       Appearance: Frail. Chronically ill-appearing.   Eyes:      Pupils: Pupils are equal, round, and reactive to light.   HENT:      Head: Normocephalic and atraumatic.      Nose: Nose normal.    Mouth/Throat:      Dentition: Normal.      Pharynx: Oropharynx is clear.   Pulmonary:      Effort: Pulmonary effort is normal. Tachypnea present.      Breath sounds: Normal breath sounds.   Chest:      Chest wall: Not tender to palpatation.   Cardiovascular:      PMI at left midclavicular  "line. Normal rate. Regular rhythm.      Murmurs: There is a systolic murmur.   Pulses:     Intact distal pulses.   Edema:     Peripheral edema absent.   Abdominal:      General: Bowel sounds are normal.      Palpations: Abdomen is soft.   Musculoskeletal: Normal range of motion.      Cervical back: Normal range of motion. Skin:     General: Skin is warm and dry.   Neurological:      Mental Status: Alert, oriented to person, place, and time and oriented to person, place and time.   Psychiatric:         Attention and Perception: Attention normal.         Mood and Affect: Mood normal.           Procedures  /87 (BP Location: Left arm, Patient Position: Sitting, Cuff Size: Adult)   Pulse 70   Temp 98.2 °F (36.8 °C) (Infrared)   Resp 16   Ht 157.5 cm (62.01\")   Wt 81.6 kg (180 lb)   SpO2 99%   BMI 32.91 kg/m²   Lab Review:   I have reviewed       Lab Results   Component Value Date    CHOL 130 12/28/2020    TRIG 113 12/28/2020    HDL 55 12/28/2020    LDL 55 12/28/2020      Results for orders placed during the hospital encounter of 12/27/20    Adult Transthoracic Echo Complete W/ Cont if Necessary Per Protocol (With Agitated Saline)    Interpretation Summary  · The right atrial cavity is mild to moderately dilated.  · Moderate aortic valve regurgitation is present.  · Estimated right ventricular systolic pressure from tricuspid regurgitation is markedly elevated (>55 mmHg).  · Severe pulmonary hypertension is present.  · The right ventricular cavity is severely dilated.  · Severely reduced right ventricular systolic function noted.  · Left ventricular wall thickness is consistent with concentric hypertrophy.  · Estimated left ventricular EF = 65% Left ventricular systolic function is normal.  · Left ventricular diastolic function is consistent with age.  · The study is very limited and a small right to left shunt can not be excluded     Assessment:          Diagnosis Plan   1. Aortic stenosis, moderate     2. " Chronic respiratory failure with hypoxia and hypercapnia (HCC)     3. Chronic right-sided heart failure (HCC)     4. Severe pulmonary hypertension (HCC)     5. Cerebrovascular accident (CVA) due to embolism of middle cerebral artery, unspecified blood vessel laterality (HCC)     6. Benign hypertension            Plan:       1. Moderate AS- check echo  2. Chronic Respiratory Failure- Now on continuous oxygen and notes breathing better. Growing more comfortable with oxygen use. Follows with Nazanin COLINDRES  3. Right Sided Heart Failure- secondary to lung disease and severe pulmonary hypertension. Discussed importance of low salt diet. Daily weights. Discussed symptoms to monitor. On Bumex. Euvolemic today. Flex dosing of Bumex PRN.  4. Severe pulmonary hypertension - History of interstitial lung disease. New diagnosis of respiratory failure and severe pulmonary hypertension in December. Now following [facility]  5. CVA- thought to be embolic per Neurology. Anticoagulated per neurology's recommendation  6. Hypertension- stable. With good control.      Follow up in 6 months or sooner if needed. Routine echo scheduled.     I spent 30 minutes caring for Dolly on this date of service. This time includes time spent by me in the following activities:preparing for the visit, reviewing tests, obtaining and/or reviewing a separately obtained history, performing a medically appropriate examination and/or evaluation , counseling and educating the patient/family/caregiver, ordering medications, tests, or procedures, referring and communicating with other health care professionals , documenting information in the medical record, independently interpreting results and communicating that information with the patient/family/caregiver and care coordination

## 2022-03-22 NOTE — TELEPHONE ENCOUNTER
Per Betty Ma called pt guille Vazquez to set up follow appt for June to see [employer]. Left vm.

## 2022-03-28 NOTE — TELEPHONE ENCOUNTER
Provider needs to review PDMP    PDMP Monitoring:    Last PDMP [place] as Reviewed [facility]):  Review User Review Instant Review Result          Urine Drug Screenings (1 yr)    No resulted procedures found.        Medication Contract and Consent for Opioid Use Documents Filed      No documents found

## 2022-03-28 NOTE — TELEPHONE ENCOUNTER
One month supply until [employer] is received via daughter            PDMP Monitoring:    Last PDMP [place] as Reviewed [facility]):  Review User Review Instant Review Result   SANDEEP GRANADOS 3/28/2022  8:59 AM Reviewed PDMP [1]     Urine Drug Screenings (1 yr)    No resulted procedures found.        Medication Contract and Consent for Opioid Use Documents Filed      No documents found

## 2022-04-13 PROBLEM — N18.32 STAGE 3B CHRONIC KIDNEY DISEASE (HCC): Status: ACTIVE | Noted: 2022-01-01

## 2022-04-13 PROBLEM — D63.1 ANEMIA DUE TO STAGE 3B CHRONIC KIDNEY DISEASE (HCC): Status: ACTIVE | Noted: 2022-01-01

## 2022-04-13 PROBLEM — N18.32 ANEMIA DUE TO STAGE 3B CHRONIC KIDNEY DISEASE (HCC): Status: ACTIVE | Noted: 2022-01-01

## 2022-06-06 PROBLEM — N18.30 CHRONIC RENAL DISEASE, STAGE III (HCC): Status: ACTIVE | Noted: 2022-01-01

## 2022-06-06 NOTE — PROGRESS NOTES
SUBJECTIVE:    Patient ID: So Castillo is a 80 y.o. female. HPI:   Patient is seen today for complaints of dizziness and lightheadedness. She states that this happens mostly when she goes to stand up. She states that she has not fallen. She states that the room is spinning. She is on blood pressure medication and they think that she does take it all in the morning although she does do clonidine twice a day. She is currently on Toprol-XL and lisinopril. She also takes clonidine twice a day. She states that she has not changed the dosage of these recently. She states that she does feel worse in the mornings. She states that her blood pressure is high until she takes her medications and then she runs lower. She states that she is keeping a log on her blood pressures at home. They have showed me those today. She does have a history of anemia which is related to her chronic kidney disease. She is followed by hematology for this. She does typically receive infusions. She states that she is doing well with these.     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)       Current Outpatient Medications on File Prior to Visit   Medication Sig Dispense Refill    gabapentin (NEURONTIN) 100 MG capsule TAKE 4 CAPSULES DAILY (TAKE 1 CAPSULE EVERY MORNING, TAKE 1 CAPSULE MIDDAY AND TAKE 2 CAPSULES AT BEDTIME) 120 capsule 0    atorvastatin (LIPITOR) 80 MG tablet Take 1 tablet by mouth nightly 7 tablet 0    QUEtiapine (SEROQUEL) 300 MG tablet Take 1 tablet by mouth nightly 7 tablet 0    pramipexole (MIRAPEX) 0.5 MG tablet Take 1 tablet by mouth nightly (Patient taking differently: Take 0.5 mg by mouth daily ) 90 tablet 3    lisinopril (PRINIVIL;ZESTRIL) 40 MG tablet Take 1 tablet by mouth daily 90 tablet 3    cloNIDine (CATAPRES) 0.1 MG tablet Take 1 tablet by mouth 2 times daily 180 tablet 1    metoprolol succinate (TOPROL XL) 100 MG extended release tablet Take 1 tablet by mouth daily 90 tablet 3    oxybutynin (DITROPAN-XL) 10 MG extended release tablet Take 1 tablet by mouth daily 90 tablet 1    FLUoxetine (PROZAC) 20 MG capsule Take 1 capsule by mouth daily 90 capsule 3    carbidopa-levodopa (SINEMET)  MG per tablet Take 2 tablets by mouth nightly 180 tablet 3    Multiple Vitamins-Minerals (EYE VITAMINS PO) Take by mouth daily      apixaban (ELIQUIS) 2.5 MG TABS tablet Take 1 tablet by mouth every 12 hours 60 tablet 0    bumetanide (BUMEX) 1 MG tablet Take 1 tablet by mouth daily 30 tablet 3    OXYGEN Inhale 1 L/min into the lungs continuous 1 Bottle 5    Omega-3 Fatty Acids (FISH OIL) 1200 MG CAPS Take 1 capsule by mouth 2 times daily (with meals)      Multiple Vitamins-Minerals (MULTIVITAMIN ADULTS PO) Take 1 tablet by mouth daily      Cholecalciferol (VITAMIN D3) 1.25 MG (35799 UT) CAPS Take 1 capsule by mouth daily       No current facility-administered medications on file prior to visit. No Known Allergies    Review of Systems   Constitutional: Negative for activity change, appetite change and fever. HENT: Negative for congestion and nosebleeds. Eyes: Negative for discharge. Respiratory: Negative for cough and wheezing. Cardiovascular: Negative for chest pain and leg swelling. Gastrointestinal: Negative for abdominal pain, diarrhea, nausea and vomiting. Genitourinary: Negative for difficulty urinating, frequency and urgency. Musculoskeletal: Negative for back pain and gait problem. Skin: Negative for color change and rash. Neurological: Positive for dizziness and light-headedness. Negative for headaches.    Hematological: Does not bruise/bleed easily. Psychiatric/Behavioral: Negative for sleep disturbance and suicidal ideas. OBJECTIVE:    Physical Exam  Vitals reviewed. Constitutional:       General: She is not in acute distress. Appearance: Normal appearance. She is well-developed. She is not diaphoretic. HENT:      Head: Normocephalic and atraumatic. Right Ear: External ear normal.      Left Ear: External ear normal.   Cardiovascular:      Rate and Rhythm: Normal rate and regular rhythm. Pulses: Normal pulses. Heart sounds: Murmur (3/6 systolic can be heard in carotids) heard. Pulmonary:      Effort: Pulmonary effort is normal. No respiratory distress. Breath sounds: Normal breath sounds. Musculoskeletal:      Cervical back: Normal range of motion and neck supple. Skin:     General: Skin is warm and dry. Neurological:      General: No focal deficit present. Mental Status: She is alert and oriented to person, place, and time. Mental status is at baseline. Psychiatric:         Mood and Affect: Mood normal.         Behavior: Behavior normal.         Thought Content: Thought content normal.         Judgment: Judgment normal.        /68   Pulse 66   Temp 97.6 °F (36.4 °C) (Temporal)   Resp 16   Wt 179 lb (81.2 kg)   SpO2 98%   BMI 36.15 kg/m²      ASSESSMENT/PLAN:    1. Chronic right-sided heart failure ([address]  2. 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 Stage 3b chronic kidney disease ([address]  4. NPH (normal pressure hydrocephalus) ([address]  5. Dizziness  6. Essential hypertension  7. Severe obesity (BMI 35.0-39. 9) with comorbidity ([address]       Continue to follow with hematology regarding her anemia. Continue her lisinopril in the mornings however we are going to move her Toprol to the evenings and clonidine twice a day. They are to call me on Monday and let me know how she is feeling and how the dizziness is as well as her blood pressures and daily weights.   If she is continuing to experience the dizziness worse during the day and her blood pressures running low discussed we may need to decrease the dose on her Toprol-XL. Continue to follow with cardiology. She does have a large heart murmur and discussed that this could be contributing to the dizziness as well due to her chronic right-sided heart failure. I did discuss we may have her start using some compression socks that she may have a little bit of decreased return on it standing as that is when her symptoms seem to worsen. PDMP Monitoring:    Last PDMP [place] as Reviewed [facility]):  Review User Review Instant Review Result   SANDEEP GRANADOS 3/28/2022  8:59 AM Reviewed PDMP [1]       Urine Drug Screenings (1 yr)    No resulted procedures found. Medication Contract and Consent for Opioid Use Documents Filed      No documents found                 EMR Dragon/transcription disclaimer:  Much of this encounter note is electronic transcription/translation of spoken language toprinted texts. The electronic translation of spoken language may be erroneous, or at times, nonsensical words or phrases may be inadvertently transcribed.   Although I have reviewed the note for such errors, some may stillexist.

## 2022-06-06 NOTE — PATIENT INSTRUCTIONS
Lisinopril in the morning and metoprolol in the evening. Leave clonidine twice a day. Keep BP log and call next Monday with BP readings and how she is feeling.

## 2022-06-29 NOTE — PROGRESS NOTES
Progress Note      Pt Name: Magdalene Diaz: 10/14/1933  MRN: [id number]    Date of evaluation: 6/30/2022  History Obtained From:  patient, electronic medical record    CHIEF COMPLAINT:    Chief Complaint   Patient presents with    Follow-up     Anemia of chronic disease    Other     Iron deficiency    Treatment     Retacrit     HISTORY OF PRESENT ILLNESS:    Kira Cordero is a 80 y.o.  female who is treated for chronic anemia in the setting of chronic kidney disease with iron deficiency and also followed for a history of lobular in situ breast cancer in 1985 and clear cell renal cancer with partial right nephrectomy on 03/09/2010, with no known recurrence of either carcinoma. She has received IV iron replacement with Venofer for a total of 1000 mg IV given over 3 doses (3/31/2022, 4/7/2022 and 4/14/2022) and began receiving erythropoietin stimulating agent therapy on 4/21/2022 with Retacrit 20,000 units. She received Retacrit 20,000 units on 5/5/2022 and 5/19/2022, due to scheduling error she has not received her fourth dose of Retacrit. Christo Vargas returns today in scheduled follow-up for evaluation, lab monitoring and consideration for Retacrit. Is accompanied by her daughter again today. Today's clinic visit to include physical assessment, review of systems, any lab or radiographic findings that were available and plan of care are documented below.     HEMATOLOGIC HISTORY:   Diagnosis:  · Chronic anemia in the setting of chronic kidney disease stage III and iron deficiency    Treatment summary:  · Venofer for a total of 1000 mg IV given over 3 doses (3/31/2022, 4/7/2022 and 4/14/2022)  · Initiated erythropoietin stimulating agent therapy on 4/21/2022 with Retacrit 20,000 units every 2 weeks      Christo Vargas was seen in initial hematology consultation on 3/1/2022 for further evaluation of normocytic anemia, referred by [address] MD for further evaluation and recommendations. 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 She resides with her daughter Geoffrey Plunkett after the death of her spouse and experiencing CVA. She presented in a wheelchair and reported that she uses a walker while she is at home and wheelchair for mobility otherwise. Christo Vargas denied any bleeding to include melena, epistaxis, hemoptysis, hematuria or hematochezia. She denied any known history of iron or B12 deficiency requiring treatment with supplements. She reported having diarrhea to constipation chronically. On chronic anticoagulation with Eliquis 2.5 mg twice daily due to history of stroke.     I reviewed the following at initial consultation on 3/1/2022:    CBC results:  04/23/2019 WBC 6.2, Hgb 12.1, Hct 38.8, platelets 224,827, MCV 95.8   12/27/2020 WBC 5.66, Hgb 12.1, Hct 38.8, platelets 276,620, MCV 94.9  01/04/2021 WBC 5.0, Hgb 10.0, Hct 34.6, platelets 430,786, MCV 99.4, creatinine 1.6  01/07/2021 WBC 4.9, Hgb 10.5, Hct 35.1, platelets 047,640, MCV 98.6, creatinine 1.3  01/11/2021 WBC 5.1, Hgb 10.1, Hct 33.5, platelets 869,656, MCV 96.0, creatinine 1.2  01/14/2021 WBC 5.7, Hgb 10.6, Hct 35.3, platelets 921,997, MCV 95.1, creatinine 1.1  01/13/2022 WBC 6.08, Hgb 7.7, Hct 25.5, platelets 057,256, MCV 99.6, Creat 1.66, GFR 29  01/25/2022 WBC 5.9, Hgb   7.7, Hct 26.8, platelets 978,010, .3, creatinine 1.4    01/13/2022 Serology results  · Creat 1.66  · GFR 29  · AST 39  · ALT 6  · Alk Phos 70  · Bili 0.3    01/25/2022 Serology results  · Iron 49  · TIBC 292  · Saturation 17%  · Ferritin 95  · Vitamin B12:  891  · Creat 1.4  · GFR 35  · AST 30  · ALT 25  · Alk Phos 78  · Bili 0.3    2022 MRI Brain [facility])- No acute intracranial abnormalities. Chronic microvascular changes    CBC at initial consultation on 3/1/2022 reported a WBC of 6.3, ANC 4.5, lymphocytes 15.9%, hemoglobin 8.6, hematocrit 27.5, MCV 97.2 and a platelet count of 144,512.    2022 Serology results  · B2M 7.7  · Kappa light chains 90.44  · Lambda light chains 40.60  · Kappa/Lambda Ratio 2.23  · IgG 942, IgM 88, IgA 126  · Normal SPEP pattern.  NAT gel shows a normal pattern; no monoclonal proteins seen. ·   · Creat 1.5/ GFR 33  · C-reactive Protein <0  · Haptoglobin 169  · Retic Pct 2.23, Retic Abs 0.0629  · Erythropoietin 23  · Sed Rate 44  · Iron 54  · TIBC 311  · Saturation 17%  · Ferritin 93  · B12/Folate 953/>20  · Copper 124.3      --------------------------------------------------------------------------------------------------------------------------------------------      ONCOLOGIC HISTORY:  Diagnosis:  · Lobular in situ breast cancer,   · Clear-cell renal carcinoma, pT1a, pNx, pMx, 3/9/2010    Treatment summary:  · Partial right nephrectomy, 3/9/2010    1985- Lobular in situ breast cancer-bilateral mastectomy per Dr Alejandro Mauro; reconstruction and implants: Unfortunately medical records not available to provide further information. 2010- Clear cell renal cancer with partial right nephrectomy Dr Cynthia Raya. Pathology-Clear cell renal carcinoma, conventional type. [place] nuclear grade 2. No obvious invasion beyond renal capsule. Surgical margins of excision negative for evidence of malignancy. Indeterminate for lymphovascular invasion. Patchy mild chronic subcapsular inflammation involving non-neoplastic renal parenchyma.  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    Macular degeneration     bilateral    Normal pressure hydrocephalus (HCC)     Stage 3b chronic kidney disease ([facility] [address] 4/13/2022    Stroke [facility])        Past Surgical History:    Past Surgical History:   Procedure Laterality Date    BACK SURGERY      MASTECTOMY, BILATERAL      PARTIAL NEPHRECTOMY Right     TOTAL KNEE ARTHROPLASTY Bilateral        Current Medications:    Current Outpatient Medications   Medication Sig Dispense Refill    atorvastatin (LIPITOR) 80 MG tablet Take 1 tablet by mouth nightly 7 tablet 0    QUEtiapine (SEROQUEL) 300 MG tablet Take 1 tablet by mouth nightly 7 tablet 0    pramipexole (MIRAPEX) 0.5 MG tablet Take 1 tablet by mouth nightly (Patient taking differently: Take 0.5 mg by mouth daily ) 90 tablet 3    lisinopril (PRINIVIL;ZESTRIL) 40 MG tablet Take 1 tablet by mouth daily 90 tablet 3    cloNIDine (CATAPRES) 0.1 MG tablet Take 1 tablet by mouth 2 times daily 180 tablet 1    metoprolol succinate (TOPROL XL) 100 MG extended release tablet Take 1 tablet by mouth daily 90 tablet 3    oxybutynin (DITROPAN-XL) 10 MG extended release tablet Take 1 tablet by mouth daily 90 tablet 1    FLUoxetine (PROZAC) 20 MG capsule Take 1 capsule by mouth daily 90 capsule 3    carbidopa-levodopa (SINEMET)  MG per tablet Take 2 tablets by mouth nightly 180 tablet 3    Multiple Vitamins-Minerals (EYE VITAMINS PO) Take by mouth daily      apixaban (ELIQUIS) 2.5 MG TABS tablet Take 1 tablet by mouth every 12 hours 60 tablet 0    bumetanide (BUMEX) 1 MG tablet Take 1 tablet by mouth daily 30 tablet 3    OXYGEN Inhale 1 L/min into the lungs continuous 1 Bottle 5    Omega-3 Fatty Acids (FISH OIL) 1200 MG CAPS Take 1 capsule by mouth 2 times daily (with meals)      Multiple Vitamins-Minerals (MULTIVITAMIN ADULTS PO) Take 1 tablet by mouth daily      Cholecalciferol (VITAMIN D3) 1.25 MG (59680 UT) CAPS Take 1 capsule by mouth daily      gabapentin (NEURONTIN) 100 MG capsule TAKE 4 CAPSULES DAILY (TAKE 1 CAPSULE EVERY MORNING, TAKE 1 CAPSULE MIDDAY AND TAKE 2 CAPSULES AT BEDTIME) 120 capsule 0     No current facility-administered medications for this visit. Allergies: No Known Allergies    Social History:    Social History     Tobacco Use    Smoking status: Never Smoker    Smokeless tobacco: Never Used   Vaping Use    Vaping Use: Never used   Substance Use Topics    Alcohol use: Never    Drug use: Not on file       Family History:   Family History   Problem Relation Age of Onset    Heart Attack Mother     Breast Cancer Maternal Aunt     Kidney Cancer Daughter        Vitals:  Vitals:    06/29/22 1403   BP: 126/80   Pulse: 63   SpO2: 94%  Comment: on 1 L of O2   Weight: 177 lb 11.2 oz (80.6 kg)   Height: 4' 11\" (1.499 m)        Subjective   REVIEW OF SYSTEMS:   Review of Systems   Constitutional: Positive for fatigue. Negative for chills, diaphoresis and fever. HENT: Negative. Negative for congestion, ear pain, hearing loss, nosebleeds, sore throat and tinnitus. Eyes: Negative. Negative for pain, discharge and redness. Respiratory: Positive for shortness of breath. Negative for cough and wheezing. Cardiovascular: Negative. Negative for chest pain, palpitations and leg swelling. Gastrointestinal: Negative. Negative for abdominal pain, blood in stool, constipation, diarrhea, nausea and vomiting. Endocrine: Negative for polydipsia. Genitourinary: Negative for dysuria, flank pain, frequency, hematuria and urgency. Musculoskeletal: Negative. Negative for back pain, myalgias and neck pain. Skin: Negative. Negative for rash. Neurological: Positive for weakness. Negative for dizziness, tremors, seizures and headaches. Hematological: Does not bruise/bleed easily. Psychiatric/Behavioral: Negative. The patient is not nervous/anxious. Objective   PHYSICAL EXAM:  Physical Exam  Vitals reviewed. Constitutional:       General: She is not in acute distress. Appearance: She is well-developed. HENT:      Head: Normocephalic and atraumatic. Mouth/Throat:      Pharynx: Uvula midline. Tonsils: No tonsillar exudate. Eyes:      General: Lids are normal.      Conjunctiva/sclera: Conjunctivae normal.      Pupils: Pupils are equal, round, and reactive to light. Neck:      Thyroid: No thyroid mass or thyromegaly. Vascular: No JVD. Trachea: Trachea normal. No tracheal deviation. Cardiovascular:      Rate and Rhythm: Normal rate and regular rhythm. Pulses: Normal pulses. Heart sounds: Murmur heard. Pulmonary:      Effort: Pulmonary effort is normal. No respiratory distress. Breath sounds: Normal breath sounds. No wheezing or rales. Comments: 1 L per NC  Chest:      Chest wall: No tenderness. Abdominal:      General: Bowel sounds are normal. There is no distension. Palpations: Abdomen is soft. There is no mass. Tenderness: There is no abdominal tenderness. There is no guarding. Musculoskeletal:         General: No tenderness or deformity. Cervical back: Normal range of motion and neck supple. Right lower leg: Edema present. Left lower leg: Edema present. Comments: Range of motion within normal limits x4 extremities   Skin:     General: Skin is warm. Findings: Bruising present. No erythema or rash. Neurological:      Mental Status: She is alert and oriented to person, place, and time. Cranial Nerves: No cranial nerve deficit. Coordination: Coordination normal.      Gait: Gait abnormal (Requires wheelchair and walker for stability). Psychiatric:         Behavior: Behavior normal.         Thought Content:  Thought content normal. Labs reviewed today:  Lab Results   Component Value Date    WBC 6.30 06/29/2022    HGB 8.1 (L) 06/29/2022    HCT 27.0 (LL) 06/29/2022    MCV 99.3 (H) 06/29/2022     06/29/2022     Lab Results   Component Value Date    NEUTROABS 4.40 06/29/2022       ASSESSMENT/PLAN:      1. Anemia of chronic disease with CKD stage IIIb and iron deficiency. Hemoglobin 8.6, hematocrit 27.5 with an MCV of 97.2 on 3/1/2022. Iron saturation 17% with a ferritin of 93 on 3/1/2022. Received IV iron replacement with Venofer for a total of 1000 mg IV given over 3 doses (3/31/2022, 4/7/2022 and 4/14/2022) and began receiving erythropoietin stimulating agent therapy on 4/21/2022 with Retacrit 20,000 units. She received Retacrit 20,000 units on 5/5/2022 and 5/19/2022, due to scheduling error she has not received her fourth dose of Retacrit. I reviewed the following lab results with Karla Castro and her daughter:  03/01/2022 Serology results  · C-reactive Protein <0  · Haptoglobin 169  · Retic Pct 2.23, Retic Abs 0.0629  · Erythropoietin 23  · Sed Rate 44  · Iron 54  · TIBC 311  · Saturation 17%  · Ferritin 93  · B12/Folate 953/>20  · Copper 124.3    Labs to be completed today:  - CBC  -CMP  - Ferritin; Future  - Iron and TIBC; Future    -Retacrit 20,000 units subcu given today  -Will increase Retacrit to 40,000 units SQ every two weeks at next appointment (07/13/2022)    2. Stage 3b chronic kidney disease, creatinine 1.5 with a GFR of 33 on 3/1/2022. Has previously seen Dr. Irina Gomes in the past, and has been lost to follow-up    I reviewed the following lab results with Karla Castro and her daughter:  03/01/2022 Serology results  · B2M 7.7  · Kappa light chains 90.44  · Lambda light chains 40.60  · Kappa/Lambda Ratio 2.23  · IgG 942, IgM 88, IgA 126  · Normal SPEP pattern.  NAT gel shows a normal pattern; no monoclonal proteins seen.   ·   · Creat 1.5/ GFR 33    -Referral back to Dr. Irina Gomes to reestablish care for chronic kidney disease      3. Fatigue, chronic suspect secondary to comorbidities  TSH of 1.990 on 03/01/2022      4. Chronic anticoagulation, History of CVA (cerebrovascular accident)  -Currently on Eliquis 2.5 mg p.o. twice daily    I discussed all of the above findings included in the assessment and plan with the patient and the patient is in agreement to move forward with current recommendations/treatment. I have addressed all of their questions and concerns that were verbalized. FOLLOW UP:  1. Follow up given for 4 weeks or sooner, if needed  2. CBC and Retacrit 40,000 units in 2 weeks  3. Continue to follow with other medical providers as recommended  4. Labs at next visit: CBC, CMP, iron panel and ferritin    EMR Dragon/Transcription disclaimer:   Much of this encounter note is an electronic transcription/translation of spoken language to printed text. The electronic translation of spoken language may permit erroneous, or at times, nonsensical words or phrases to be inadvertently transcribed; although attempts have made to review the note for such errors, some may still exist.  Please excuse any unrecognized transcription errors and contact us if the air is unintelligible or needs documented correction. Also, portions of this note have been copied forward, however, changed to reflect the most current clinical status of this patient. Electronically signed by GRIFFIN Quinn on 6/30/2022 at 2:36 PM  Tamara HIRSCH am pre-charting as a registered nurse for GRIFFIN Wall.

## 2022-07-13 PROBLEM — J84.9 INTERSTITIAL LUNG DISEASE: Status: RESOLVED | Noted: 2020-12-29 | Resolved: 2022-01-01

## 2022-07-13 NOTE — PROGRESS NOTES
"Chief Complaint  chronic hypoxemic respiratory failure    Subjective    History of Present Illness      Dolly Ramirez presents to [facility] for:    History of Present Illness   Annual appointment for management of chronic respiratory failure.  She continues to wear and benefit from oxygen.  Her daughter reports that if she takes her oxygen off even for short period of time her O2 levels dropped to the mid 80s.  She also has pulmonary hypertension, moderate aortic stenosis, chronic right-sided heart failure and a history of breast cancer.  From a pulmonary standpoint, she denies any new problems.  I recommended Prevnar 20 for her but we are unfortunately out of it.  She will follow up with her primary care doctor about that.  Objective   Vital Signs:   /60   Pulse 62   Ht 157.5 cm (62\")   Wt 79.8 kg (176 lb) Comment: per patient  SpO2 98% Comment: 1L  BMI 32.19 kg/m²     Physical Exam  Vitals reviewed.   Constitutional:       General: She is not in acute distress.     Appearance: Normal appearance. She is overweight.   HENT:      Head: Normocephalic and atraumatic.      Comments: Wearing a mask  Cardiovascular:      Rate and Rhythm: Normal rate and regular rhythm.      Heart sounds: Murmur heard.   Pulmonary:      Effort: Pulmonary effort is normal.      Breath sounds: Normal breath sounds.   Musculoskeletal:      Cervical back: Normal range of motion.   Skin:     General: Skin is warm and dry.   Neurological:      Mental Status: She is alert and oriented to person, place, and time.      Motor: Weakness present.      Comments: Uses a wheelchair   Psychiatric:         Mood and Affect: Mood normal.         Behavior: Behavior normal.        Result Review :       No results found for this or any previous visit.    Rest/Exercise Pulse Ox Values        Some values may be hidden. Unless noted otherwise, only the newest values recorded on each date are " displayed.         Rest/Exercise Pulse Ox Results 6/24/21   Rest room air SAT % 92   Exercise room air SAT % 87   Rest on O2 @ Liters 2   Rest on O2 SAT % 98   Exercise on O2 @ Liters 2   Exercise on O2 SAT % 98                    Assessment and Plan      Diagnoses and all orders for this visit:    1. Chronic hypoxemic respiratory failure (HCC) (Primary)  Comments:  Stable.  Patient continues to wear and benefit from oxygen.    2. History of breast cancer  Comments:  Continue follow-up with others.    3. Pulmonary hypertension (HCC)  Comments:  Stable.  Continue utilizing supplemental oxygen.        Jeyson Garcia, APRN  7/13/2022  13:49 CDT    Follow Up   Return in about 1 year (around 7/13/2023).    Patient was given instructions and counseling regarding her condition or for health maintenance advice. Please see specific information pulled into the AVS if appropriate.

## 2022-07-26 NOTE — PROGRESS NOTES
Progress Note      Pt Name: Moe Duarte: 10/14/1933  MRN: [id number]    Date of evaluation: 7/28/2022  History Obtained From:  patient, electronic medical record    CHIEF COMPLAINT:    Chief Complaint   Patient presents with    Follow-up     Iron deficiency anemia, unspecified iron deficiency anemia type    Treatment     Retacrit    Discuss Labs     HISTORY OF PRESENT ILLNESS:    Tobin Range is a 80 y.o.  female who is treated for chronic anemia in the setting of chronic kidney disease with iron deficiency and also followed for a history of lobular in situ breast cancer in 1985 and clear cell renal cancer with partial right nephrectomy on 03/09/2010, with no known recurrence of either carcinoma. She has received IV iron replacement with Venofer for a total of 1000 mg IV given over 3 doses (3/31/2022, 4/7/2022 and 4/14/2022) and began receiving erythropoietin stimulating agent therapy on 4/21/2022 with Retacrit. She is currently receiving Retacrit 40,000 units every 2 weeks for a hemoglobin <11, her last dose was given on 7/13/2022. Herman Kauffman returns today in scheduled follow-up for evaluation, lab monitoring and consideration for Retacrit. Again today she is accompanied by her daughter, her caregiver. Today's clinic visit to include physical assessment, review of systems, any lab or radiographic findings that were available and plan of care are documented below.     HEMATOLOGIC HISTORY:   Diagnosis:  Chronic anemia in the setting of chronic kidney disease stage III and iron deficiency    Treatment summary:  Venofer for a total of 1000 mg IV given over 3 doses (3/31/2022, 4/7/2022 and 4/14/2022)  Initiated erythropoietin stimulating agent therapy on 4/21/2022 with Retacrit 20,000 units every 2 weeks, dosing increased to 40,000 units on 7/13/2022      Herman Kauffman was seen in initial hematology consultation on 3/1/2022 for further evaluation of normocytic anemia, referred by Christophe Garcia Aury MAYEN for further evaluation and recommendations. Dionne Boston has a significant past medical history to include lobular in situ breast cancer in 1985 with no known recurrence, clear cell renal cancer with partial right nephrectomy on 03/09/2010 with no known recurrence, normal pressure hydrocephalus syndrome, congestive heart failure, hypertension, chronic respiratory failure requiring O2 supplement, pulmonary hypertension, left anterior cerebral artery infarct in December 2020 and chronic kidney disease stage III. She resides with her daughter Sandie Higgins after the death of her spouse and experiencing CVA. She presented in a wheelchair and reported that she uses a walker while she is at home and wheelchair for mobility otherwise. Dionne Boston denied any bleeding to include melena, epistaxis, hemoptysis, hematuria or hematochezia. She denied any known history of iron or B12 deficiency requiring treatment with supplements. She reported having diarrhea to constipation chronically. On chronic anticoagulation with Eliquis 2.5 mg twice daily due to history of stroke.     I reviewed the following at initial consultation on 3/1/2022:    CBC results:  04/23/2019 WBC 6.2, Hgb 12.1, Hct 38.8, platelets 218,247, MCV 95.8   12/27/2020 WBC 5.66, Hgb 12.1, Hct 38.8, platelets 363,392, MCV 94.9  01/04/2021 WBC 5.0, Hgb 10.0, Hct 34.6, platelets 809,601, MCV 99.4, creatinine 1.6  01/07/2021 WBC 4.9, Hgb 10.5, Hct 35.1, platelets 216,388, MCV 98.6, creatinine 1.3  01/11/2021 WBC 5.1, Hgb 10.1, Hct 33.5, platelets 457,340, MCV 96.0, creatinine 1.2  01/14/2021 WBC 5.7, Hgb 10.6, Hct 35.3, platelets 227,320, MCV 95.1, creatinine 1.1  01/13/2022 WBC 6.08, Hgb 7.7, Hct 25.5, platelets 309,657, MCV 99.6, Creat 1.66, GFR 29  01/25/2022 WBC 5.9, Hgb   7.7, Hct 26.8, platelets 831,915, .3, creatinine 1.4    01/13/2022 Serology results  Creat 1.66  GFR 29  AST 39  ALT 6  Alk Phos 70  Bili 0.3    01/25/2022 Serology results  Iron 49  TIBC 292  Saturation 17%  Ferritin 95  Vitamin B12:  891  Creat 1.4  GFR 35  AST 30  ALT 25  Alk Phos 78  Bili 0.3    2022 MRI Brain [facility])- No acute intracranial abnormalities. Chronic microvascular changes    CBC at initial consultation on 3/1/2022 reported a WBC of 6.3, ANC 4.5, lymphocytes 15.9%, hemoglobin 8.6, hematocrit 27.5, MCV 97.2 and a platelet count of 973,412.    2022 Serology results  B2M 7.7  Kappa light chains 90.44  Lambda light chains 40.60  Kappa/Lambda Ratio 2.23  IgG 942, IgM 88, IgA 126  Normal SPEP pattern. NAT gel shows a normal pattern; no monoclonal proteins seen.   Creat 1.5/ GFR 33  C-reactive Protein <0  Haptoglobin 169  Retic Pct 2.23, Retic Abs 0.0629  Erythropoietin 23  Sed Rate 44  Iron 54  TIBC 311  Saturation 17%  Ferritin 93  B12/Folate 953/>20  Copper 124.3    2022 Serology results  Iron 52  TIBC 247  Saturation 21%  Ferritin 265.7  --------------------------------------------------------------------------------------------------------------------------------------------      ONCOLOGIC HISTORY:  Diagnosis:  Lobular in situ breast cancer,   Clear-cell renal carcinoma, pT1a, pNx, pMx, 3/9/2010    Treatment summary:  Partial right nephrectomy, 3/9/2010    1985- Lobular in situ breast cancer-bilateral mastectomy per Dr Pino Buckner; reconstruction and implants: Unfortunately medical records not available to provide further information. 2010- Clear cell renal cancer with partial right nephrectomy Dr Hua Veras. Pathology-Clear cell renal carcinoma, conventional type. [place] nuclear grade 2. No obvious invasion beyond renal capsule. Surgical margins of excision negative for evidence of malignancy. Indeterminate for lymphovascular invasion. Patchy mild chronic subcapsular inflammation involving non-neoplastic renal parenchyma.  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    Macular degeneration     bilateral    Normal pressure hydrocephalus (HCC)     Stage 3b chronic kidney disease ([facility] [address] 4/13/2022    Stroke [facility])        Past Surgical History:    Past Surgical History:   Procedure Laterality Date    BACK SURGERY      MASTECTOMY, BILATERAL      PARTIAL NEPHRECTOMY Right     TOTAL KNEE ARTHROPLASTY Bilateral        Current Medications:    Current Outpatient Medications   Medication Sig Dispense Refill    bumetanide (BUMEX) 1 MG tablet Take 1 tablet by mouth daily 90 tablet 3    atorvastatin (LIPITOR) 80 MG tablet Take 1 tablet by mouth nightly 7 tablet 0    QUEtiapine (SEROQUEL) 300 MG tablet Take 1 tablet by mouth nightly 7 tablet 0    pramipexole (MIRAPEX) 0.5 MG tablet Take 1 tablet by mouth nightly (Patient taking differently: Take 0.5 mg by mouth in the morning.) 90 tablet 3    lisinopril (PRINIVIL;ZESTRIL) 40 MG tablet Take 1 tablet by mouth daily 90 tablet 3    cloNIDine (CATAPRES) 0.1 MG tablet Take 1 tablet by mouth 2 times daily 180 tablet 1    metoprolol succinate (TOPROL XL) 100 MG extended release tablet Take 1 tablet by mouth daily 90 tablet 3    oxybutynin (DITROPAN-XL) 10 MG extended release tablet Take 1 tablet by mouth daily 90 tablet 1    FLUoxetine (PROZAC) 20 MG capsule Take 1 capsule by mouth daily 90 capsule 3    carbidopa-levodopa (SINEMET)  MG per tablet Take 2 tablets by mouth nightly 180 tablet 3    Multiple Vitamins-Minerals (EYE VITAMINS PO) Take by mouth daily      apixaban (ELIQUIS) 2.5 MG TABS tablet Take 1 tablet by mouth every 12 hours 60 tablet 0    OXYGEN Inhale 1 L/min into the lungs continuous 1 Bottle 5    Omega-3 Fatty Acids (FISH OIL) 1200 MG CAPS Take 1 capsule by mouth 2 times daily (with meals)      Multiple Vitamins-Minerals (MULTIVITAMIN ADULTS PO) Take 1 tablet by mouth daily      Cholecalciferol (VITAMIN D3) 1.25 MG (96934 UT) CAPS Take 1 capsule by mouth daily      gabapentin (NEURONTIN) 100 MG capsule TAKE 4 CAPSULES DAILY (TAKE 1 CAPSULE EVERY MORNING, TAKE 1 CAPSULE MIDDAY AND TAKE 2 CAPSULES AT BEDTIME) 120 capsule 0     No current facility-administered medications for this visit. Allergies: No Known Allergies    Social History:    Social History     Tobacco Use    Smoking status: Never    Smokeless tobacco: Never   Vaping Use    Vaping Use: Never used   Substance Use Topics    Alcohol use: Never       Family History:   Family History   Problem Relation Age of Onset    Heart Attack Mother     Breast Cancer Maternal Aunt     Kidney Cancer Daughter        Vitals:  Vitals:    07/27/22 1520   BP: 100/72   Pulse: 66   SpO2: 96%  Comment: 2 L O2   Height: 4' 11\" (1.499 m)        Subjective   REVIEW OF SYSTEMS:   Review of Systems   Constitutional:  Positive for fatigue. Negative for chills, diaphoresis and fever. HENT: Negative. Negative for congestion, ear pain, hearing loss, nosebleeds, sore throat and tinnitus. Eyes: Negative. Negative for pain, discharge and redness. Respiratory:  Positive for shortness of breath (wears O2 at 2L/NC). Negative for cough and wheezing. Cardiovascular: Negative. Negative for chest pain, palpitations and leg swelling. Gastrointestinal: Negative. Negative for abdominal pain, blood in stool, constipation, diarrhea, nausea and vomiting. Endocrine: Negative for polydipsia. Genitourinary:  Negative for dysuria, flank pain, frequency, hematuria and urgency. Musculoskeletal: Negative. Negative for back pain, myalgias and neck pain. Skin: Negative. Negative for rash. Neurological:  Positive for weakness. Negative for dizziness, tremors, seizures and headaches. Hematological:  Does not bruise/bleed easily. Psychiatric/Behavioral: Negative. The patient is not nervous/anxious. Objective   PHYSICAL EXAM:  Physical Exam  Vitals reviewed. Constitutional:       General: She is not in acute distress. Appearance: She is well-developed. HENT:      Head: Normocephalic and atraumatic. Mouth/Throat:      Pharynx: Uvula midline. Tonsils: No tonsillar exudate. Eyes:      General: Lids are normal.      Conjunctiva/sclera: Conjunctivae normal.      Pupils: Pupils are equal, round, and reactive to light. Neck:      Thyroid: No thyroid mass or thyromegaly. Vascular: No JVD. Trachea: Trachea normal. No tracheal deviation. Cardiovascular:      Rate and Rhythm: Normal rate and regular rhythm. Pulses: Normal pulses. Heart sounds: Murmur heard. Pulmonary:      Effort: Pulmonary effort is normal. No respiratory distress. Breath sounds: Normal breath sounds. No wheezing or rales. Comments: Requiring 2 L per nasal cannula  Chest:      Chest wall: No tenderness. Abdominal:      General: Bowel sounds are normal. There is no distension. Palpations: Abdomen is soft. There is no mass. Tenderness: There is no abdominal tenderness. There is no guarding. Musculoskeletal:         General: No tenderness or deformity. Cervical back: Normal range of motion and neck supple. Comments: Range of motion within normal limits x4 extremities   Skin:     General: Skin is warm. Findings: No bruising, erythema or rash. Neurological:      Mental Status: She is alert and oriented to person, place, and time. Cranial Nerves: No cranial nerve deficit. Motor: Weakness present. Coordination: Coordination normal.      Gait: Gait abnormal (Requires wheelchair for mobility). Psychiatric:         Behavior: Behavior normal.         Thought Content:  Thought content normal.     Labs reviewed today:  Lab Results   Component Value Date    WBC 6.24 07/27/2022    HGB 10.0 (L) 07/27/2022    HCT 34.4 07/27/2022    .9 (H) 07/27/2022     (L) 07/27/2022     Lab Results   Component Value Date    NEUTROABS 4.15 07/27/2022       ASSESSMENT/PLAN:      1. Anemia of chronic disease with CKD stage IIIb and iron deficiency. Received IV iron replacement with Venofer for a total of 1000 mg IV given over 3 doses (3/31/2022, 4/7/2022 and 4/14/2022). Receiving Retacrit 40,000 units every 2 weeks for a hemoglobin <11, her last dose was given on 7/13/2022. Iron substrates on 6/29/2022 were within adequate limits to continue with Retacrit therapy    06/29/2022 Serology results  Iron 52  TIBC 247  Saturation 21%  Ferritin 265.7      Labs to be completed in 4 weeks:  - CBC  -CMP  - Ferritin; Future  - Iron and TIBC; Future    -Retacrit 40,000 units subcu given today, continue Retacrit to 40,000 units SQ for hemoglobin <11    2. Stage 3b chronic kidney disease, creatinine 1.3 with a GFR of 39 on 6/29/2022. Has previously seen Dr. Elissa Blakely in the past, has been lost to follow-up and will be seen on 8/3/2022 to reestablish care    I reviewed the following lab results with Manny Choe and her daughter:      -Niurka Martines with Dr Elissa Blakely on 08/03/2022      3. Fatigue, chronic suspect secondary to comorbidities, no change from previous exam, grade 1-2  TSH of 1.990 on 03/01/2022    4. Chronic anticoagulation, History of CVA (cerebrovascular accident)  -Currently on Eliquis 2.5 mg p.o. twice daily    I discussed all of the above findings included in the assessment and plan with the patient and the patient is in agreement to move forward with current recommendations/treatment. I have addressed all of their questions and concerns that were verbalized.       FOLLOW UP:  Follow up given for 2 months or sooner, if needed  CBC and Retacrit 40,000 units every 2 weeks  Continue to follow with other medical providers as recommended  Labs at next visit: CBC, CMP, iron panel and ferritin    EMR Dragon/Transcription disclaimer:   Much of this encounter note is an electronic transcription/translation of spoken language to printed text. The electronic translation of spoken language may permit erroneous, or at times, nonsensical words or phrases to be inadvertently transcribed; although attempts have made to review the note for such errors, some may still exist.  Please excuse any unrecognized transcription errors and contact us if the air is unintelligible or needs documented correction. Also, portions of this note have been copied forward, however, changed to reflect the most current clinical status of this patient. Electronically signed by GRIFFIN Mancuso on 7/28/2022 at 5:11 PM  Artie HIRSCH am pre-charting as a registered nurse for GRIFFIN Sears.

## 2022-07-29 NOTE — PATIENT INSTRUCTIONS
Personalized Preventive Plan for Enmanuel Terry - 7/21/2022  Medicare offers a range of preventive health benefits. Some of the tests and screenings are paid in full while other may be subject to a deductible, co-insurance, and/or copay. Some of these benefits include a comprehensive review of your medical history including lifestyle, illnesses that may run in your family, and various assessments and screenings as appropriate. After reviewing your medical record and screening and assessments performed today your provider may have ordered immunizations, labs, imaging, and/or referrals for you. A list of these orders (if applicable) as well as your Preventive Care list are included within your After Visit Summary for your review. Other Preventive Recommendations:    A preventive eye exam performed by an eye specialist is recommended every 1-2 years to screen for glaucoma; cataracts, macular degeneration, and other eye disorders. A preventive dental visit is recommended every 6 months. Try to get at least 150 minutes of exercise per week or 10,000 steps per day on a pedometer . Order or download the FREE \"Exercise & Physical Activity: Your Everyday Guide\" from [employer]. Call 8-622.903.9241 or search [employer] online. You need 1424-7728 mg of calcium and 3792-7280 IU of vitamin D per day. It is possible to meet your calcium requirement with diet alone, but a vitamin D supplement is usually necessary to meet this goal.  When exposed to the sun, use a sunscreen that protects against both UVA and UVB radiation with an SPF of 30 or greater. Reapply every 2 to 3 hours or after sweating, drying off with a towel, or swimming. Always wear a seat belt when traveling in a car. Always wear a helmet when riding a bicycle or motorcycle.

## 2022-07-29 NOTE — PROGRESS NOTES
Medicare Annual Wellness Visit    Enmanuel Terry is here for Medicare AWV    Assessment & Plan   Encounter for subsequent annual wellness visit (AWV) in Medicare patient  NPH (normal pressure hydrocephalus) (HCC)  Essential hypertension  Stage 3b chronic kidney disease ([facility] [address]  Cerebrovascular accident (CVA), unspecified mechanism ([facility] [address]  Tremors of nervous system    Recommendations for Preventive Services Due: see orders and patient instructions/AVS.  Recommended screening schedule for the next 5-10 years is provided to the patient in written form: see Patient Instructions/AVS.     Return for Medicare Annual Wellness Visit in 1 year. Subjective   The following acute and/or chronic problems were also addressed today:  Patient is seen today for Medicare annualcompany but today by her daughter who seems to be concerned about more confusion and her mom. She states she is also been having some episodes during the night where she will scream and be dreaming. The daughter states that when she asked her about it she does not remember that it upsets her states she tries not to mention it. She states that it seems to be worse when she is really tired or if they have been out doing a lot. She states that she is not sure if any of her medications could be causing this. She does take Seroquel at night to help her sleep. She states that she has not missed any doses on this. She states that she seems to be tolerating it well. She does have a history of having CVA's in the past.    She does have a history of chronic kidney disease. She states that she is taking her medications regularly. She does have a history of normal pressure hydrocephalus as well as CVAs. She has not had any recent headaches. She has not had any recent falls or head trauma. She does have a history of hypertension. She is on lisinopril. She is also on metoprolol. She states that her blood pressures been staying well controlled.   Has not had any recent medication changes there either. Patient's complete Health Risk Assessment and screening values have been reviewed and are found in Flowsheets. The following problems were reviewed today and where indicated follow up appointments were made and/or referrals ordered.     Positive Risk Factor Screenings with Interventions:    Fall Risk:  Do you feel unsteady or are you worried about falling? : (!) yes  2 or more falls in past year?: no  Fall with injury in past year?: no   Fall Risk Interventions:    Home safety tips provided            General Health and ACP:  General  In general, how would you say your health is?: Good  In the past 7 days, have you experienced any of the following: New or Increased Pain, New or Increased Fatigue, Loneliness, Social Isolation, Stress or Anger?: No  Do you get the social and emotional support that you need?: Yes  Do you have a Living Will?: Yes    Advance Directives       Power of  Living Will ACP-Advance Directive ACP-Power of     Not on File Not on File Not on File Not on File        General Health Risk Interventions:  none    Health Habits/Nutrition:  Physical Activity: Inactive    Days of Exercise per Week: 0 days    Minutes of Exercise per Session: 0 min     Have you lost any weight without trying in the past 3 months?: No  Body mass index: (!) 36.15  Have you seen the dentist within the past year?: (!) No  Health Habits/Nutrition Interventions:  Nutritional issues:  educational materials for healthy, well-balanced diet provided  Dental exam overdue:  patient encouraged to make appointment with his/her dentist      ADLs:  In the past 7 days, did you need help from others to perform any of the following everyday activities: Eating, dressing, grooming, bathing, toileting, or walking/balance?: (!) Yes  Select all that apply: (!) Dressing, Walking/Balance  In the past 7 days, did you need help from others to take care of any of the following: Laundry, housekeeping, banking/finances, shopping, telephone use, food preparation, transportation, or taking medications?: (!) Yes  Select all that apply: (!) Banking/Finances, Telephone Use, Food Preparation, Transportation  ADL Interventions:  Antonia does all of this for her          Objective   Vitals:    07/21/22 1101   BP: 120/74   Pulse: 66   Temp: (!) 96.1 °F (35.6 °C)   SpO2: 96%   Weight: 179 lb (81.2 kg)   Height: 4' 11\" (1.499 m)      Body mass index is 36.15 kg/m². General Appearance: alert and oriented to person, place and time, well-developed and well-nourished, in no acute distress  Skin: warm and dry, no rash or erythema  Head: normocephalic and atraumatic  Neck: neck supple and non tender without mass, no thyromegaly or thyroid nodules, no cervical lymphadenopathy   Pulmonary/Chest: clear to auscultation bilaterally- no wheezes, rales or rhonchi, normal air movement, no respiratory distress  Cardiovascular: normal rate, normal S1 and S2, no gallops, intact distal pulses, and no carotid bruits  Extremities: no cyanosis and no clubbing  Musculoskeletal: in a wheelchair in exam room today. Neurologic: no cranial nerve deficit and speech normal       No Known Allergies  Prior to Visit Medications    Medication Sig Taking? Authorizing Provider   bumetanide (BUMEX) 1 MG tablet Take 1 tablet by mouth daily Yes Kinga Jeffers MD   gabapentin (NEURONTIN) 100 MG capsule TAKE 4 CAPSULES DAILY (TAKE 1 CAPSULE EVERY MORNING, TAKE 1 CAPSULE MIDDAY AND TAKE 2 CAPSULES AT BEDTIME) Yes Melany Castro MD   atorvastatin (LIPITOR) 80 MG tablet Take 1 tablet by mouth nightly Yes Kinga Jeffers MD   QUEtiapine (SEROQUEL) 300 MG tablet Take 1 tablet by mouth nightly Yes Kinga Jeffers MD   pramipexole (MIRAPEX) 0.5 MG tablet Take 1 tablet by mouth nightly  Patient taking differently: Take 0.5 mg by mouth in the morning.  Yes Melany Castro MD   lisinopril (PRINIVIL;ZESTRIL) 40 MG tablet Take 1 tablet by mouth daily Yes Ruth Perez MD   cloNIDine (CATAPRES) 0.1 MG tablet Take 1 tablet by mouth 2 times daily Yes Ruth Perez MD   metoprolol succinate (TOPROL XL) 100 MG extended release tablet Take 1 tablet by mouth daily Yes Ruth Perez MD   oxybutynin (DITROPAN-XL) 10 MG extended release tablet Take 1 tablet by mouth daily Yes Kinga Jeffers MD   FLUoxetine (PROZAC) 20 MG capsule Take 1 capsule by mouth daily Yes Ruth Perez MD   carbidopa-levodopa (SINEMET)  MG per tablet Take 2 tablets by mouth nightly Yes Ruth Perez MD   Multiple Vitamins-Minerals (EYE VITAMINS PO) Take by mouth daily Yes Historical Provider, MD   apixaban (ELIQUIS) 2.5 MG TABS tablet Take 1 tablet by mouth every 12 hours Yes Sylvia Toledo MD   OXYGEN Inhale 1 L/min into the lungs continuous Yes Sylvia Toledo MD   Omega-3 Fatty Acids (FISH OIL) 1200 MG CAPS Take 1 capsule by mouth 2 times daily (with meals) Yes Historical Provider, MD   Multiple Vitamins-Minerals (MULTIVITAMIN ADULTS PO) Take 1 tablet by mouth daily Yes Historical Provider, MD   Cholecalciferol (VITAMIN D3) 1.25 MG (00463 UT) CAPS Take 1 capsule by mouth daily Yes Historical Provider, MD       CareTeam (Including outside providers/suppliers regularly involved in providing care):   Patient Care Team:  Ruth Perez MD as PCP - General (Family Medicine)  Ruth Perez [facility]  Kassandra Lee MD as Consulting Physician (Nephrology)     Reviewed and updated this visit:  Tobacco  Allergies  Meds  Problems  Med Hx  Surg Hx  Soc Hx  Fam Hx

## 2022-08-02 NOTE — TELEPHONE ENCOUNTER
Provider needs to review PDMP    PDMP Monitoring:    Last PDMP Yovanny Mcmillan as Reviewed [facility]):  Review User Review Instant Review Result   SANDEEP GRANADOS 3/28/2022  8:59 AM Reviewed PDMP [1]     Urine Drug Screenings (1 yr)    No resulted procedures found.        Medication Contract and Consent for Opioid Use Documents Filed      No documents found

## 2022-09-09 NOTE — TELEPHONE ENCOUNTER
Provider needs to review PDMP    PDMP Monitoring:    Last PDMP Mana Luciano as Reviewed [facility]):  Review User Review Instant Review Result   SANDEEP GRANADOS 8/2/2022  5:03 PM Reviewed PDMP [1]     Urine Drug Screenings (1 yr)    No resulted procedures found.        Medication Contract and Consent for Opioid Use Documents Filed        No documents found

## 2022-09-21 NOTE — TELEPHONE ENCOUNTER
PDMP Monitoring:    Last PDMP Nory Whipple as Reviewed [facility]):  Review User Review Instant Review Result   SANDEEP GRANADOS 8/2/2022  5:03 PM Reviewed PDMP [1]     Urine Drug Screenings (1 yr)    No resulted procedures found.        Medication Contract and Consent for Opioid Use Documents Filed      No documents found

## 2022-09-24 PROBLEM — K52.9 COLITIS PRESUMED INFECTIOUS: Status: ACTIVE | Noted: 2022-01-01

## 2022-09-25 PROBLEM — E87.20 LACTIC ACIDOSIS: Status: ACTIVE | Noted: 2022-01-01

## 2022-09-25 PROBLEM — N28.89 LEFT KIDNEY MASS: Status: ACTIVE | Noted: 2022-01-01

## 2022-09-25 PROBLEM — N17.9 ACUTE KIDNEY INJURY (HCC): Status: ACTIVE | Noted: 2022-01-01

## 2022-09-30 PROBLEM — I48.91 ATRIAL FIBRILLATION WITH RAPID VENTRICULAR RESPONSE (HCC): Status: ACTIVE | Noted: 2022-01-01

## 2022-10-01 NOTE — PROGRESS NOTES
Arabella Fisher MD - General Surgery  Progress Note     LOS: 7 days   Patient Care Team:  Sandhya Rg MD as PCP - General (Family Medicine)  Jeyson Garcia APRN as Nurse Practitioner (Pulmonary Disease)      Subjective     Interval History:      Patient went into A fib overnight - given amiodarone infusion and lasix. Hypotension this AM.  3 bowel movements yesterday and one this AM. She got one unit of blood yesterday and an additional unit today. She also received albumin this AM for hypotension. She denies nausea and abdominal pain on my exam. Tmax 99.4. She is not tachycardic. Nursing reports no blood in stool today.     Objective     Vital Signs  Temp:  [97.9 °F (36.6 °C)-99.4 °F (37.4 °C)] 97.9 °F (36.6 °C)  Heart Rate:  [] 78  Resp:  [16-28] 16  BP: ()/() 107/91    Physical Exam:  General appearance - in mild to moderate distress and ill-appearing  Mental status - appears confused   Eyes - pupils equal and reactive, extraocular eye movements intact  Neck - supple, no significant adenopathy  Chest - appears short of breath on 50% 20 LPM   Heart - regular rate   Abdomen - soft, obese, she denies tenderness but flinches with deep palpation   Neurological - alert, oriented, normal speech, no focal findings or movement disorder noted      Results Review:    Lab Results (last 24 hours)     Procedure Component Value Units Date/Time    Comprehensive Metabolic Panel [[id number]]  (Abnormal) Collected: 10/01/22 1046    Specimen: Blood Updated: 10/01/22 1121     Glucose 111 mg/dL      BUN 31 mg/dL      Creatinine 2.22 mg/dL      Sodium 145 mmol/L      Potassium 3.4 mmol/L      Chloride 107 mmol/L      CO2 27.0 mmol/L      Calcium 7.8 mg/dL      Total Protein 5.2 g/dL      Albumin 3.10 g/dL      ALT (SGPT) <5 U/L      AST (SGOT) 41 U/L      Alkaline Phosphatase 68 U/L      Total Bilirubin 0.5 mg/dL      Globulin 2.1 gm/dL      A/G Ratio 1.5 g/dL      BUN/Creatinine Ratio  14.0     Anion Gap 11.0 mmol/L      eGFR 20.9 mL/min/1.73      Comment: National Kidney Foundation and American Society of Nephrology (ASN) Task Force recommended calculation based on the Chronic Kidney Disease Epidemiology Collaboration (CKD-EPI) equation refit without adjustment for race.       Narrative:      GFR Normal >60  Chronic Kidney Disease <60  Kidney Failure <15      CBC & Differential [[id number]] Updated: 10/01/22 1105    Specimen: Blood     Narrative:      The following orders were created for panel order CBC & Differential.  Procedure                               Abnormality         Status                     ---------                               -----------         ------                     CBC Auto Differential[[id number]]                                                         Please view results for these tests on the individual orders.    CBC Auto Differential [[id number]] Updated: 10/01/22 1105    Specimen: Blood     POC Glucose Once [[id number]]  (Normal) Collected: 10/01/22 0833    Specimen: Blood Updated: 10/01/22 0850     Glucose 118 mg/dL      Comment: : [id number] Erica DiamondMeter ID: [id number]       POC Glucose Once [[id number]]  (Abnormal) Collected: 10/01/22 0616    Specimen: Blood Updated: 10/01/22 0627     Glucose 131 mg/dL      Comment: : [id number] Lacy CurtisMeter ID: [id number]       Blood Gas, Arterial - [[id number]]  (Abnormal) Collected: 10/01/22 0541    Specimen: Arterial Blood Updated: 10/01/22 0536     Site Right Radial     Rob's Test Positive     pH, Arterial 7.340 pH units      Comment: 84 Value below reference range        pCO2, Arterial 48.8 mm Hg      Comment: 83 Value above reference range        pO2, Arterial 76.2 mm Hg      Comment: 84 Value below reference range        HCO3, Arterial 26.3 mmol/L      Comment: 83 Value above reference range        Base Excess, Arterial 0.4 mmol/L      O2 Saturation, Arterial 96.3 %      Temperature 37.0 C       Barometric Pressure for Blood Gas 751 mmHg      Modality Heated HFNC     FIO2 40 %      Flow Rate 20.0 lpm      Ventilator Mode NA     Collected by [id number]     Comment: Meter: [id number]     :  [id number]        pCO2, Temperature Corrected 48.8 mm Hg      pH, Temp Corrected 7.340 pH Units      pO2, Temperature Corrected 76.2 mm Hg     Occult Blood X 1, Stool - Stool, Per Rectum [[id number]]  (Normal) Collected: 10/01/22 0137    Specimen: Stool from Per Rectum Updated: 10/01/22 0145     Fecal Occult Blood Negative    Hemoglobin & Hematocrit, Blood [[id number]]  (Abnormal) Collected: 10/01/22 0046    Specimen: Blood Updated: 10/01/22 0052     Hemoglobin 7.1 g/dL      Hematocrit 22.6 %     Blood Gas, Arterial - [[id number]]  (Abnormal) Collected: 10/01/22 0025    Specimen: Arterial Blood Updated: 10/01/22 0019     Site Left Radial     Rob's Test Positive     pH, Arterial 7.328 pH units      Comment: 84 Value below reference range        pCO2, Arterial 48.4 mm Hg      Comment: 83 Value above reference range        pO2, Arterial 88.8 mm Hg      HCO3, Arterial 25.4 mmol/L      Base Excess, Arterial -0.6 mmol/L      Comment: 84 Value below reference range        O2 Saturation, Arterial 97.8 %      Temperature 37.0 C      Barometric Pressure for Blood Gas 751 mmHg      Modality Nasal Cannula     Flow Rate 4.0 lpm      Ventilator Mode NA     Collected by [id number]     Comment: Meter: [id number]     :  [id number]        pCO2, Temperature Corrected 48.4 mm Hg      pH, Temp Corrected 7.328 pH Units      pO2, Temperature Corrected 88.8 mm Hg     Hemoglobin & Hematocrit, Blood [[id number]]  (Abnormal) Collected: 09/30/22 1802    Specimen: Blood Updated: 09/30/22 1835     Hemoglobin 7.4 g/dL      Hematocrit 23.3 %     Hemoglobin & Hematocrit, Blood [[id number]]  (Abnormal) Collected: 09/30/22 1533    Specimen: Blood Updated: 09/30/22 1600     Hemoglobin 7.4 g/dL      Hematocrit 24.8 %         Imaging Results (Last  24 Hours)     Procedure Component Value Units Date/Time    XR Chest 1 View [[id number]] Collected: 10/01/22 0748     Updated: 10/01/22 0753    Narrative:      EXAMINATION: XR CHEST 1 VW- 10/1/2022 7:48 AM CDT     HISTORY: afib, soa; K52.9-Noninfective gastroenteritis and colitis,  unspecified; K55.9-Vascular disorder of intestine, unspecified;  R93.5-Abnormal findings on diagnostic imaging of other abdominal  regions, including retroperitoneum; N28.89-Other specified disorders of  kidney and ureter; N17.9-Acute kidney failure, unspecified; Z79.01-Long  term (current) use of anticoagulants; Z74.09-Other reduced mobility     REPORT: A frontal view the chest was obtained.     COMPARISON: Chest x-ray 09/24/2022.     The lungs are grossly hypoaerated, there are increased interstitial and  alveolar infiltrates bilaterally which may reflect increasing pulmonary  edema though pneumonia cannot be excluded. The heart margins are  partially obscured, the heart appears mildly enlarged however. No  pneumothorax is identified. There multiple surgical clips in the right  axilla.       Impression:      Extensive volume loss in the lung bases with increased mixed  interstitial and alveolar infiltrates bilaterally likely related to  pulmonary edema and CHF. Underlying pneumonia cannot be excluded.     A preliminary report was provided by the [facility] radiology service.     This report was finalized on 10/01/2022 07:50 by Dr. Tesfaye Sepulveda MD.            Assessment & Plan       Ms. Ramirez is an 88 year old female with colitis of the descending and sigmoid colon, thought to be infectious but ischemic not ruled out. She has required 2 u pRBCs in the past 36 hours. Gi consulted for possible colonoscopy and due to her comorbidities Dr. Barboza and the patient's daughter have elected to avoid a scope at this time. CBC with differential never collected this AM, and lactic acid ordered due to hypotension which has also not been collected. I  have spoken with the staff on CCU and they will call lab to get these drawn ASAP. Continue Zosyn. Needs very close monitoring.       Arabella Fisher MD  10/01/22  14:03 CDT

## 2022-10-01 NOTE — PLAN OF CARE
Goal Outcome Evaluation:  Plan of Care Reviewed With: patient        Progress: no change     Pt with no complaints of pain. IVF and abx infusing per order. Amiodarone infusion started and lasix given. O2 at 6L; humidified, nasal cannula. Tele; now running NSR. Hgb/Hct continuing to be monitored. SCDs in place. Safety maintained. Daughter at bedside.

## 2022-10-01 NOTE — PROGRESS NOTES
Assessment tool to be used for patients with existing breathing treatments ordered by hospitalist                               Respiratory Therapist Driven Protocol - RT to Assess and Treat Algorithm    Item 0 Points 1 Point 2 Points 3 Points 4 Points Subtotal   Mental Status Alert, orientated, cooperative Lethargic, follows commands Confused, not following commands Obtunded or Somnolent Comatose 0   Respiratory Pattern Regular RR  8-16 breaths/minute Increased RR  18-25 breaths/minute Dyspnea on exertion, irregular RR  26-30/minute Shortness of breath,  RR 31-35/minute Accessory muscle use, severe SOB  RR > 35/minute 0   Breath Sounds Clear Decreased unilaterally Decreased bilaterally Basilar crackles Wheezing and/or rhonchi 1   Cough Strong, spontaneous non-productive Strong productive Weak, non-productive Weak productive or weak with rhonchi Absent or may require suctioning 0   Pulmonary Status Nonsmoker or no previous history > 1 year quit < 1 PPD  < 1 year quit >  or = 1 PPD Diagnosed pulmonary disease (severe or chronic) Severe or chronic pulmonary disease with exacerbation 3   Surgical Status None General surgery (non-abdominal or non-thoracic) Lower abdominal Thoracic or upper abdominal Thoracic with pulmonary disease 0   Chest X-ray Clear Chronic changes Infiltrates, atelectasis or pleural effusion Infiltrates > 1 lobe Diffuse infiltrates and atelectasis and/or effusions 0   Activity level Ambulatory Ambulatory with assistance Non-ambulatory Paraplegic Quadriplegic 1                     Total Score 5   Score    Drug Therapy Frequency  20 or >    Q4 Duoneb & Q3 Albuterol PRN 15 - 19     Q6 Duoneb & Q4 Albuterol PRN 10 - 14    QID Duoneb & Q4 Albuterol PRN 5 - 9    TID Duoneb & Q6 Albuterol PRN 0 - 4    Q4 PRN Duoneb or Q4 PRN Albuterol    Incentive Spirometry - Initial RT instruct    Lung Expansion Therapy (PEP) Bronchopulmonary Hygiene (CPT)   Q4 & PRN - Severe atelectasis, poor  oxygenation Q4 - copious secretions, dyspnea, unable to sleep, mucus plugging   QID - High risk for persistent atelectasis, existence of atelectasis QID & Q4 PRN - Moderate secretion production   TID - At risk for developing atelectasis TID - small amounts of secretions with poor cough   BID - prevention of atelectasis BID - unable to breathe deeply and cough spontaneously   *RT Protocol patients will be re-assessed/re-evaluated every 48 hours.    *Patients who are home nebulizer treatments will be protocoled to no less than their home regimen and will remain     on their home regimen with re-evaluations as needed with changes in patient condition.    RT Comments/Recommendations: Recommendation is to change to TID Duoneb with  Q6 albuterol

## 2022-10-01 NOTE — PROGRESS NOTES
[facility] Medicine Services  INPATIENT PROGRESS NOTE    Length of Stay: 7  Date of Admission: 9/24/2022  Primary Care Physician: Sandhya Rg MD    Subjective     Chief Complaint:     Abdominal discomfort, dyspnea    HPI     The patient developed atrial fibrillation yesterday with variable rates between 80 and 110 and was treated with a single dose of Cardizem 10 mg IV.  Nocturnist was contacted regarding the patient's atrial fibrillation and increasing oxygen requirement likely secondary to pulmonary edema and the patient was transferred to the intensive care unit, placed on Vapotherm and an amiodarone drip.  She has subsequently converted to normal sinus rhythm and amiodarone drip to be discontinued.  Hemoglobin was low at 7.1 this morning and the patient was transfused 1 unit PRBCs secondary to symptomatic status.  Hemoglobin has increased to 7.5 after transfusion.  Creatinine essentially stable at 2.22.  BUN 31 and potassium 3.4.  Chest x-ray ordered this morning is consistent with pulmonary edema.  Blood pressure has been somewhat soft.  Will give 2 doses Lasix 40 mg every 12 hours and add pressors if hypotension ensues.  We will continue to monitor renal function.  Midodrine will be started at 5 mg 3 times daily.  DuoNeb will be discontinued in favor of Atrovent nebulizer treatments.  She is currently on Vapotherm at 20 L and FiO2 of 0.50.  Oxygen saturations are in the high 90s.    Review of Systems     All pertinent negatives and positives are as above. All other systems have been reviewed and are negative unless otherwise stated.     Objective    Temp:  [97.9 °F (36.6 °C)-99.4 °F (37.4 °C)] 97.9 °F (36.6 °C)  Heart Rate:  [] 75  Resp:  [14-28] 18  BP: ()/() 107/39    Lab Results (last 24 hours)     Procedure Component Value Units Date/Time    CBC & Differential [[id number]]  (Abnormal) Collected: 10/01/22 1428    Specimen: Blood  Updated: 10/01/22 1435    Narrative:      The following orders were created for panel order CBC & Differential.  Procedure                               Abnormality         Status                     ---------                               -----------         ------                     CBC Auto Differential[[id number]]        Abnormal            Final result                 Please view results for these tests on the individual orders.    CBC Auto Differential [[id number]]  (Abnormal) Collected: 10/01/22 1428    Specimen: Blood Updated: 10/01/22 1435     WBC 10.67 10*3/mm3      RBC 2.55 10*6/mm3      Hemoglobin 7.5 g/dL      Hematocrit 24.2 %      MCV 94.9 fL      MCH 29.4 pg      MCHC 31.0 g/dL      RDW 17.9 %      RDW-SD 61.2 fl      MPV 9.2 fL      Platelets 202 10*3/mm3      Neutrophil % 70.4 %      Lymphocyte % 11.1 %      Monocyte % 14.2 %      Eosinophil % 0.4 %      Basophil % 0.2 %      Immature Grans % 3.7 %      Neutrophils, Absolute 7.52 10*3/mm3      Lymphocytes, Absolute 1.18 10*3/mm3      Monocytes, Absolute 1.52 10*3/mm3      Eosinophils, Absolute 0.04 10*3/mm3      Basophils, Absolute 0.02 10*3/mm3      Immature Grans, Absolute 0.39 10*3/mm3      nRBC 0.9 /100 WBC     Lactic Acid, Plasma [[id number]] Collected: 10/01/22 1428    Specimen: Blood Updated: 10/01/22 1432    Comprehensive Metabolic Panel [[id number]]  (Abnormal) Collected: 10/01/22 1046    Specimen: Blood Updated: 10/01/22 1121     Glucose 111 mg/dL      BUN 31 mg/dL      Creatinine 2.22 mg/dL      Sodium 145 mmol/L      Potassium 3.4 mmol/L      Chloride 107 mmol/L      CO2 27.0 mmol/L      Calcium 7.8 mg/dL      Total Protein 5.2 g/dL      Albumin 3.10 g/dL      ALT (SGPT) <5 U/L      AST (SGOT) 41 U/L      Alkaline Phosphatase 68 U/L      Total Bilirubin 0.5 mg/dL      Globulin 2.1 gm/dL      A/G Ratio 1.5 g/dL      BUN/Creatinine Ratio 14.0     Anion Gap 11.0 mmol/L      eGFR 20.9 mL/min/1.73      Comment: National Kidney Foundation and  American Society of Nephrology (ASN) Task Force recommended calculation based on the Chronic Kidney Disease Epidemiology Collaboration (CKD-EPI) equation refit without adjustment for race.       Narrative:      GFR Normal >60  Chronic Kidney Disease <60  Kidney Failure <15      POC Glucose Once [[id number]]  (Normal) Collected: 10/01/22 0833    Specimen: Blood Updated: 10/01/22 0850     Glucose 118 mg/dL      Comment: : [email or web address] Erica DiamondMeter ID: [id number]       POC Glucose Once [[id number]]  (Abnormal) Collected: 10/01/22 0616    Specimen: Blood Updated: 10/01/22 0627     Glucose 131 mg/dL      Comment: : [id number] Lacy TownsendahMeter ID: [id number]       Blood Gas, Arterial - [[id number]]  (Abnormal) Collected: 10/01/22 0541    Specimen: Arterial Blood Updated: 10/01/22 0536     Site Right Radial     Rob's Test Positive     pH, Arterial 7.340 pH units      Comment: 84 Value below reference range        pCO2, Arterial 48.8 mm Hg      Comment: 83 Value above reference range        pO2, Arterial 76.2 mm Hg      Comment: 84 Value below reference range        HCO3, Arterial 26.3 mmol/L      Comment: 83 Value above reference range        Base Excess, Arterial 0.4 mmol/L      O2 Saturation, Arterial 96.3 %      Temperature 37.0 C      Barometric Pressure for Blood Gas 751 mmHg      Modality Heated HFNC     FIO2 40 %      Flow Rate 20.0 lpm      Ventilator Mode NA     Collected by [id number]     Comment: Meter: [id number]     :  [id number]        pCO2, Temperature Corrected 48.8 mm Hg      pH, Temp Corrected 7.340 pH Units      pO2, Temperature Corrected 76.2 mm Hg     Occult Blood X 1, Stool - Stool, Per Rectum [[id number]]  (Normal) Collected: 10/01/22 0137    Specimen: Stool from Per Rectum Updated: 10/01/22 0145     Fecal Occult Blood Negative    Hemoglobin & Hematocrit, Blood [[id number]]  (Abnormal) Collected: 10/01/22 0046    Specimen: Blood Updated: 10/01/22 0052     Hemoglobin 7.1 g/dL       Hematocrit 22.6 %     Blood Gas, Arterial - [[id number]]  (Abnormal) Collected: 10/01/22 0025    Specimen: Arterial Blood Updated: 10/01/22 0019     Site Left Radial     Rob's Test Positive     pH, Arterial 7.328 pH units      Comment: 84 Value below reference range        pCO2, Arterial 48.4 mm Hg      Comment: 83 Value above reference range        pO2, Arterial 88.8 mm Hg      HCO3, Arterial 25.4 mmol/L      Base Excess, Arterial -0.6 mmol/L      Comment: 84 Value below reference range        O2 Saturation, Arterial 97.8 %      Temperature 37.0 C      Barometric Pressure for Blood Gas 751 mmHg      Modality Nasal Cannula     Flow Rate 4.0 lpm      Ventilator Mode NA     Collected by [id number]     Comment: Meter: [id number]     :  [id number]        pCO2, Temperature Corrected 48.4 mm Hg      pH, Temp Corrected 7.328 pH Units      pO2, Temperature Corrected 88.8 mm Hg     Hemoglobin & Hematocrit, Blood [[id number]]  (Abnormal) Collected: 09/30/22 1802    Specimen: Blood Updated: 09/30/22 1835     Hemoglobin 7.4 g/dL      Hematocrit 23.3 %     Hemoglobin & Hematocrit, Blood [[id number]]  (Abnormal) Collected: 09/30/22 1533    Specimen: Blood Updated: 09/30/22 1600     Hemoglobin 7.4 g/dL      Hematocrit 24.8 %           Imaging Results (Last 24 Hours)     Procedure Component Value Units Date/Time    XR Chest 1 View [[id number]] Collected: 10/01/22 0748     Updated: 10/01/22 0753    Narrative:      EXAMINATION: XR CHEST 1 VW- 10/1/2022 7:48 AM CDT     HISTORY: afib, soa; K52.9-Noninfective gastroenteritis and colitis,  unspecified; K55.9-Vascular disorder of intestine, unspecified;  R93.5-Abnormal findings on diagnostic imaging of other abdominal  regions, including retroperitoneum; N28.89-Other specified disorders of  kidney and ureter; N17.9-Acute kidney failure, unspecified; Z79.01-Long  term (current) use of anticoagulants; Z74.09-Other reduced mobility     REPORT: A frontal view the chest was  obtained.     COMPARISON: Chest x-ray 09/24/2022.     The lungs are grossly hypoaerated, there are increased interstitial and  alveolar infiltrates bilaterally which may reflect increasing pulmonary  edema though pneumonia cannot be excluded. The heart margins are  partially obscured, the heart appears mildly enlarged however. No  pneumothorax is identified. There multiple surgical clips in the right  axilla.       Impression:      Extensive volume loss in the lung bases with increased mixed  interstitial and alveolar infiltrates bilaterally likely related to  pulmonary edema and CHF. Underlying pneumonia cannot be excluded.     A preliminary report was provided by the [facility] radiology service.     This report was finalized on 10/01/2022 07:50 by Dr. Tesfaye Sepulveda MD.             Intake/Output Summary (Last 24 hours) at 10/1/2022 1445  Last data filed at 10/1/2022 1400  Gross per 24 hour   Intake 2105.63 ml   Output --   Net 2105.63 ml       Physical Exam  Constitutional:       General: She is not in acute distress.     Appearance: Normal appearance.      Comments: Family members at bedside  HENT:      Head: Normocephalic.   Cardiovascular:      Rate and Rhythm: Normal rate and regular rhythm.      Pulses: Normal pulses.      Heart sounds: Normal heart sounds. No murmur heard.     Comments: Heart rate 80s  Pulmonary:      Effort: Pulmonary effort is normal. No respiratory distress.      Breath sounds: Normal breath sounds. No wheezing.      Comments: Vapotherm at 20 L and FiO2 of 0.50.  Abdominal:      General: Bowel sounds are normal. There is no distension.      Palpations: Abdomen is soft.      Tenderness: There is abdominal tenderness.   Musculoskeletal:         General: No swelling or tenderness. Normal range of motion.      Cervical back: Normal range of motion. No rigidity or tenderness.   Skin:     General: Skin is warm and dry.       Findings: No bruising, erythema or rash.   Neurological:      Mental  Status: She is alert. She is disoriented.      Motor: Weakness present.      Comments: Disoriented to time and situation.     Results Review:  I have reviewed the labs, radiology results, and diagnostic studies since my last progress note and made treatment changes reflective of the results.   I have reviewed the current medications.    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  Lasix 40 mg IV every 12 hours x2 doses  Add Levophed if hypotension ensues  Discontinue DuoNeb treatments in favor of Atrovent secondary to tachyarrhythmia noted yesterday  Midodrine 5 mg p.o. 3 times daily    Electronically signed by Og Dumont DO, 10/01/22, 14:45 CDT.    I spent 40 minutes providing critical care management to this patient. This excludes time spent in performing separately billed procedures.

## 2022-10-01 NOTE — NURSING NOTE
Monitor room contacted me stating that pt. was in Afib with rate in the 120's. Vitals taken and hospitalist contacted. Pt. has no history of afib per daughter in room.    Cardizem drip discontinued per Hospitalist.  Pt. Received Cardizem 2ml IV. Cardizem PO every 6hrs per order.

## 2022-10-02 NOTE — PROGRESS NOTES
[facility] Medicine Services  INPATIENT PROGRESS NOTE    Length of Stay: 8  Date of Admission: 9/24/2022  Primary Care Physician: Sandhya Rg MD    Subjective     Chief Complaint:     Abdominal discomfort, dyspnea    HPI     The patient remains disoriented.  She was unable to tell me her name this morning.  Hemoglobin is stable at 8.1 after an additional 1 unit of PRBCs yesterday.  Creatinine this a.m. is 2.32.  Sodium 147.  Chloride elevated at 110.  No evidence of contraction alkalosis at present.  She is not a candidate for endoscopic procedures.  She remains on midodrine for support of blood pressure.  The patient's daughter is present and wanted to discuss her mother's overall condition.  She brought up the topic of de-escalation of care and we discussed that.  Palliative care will be consulted for determination of scope of care desires.  The patient's daughter realizes that the patient is unlikely to be independently functioning from this point forward.  She is not sure that her mother would desire aggressive treatment at this point if she would not recover to an independently functioning state.    Review of Systems     All pertinent negatives and positives are as above. All other systems have been reviewed and are negative unless otherwise stated.     Objective    Temp:  [97.9 °F (36.6 °C)-99 °F (37.2 °C)] 98.4 °F (36.9 °C)  Heart Rate:  [70-91] 84  Resp:  [14-26] 17  BP: ()/(37-91) 135/47    Lab Results (last 24 hours)     Procedure Component Value Units Date/Time    Hemoglobin & Hematocrit, Blood [[id number]]  (Abnormal) Collected: 10/02/22 0658    Specimen: Blood Updated: 10/02/22 0709     Hemoglobin 8.1 g/dL      Hematocrit 25.9 %     Comprehensive Metabolic Panel [[id number]]  (Abnormal) Collected: 10/02/22 0408    Specimen: Blood Updated: 10/02/22 0459     Glucose 107 mg/dL      BUN 34 mg/dL      Creatinine 2.32 mg/dL      Sodium 147 mmol/L       Potassium 4.1 mmol/L      Comment: Slight hemolysis detected by analyzer. Results may be affected.        Chloride 110 mmol/L      CO2 23.0 mmol/L      Calcium 8.2 mg/dL      Total Protein 5.3 g/dL      Albumin 3.00 g/dL      ALT (SGPT) <5 U/L      AST (SGOT) 51 U/L      Comment: Slight hemolysis detected by analyzer. Results may be affected.        Alkaline Phosphatase 74 U/L      Total Bilirubin 0.4 mg/dL      Globulin 2.3 gm/dL      A/G Ratio 1.3 g/dL      BUN/Creatinine Ratio 14.7     Anion Gap 14.0 mmol/L      eGFR 19.8 mL/min/1.73      Comment: National Kidney Foundation and American Society of Nephrology (ASN) Task Force recommended calculation based on the Chronic Kidney Disease Epidemiology Collaboration (CKD-EPI) equation refit without adjustment for race.       Narrative:      GFR Normal >60  Chronic Kidney Disease <60  Kidney Failure <15      CBC & Differential [[id number]]  (Abnormal) Collected: 10/02/22 0408    Specimen: Blood Updated: 10/02/22 0434    Narrative:      The following orders were created for panel order CBC & Differential.  Procedure                               Abnormality         Status                     ---------                               -----------         ------                     CBC Auto Differential[[id number]]        Abnormal            Final result                 Please view results for these tests on the individual orders.    CBC Auto Differential [[id number]]  (Abnormal) Collected: 10/02/22 0408    Specimen: Blood Updated: 10/02/22 0434     WBC 13.77 10*3/mm3      RBC 2.71 10*6/mm3      Hemoglobin 8.0 g/dL      Hematocrit 24.9 %      MCV 91.9 fL      MCH 29.5 pg      MCHC 32.1 g/dL      RDW 18.5 %      RDW-SD 60.3 fl      MPV 9.9 fL      Platelets 236 10*3/mm3      Neutrophil % 73.6 %      Lymphocyte % 9.5 %      Monocyte % 12.3 %      Eosinophil % 0.8 %      Basophil % 0.2 %      Immature Grans % 3.6 %      Neutrophils, Absolute 10.13 10*3/mm3      Lymphocytes,  Absolute 1.31 10*3/mm3      Monocytes, Absolute 1.69 10*3/mm3      Eosinophils, Absolute 0.11 10*3/mm3      Basophils, Absolute 0.03 10*3/mm3      Immature Grans, Absolute 0.50 10*3/mm3      nRBC 1.1 /100 WBC     Hemoglobin & Hematocrit, Blood [[id number]]  (Abnormal) Collected: 10/02/22 0040    Specimen: Blood Updated: 10/02/22 0055     Hemoglobin 7.4 g/dL      Hematocrit 23.5 %     Hemoglobin & Hematocrit, Blood [[id number]]  (Abnormal) Collected: 10/01/22 1804    Specimen: Blood Updated: 10/01/22 1830     Hemoglobin 7.7 g/dL      Hematocrit 24.3 %     Lactic Acid, Plasma [[id number]]  (Normal) Collected: 10/01/22 1428    Specimen: Blood Updated: 10/01/22 1454     Lactate 0.9 mmol/L     CBC & Differential [[id number]]  (Abnormal) Collected: 10/01/22 1428    Specimen: Blood Updated: 10/01/22 1435    Narrative:      The following orders were created for panel order CBC & Differential.  Procedure                               Abnormality         Status                     ---------                               -----------         ------                     CBC Auto Differential[[id number]]        Abnormal            Final result                 Please view results for these tests on the individual orders.    CBC Auto Differential [[id number]]  (Abnormal) Collected: 10/01/22 1428    Specimen: Blood Updated: 10/01/22 1435     WBC 10.67 10*3/mm3      RBC 2.55 10*6/mm3      Hemoglobin 7.5 g/dL      Hematocrit 24.2 %      MCV 94.9 fL      MCH 29.4 pg      MCHC 31.0 g/dL      RDW 17.9 %      RDW-SD 61.2 fl      MPV 9.2 fL      Platelets 202 10*3/mm3      Neutrophil % 70.4 %      Lymphocyte % 11.1 %      Monocyte % 14.2 %      Eosinophil % 0.4 %      Basophil % 0.2 %      Immature Grans % 3.7 %      Neutrophils, Absolute 7.52 10*3/mm3      Lymphocytes, Absolute 1.18 10*3/mm3      Monocytes, Absolute 1.52 10*3/mm3      Eosinophils, Absolute 0.04 10*3/mm3      Basophils, Absolute 0.02 10*3/mm3      Immature Grans, Absolute 0.39  10*3/mm3      nRBC 0.9 /100 WBC     Comprehensive Metabolic Panel [[id number]]  (Abnormal) Collected: 10/01/22 1046    Specimen: Blood Updated: 10/01/22 1121     Glucose 111 mg/dL      BUN 31 mg/dL      Creatinine 2.22 mg/dL      Sodium 145 mmol/L      Potassium 3.4 mmol/L      Chloride 107 mmol/L      CO2 27.0 mmol/L      Calcium 7.8 mg/dL      Total Protein 5.2 g/dL      Albumin 3.10 g/dL      ALT (SGPT) <5 U/L      AST (SGOT) 41 U/L      Alkaline Phosphatase 68 U/L      Total Bilirubin 0.5 mg/dL      Globulin 2.1 gm/dL      A/G Ratio 1.5 g/dL      BUN/Creatinine Ratio 14.0     Anion Gap 11.0 mmol/L      eGFR 20.9 mL/min/1.73      Comment: National Kidney Foundation and American Society of Nephrology (ASN) Task Force recommended calculation based on the Chronic Kidney Disease Epidemiology Collaboration (CKD-EPI) equation refit without adjustment for race.       Narrative:      GFR Normal >60  Chronic Kidney Disease <60  Kidney Failure <15            Imaging Results (Last 24 Hours)     ** No results found for the last 24 hours. **             Intake/Output Summary (Last 24 hours) at 10/2/2022 1022  Last data filed at 10/2/2022 0800  Gross per 24 hour   Intake 1161.62 ml   Output 275 ml   Net 886.62 ml       Physical Exam  Constitutional:       General: She is not in acute distress.     Appearance: Normal appearance.      Comments: Daughter at bedside  HENT:      Head: Normocephalic.   Cardiovascular:      Rate and Rhythm: Normal rate and regular rhythm.      Pulses: Normal pulses.      Heart sounds: Normal heart sounds. No murmur heard.     Comments: Heart rate 80s  Pulmonary:      Effort: Pulmonary effort is normal. No respiratory distress.      Breath sounds: Normal breath sounds. No wheezing.      Comments: NC at 6L.   Abdominal:      General: Bowel sounds are normal. There is no distension.      Palpations: Abdomen is soft.      Tenderness: There is mild abdominal tenderness.   Musculoskeletal:         General:  No swelling or tenderness. Normal range of motion.      Cervical back: Normal range of motion. No rigidity or tenderness.   Skin:     General: Skin is warm and dry.       Findings: No bruising, erythema or rash.   Neurological:      Mental Status: She is alert. She is disoriented.      Motor: Weakness present.      Comments: Disoriented to time and situation.     Results Review:  I have reviewed the labs, radiology results, and diagnostic studies since my last progress note and made treatment changes reflective of the results.   I have reviewed the current medications.    Assessment/Plan     Active Hospital Problems    Diagnosis    • **Colitis, acute    • Atrial fibrillation with rapid ventricular response (HCC)    • Left kidney mass, concerning for renal cell carcinoma    • Lactic acidosis    • Acute kidney injury (HCC)    • Aortic stenosis, moderate    • Chronic right-sided heart failure (HCC)    • Severe pulmonary hypertension (HCC)    • Blind one eye    • Chronic respiratory failure with hypoxia and hypercapnia (HCC)    • Benign hypertension        PLAN:  Repeat CBC and CMP in a.m.  Continue diuresis with Lasix 40 mg IV every 12 hours x2 additional doses  Continue midodrine  Continue Atrovent treatment  Palliative care consultation in a.m.    Electronically signed by Og Dumont DO, 10/02/22, 10:22 CDT.

## 2022-10-02 NOTE — THERAPY TREATMENT NOTE
Acute Care - Physical Therapy Treatment Note  [facility]     Patient Name: Dolly Ramirez  : 10/14/1933  MRN: [id number]  Today's Date: 10/2/2022      Visit Dx:     ICD-10-CM ICD-9-CM   1. Colitis presumed infectious  K52.9 009.1   2. Ischemic colitis (HCC)  K55.9 557.9   3. Abnormal CT of the abdomen  R93.5 793.6   4. Renal mass  N28.89 593.9   5. ARMINDA (acute kidney injury) (HCC)  N17.9 584.9   6. Long term current use of anticoagulant  Z79.01 V58.61   7. Impaired mobility and ADLs  Z74.09 V49.89    Z78.9    8. Impaired mobility  Z74.09 799.89     Patient Active Problem List   Diagnosis   • Non-smoker   • BMI 35.0-35.9,adult   • Neuropathy   • Normal pressure hydrocephalus (CMS/HCC)   • Cerebrovascular accident (CVA) (HCC)   • Benign hypertension   • Blind one eye   • Acute heart failure (HCC)   • Chronic respiratory failure with hypoxia and hypercapnia (HCC)   • Acute cor pulmonale (HCC)   • Severe pulmonary hypertension (HCC)   • History of breast cancer   • Chronic right-sided heart failure (HCC)   • Aortic stenosis, moderate   • Colitis, acute   • Left kidney mass, concerning for renal cell carcinoma   • Lactic acidosis   • Acute kidney injury (HCC)   • Atrial fibrillation with rapid ventricular response (HCC)     Past Medical History:   Diagnosis Date   • Acute cor pulmonale (HCC)    • Arthritis    • Blind one eye    • Cancer (HCC)     breast cancer   • Chronic joint pain    • Chronic respiratory failure (HCC)    • Chronic right-sided CHF (congestive heart failure) (HCC)    • CVA (cerebral vascular accident) (HCC)    • Hypertension    • Interstitial lung disease (HCC)    • Neuropathy    • Normal pressure hydrocephalus syndrome (HCC)    • Pulmonary hypertension (HCC)      Past Surgical History:   Procedure Laterality Date   • BREAST SURGERY      breast cancer   • KIDNEY SURGERY      cancer   • MASTECTOMY Bilateral    • TOTAL KNEE ARTHROPLASTY       PT Assessment (last 12 hours)     PT Evaluation and  Treatment     [facility] Name 10/02/22 1314          Physical Therapy Time and Intention    Subjective Information no complaints  -     Document Type therapy note (daily note)  -     Mode of Treatment physical therapy  -     Comment nursing ok'd tx. Pt. lethargic. Not speaking. Very little to no participation during exercises.  -[facility] Name 10/02/22 1314          General Information    Existing Precautions/Restrictions fall;oxygen therapy device and L/min  -     Limitations/Impairments safety/cognitive  -[facility] Name 10/02/22 1314          Pain    Pain Intervention(s) Repositioned  -[facility] Name 10/02/22 1314          Pain Scale: FACES Pre/Post-Treatment    Pain: FACES Scale, Pretreatment 2-->hurts little bit  -     Posttreatment Pain Rating 4-->hurts little more  -     Pain Location - Side/Orientation Bilateral  -     Pain Location lower  -     Pain Location - extremity  -[facility] Name 10/02/22 1314          Bed Mobility    Rolling Left Yoder (Bed Mobility) verbal cues;dependent (less than 25% patient effort)  -     Rolling Right Yoder (Bed Mobility) verbal cues;dependent (less than 25% patient effort)  -     Scooting/Bridging Yoder (Bed Mobility) dependent (less than 25% patient effort);2 person assist  -     Comment, (Bed Mobility) did not attempt to sit up due to lack of participation.  -[facility] Name 10/02/22 1314          Motor Skills    Comments, Therapeutic Exercise PROM to all extremities-10-20 reps. Pt. would not squeeze either hand on command.  -[facility] Name 10/02/22 1314          Plan of Care Review    Plan of Care Reviewed With patient  -     Progress declining  -     Outcome Evaluation Nursing ok'd treatment for today. Pt. lethargic and not verbalizing during session. Pt. was Dependent for rolling in bed both directions. Due to lack of participation with rolling, sitting EOB was deferred. Assisted pt. with ROM to all extremities-PROM x 10-20  "reps. Will continue to work with pt. on strengthening as able.  -     Row Name 10/02/22 1314          Positioning and Restraints    Pre-Treatment Position in bed  -     Post Treatment Position bed  -MF     In Bed fowlers;call light within reach;patient within staff view;side rails up x2;RUE elevated;LUE elevated;SCD pump applied  -           User Key  (r) = Recorded By, (t) = Taken By, (c) = Cosigned By    Initials Name Provider Type    Kaye Dominique, PTA Physical Therapist Assistant                Physical Therapy Education                 Title: PT OT SLP Therapies (In Progress)     Topic: Physical Therapy (In Progress)     Point: Mobility training (In Progress)     Learning Progress Summary           Patient Nonacceptance, E,D, NL by  at 10/2/2022 1418    Comment: bed mobility, ROM    Acceptance, E, VU by MEGGAN at 9/30/2022 1129    Comment: bed exercises    Acceptance, E, NR by MEGGAN at 9/29/2022 1332    Comment: bed mobiity    Acceptance, E,TB, VU by  at 9/28/2022 1021    Comment: bed mobility    Eager, E, VU by AE at 9/27/2022 0901    Comment: EX\"S POC    Nonacceptance, E, NR,NL by JG at 9/26/2022 1058    Comment: Role of PT, benefits of movement, functional mobility technique   Family Acceptance, E, VU by MEGGAN at 9/30/2022 1129    Comment: bed exercises   Other Nonacceptance, E, NR,NL by JG at 9/26/2022 1058    Comment: Role of PT, benefits of movement, functional mobility technique                   Point: Home exercise program (In Progress)     Learning Progress Summary           Patient Nonacceptance, E,D, NL by  at 10/2/2022 1418    Comment: bed mobility, ROM    Eager, E, VU by AE at 9/27/2022 0901    Comment: EX\"S POC                   Point: Body mechanics (In Progress)     Learning Progress Summary           Patient Nonacceptance, E,D, NL by  at 10/2/2022 1418    Comment: bed mobility, ROM    Nonacceptance, E, NR,NL by JG at 9/26/2022 1058    Comment: Role of PT, benefits of movement, " functional mobility technique   Other Nonacceptance, E, NR,NL by  at 9/26/2022 1058    Comment: Role of PT, benefits of movement, functional mobility technique                   Point: Precautions (In Progress)     Learning Progress Summary           Patient Nonacceptance, E,D, NL by  at 10/2/2022 1418    Comment: bed mobility, ROM                               User Key     Initials Effective Dates Name Provider Type Discipline    AE 06/22/15 -  Ramila Fierro, PTA Physical Therapist Assistant PT     06/16/21 -  Ana Francis, PTA Physical Therapist Assistant PT     12/08/16 -  Jose Rafael Martinez, PTA Physical Therapist Assistant PT     06/16/21 -  Kaye Carrillo, PTA Physical Therapist Assistant PT     08/05/22 -  JENNIE Reyes, PT Student PT Student PT              PT Recommendation and Plan     Plan of Care Reviewed With: patient  Progress: declining  Outcome Evaluation: Nursing ok'd treatment for today. Pt. lethargic and not verbalizing during session. Pt. was Dependent for rolling in bed both directions. Due to lack of participation with rolling, sitting EOB was deferred. Assisted pt. with ROM to all extremities-PROM x 10-20 reps. Will continue to work with pt. on strengthening as able.   Outcome Measures     Row Name 10/02/22 1314 09/30/22 1129          How much help from another person do you currently need...    Turning from your back to your side while in flat bed without using bedrails? 1  - 2  -MEGGAN     Moving from lying on back to sitting on the side of a flat bed without bedrails? 1  - 2  -MEGGAN     Moving to and from a bed to a chair (including a wheelchair)? 1  - 1  -MEGGAN     Standing up from a chair using your arms (e.g., wheelchair, bedside chair)? 1  - 1  -MEGGAN     Climbing 3-5 steps with a railing? 1  - 1  -MEGGAN     To walk in hospital room? 1  - 1  -MEGGAN     AM-PAC 6 Clicks Score (PT) 6  - 8  -MEGGAN            Functional Assessment    Outcome Measure Options AM-PAC 6 Clicks  Basic Mobility (PT)  - AM-PAC 6 Clicks Basic Mobility (PT)  -MEGGAN           User Key  (r) = Recorded By, (t) = Taken By, (c) = Cosigned By    Initials Name Provider Type    Jose Rafael Blackmon PTA Physical Therapist Assistant    Kaye Dominique PTA Physical Therapist Assistant                 Time Calculation:    PT Charges     Row Name 10/02/22 1419             Time Calculation    Start Time 1314  -      Stop Time 1338  -      Time Calculation (min) 24 min  -MF      PT Received On 10/02/22  -              Time Calculation- PT    Total Timed Code Minutes- PT 24 minute(s)  -              Timed Charges    69933 - PT Therapeutic Exercise Minutes 24  -MF              Total Minutes    Timed Charges Total Minutes 24  -MF       Total Minutes 24  -MF            User Key  (r) = Recorded By, (t) = Taken By, (c) = Cosigned By    Initials Name Provider Type     Kaye Carrillo PTA Physical Therapist Assistant              Therapy Charges for Today     Code Description Service Date Service Provider Modifiers Qty    77719499660 HC PT THER PROC EA 15 MIN 10/2/2022 Kaye Carrillo PTA GP 2          PT G-Codes  Outcome Measure Options: AM-PAC 6 Clicks Basic Mobility (PT)  AM-PAC 6 Clicks Score (PT): 6  AM-PAC 6 Clicks Score (OT): 11    Kaye Carrillo PTA  10/2/2022

## 2022-10-02 NOTE — PLAN OF CARE
Goal Outcome Evaluation:  Plan of Care Reviewed With: patient        Progress: no change  Outcome Evaluation: Transfered from unit. She is alert but confused. Tinajero in place. She is on 6L of oxygen. cont to monitor.

## 2022-10-02 NOTE — PLAN OF CARE
Goal Outcome Evaluation:  Plan of Care Reviewed With: patient        Progress: declining  Outcome Evaluation: Nursing ok'd treatment for today. Pt. lethargic and not verbalizing during session. Pt. was Dependent for rolling in bed both directions. Due to lack of participation with rolling, sitting EOB was deferred. Assisted pt. with ROM to all extremities-PROM x 10-20 reps. Will continue to work with pt. on strengthening as able.

## 2022-10-03 NOTE — PLAN OF CARE
Goal Outcome Evaluation:              Outcome Evaluation: NTN follow up. POC comfort measures. NTN will follow peripherally. If interventions desired, RD available per request.

## 2022-10-03 NOTE — PROGRESS NOTES
[facility] Emergency Medicine Services  INPATIENT PROGRESS NOTE    Patient Name: Dolly Ramirez  Date of Admission: 9/24/2022  Today's Date: 10/03/22  Length of Stay: 9  Primary Care Physician: Sandhya Rg MD    Subjective   Chief Complaint: comfort care  HPI     Patient was seen and examined at bedside.  Family bedside.  The patient is being transitioned over to hospice/comfort care.  The patient has been having some difficulty resting.  She appears comfortable at this time and appears to be resting although she does seem to wake up and fall back asleep repeatedly while I was in the room.  Patient does not appear in any acute distress.        Review of Systems   Unable to perform ROS: Patient unresponsive        All pertinent negatives and positives are as above. All other systems have been reviewed and are negative unless otherwise stated.     Objective    Temp:  [97.3 °F (36.3 °C)-98.4 °F (36.9 °C)] 98.4 °F (36.9 °C)  Heart Rate:  [] 83  Resp:  [18-24] 20  BP: (106-154)/(40-86) 120/42  Physical Exam  Vitals and nursing note reviewed.   Constitutional:       General: She is not in acute distress.     Appearance: She is obese. She is ill-appearing.   HENT:      Head: Normocephalic and atraumatic.      Nose: No congestion or rhinorrhea.      Mouth/Throat:      Mouth: Mucous membranes are dry.      Pharynx: Oropharynx is clear.   Eyes:      General: No scleral icterus.     Conjunctiva/sclera: Conjunctivae normal.   Cardiovascular:      Rate and Rhythm: Normal rate and regular rhythm.      Heart sounds: Murmur heard.   Pulmonary:      Effort: Pulmonary effort is normal. No respiratory distress.      Breath sounds: Normal breath sounds. No stridor.   Abdominal:      General: Abdomen is flat. Bowel sounds are normal. There is no distension.      Palpations: Abdomen is soft. There is no mass.      Tenderness: There is no abdominal tenderness.      Hernia: No hernia is  present.   Musculoskeletal:      Right lower leg: Edema present.      Left lower leg: Edema present.   Skin:     General: Skin is warm and dry.      Coloration: Skin is pale. Skin is not jaundiced.   Neurological:      General: No focal deficit present.      Mental Status: She is disoriented.   Psychiatric:      Comments: Unable to assess             Results Review:  I have reviewed the labs, radiology results, and diagnostic studies.    Laboratory Data:   Results from last 7 days   Lab Units 10/02/22  0658 10/02/22  0408 10/02/22  0040 10/01/22  1804 10/01/22  1428 09/30/22  1533 09/30/22  0741   WBC 10*3/mm3  --  13.77*  --   --  10.67  --  8.93   HEMOGLOBIN g/dL 8.1* 8.0* 7.4*   < > 7.5*   < > 5.6*   HEMATOCRIT % 25.9* 24.9* 23.5*   < > 24.2*   < > 18.2*   PLATELETS 10*3/mm3  --  236  --   --  202  --  191    < > = values in this interval not displayed.        Results from last 7 days   Lab Units 10/02/22  0408 10/01/22  1046 09/30/22  0741   SODIUM mmol/L 147* 145 144   POTASSIUM mmol/L 4.1 3.4* 3.5   CHLORIDE mmol/L 110* 107 108*   CO2 mmol/L 23.0 27.0 24.0   BUN mg/dL 34* 31* 27*   CREATININE mg/dL 2.32* 2.22* 1.66*   CALCIUM mg/dL 8.2* 7.8* 7.5*   BILIRUBIN mg/dL 0.4 0.5 0.3   ALK PHOS U/L 74 68 67   ALT (SGPT) U/L <5 <5 <5   AST (SGOT) U/L 51* 41* 48*   GLUCOSE mg/dL 107* 111* 100*       Culture Data:   No results found for: BLOODCX, URINECX, WOUNDCX, MRSACX, RESPCX, STOOLCX    Radiology Data:   Imaging Results (Last 24 Hours)     ** No results found for the last 24 hours. **          I have reviewed the patient's current medications.     Assessment/Plan     Active Hospital Problems    Diagnosis    • **Colitis, acute    • Atrial fibrillation with rapid ventricular response (HCC)    • Left kidney mass, concerning for renal cell carcinoma    • Lactic acidosis    • Acute kidney injury (HCC)    • Aortic stenosis, moderate    • Chronic right-sided heart failure (HCC)    • Severe pulmonary hypertension (HCC)    •  Blind one eye    • Chronic respiratory failure with hypoxia and hypercapnia (HCC)    • Benign hypertension        Plan:  Discussed case with palliative care.  Transitioned to comfort care.  Comfort medications per palliative care.      Continue goncalves    SW consult for home hospice.  Arrange medicine  Comfort care pack at discharge.     Discontinue non-comfort medications.              Discharge Planning: I expect the patient to be discharged to home with hospice in 1-2 days.    Electronically signed by Sina Shaikh MD, 10/03/22, 15:58 CDT.

## 2022-10-03 NOTE — THERAPY DISCHARGE NOTE
Acute Care - Physical Therapy Discharge Summary  [facility]       Patient Name: Dolly Ramirez  : 10/14/1933  MRN: [id number]    Today's Date: 10/3/2022                 Admit Date: 2022      PT Recommendation and Plan    Visit Dx:    ICD-10-CM ICD-9-CM   1. Colitis presumed infectious  K52.9 009.1   2. Ischemic colitis (HCC)  K55.9 557.9   3. Abnormal CT of the abdomen  R93.5 793.6   4. Renal mass  N28.89 593.9   5. ARMINDA (acute kidney injury) (HCC)  N17.9 584.9   6. Long term current use of anticoagulant  Z79.01 V58.61   7. Impaired mobility and ADLs  Z74.09 V49.89    Z78.9    8. Impaired mobility  Z74.09 799.89        Outcome Measures     Row Name 10/02/22 1314             How much help from another person do you currently need...    Turning from your back to your side while in flat bed without using bedrails? 1  -MF      Moving from lying on back to sitting on the side of a flat bed without bedrails? 1  -MF      Moving to and from a bed to a chair (including a wheelchair)? 1  -MF      Standing up from a chair using your arms (e.g., wheelchair, bedside chair)? 1  -MF      Climbing 3-5 steps with a railing? 1  -MF      To walk in hospital room? 1  -MF      AM-PAC 6 Clicks Score (PT) 6  -MF              Functional Assessment    Outcome Measure Options AM-PAC 6 Clicks Basic Mobility (PT)  -            User Key  (r) = Recorded By, (t) = Taken By, (c) = Cosigned By    Initials Name Provider Type    Kaye Dominique PTA Physical Therapist Assistant                     PT Rehab Goals     Row Name 10/03/22 9360             Bed Mobility Goal 1 (PT)    Activity/Assistive Device (Bed Mobility Goal 1, PT) bed mobility activities, all  -MF      [place] Level/Cues Needed (Bed Mobility Goal 1, PT) minimum assist (75% or more patient effort)  -MF      Time Frame (Bed Mobility Goal 1, PT) long term goal (LTG)  -MF      Progress/Outcomes (Bed Mobility Goal 1, PT) goal not met  -              Transfer Goal 1  (PT)    Activity/Assistive Device (Transfer Goal 1, PT) sit-to-stand/stand-to-sit;bed-to-chair/chair-to-bed;walker, rolling  -      [place] Level/Cues Needed (Transfer Goal 1, PT) moderate assist (50-74% patient effort)  -MF      Time Frame (Transfer Goal 1, PT) long term goal (LTG)  -MF      Progress/Outcome (Transfer Goal 1, PT) goal not met  -MF              Gait Training Goal 1 (PT)    Activity/Assistive Device (Gait Training Goal 1, PT) gait (walking locomotion);assistive device use;backward stepping;decrease asymmetrical patterns;decrease fall risk;diminish gait deviation;forward stepping;improve balance and speed;increase endurance/gait distance;increase energy conservation;walker, rolling  -      [place] Level (Gait Training Goal 1, PT) minimum assist (75% or more patient effort)  -MF      Distance (Gait Training Goal 1, PT) 15  -MF      Time Frame (Gait Training Goal 1, PT) long term goal (LTG)  -MF      Progress/Outcome (Gait Training Goal 1, PT) goal not met  -            User Key  (r) = Recorded By, (t) = Taken By, (c) = Cosigned By    Initials Name Provider Type Discipline     Kaye Carrillo PTA Physical Therapist Assistant PT                Therapy Charges for Today     Code Description Service Date Service Provider Modifiers Qty    [id number]  PT THER PROC EA 15 MIN 10/2/2022 Kaye Carrillo PTA GP 2          PT Discharge Summary  Anticipated Discharge Disposition (PT): other (see comments) (comfort measures)  Reason for Discharge: Change in medical status  Outcomes Achieved: Unable to make functional progress toward goals at this time  Discharge Destination: other (comment) (comfort measures)      Kaye Carrillo PTA   10/3/2022

## 2022-10-03 NOTE — CASE MANAGEMENT/SOCIAL WORK
Continued Stay Note   [place]     Patient Name: Dolly Ramirez  MRN: [id number]  Today's Date: 10/3/2022    Admit Date: 9/24/2022    Plan: Referral to [facility]   Discharge Plan     Row Name 10/03/22 1604       Plan    Plan Referral to [facility]    Plan Comments ARELIS spoke to Lizbeth at [facility] earlier today. ARELIS called Lizbeth again because patient's daughter, Leti, says that she has not heard from hospice staff yet. Lizbeth has already left for the day but ARELIS was able to speak to Giuliana and Giuliana confirmed she will call patient's daughter now to make arrangements with anticipated discharge tomorrow.                Expected Discharge Date and Time     Expected Discharge Date Expected Discharge Time    Oct 5, 2022             CARLOS Cortez

## 2022-10-03 NOTE — CASE MANAGEMENT/SOCIAL WORK
Continued Stay Note  [facility]     Patient Name: Dolly Ramirez  MRN: [id number]  Today's Date: 10/3/2022    Admit Date: 9/24/2022    Plan: Referral to [facility]   Discharge Plan     Row Name 10/03/22 1220       Plan    Plan Referral to [facility]    Plan Comments Called and left a message for Lizbeth Blevinscovering for Maggie) at [facility] to notify of new referral. ARELIS faxed referral to [facility] at [phone]. Will await hospice discussion with family. Anticipate discharge home when arrangements are complete.                Expected Discharge Date and Time     Expected Discharge Date Expected Discharge Time    Oct 5, 2022             CARLOS Cortez

## 2022-10-03 NOTE — PLAN OF CARE
Goal Outcome Evaluation:              Outcome Evaluation: No falls or injuries , pt poss d/c home with hospics ,  cont to monitor goncalves to bsd , family at bedside pt as alot brusing and swelling .

## 2022-10-03 NOTE — DISCHARGE PLACEMENT REQUEST
"Shilpi Nash P (88 y.o. Female)             Date of Birth   10/14/1933    Social Security Number       Address   [address] [place] [address]    Home Phone   [phone]    MRN   [id number]       [facility]    Marital Status                               Admission Date   9/24/22    Admission Type   Emergency    Admitting Provider   Sina Shaikh MD    Attending Provider   Sina Shaikh MD    Department, Room/Bed   [address], 404/1       Discharge Date       Discharge Disposition       Discharge Destination                               Attending Provider: Sina Shaikh MD    Allergies: No Known Allergies    Isolation: None   Infection: None   Code Status: No CPR   Advance Care Planning Activity    Ht: 152.4 cm (60\")   Wt: 94.8 kg (209 lb)    Admission Cmt: None   Principal Problem: Colitis, acute [K52.9]                 Active Insurance as of 9/24/2022     Primary Coverage     Payor Plan Insurance Group Employer/Plan Group    MEDICARE MEDICARE A & B      Payor Plan Address Payor Plan Phone Number Payor Plan Fax Number Effective Dates    [address] [phone]  10/1/1998 - None Entered    [facility] [id number]       Subscriber Name Subscriber Birth Date Member ID       SHILPI NASH P 10/14/1933 [id number]           Secondary Coverage     Payor Plan Insurance Group Employer/Plan Group    ANTH BLUE CROSS ANTHEM BLUE CROSS BLUE SHIELD PPO [id number]     Payor Plan Address Payor Plan Phone Number Payor Plan Fax Number Effective Dates    [address] [phone]  1/1/2017 - None Entered    [facility] [id number]       Subscriber Name Subscriber Birth Date Member ID       SHILPI NASH P 10/14/1933 [id number]                 Emergency Contacts      (Rel.) Home Phone Work Phone Mobile Phone    Papi Nash (Son) [phone] -- --    MANUEL HANNAH (Daughter) -- -- [phone]            Insurance Information                MEDICARE/MEDICARE " A & B Phone: [phone]    Subscriber: Dolly Ramirez Subscriber#: [id number]    Group#: -- Precert#: --        ANTHEM BLUE CROSS/ANTHEM BLUE CROSS BLUE SHIELD PPO Phone: [phone]    Subscriber: Dolly Ramirez Subscriber#: [id number]    Group#: [id number] Precert#: --             History & Physical      Marquis Hill MD at 09/24/22 2008              [facility]  HISTORY AND PHYSICAL    Date of Admission: 9/24/2022  Primary Care Physician: Sandhya Rg MD    Subjective     Chief Complaint: Colitis/nausea vomiting/hypotension/acute renal failure.    History of Present Illness  Patient is a 88-year-old presented to ER with complaint of abdomen pain with nausea vomiting.  Most information is from the daughter.  Patient was been eating earlier without any problems.  Later on patient was sad and drive he and vomited x1.  No blood in the vomit per daughter.  Patient's mom is currently constipated with occasional fecal impaction.  Noted some bright red blood in the stool mixed with regular stool.  The bleeding has subsided.  Chronic history of anemia, receiving iron transfusion outpatient.      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    Review of Systems   Constitutional: Positive for activity change, appetite change and fatigue. Negative for chills and fever.   HENT: Negative for hearing loss, nosebleeds, tinnitus and trouble swallowing.    Eyes: Negative for visual disturbance.   Respiratory: Positive for shortness of breath. Negative for cough, chest tightness and wheezing.    Cardiovascular: Negative for chest pain, palpitations and leg swelling.   Gastrointestinal: Positive for abdominal pain, nausea and vomiting. Negative for abdominal  distention, blood in stool, constipation and diarrhea.   Endocrine: Negative for cold intolerance, heat intolerance, polydipsia, polyphagia and polyuria.   Genitourinary: Negative for decreased urine volume, difficulty urinating, dysuria, flank pain, frequency and hematuria.   Musculoskeletal: Positive for arthralgias, gait problem and myalgias. Negative for joint swelling.   Skin: Negative for rash.   Allergic/Immunologic: Negative for immunocompromised state.   Neurological: Positive for weakness. Negative for dizziness, syncope, light-headedness and headaches.   Hematological: Negative for adenopathy. Does not bruise/bleed easily.   Psychiatric/Behavioral: Negative for confusion and sleep disturbance. The patient is not nervous/anxious.         Otherwise complete ROS reviewed and negative except as mentioned in the HPI.      Past Medical History:   Past Medical History:   Diagnosis Date   • Acute cor pulmonale (HCC)    • Arthritis    • Blind one eye    • Cancer (HCC) 1985    breast cancer   • Chronic joint pain    • Chronic respiratory failure (HCC)    • Chronic right-sided CHF (congestive heart failure) (HCC)    • CVA (cerebral vascular accident) (HCC)    • Hypertension    • Interstitial lung disease (HCC)    • Neuropathy    • Normal pressure hydrocephalus syndrome (HCC)    • Pulmonary hypertension (HCC)        Past Surgical History:  Past Surgical History:   Procedure Laterality Date   • BREAST SURGERY  1985    breast cancer   • KIDNEY SURGERY      cancer   • TOTAL KNEE ARTHROPLASTY         Family History: family history includes No Known Problems in her father and mother.    Social History:  reports that she has never smoked. She has never used smokeless tobacco. She reports that she does not drink alcohol and does not use drugs.    Allergies:  No Known Allergies  Medications:  Prior to Admission medications    Medication Sig Start Date End Date Taking? Authorizing Provider   atorvastatin (LIPITOR) 80 MG  "tablet Take 1 tablet by mouth Every Night. 12/30/20   Mery Shen APRN   bumetanide (BUMEX) 1 MG tablet Take 1 tablet by mouth Daily. 12/31/20   Mery Shen APRN   carbidopa-levodopa (SINEMET)  MG per tablet Take 2 tablets by mouth Every Night.    Sonia Lira MD   Cholecalciferol (VITAMIN D3) 5000 UNITS capsule capsule Take 5,000 Units by mouth Daily.    Sonia Lira MD   CloNIDine (CATAPRES) 0.1 MG tablet Take 0.1 mg by mouth 2 (Two) Times a Day.    Sonia Lira MD   Eliquis 5 MG tablet tablet TAKE 1 TABLET EVERY 12 HOURS FOR EMBOLIC STROKE 4/12/22   Jaron Espinoza APRN   FLUoxetine (PROzac) 20 MG capsule Take 20 mg by mouth Daily.    Sonia Lira MD   gabapentin (NEURONTIN) 300 MG capsule Take 100 mg by mouth 3 (Three) Times a Day.    Sonia Lira MD   lisinopril (PRINIVIL,ZESTRIL) 40 MG tablet Take 40 mg by mouth Daily.    Sonia Lira MD   metoprolol succinate XL (TOPROL-XL) 25 MG 24 hr tablet Take 1 tablet by mouth Daily. 12/31/20   Mery Shen APRN   Multiple Vitamins-Minerals (CENTRUM ADULTS PO) Take  by mouth Daily.    Sonia Lira MD   Omega-3 Fatty Acids (FISH OIL) 1200 MG capsule capsule Take 1,200 mg by mouth 2 (Two) Times a Day With Meals.    Sonia Lira MD   oxybutynin XL (DITROPAN-XL) 10 MG 24 hr tablet Take 10 mg by mouth Daily.    Sonia Lira MD   pramipexole (MIRAPEX) 0.5 MG tablet Take 0.5 mg by mouth Every Night.    Sonia Lira MD   QUEtiapine (SEROquel) 300 MG tablet Take 300 mg by mouth Every Night.    Sonia Lira MD       I have utilized all available immediate resources to obtain, update, and review the patient's current medications.    Objective     Vital Signs: BP (!) 91/39   Pulse 89   Temp 97.5 °F (36.4 °C) (Oral)   Resp 28   Ht 149.9 cm (59\")   Wt 79.8 kg (176 lb)   SpO2 95%   BMI 35.55 kg/m²   Physical Exam  Nursing note reviewed.   HENT:      Head: Normocephalic and " atraumatic.   Eyes:      Conjunctiva/sclera: Conjunctivae normal.      Pupils: Pupils are equal, round, and reactive to light.   Neck:      Vascular: No JVD.   Cardiovascular:      Rate and Rhythm: Normal rate and regular rhythm.      Heart sounds: Normal heart sounds.   Pulmonary:      Effort: No respiratory distress.      Breath sounds: No wheezing or rales.      Comments: Large breast implant in place, hard and firm.  Diminished breath sound bilateral, clear.  Chest:      Chest wall: No tenderness.   Abdominal:      General: Bowel sounds are normal. There is no distension.      Palpations: Abdomen is soft.      Tenderness: There is abdominal tenderness.      Comments: Epigastric.   Musculoskeletal:         General: No tenderness or deformity.      Cervical back: Neck supple.   Skin:     General: Skin is warm and dry.      Capillary Refill: Capillary refill takes 2 to 3 seconds.      Findings: No rash.   Neurological:      Mental Status: She is alert and oriented to person, place, and time.      Cranial Nerves: No cranial nerve deficit.      Motor: Weakness present. No abnormal muscle tone.      Coordination: Coordination abnormal.      Gait: Gait abnormal.      Deep Tendon Reflexes: Reflexes normal.   Psychiatric:         Mood and Affect: Mood normal.         Behavior: Behavior normal.         Thought Content: Thought content normal.         Judgment: Judgment normal.             Results Reviewed:    Lab Results (last 24 hours)     Procedure Component Value Units Date/Time    Urinalysis With Culture If Indicated - Urine, Catheter [[id number]]  (Abnormal) Collected: 09/24/22 1923    Specimen: Urine, Catheter Updated: 09/24/22 1949     Color, UA Yellow     Appearance, UA Clear     pH, UA 8.0     Specific Gravity, UA 1.022     Glucose,  mg/dL (Trace)     Ketones, UA Negative     Bilirubin, UA Negative     Blood, UA Negative     Protein, UA >=300 mg/dL (3+)     Leuk Esterase, UA Negative     Nitrite, UA Negative      Urobilinogen, UA 0.2 E.U./dL    Narrative:      In absence of clinical symptoms, the presence of pyuria, bacteria, and/or nitrites on the urinalysis result does not correlate with infection.    Urinalysis, Microscopic Only - Urine, Catheter [[id number]]  (Abnormal) Collected: 09/24/22 1923    Specimen: Urine, Catheter Updated: 09/24/22 1949     RBC, UA 3-5 /HPF      WBC, UA 6-12 /HPF      Bacteria, UA 1+ /HPF      Squamous Epithelial Cells, UA 3-6 /HPF      Hyaline Casts, UA 7-12 /LPF      Methodology Manual Light Microscopy    Urine Culture - Urine, Urine, Catheter [[id number]] Collected: 09/24/22 1923    Specimen: Urine, Catheter Updated: 09/24/22 1949    POC Occult Blood Stool [[id number]]  (Normal) Collected: 09/24/22 1847    Specimen: Stool Updated: 09/24/22 1848     Fecal Occult Blood Negative     Lot Number \225\     Expiration Date \03/31/2023\     DEVELOPER LOT NUMBER \225\     DEVELOPER EXPIRATION DATE \03/31/2023\     Positive Control Positive     Negative Control Negative    [place] Draw [[id number]] Collected: 09/24/22 1706    Specimen: Blood Updated: 09/24/22 1817    Narrative:      The following orders were created for panel order [place] Draw.  Procedure                               Abnormality         Status                     ---------                               -----------         ------                     Green Top (Gel)[[id number]]                                  Final result               Lavender Top[[id number]]                                     Final result               Red Top[[id number]]                                          Final result               Light Blue Top[[id number]]                                   Final result                 Please view results for these tests on the individual orders.    Lavender Top [[id number]] Collected: 09/24/22 1706    Specimen: Blood Updated: 09/24/22 1817     Extra Tube hold for add-on     Comment: Auto resulted       Red Top [[id number]]  Collected: 09/24/22 1706    Specimen: Blood Updated: 09/24/22 1817     Extra Tube Hold for add-ons.     Comment: Auto resulted.       Light Blue Top [[id number]] Collected: 09/24/22 1706    Specimen: Blood Updated: 09/24/22 1817     Extra Tube Hold for add-ons.     Comment: Auto resulted       Green Top (Gel) [[id number]] Collected: 09/24/22 1706    Specimen: Blood Updated: 09/24/22 1817     Extra Tube Hold for add-ons.     Comment: Auto resulted.       Lactic Acid, Plasma [[id number]]  (Abnormal) Collected: 09/24/22 1706    Specimen: Blood Updated: 09/24/22 1747     Lactate 5.4 mmol/L     Lipase [[id number]]  (Abnormal) Collected: 09/24/22 1706    Specimen: Blood Updated: 09/24/22 1745     Lipase 67 U/L     Comprehensive Metabolic Panel [[id number]]  (Abnormal) Collected: 09/24/22 1706    Specimen: Blood Updated: 09/24/22 1739     Glucose 157 mg/dL      BUN 32 mg/dL      Creatinine 2.05 mg/dL      Sodium 143 mmol/L      Potassium 4.5 mmol/L      Comment: Slight hemolysis detected by analyzer. Results may be affected.        Chloride 97 mmol/L      CO2 30.0 mmol/L      Calcium 10.7 mg/dL      Total Protein 6.9 g/dL      Albumin 4.10 g/dL      ALT (SGPT) 6 U/L      AST (SGOT) 45 U/L      Alkaline Phosphatase 146 U/L      Total Bilirubin 0.6 mg/dL      Globulin 2.8 gm/dL      A/G Ratio 1.5 g/dL      BUN/Creatinine Ratio 15.6     Anion Gap 16.0 mmol/L      eGFR 22.9 mL/min/1.73      Comment: National Kidney Foundation and American Society of Nephrology (ASN) Task Force recommended calculation based on the Chronic Kidney Disease Epidemiology Collaboration (CKD-EPI) equation refit without adjustment for race.       Narrative:      GFR Normal >60  Chronic Kidney Disease <60  Kidney Failure <15      C-reactive Protein [[id number]]  (Abnormal) Collected: 09/24/22 1706    Specimen: Blood Updated: 09/24/22 1738     C-Reactive Protein 0.93 mg/dL     Phosphorus [[id number]]  (Abnormal) Collected: 09/24/22 1706    Specimen: Blood  Updated: 09/24/22 1737     Phosphorus 5.7 mg/dL     Magnesium [[id number]]  (Normal) Collected: 09/24/22 1706    Specimen: Blood Updated: 09/24/22 1733     Magnesium 2.4 mg/dL     Protime-INR [[id number]]  (Abnormal) Collected: 09/24/22 1706    Specimen: Blood Updated: 09/24/22 1732     Protime 19.9 Seconds      INR 1.76    aPTT [[id number]]  (Normal) Collected: 09/24/22 1706    Specimen: Blood Updated: 09/24/22 1732     PTT 33.3 seconds     Blood Culture - Blood, Arm, Left [[id number]] Collected: 09/24/22 1706    Specimen: Blood from Arm, Left Updated: 09/24/22 1722    CBC & Differential [[id number]]  (Abnormal) Collected: 09/24/22 1706    Specimen: Blood Updated: 09/24/22 1720    Narrative:      The following orders were created for panel order CBC & Differential.  Procedure                               Abnormality         Status                     ---------                               -----------         ------                     CBC Auto Differential[[id number]]        Abnormal            Final result                 Please view results for these tests on the individual orders.    CBC Auto Differential [[id number]]  (Abnormal) Collected: 09/24/22 1706    Specimen: Blood Updated: 09/24/22 1720     WBC 18.39 10*3/mm3      RBC 4.62 10*6/mm3      Hemoglobin 13.7 g/dL      Hematocrit 46.0 %      MCV 99.6 fL      MCH 29.7 pg      MCHC 29.8 g/dL      RDW 16.1 %      RDW-SD 58.7 fl      MPV 9.9 fL      Platelets 357 10*3/mm3      Neutrophil % 88.1 %      Lymphocyte % 6.7 %      Monocyte % 4.2 %      Eosinophil % 0.2 %      Basophil % 0.3 %      Immature Grans % 0.5 %      Neutrophils, Absolute 16.20 10*3/mm3      Lymphocytes, Absolute 1.24 10*3/mm3      Monocytes, Absolute 0.77 10*3/mm3      Eosinophils, Absolute 0.03 10*3/mm3      Basophils, Absolute 0.06 10*3/mm3      Immature Grans, Absolute 0.09 10*3/mm3      nRBC 0.1 /100 WBC     Blood Gas, Arterial - [[id number]]  (Abnormal) Collected: 09/24/22 1429    Specimen:  Arterial Blood Updated: 09/24/22 1714     Site Right Radial     Rob's Test Positive     pH, Arterial 7.381 pH units      pCO2, Arterial 48.3 mm Hg      Comment: 83 Value above reference range        pO2, Arterial 88.1 mm Hg      HCO3, Arterial 28.6 mmol/L      Comment: 83 Value above reference range        Base Excess, Arterial 2.7 mmol/L      Comment: 83 Value above reference range        O2 Saturation, Arterial 97.4 %      Temperature 37.0 C      Barometric Pressure for Blood Gas 747 mmHg      Modality Nasal Cannula     Flow Rate 2.5 lpm      Ventilator Mode NA     Collected by [id number]     Comment: Meter: [id number]     :  [id number]        pCO2, Temperature Corrected 48.3 mm Hg      pH, Temp Corrected 7.381 pH Units      pO2, Temperature Corrected 88.1 mm Hg            Radiology Data:    Imaging Results (Last 24 Hours)     Procedure Component Value Units Date/Time    CT Angiogram Abdomen Pelvis [[id number]] Collected: 09/24/22 1930     Updated: 09/24/22 1946    Narrative:      CT ANGIOGRAM ABDOMEN PELVIS- 9/24/2022 6:25 PM CDT     HISTORY: severe ab pain guarding     COMPARISON: None     DOSE LENGTH PRODUCT: 214 mGy cm. Automated exposure control was also  utilized to decrease patient radiation dose.     TECHNIQUE: Helical tomographic images of the abdomen and pelvis  utilizing angiographic protocol were obtained following the intravenous  infusion of contrast. Multiplanar and 3 D reformatted images were  provided for review.     FINDINGS:     Angiogram:     Abdominal aorta is normal in caliber. No flow-limiting stenosis along  the abdominal aorta. Bilateral common, internal and external iliac  arteries are patent. Common femoral arteries are patent. Celiac artery  and branches are unremarkable. SMA is patent. NATHANAEL is patent. Renal  arteries are patent.     Other findings:     Cholelithiasis. Biliary tree is nondilated. Diffuse atrophy of pancreas.  2.6 cm cystic solid enhancing exophytic left upper  pole renal mass  concerning for a cystic renal cell carcinoma. Bilateral renal cortical  thinning. No urolithiasis or hydronephrosis. Moderate gastric  distention. Small bowel loops are nondilated. Stool impaction at the  rectum. Long segment circumferential wall thickening along the colon.  This is most notable along the descending and sigmoid colon. Colon is  nondilated. There is hyperemia identified in the large bowel mesentery,  especially along the descending and sigmoid segments. Mucosal  enhancement is poorly cyst on this arteriogram due to the phase of the  contrast. There is no viscus perforation or peritoneal abscess.       Impression:      1. No acute arterial occlusion. Abdominal aorta is patent, normal in  caliber and without dissection.  2. Long segment abnormal wall thickening along the colon, most notably  along the descending and sigmoid segments. The transverse segment also  appears somewhat abnormal although not as inflamed as the more distal  colon. Primary consideration is infectious colitis. Ischemic colitis not  completely excluded although I do not see any discrete arterial  thrombus.  3. There is no viscus perforation.  4. Incidentally noted 2.6 cm cystic-solid enhancing exophytic left upper  pole renal mass suggesting a cystic renal cell carcinoma.     This report was finalized on 09/24/2022 19:42 by Dr Loy Roman, .    CT Angiogram Chest [[id number]] Collected: 09/24/22 1924     Updated: 09/24/22 1943    Narrative:      CT ANGIOGRAM CHEST- 9/24/2022 6:25 PM CDT      HISTORY: severe pain      COMPARISON: None.      DOSE LENGTH PRODUCT: 214 mGy cm. Automated exposure control was also  utilized to decrease patient radiation dose.     TECHNIQUE: Helical tomographic images of the chest were obtained after  the administration of intravenous contrast following angiogram protocol.  Additionally, 3D and multiplanar reformatted images were provided.        FINDINGS:    Pulmonary arteries: There is  adequate enhancement of the pulmonary  arteries to evaluate for central and segmental pulmonary emboli. There  are no filling defects within the main, lobar, segmental or visualized  subsegmental pulmonary arteries. The pulmonary arteries are enlarged,  consistent with pulmonary arterial hypertension.      Aorta and great vessels: Thoracic aorta is normal in caliber. No  dissection identified. No flow-limiting stenosis identified at the great  vessel origins.     Visualized neck base: The imaged portion of the base of the neck appears  unremarkable.      Lungs: Mild fibrotic change in the lung bases. Mild cylindrical  bronchiectasis. No consolidation or pleural effusion. The airways are  clear.     Heart: The heart is normal in size. There is no pericardial effusion.      Mediastinum and lymph nodes: No suspicious adenopathy. Layering fluid in  the mid and distal esophagus. Esophagus is nondilated.     Skeletal and soft tissues: Bilateral breast augmentation with bilateral  intracapsular rupture. No acute bony abnormality. Advanced thoracic  spine degenerative change.        Impression:      1. No evidence of pulmonary embolus.  2. Thoracic aorta is normal in caliber. No dissection, intramural  hematoma or pseudoaneurysm.  3. Dilated central pulmonary arteries suggesting pulmonary hypertension.        This report was finalized on 09/24/2022 19:30 by Dr Loy Roman, .    XR Chest 1 View [[id number]] Collected: 09/24/22 1712     Updated: 09/24/22 1716    Narrative:      HISTORY: Sepsis protocol     CXR: Frontal view the chest obtained.     COMPARISON: 13 2022     FINDINGS: There is stable cardiomegaly. No focal consolidation or  convincing edema. Prominent Soft tissue attenuation from the breast  implants. No pleural effusion or convincing pneumothorax. Degenerative  change of the thoracic spine.       Impression:      1. Prominent soft tissue attenuation artifact from the breast implants.  Cardiomegaly with no  convincing acute pulmonary process.  This report was finalized on 09/24/2022 17:13 by Dr. Geneva Ji MD.          I have personally reviewed and interpreted the radiology studies and ECG obtained at time of admission.     Assessment / Plan      Assessment & Plan  Active Hospital Problems    Diagnosis    • Colitis, acute    • Aortic stenosis, moderate    • Severe pulmonary hypertension (HCC)    • Blind one eye    • Chronic respiratory failure with hypoxia and hypercapnia (HCC)      Plans  Colitis-infectious versus ischemic.  Surgery consult.  Levaquin and Flagyl started in ER.  Continue Levaquin and Flagyl for now.  CT scan abdomen pelvic-No acute arterial occlusion,  Abdominal aorta is patent- normal in caliber and without dissection, Long segment abnormal wall thickening along the colon- most notably along the descending and sigmoid segments,  transverse segment also appears somewhat abnormal although not as inflamed as the more distal colon, Primary consideration is infectious colitisi- Ischemic colitis not completely excluded although I do not see any discrete arterial thrombus, no viscus perforation, Incidentally noted 2.6 cm cystic-solid enhancing exophytic left upper pole renal mass suggesting a cystic renal cell carcinoma.    Hypotension/hyperlipidemia/severe aortic valve stenosis.  Improved proved with fluids in ER.  Hypotension- hold Catapres, hold lisinopril, hold Toprol XL . continue Lipitor.  Hold Bumex.  Levophed drip.  Echocardiogram 3/25/2022-  · The right atrial cavity is mildly dilated.  · Severe aortic valve stenosis is present.  · Left ventricular wall thickness is consistent with mild concentric hypertrophy.  · Estimated left ventricular EF = 65% Left ventricular systolic function is normal.  · Left ventricular diastolic function is consistent with (grade Ia w/high LAP) impaired relaxation and age.  · The right ventricular cavity is mild to moderately dilated.  · Mildly reduced right  ventricular systolic function noted.    COPD/respiratory failure hypoxia.  Patient is on chronic 1 L of oxygen at home.  CTA of the chest-No evidence of pulmonary embolus, Thoracic aorta is normal in caliber. No dissection- intramural hematoma or pseudoaneurysm, Dilated central pulmonary arteries suggesting pulmonary hypertension.  Patient is on 2 L of oxygen right now.    History of CVA.  Hold Eliquis.  Lovenox therapeutic for now    Urinary incontinence.  Hold Ditropan.    UTI.    Nausea/vomiting.  Pepcid.  Zofran as needed.    Chronic pain/neuropathy.  Hold Neurontin.  Hold Mirapex.    Depression/anxiety.  Hold Prozac.  Hold Seroquel    Acute kidney injury.  Baseline creatinine is 1.3.  Ultrasound the kidneys.  Slow IV hydration    Left renal mass.  Ultrasound the kidney.    NPH.  Hold Sinemet.    Nutrition.  Hold vitamin D.  N.p.o. for now.    Deconditioning.  PT and OT consult.  Patient used to get around with a walker and wheelchair.    Blood culture pending.  Urine culture pending    Code Status/Advanced Care Plan: DNR.  DNI.    The patient's surrogate decision maker is daughter, Leti Simmons.      I discussed the patient's findings and my recommendations with: Patient and daughter    Estimated length of stay: 3 to 6 days.    Electronically signed by Marquis Hill MD, 09/24/22, 8:08 PM CDT.              Electronically signed by Marquis Hill MD at 09/24/22 2052          Consult Notes (most recent note)      Susana Fisher APRN at 10/03/22 0837      Consult Orders    1. Inpatient Palliative Care Consult [[id number]] ordered by Og Dumont DO at 10/02/22 1040                 [facility] Palliative Care Services  Initial Consult    Attending Physician: Sina Shaikh MD  Referring Provider: Og Dumont DO    Patient Name: Dolly Ramirez  Date of Admission: 9/24/2022     Today's Date: 10/03/22     Reason for Referral: Goals of Care/Advance Care Planning and Support for  Patient/Family    Code Status and Medical Interventions:   Ordered at: 09/24/22 2051     Medical Intervention Limits:    NO intubation (DNI)    NO cardioversion     Level Of Support Discussed With:    Patient     Code Status (Patient has no pulse and is not breathing):    No CPR (Do Not Attempt to Resuscitate)     Medical Interventions (Patient has pulse or is breathing):    Limited Support      Subjective     HPI: 88 y.o. female with past medical history of arthritis, breast cancer, blindness in one eye, chronic respiratory failure, chronic right-sided heart failure with acute cor pulmonale, chronic joint pain, cerebral vascular accident, hypertension, interstitial lung disease, neuropathy, normal pressure hydrocephalus syndrome and pulmonary hypertension. Patient presented [facility] on 9/24/2022 related to abdominal pain, nausea and vomiting. Work-up in ED revealed multiple abnormalities in labs including creatinine 2.05, BUN 32, GFR 22.9, calcium 10.7, alkaline phosphatase 146, AST 45, phosphorus 5.7, CRP 0.93, lactate 5.4, lipase 67 and WBC 18.39. ABG's completed while on 2.5 L of oxygen nasal cannula revealing pH 7.381, PCO2 48.3 and PO2 88.1. Chest x-ray revealed stable cardiomegaly with no convincing acute pulmonary process. CTA of abdomen and pelvis revealed no acute arterial occlusion however did visualize multiple abnormalities including thickening of abdominal wall concerning for infectious versus ischemic colitis, cholelithiasis, stool impaction at the rectum and a 2.6 cm cystic-solid enhancing exophytic left upper pole renal mass suggesting a cystic renal cell carcinoma. CTA of chest revealed no evidence of pulmonary embolus however findings suggestive of pulmonary arterial hypertension visualized. Fecal occult blood negative. UA revealed 1+ bacteria however negative for nitrite. According to chart review she was hypotensive in ED with blood pressure in 60's/50's and she had central line  placed and started on vasopressors as family expressed wishes for patient to remain full code. She was admitted to the critical care unit for further work-up and treatment. Appears her CODE STATUS was changed after admission to include no CPR, no intubation and no cardioversion. General surgery consulted and recommended treatment of colitis with antibiotics before consideration of surgery as patient apparently previously expressed she would not wish to have a colostomy per chart review. She was transferred to the medical floor on 9/26/2022. X-ray of abdomen completed on 9/27/2022 with findings suggestive of adynamic ileus. Labs collected on 9/30/2022 revealed hemoglobin dropped to 5.6 (7.7 on 9/29/2022) and hematocrit to 18.2 (27.2 on 9/29/2022) and she received a unit of PRBCs with improvement of hemoglobin and hematocrit post-transfusion to 7.4 and 24.8. Gastroenterology consulted on 9/30/2022 due to drop in hemoglobin without signs of bleeding and recommended continuation of supportive measures as patient is high risk for adverse events with anesthesia secondary to comorbidities and has she has been on eliquis. Chart review reveals she converted to atrial fibrillation with heart rate in 120's on 9/30/2022, she was placed on amiodarone drip and later converted back to NSR. Apparently oxygen requirements increased through night of 9/30/2022 and she was transferred to intensive care unit and placed on Vapotherm support. Chest x-ray on 10/1/2022 revealed extensive volume loss in the lung bases with increased mixed interstitial and alveolar infiltrates bilaterally likely related to pulmonary edema and CHF. She received an additional unit of PRBCs on 10/1/2022 as hemoglobin was 7.1 and hematocrit 22.6. She was transferred back to the medical floor on 10/2/2022. GI panel completed on 10/2/2022 negative. Labs collected on 10/2/2022 reveal BNP significantly elevated at 31,731.0, sodium elevated at 147, renal function  "remained impaired with creatinine 2.32, BUN 34 and GFR 19.8 along with elevation in AST to 51 and hypoalbuminemia with albumin 3.00. Appears daily labs have not been collected this morning. Oxygen requirements have decreased and she has been weaned to 4 L nasal cannula this morning. She is lying in bed at time of exam asleep however arouses to voice. Unable to answer orientation questions however when asked if she was hurting she shook her head \"no\" and nodded her head \"yes\" to if she was tired. Does not appear in any distress. Daughter, Leti, is present at time of exam.     Advance Care Planning   Advanced Directives: No advance directive on file.     Advance Care Planning Discussion: Care conference held with patient's daughter Leti at bedside as Ms. Ramirez is unable to demonstrate orientation or ability to make complex medical decisions at this time. Discussed events leading to hospitalization, overall decline and long-term prognosis. Leti reflected on her mother's baseline and shared her mother moved in with she and her  after her father passed away last year. Reports she had been well overall until around a month ago when she started noticing she was becoming more forgetful intermittently. Reflected on previous conversations held with providers. Leti shared she is sad that her mother has continued to decline despite interventions performed thus far however appears to have good understanding and stated \"I don't want to keep doing these things and cause additional problems\" referring to fluid imbalances, underlying cardiac issues and renal impairment. Goals of care discussed including medical priorities and treatment options including continuing limited interventions versus transition to comfort focused care/symptom management and discharge options including returning home with hospice services. Leti shared she and her brother, Papi, had discussed this and wish to keep their mother comfortable and " "are interested in returning home with hospice services. Discussed details and expectations of comfort measures including available adjuvants to ensure she is comfortable. Leti states \"I am at peace knowing hospice is an option.\" Briefly discussed details and uses of MOST form as well. Leti appears to have good prognostic awareness. Support provided. Notified attending, general surgery and nursing via secure chat. Hospice and SW/CM consult placed.     The patient receives support from her children and extended family. Patient's children are her next of kin.    Due to the palliative care, goals of care, treatment options, discharge options, medical priorities and hospice services we will establish an advance care plan.      Review of Systems   Unable to perform ROS: mental status change     Pain Assessment  Nonverbal Indicators of Pain: nonverbal indicators absent  CPOT Facial Expression: 0-->relaxed, neutral  CPOT Body Movements: 0-->absence of movements  CPOT Muscle Tension: 0-->relaxed  Ventilator Compliance/Vocalization: 0-->talking in normal tone or no sound  CPOT Score: 0  PAINAD Breathin-->normal  PAINAD Negative Vocalization: 0-->none  PAINAD Facial Expression: 0-->smiling or inexpressive  PAINAD Body Language: 0-->relaxed  PAINAD Consolability: 0-->no need to console  PAINAD Score: 0  Pain Location: extremity (legs)  Past Medical History:   Diagnosis Date   • Acute cor pulmonale (HCC)    • Arthritis    • Blind one eye    • Cancer (HCC)     breast cancer   • Chronic joint pain    • Chronic respiratory failure (HCC)    • Chronic right-sided CHF (congestive heart failure) (HCC)    • CVA (cerebral vascular accident) (HCC)    • Hypertension    • Interstitial lung disease (HCC)    • Neuropathy    • Normal pressure hydrocephalus syndrome (HCC)    • Pulmonary hypertension (HCC)       Past Surgical History:   Procedure Laterality Date   • BREAST SURGERY      breast cancer   • KIDNEY SURGERY      cancer "   • MASTECTOMY Bilateral    • TOTAL KNEE ARTHROPLASTY        Social History     Socioeconomic History   • Marital status:    Tobacco Use   • Smoking status: Never Smoker   • Smokeless tobacco: Never Used   Vaping Use   • Vaping Use: Never used   Substance and Sexual Activity   • Alcohol use: No   • Drug use: No   • Sexual activity: Defer     Family History   Problem Relation Age of Onset   • No Known Problems Mother    • No Known Problems Father       No Known Allergies    Objective   Diagnostics: Reviewed    Intake/Output Summary (Last 24 hours) at 10/3/2022 0837  Last data filed at 10/2/2022 2324  Gross per 24 hour   Intake --   Output 100 ml   Net -100 ml       Current medication reviewed for route, type, dose and frequency and are current per MAR at time of dictation.  Current Facility-Administered Medications   Medication Dose Route Frequency Provider Last Rate Last Admin   • acetaminophen (TYLENOL) tablet 650 mg  650 mg Oral Q4H PRN Sina Montoya DO   650 mg at 09/29/22 2108    Or   • acetaminophen (TYLENOL) 160 MG/5ML solution 650 mg  650 mg Oral Q4H PRN Sina Montoya DO        Or   • acetaminophen (TYLENOL) suppository 650 mg  650 mg Rectal Q4H PRN Sina Montoya DO       • atorvastatin (LIPITOR) tablet 80 mg  80 mg Oral Nightly Sina Shaikh MD   80 mg at 10/02/22 2121   • carbidopa-levodopa (SINEMET)  MG per tablet 2 tablet  2 tablet Oral Nightly Juliane Rae APRN   2 tablet at 10/02/22 2121   • ipratropium (ATROVENT) nebulizer solution 0.5 mg  0.5 mg Nebulization 4x Daily - RT Og Dumont DO   0.5 mg at 10/03/22 0703   • lactated ringers bolus 250 mL  250 mL Intravenous Once Sina Shaikh MD       • midodrine (PROAMATINE) tablet 10 mg  10 mg Oral Once Kem Contreras DO       • midodrine (PROAMATINE) tablet 5 mg  5 mg Oral TID AC Og Dumont DO   5 mg at 10/02/22 0650   • ondansetron (ZOFRAN) injection 4 mg  4 mg Intravenous Q6H PRN Neel  "Sina Yung MD   4 mg at 09/26/22 0636   • pantoprazole (PROTONIX) injection 40 mg  40 mg Intravenous Q AM Bianca Lazaro APRN   40 mg at 10/03/22 0514   • piperacillin-tazobactam (ZOSYN) 3.375 g in iso-osmotic dextrose 50 ml (premix)  3.375 g Intravenous Q12H Arabella Fisher MD   3.375 g at 10/03/22 0514   • polyethylene glycol (MIRALAX) packet 17 g  17 g Oral Daily Sina Shaikh MD   17 g at 09/28/22 0912   • QUEtiapine (SEROquel) tablet 50 mg  50 mg Oral Nightly Juliane Rae APRN   50 mg at 10/02/22 2121   • sennosides-docusate (PERICOLACE) 8.6-50 MG per tablet 1 tablet  1 tablet Oral BID Sina Shaikh MD   1 tablet at 10/02/22 2121   • sodium chloride 0.9 % flush 10 mL  10 mL Intravenous PRN Sina Shaikh MD       • sodium chloride 0.9 % flush 10 mL  10 mL Intravenous PRN Sina Shaikh MD       • sodium chloride 0.9 % flush 10 mL  10 mL Intravenous Q12H Sina Shaikh MD   10 mL at 10/02/22 2121   • sodium chloride 0.9 % flush 10 mL  10 mL Intravenous PRN Sina Shaikh MD          •  acetaminophen **OR** acetaminophen **OR** acetaminophen  •  ondansetron  •  sodium chloride  •  [COMPLETED] Insert peripheral IV **AND** sodium chloride  •  sodium chloride  Assessment   /40 (BP Location: Right arm, Patient Position: Lying)   Pulse 95   Temp 98.1 °F (36.7 °C) (Axillary)   Resp 20   Ht 152.4 cm (60\")   Wt 94.8 kg (209 lb)   SpO2 97%   BMI 40.82 kg/m²     Physical Exam  Vitals and nursing note reviewed.   Constitutional:       General: She is not in acute distress.     Appearance: She is morbidly obese. She is ill-appearing.      Interventions: Nasal cannula in place.   HENT:      Head: Normocephalic and atraumatic.      Mouth/Throat:      Mouth: Mucous membranes are dry.   Eyes:      General: Lids are normal.      Extraocular Movements: Extraocular movements intact.   Neck:      Vascular: No JVD.      Trachea: Trachea normal.   Cardiovascular:      Rate and " Rhythm: Regular rhythm. Tachycardia present.   Pulmonary:      Effort: Pulmonary effort is normal.      Breath sounds: Decreased breath sounds present.   Chest:      Chest wall: No deformity or swelling.   Abdominal:      General: Abdomen is protuberant.      Palpations: Abdomen is soft.   Genitourinary:     Comments: Indwelling urinary catheter present.  Musculoskeletal:      Cervical back: Neck supple.   Skin:     General: Skin is warm and dry.   Neurological:      Mental Status: She is easily aroused. She is lethargic and disoriented.      Motor: Weakness present.   Psychiatric:         Mood and Affect: Mood is not anxious.         Behavior: Behavior is not agitated.     Functional status: Palliative Performance Scale Score: Performance 30% based on the following measures: Ambulation: Totally bed bound, Activity and Evidence of Disease: Unable to do any work, extensive evidence of disease, Self-Care: Total care required,  Intake: Reduced, LOC: Full, drowsy or confusion.  Nutritional status: Albumin 3.00. Body mass index is 40.82 kg/m².  Patient status: Disease state: No further treatment being pursued.    Impression/Problem List:  1. Renal mass, left concerning for neoplasm - Does not wish for further work-up/treatment  2. Altered mental status   3. Acute kidney injury  4. Acute on chronic respiratory failure with hypoxia and hypercapnia   5. Chronic right-sided congestive heart failure   6. Anemia   7. Hypotension   8. Impaired mobility and activities of daily living / Poor performance status   9. Colitis, acute  10. Hypoalbuminemia  11. Pulmonary hypertension  12. Atrial fibrillation with rapid ventricular response, new onset   13. Lactic acidosis  14. Hypernatremia  15. Elevated AST   16. Leukocytosis   17. Advanced age  18. Morbid obesity (BMI 40.82)  19. Hypocalcemia   20. History of breast cancer     Plan / Recommendations     1. Palliative Care Encounter   - Goals of care include CODE STATUS changes to  include NO CPR with comfort measure interventions.    - Prognosis is poor to guarded long-term secondary to renal mass concerning for neoplasm with no wishes to pursue further work-up/treatment, AMS, ARMINDA, acute on chronic respiratory failure with hypoxia and hypercapnia, chronic right sided CHF, new onset of atrial fibrillation with RVR, anemia, impaired mobility and ADLs/poor performance status, acute colitis, lactic acidosis, advanced age and other comorbidities listed above.     - Care conference held with patient's daughter, Leti, at bedside as Ms. Ramirez is unable to demonstrate ability to make complex medical decisions at this time. Additional details of conversation above.     - Leti shared she is sad that her mother has continued to decline despite interventions however appears to have good understanding of risks and benefits of interventions and overall prognosis.     - After further discussion expressed wishes to transition to comfort measures and discharge home with hospice services.    - Discussed details and expectations of comfort measures including available adjuvants to ensure she is comfortable. Notified attending, general surgery and nursing via secure chat.     - Briefly discussed details and uses of MOST form as well. Will plan to see if interested in completing before discharge.     2.   Comfort Measures  - Discontinue orders not in line with comfort. May continue scheduled PO medications if able to safely tolerate.     - Family wish to continue scheduled nebulizer treatments at this time.     - Recommend continuing Seroquel per attending as long as she is able to tolerate.     - As needed palliative/comfort adjuvants for pain and anxiety added with assistance of BERNADINE Noble.     - Hospice and SW/CM consult placed.     - Thank you for allowing us to participate in patient's plan of care. Palliative Care Team will continue to follow patient.     Time spent:105 minutes spent  reviewing medical and medication records, assessing and examining patient, discussing with family, answering questions, providing some guidance about a plan and documentation of care, and coordinating care with other healthcare members, with > 50% time spent face to face.   35 minutes spent on advance care planning.    Electronically signed by, BERNADINE Tolentino, 10/03/22.                Electronically signed by Susana Fisher APRN at 10/03/22 4204

## 2022-10-03 NOTE — CONSULTS
[facility] Palliative Care Services  Initial Consult    Attending Physician: Sina Shaikh MD  Referring Provider: Og Dumont DO    Patient Name: Dolly Ramirez  Date of Admission: 9/24/2022     Today's Date: 10/03/22     Reason for Referral: Goals of Care/Advance Care Planning and Support for Patient/Family    Code Status and Medical Interventions:   Ordered at: 09/24/22 2051     Medical Intervention Limits:    NO intubation (DNI)    NO cardioversion     Level Of Support Discussed With:    Patient     Code Status (Patient has no pulse and is not breathing):    No CPR (Do Not Attempt to Resuscitate)     Medical Interventions (Patient has pulse or is breathing):    Limited Support      Subjective     HPI: 88 y.o. female with past medical history of arthritis, breast cancer, blindness in one eye, chronic respiratory failure, chronic right-sided heart failure with acute cor pulmonale, chronic joint pain, cerebral vascular accident, hypertension, interstitial lung disease, neuropathy, normal pressure hydrocephalus syndrome and pulmonary hypertension. Patient presented to [facility] on 9/24/2022 related to abdominal pain, nausea and vomiting. Work-up in ED revealed multiple abnormalities in labs including creatinine 2.05, BUN 32, GFR 22.9, calcium 10.7, alkaline phosphatase 146, AST 45, phosphorus 5.7, CRP 0.93, lactate 5.4, lipase 67 and WBC 18.39. ABG's completed while on 2.5 L of oxygen nasal cannula revealing pH 7.381, PCO2 48.3 and PO2 88.1. Chest x-ray revealed stable cardiomegaly with no convincing acute pulmonary process. CTA of abdomen and pelvis revealed no acute arterial occlusion however did visualize multiple abnormalities including thickening of abdominal wall concerning for infectious versus ischemic colitis, cholelithiasis, stool impaction at the rectum and a 2.6 cm cystic-solid enhancing exophytic left upper pole renal mass suggesting a cystic renal cell  carcinoma. CTA of chest revealed no evidence of pulmonary embolus however findings suggestive of pulmonary arterial hypertension visualized. Fecal occult blood negative. UA revealed 1+ bacteria however negative for nitrite. According to chart review she was hypotensive in ED with blood pressure in 60's/50's and she had central line placed and started on vasopressors as family expressed wishes for patient to remain full code. She was admitted to the critical care unit for further work-up and treatment. Appears her CODE STATUS was changed after admission to include no CPR, no intubation and no cardioversion. General surgery consulted and recommended treatment of colitis with antibiotics before consideration of surgery as patient apparently previously expressed she would not wish to have a colostomy per chart review. She was transferred to the medical floor on 9/26/2022. X-ray of abdomen completed on 9/27/2022 with findings suggestive of adynamic ileus. Labs collected on 9/30/2022 revealed hemoglobin dropped to 5.6 (7.7 on 9/29/2022) and hematocrit to 18.2 (27.2 on 9/29/2022) and she received a unit of PRBCs with improvement of hemoglobin and hematocrit post-transfusion to 7.4 and 24.8. Gastroenterology consulted on 9/30/2022 due to drop in hemoglobin without signs of bleeding and recommended continuation of supportive measures as patient is high risk for adverse events with anesthesia secondary to comorbidities and has she has been on eliquis. Chart review reveals she converted to atrial fibrillation with heart rate in 120's on 9/30/2022, she was placed on amiodarone drip and later converted back to NSR. Apparently oxygen requirements increased through night of 9/30/2022 and she was transferred to intensive care unit and placed on Vapotherm support. Chest x-ray on 10/1/2022 revealed extensive volume loss in the lung bases with increased mixed interstitial and alveolar infiltrates bilaterally likely related to  "pulmonary edema and CHF. She received an additional unit of PRBCs on 10/1/2022 as hemoglobin was 7.1 and hematocrit 22.6. She was transferred back to the medical floor on 10/2/2022. GI panel completed on 10/2/2022 negative. Labs collected on 10/2/2022 reveal BNP significantly elevated at 31,731.0, sodium elevated at 147, renal function remained impaired with creatinine 2.32, BUN 34 and GFR 19.8 along with elevation in AST to 51 and hypoalbuminemia with albumin 3.00. Appears daily labs have not been collected this morning. Oxygen requirements have decreased and she has been weaned to 4 L nasal cannula this morning. She is lying in bed at time of exam asleep however arouses to voice. Unable to answer orientation questions however when asked if she was hurting she shook her head \"no\" and nodded her head \"yes\" to if she was tired. Does not appear in any distress. Daughter, Leti, is present at time of exam.     Advance Care Planning   Advanced Directives: No advance directive on file.     Advance Care Planning Discussion: Care conference held with patient's daughter Leti at bedside as Ms. Ramirez is unable to demonstrate orientation or ability to make complex medical decisions at this time. Discussed events leading to hospitalization, overall decline and long-term prognosis. Leti reflected on her mother's baseline and shared her mother moved in with she and her  after her father passed away last year. Reports she had been well overall until around a month ago when she started noticing she was becoming more forgetful intermittently. Reflected on previous conversations held with providers. Leti shared she is sad that her mother has continued to decline despite interventions performed thus far however appears to have good understanding and stated \"I don't want to keep doing these things and cause additional problems\" referring to fluid imbalances, underlying cardiac issues and renal impairment. Goals of care " "discussed including medical priorities and treatment options including continuing limited interventions versus transition to comfort focused care/symptom management and discharge options including returning home with hospice services. Leti shared she and her brother, Papi, had discussed this and wish to keep their mother comfortable and are interested in returning home with hospice services. Discussed details and expectations of comfort measures including available adjuvants to ensure she is comfortable. Leti states \"I am at peace knowing hospice is an option.\" Briefly discussed details and uses of MOST form as well. Leti appears to have good prognostic awareness. Support provided. Notified attending, general surgery and nursing via secure chat. Hospice and SW/CM consult placed.     The patient receives support from her children and extended family. Patient's children are her next of kin.    Due to the palliative care, goals of care, treatment options, discharge options, medical priorities and hospice services we will establish an advance care plan.      Review of Systems   Unable to perform ROS: mental status change     Pain Assessment  Nonverbal Indicators of Pain: nonverbal indicators absent  CPOT Facial Expression: 0-->relaxed, neutral  CPOT Body Movements: 0-->absence of movements  CPOT Muscle Tension: 0-->relaxed  Ventilator Compliance/Vocalization: 0-->talking in normal tone or no sound  CPOT Score: 0  PAINAD Breathin-->normal  PAINAD Negative Vocalization: 0-->none  PAINAD Facial Expression: 0-->smiling or inexpressive  PAINAD Body Language: 0-->relaxed  PAINAD Consolability: 0-->no need to console  PAINAD Score: 0  Pain Location: extremity (legs)  Past Medical History:   Diagnosis Date   • Acute cor pulmonale (HCC)    • Arthritis    • Blind one eye    • Cancer (HCC)     breast cancer   • Chronic joint pain    • Chronic respiratory failure (HCC)    • Chronic right-sided CHF (congestive heart " failure) (HCC)    • CVA (cerebral vascular accident) (HCC)    • Hypertension    • Interstitial lung disease (HCC)    • Neuropathy    • Normal pressure hydrocephalus syndrome (HCC)    • Pulmonary hypertension (HCC)       Past Surgical History:   Procedure Laterality Date   • BREAST SURGERY  1985    breast cancer   • KIDNEY SURGERY      cancer   • MASTECTOMY Bilateral    • TOTAL KNEE ARTHROPLASTY        Social History     Socioeconomic History   • Marital status:    Tobacco Use   • Smoking status: Never Smoker   • Smokeless tobacco: Never Used   Vaping Use   • Vaping Use: Never used   Substance and Sexual Activity   • Alcohol use: No   • Drug use: No   • Sexual activity: Defer     Family History   Problem Relation Age of Onset   • No Known Problems Mother    • No Known Problems Father       No Known Allergies    Objective   Diagnostics: Reviewed    Intake/Output Summary (Last 24 hours) at 10/3/2022 0837  Last data filed at 10/2/2022 2324  Gross per 24 hour   Intake --   Output 100 ml   Net -100 ml       Current medication reviewed for route, type, dose and frequency and are current per MAR at time of dictation.  Current Facility-Administered Medications   Medication Dose Route Frequency Provider Last Rate Last Admin   • acetaminophen (TYLENOL) tablet 650 mg  650 mg Oral Q4H PRN Sina Montoya DO   650 mg at 09/29/22 2108    Or   • acetaminophen (TYLENOL) 160 MG/5ML solution 650 mg  650 mg Oral Q4H PRN Sina Montoya DO        Or   • acetaminophen (TYLENOL) suppository 650 mg  650 mg Rectal Q4H PRN Sina Montoya DO       • atorvastatin (LIPITOR) tablet 80 mg  80 mg Oral Nightly Sina Shaikh MD   80 mg at 10/02/22 2121   • carbidopa-levodopa (SINEMET)  MG per tablet 2 tablet  2 tablet Oral Nightly Juliane Rae APRN   2 tablet at 10/02/22 2121   • ipratropium (ATROVENT) nebulizer solution 0.5 mg  0.5 mg Nebulization 4x Daily - RT Og Dumont DO   0.5 mg at 10/03/22 0703   •  "lactated ringers bolus 250 mL  250 mL Intravenous Once Sina Shaikh MD       • midodrine (PROAMATINE) tablet 10 mg  10 mg Oral Once Kem Contreras DO       • midodrine (PROAMATINE) tablet 5 mg  5 mg Oral TID Og Fine DO   5 mg at 10/02/22 0650   • ondansetron (ZOFRAN) injection 4 mg  4 mg Intravenous Q6H PRN Sina Shaikh MD   4 mg at 09/26/22 0636   • pantoprazole (PROTONIX) injection 40 mg  40 mg Intravenous Q AM Bianca Lazaro APRN   40 mg at 10/03/22 0514   • piperacillin-tazobactam (ZOSYN) 3.375 g in iso-osmotic dextrose 50 ml (premix)  3.375 g Intravenous Q12H Arabella Fisher MD   3.375 g at 10/03/22 0514   • polyethylene glycol (MIRALAX) packet 17 g  17 g Oral Daily Sina Shaikh MD   17 g at 09/28/22 0912   • QUEtiapine (SEROquel) tablet 50 mg  50 mg Oral Nightly Juliane Rae APRN   50 mg at 10/02/22 2121   • sennosides-docusate (PERICOLACE) 8.6-50 MG per tablet 1 tablet  1 tablet Oral BID Sina Shaikh MD   1 tablet at 10/02/22 2121   • sodium chloride 0.9 % flush 10 mL  10 mL Intravenous PRN Sina Shaikh MD       • sodium chloride 0.9 % flush 10 mL  10 mL Intravenous PRN Sina Shaikh MD       • sodium chloride 0.9 % flush 10 mL  10 mL Intravenous Q12H Sina Shaikh MD   10 mL at 10/02/22 2121   • sodium chloride 0.9 % flush 10 mL  10 mL Intravenous PRN Sina Shaikh MD          •  acetaminophen **OR** acetaminophen **OR** acetaminophen  •  ondansetron  •  sodium chloride  •  [COMPLETED] Insert peripheral IV **AND** sodium chloride  •  sodium chloride  Assessment   /40 (BP Location: Right arm, Patient Position: Lying)   Pulse 95   Temp 98.1 °F (36.7 °C) (Axillary)   Resp 20   Ht 152.4 cm (60\")   Wt 94.8 kg (209 lb)   SpO2 97%   BMI 40.82 kg/m²     Physical Exam  Vitals and nursing note reviewed.   Constitutional:       General: She is not in acute distress.     Appearance: She is morbidly obese. She is " ill-appearing.      Interventions: Nasal cannula in place.   HENT:      Head: Normocephalic and atraumatic.      Mouth/Throat:      Mouth: Mucous membranes are dry.   Eyes:      General: Lids are normal.      Extraocular Movements: Extraocular movements intact.   Neck:      Vascular: No JVD.      Trachea: Trachea normal.   Cardiovascular:      Rate and Rhythm: Regular rhythm. Tachycardia present.   Pulmonary:      Effort: Pulmonary effort is normal.      Breath sounds: Decreased breath sounds present.   Chest:      Chest wall: No deformity or swelling.   Abdominal:      General: Abdomen is protuberant.      Palpations: Abdomen is soft.   Genitourinary:     Comments: Indwelling urinary catheter present.  Musculoskeletal:      Cervical back: Neck supple.   Skin:     General: Skin is warm and dry.   Neurological:      Mental Status: She is easily aroused. She is lethargic and disoriented.      Motor: Weakness present.   Psychiatric:         Mood and Affect: Mood is not anxious.         Behavior: Behavior is not agitated.     Functional status: Palliative Performance Scale Score: Performance 30% based on the following measures: Ambulation: Totally bed bound, Activity and Evidence of Disease: Unable to do any work, extensive evidence of disease, Self-Care: Total care required,  Intake: Reduced, LOC: Full, drowsy or confusion.  Nutritional status: Albumin 3.00. Body mass index is 40.82 kg/m².  Patient status: Disease state: No further treatment being pursued.    Impression/Problem List:  1. Renal mass, left concerning for neoplasm - Does not wish for further work-up/treatment  2. Altered mental status   3. Acute kidney injury  4. Acute on chronic respiratory failure with hypoxia and hypercapnia   5. Chronic right-sided congestive heart failure   6. Anemia   7. Hypotension   8. Impaired mobility and activities of daily living / Poor performance status   9. Colitis, acute  10. Hypoalbuminemia  11. Pulmonary  hypertension  12. Atrial fibrillation with rapid ventricular response, new onset   13. Lactic acidosis  14. Hypernatremia  15. Elevated AST   16. Leukocytosis   17. Advanced age  18. Morbid obesity (BMI 40.82)  19. Hypocalcemia   20. History of breast cancer     Plan / Recommendations     1. Palliative Care Encounter   - Goals of care include CODE STATUS changes to include NO CPR with comfort measure interventions.    - Prognosis is poor to guarded long-term secondary to renal mass concerning for neoplasm with no wishes to pursue further work-up/treatment, AMS, ARMINDA, acute on chronic respiratory failure with hypoxia and hypercapnia, chronic right sided CHF, new onset of atrial fibrillation with RVR, anemia, impaired mobility and ADLs/poor performance status, acute colitis, lactic acidosis, advanced age and other comorbidities listed above.     - Care conference held with patient's daughter, Leti, at bedside as Ms. Ramirez is unable to demonstrate ability to make complex medical decisions at this time. Additional details of conversation above.     - Leti shared she is sad that her mother has continued to decline despite interventions however appears to have good understanding of risks and benefits of interventions and overall prognosis.     - After further discussion expressed wishes to transition to comfort measures and discharge home with hospice services.    - Discussed details and expectations of comfort measures including available adjuvants to ensure she is comfortable. Notified attending, general surgery and nursing via secure chat.     - Briefly discussed details and uses of MOST form as well. Will plan to see if interested in completing before discharge.     2.   Comfort Measures  - Discontinue orders not in line with comfort. May continue scheduled PO medications if able to safely tolerate.     - Family wish to continue scheduled nebulizer treatments at this time.     - Recommend continuing Seroquel per  attending as long as she is able to tolerate.     - As needed palliative/comfort adjuvants for pain and anxiety added with assistance of BERNADINE Noble.     - Hospice and SW/CM consult placed.     - Thank you for allowing us to participate in patient's plan of care. Palliative Care Team will continue to follow patient.     Time spent:105 minutes spent reviewing medical and medication records, assessing and examining patient, discussing with family, answering questions, providing some guidance about a plan and documentation of care, and coordinating care with other healthcare members, with > 50% time spent face to face.   35 minutes spent on advance care planning.    Electronically signed by, BERNADINE Tolentino, 10/03/22.

## 2022-10-03 NOTE — CASE MANAGEMENT/SOCIAL WORK
Continued Stay Note  [facility]     Patient Name: Dolly Ramirez  MRN: [id number]  Today's Date: 10/3/2022    Admit Date: 9/24/2022    Plan: Referral to [facility]   Discharge Plan     Row Name 10/03/22 1220       Plan    Plan Referral to [facility]    Plan Comments Called and left a message for Lizbeth Blevinscovering for Maggie) at [facility] to notify of new referral. ARELIS faxed referral to [facility] at [phone]. Will await hospice discussion with family. Anticipate discharge home when arrangements are complete.                Expected Discharge Date and Time     Expected Discharge Date Expected Discharge Time    Oct 5, 2022             CARLOS Cortez

## 2022-10-04 NOTE — DISCHARGE SUMMARY
"  [facility] Medicine Services  DEATH SUMMARY       Date of Admission: 9/24/2022  Date of Death:  10/4/2022 at approximately 0310  Primary Care Physician: Sandhya Rg MD    Presenting Problem/History of Present Illness:  Colitis presumed infectious [K52.9]     Final Death Diagnoses:  Active Hospital Problems    Diagnosis    • **Colitis, acute    • Atrial fibrillation with rapid ventricular response (HCC)    • Left kidney mass, concerning for renal cell carcinoma    • Lactic acidosis    • Acute kidney injury (HCC)    • Aortic stenosis, moderate    • Chronic right-sided heart failure (HCC)    • Severe pulmonary hypertension (HCC)    • Blind one eye    • Chronic respiratory failure with hypoxia and hypercapnia (HCC)    • Benign hypertension        Consults: Palliative Care, GI, General surgery    Procedures Performed: None    Pertinent Test Results: See chart    Hospital Course: Note physician documenting discharge summary only cared for patient after patient was transitioned to comfort care.  Interested reader can refer to daily progress notes for details if they are needed.     The patient is a 88 y.o. female who presented to [facility] with abdominal pain, nausea, and vomiting.  Patient was admitted on 9/24, by Dr. Hill, HPI: \"Patient is a 88-year-old presented to ER with complaint of abdomen pain with nausea vomiting.  Most information is from the daughter.  Patient was been eating earlier without any problems.  Later on patient was sad and drive he and vomited x1.  No blood in the vomit per daughter.  Patient's mom is currently constipated with occasional fecal impaction.  Noted some bright red blood in the stool mixed with regular stool.  The bleeding has subsided.  Chronic history of anemia, receiving iron transfusion outpatient.       Patient has a past medical history of right-sided heart failure with acute cor pulmonale, chronic respiratory " "failure, CVA on Eliquis, normal hydrocephalus syndrome, pulmonary hypertension, hypertension, breast cancer, blind on 1 night .\"    Hospital Course:      GI was consulted and recommended IVF, CLD, transfusions as needed.  There was consideration of endoscopy.    General surgery was consulted.  Broadened her antibiotics and continued IVF.  Surgery, hospitalist team, and family agreed with conservative management as surgical intervention would be high risk and would result in ostomy.    CT scan also showed significant fecal impaction and significant stool burden.  Bowel regimen.    CT Scan of the abdomen and pelvis showed colitis but also showed concern for left kidney cancer on CT scan.      Over the course of the prolonged hospital stay, the patient continued to decline with continue weakness, poor intake, and continued mental and physical decline.  Palliative was consulted.  Patient ultimately transitioned to comfort care with plans for home hospice.  Patient passed away prior to being able to discharge to home hospice.      Electronically signed by Sina Shaikh MD, 10/04/22, 08:28 CDT.    Time: 35 minutes    "

## 2022-10-10 NOTE — PROGRESS NOTES
Enter Query Response Below      Query Response: Acute on Chronic     Electronically signed by Og Dumont, , 10/10/22, 08:52 CDT.               If applicable, please update the problem list.      Patient: Dolly Ramirez        : 10/14/1933  Account: [id number]           Admit Date:         Options to Respond to Query:    1. Access the Encounter     a. From the To-Do Side bar, click Respond With Note.     b. Click New Note     c. Answer query within the yellow box.                d. Update the Problem List if applicable.     ,    89 y/o noted with acute Colitis infectious versus ischemic, Acute Kidney Injury, left renal Mass, Fecal Impaction, Chronic right sided Heart Failure, and Chronic Respiratory Failure. On 10/1 chest x-ray revealed: Extensive volume loss in the lung bases with increased mixed interstitial and alveolar infiltrates bilaterally likely related to pulmonary edema and CHF. On 10/2 proBNP 31,731.0. Patient treated with IV Lasix 10/1-3.     After study, can the acuity of the right sided Heart Failure be specified as:     >>Acute on Chronic   >>Chronic only  >>Other (please specify):_________  >>Unable to determine     By submitting this query, we are merely seeking further clarification of documentation to accurately reflect all conditions that you are monitoring, evaluating, treating or that extend the hospitalization or utilize additional resources of care. Please utilize your independent clinical judgment when addressing the question(s) above.     This query and your response, once completed, will be entered into the legal medical record.    Sincerely,  [email or web address]  Clinical Documentation Integrity Program

## 2022-10-10 NOTE — PROGRESS NOTES
Enter Query Response Below      Query Response: yes    Electronically signed by Og Dumont DO, 10/10/22, 08:51 CDT.               If applicable, please update the problem list.      Patient: Dolly Ramirez        : 10/14/1933  Account: [id number]           Admit Date:         Options to Respond to Query:    1. Access the Encounter     a. From the To-Do Side bar, click Respond With Note.     b. Click New Note     c. Answer query within the yellow box.                d. Update the Problem List if applicable.     ,    89 y/o noted with acute Colitis infectious versus ischemic, Acute Kidney Injury, left renal Mass, Fecal Impaction, Chronic right sided Heart Failure, and Chronic Respiratory Failure. Progress note 10/1 noted increasing oxygen requirement. 10/1/22 05:41, ABG revealed: Ph 7.34, pCO2 48.8 and pO2 76.2 on EAY490% at flow rate 20lpm. Palliative care APRN noted Acute on Chronic Respiratory Failure with hypoxia/hypercapnia. Patient treated with monitoring, oxygen at 4-6L, Vapotherm and transitioned to comfort care.      After study, do you agree with the Acute on Chronic Respiratory Failure with hypoxia noted by palliative care APRN:      >>Yes  >>No  >>Other (please specify):___________  >>Unable to determine   By submitting this query, we are merely seeking further clarification of documentation to accurately reflect all conditions that you are monitoring, evaluating, treating or that extend the hospitalization or utilize additional resources of care. Please utilize your independent clinical judgment when addressing the question(s) above.     This query and your response, once completed, will be entered into the legal medical record.    Sincerely,  Maribell [email or web address]  Clinical Documentation Integrity Program

## 2022-10-18 NOTE — PROGRESS NOTES
"Enter Query Response Below      Query Response:     Ischemic colitis    Electronically signed by Sina Shaikh MD, 10/17/22, 8:10 PM CDT.           If applicable, please update the problem list.          Patient: Dolly Ramirez        : 10/14/1933  Account: [id number]           Admit Date: 2022        How to Respond to this query:       a. Click New Note     b. Answer query within the yellow box.                c. Update the Problem List, if applicable.      If you have any questions about this query contact me at: [email or web address]    Dr. Shaikh,    88-year-old patient presents with abdominal pain, nausea and vomiting. H&P documents \"Colitis-infectious versus ischemic.\"    CT abdomen and pelvis  documents \"Long segment abnormal wall thickening along the colon, most notably along the descending and sigmoid segments. The transverse segment also appears somewhat abnormal although not as inflamed as the more distal colon. Primary consideration is infectious colitis. Ischemic colitis is not completely excluded although I do not see any discrete arterial thrombus.\"    WBC was 18 on  Stool was negative for occult blood on 10/1, and GI panel 10/2 was negative.  Patient received Levaquin IV  and Zosyn IV  - 10/3.  WBC was 9 on .  Progress note  documents \"Her white count is in the normal range today\".  Patient's family elected de-escalation of care and comfort measures 10/3 and patient  10/4.  Death summary documents \"Colitis, acute\".    Based on findings and treatment, can you please specify colitis as:    Bacterial  Viral  Ischemic  Other (please specify) __________________  Clinically indeterminable        By submitting this query, we are merely seeking further clarification of documentation to accurately reflect all conditions that you are monitoring, evaluating, treating or that extend the hospitalization or utilize additional resources of care. Please utilize " your independent clinical judgment when addressing the question(s) above.     This query and your response, once completed, will be entered into the legal medical record.    Sincerely,  Shaye Flores RN, BSN, [facility]  Clinical Documentation Integrity Program   [email or web address]